# Patient Record
Sex: MALE | NOT HISPANIC OR LATINO
[De-identification: names, ages, dates, MRNs, and addresses within clinical notes are randomized per-mention and may not be internally consistent; named-entity substitution may affect disease eponyms.]

---

## 2019-01-01 ENCOUNTER — EXTERNAL RECORD (OUTPATIENT)
Dept: HEALTH INFORMATION MANAGEMENT | Facility: OTHER | Age: 64
End: 2019-01-01

## 2019-01-30 ENCOUNTER — EXTERNAL RECORD (OUTPATIENT)
Dept: CARDIOLOGY | Age: 64
End: 2019-01-30

## 2019-01-30 ENCOUNTER — EXTERNAL RECORD (OUTPATIENT)
Dept: OTHER | Age: 64
End: 2019-01-30

## 2019-01-30 LAB
A/G RATIO: 1.9 (ref 1.1–2.4)
ALANINE AMINOTRANSFE: 133 U/L (ref 7–52)
ALBUMIN, SERUM (ALB): 4 G/DL (ref 3.5–5.7)
ALCOHOL, ETOH: < 10 MG/DL
ALKALINE PHOSPHATASE (ALK): 46 U/L (ref 34–104)
ALLEN'S TEST PERFORMED: POSITIVE
ALLEN'S TEST PERFORMED: POSITIVE
ANION GAP: 11 MEQ/L (ref 8–16)
ASPARTATE AMINOTRANS: 291 U/L (ref 13–39)
BASE EXCESS: -11.4 MMOL/L
BASE EXCESS: -2.8 MMOL/L
BASE EXCESS: -7.4 MMOL/L
BASE EXCESS: -8.4 MMOL/L
BILIRUBIN, TOTAL: 0.6 MG/DL (ref 0.2–0.8)
BLOOD UREA NITROGEN (BUN): 27 MG/DL (ref 7–25)
BRAIN NATIURETIC PEPTIDE CHF: 510 PG/ML (ref 0–100)
BUN/CREATININE RATIO: 19.6 (ref 7.4–23)
CALCIUM, SERUM: 8.4 MG/DL (ref 8.6–10.3)
CARBON DIOXIDE: 21 MMOL/L (ref 21–31)
CHLORIDE, SERUM: 102 MMOL/L (ref 98–107)
CREATINE KINASE: 2141 U/L (ref 30–223)
CREATININE: 1.38 MG/DL (ref 0.7–1.3)
EST GLOMERULAR FILTRATION RATE: 52 1.73M SQ
FIO2: 100 % (ref 21–100)
GLUCOSE: 301 MG/DL (ref 70–99)
HCO3 ARTERIAL: 17.4 MMOL/L (ref 21–29)
HCO3 ARTERIAL: 17.6 MMOL/L (ref 21–29)
HCO3 ARTERIAL: 20.4 MMOL/L (ref 21–29)
HCO3 ARTERIAL: 21.6 MMOL/L (ref 21–29)
INSPIRATORY PRESSURE: 37 CMH2O
K (POTASSIUM, SERUM): 4.3 MMOL/L (ref 3.5–5.1)
LACTIC ACID: 2.8 MMOL/L (ref 0.5–2)
LACTIC ACID: 5.1 MMOL/L (ref 0.5–2)
MG (MAGNESIUM, SERUM: 1.5 MG/DL (ref 1.6–2.6)
NA (SODIUM, SERUM): 134 MMOL/L (ref 133–144)
O2 APPLIANCE: ABNORMAL
OXYHEMOGLOBIN: 86 % (ref 95–100)
OXYHEMOGLOBIN: 90 % (ref 95–100)
OXYHEMOGLOBIN: 96 % (ref 95–100)
OXYHEMOGLOBIN: 98 % (ref 95–100)
PCO2 ARTERIAL: 36.5 MMHG (ref 32–46)
PCO2 ARTERIAL: 37.1 MMHG (ref 32–46)
PCO2 ARTERIAL: 50.6 MMHG (ref 32–46)
PCO2 ARTERIAL: 51.8 MMHG (ref 32–46)
PEEP: 10 CMH2O
PEEP: 5 CMH2O
PH: 7.15 (ref 7.38–7.46)
PH: 7.22 (ref 7.38–7.46)
PH: 7.29 (ref 7.38–7.46)
PH: 7.39 (ref 7.38–7.46)
PO2 ARTERIAL: 104.4 MMHG (ref 79–108)
PO2 ARTERIAL: 268.8 MMHG (ref 79–108)
PO2 ARTERIAL: 68.5 MMHG (ref 79–108)
PO2 ARTERIAL: 70.1 MMHG (ref 79–108)
PO2/FIO2 RATIO: 104
PO2/FIO2 RATIO: 269
PO2/FIO2 RATIO: 69
PO2/FIO2 RATIO: 70
PROCALCITONIN (NUMERIC): 2.13 NG/ML (ref 0.2–0.5)
PROTEIN TOTAL: 6.1 G/DL (ref 6.4–8.9)
PTT: 78 SECONDS (ref 25–36)
RESPIRATION RATE: 20
SET RESPIRATION RATE: 14
SET RESPIRATION RATE: 16
SET RESPIRATION RATE: 20
SET RESPIRATION RATE: 20
SITE: ABNORMAL
SITE: ABNORMAL
TIDAL VOLUME: 550 ML
TOTAL HEMOGLOBIN ARTERIAL: 13.2 G/DL (ref 14–18)
TOTAL HEMOGLOBIN ARTERIAL: 13.8 G/DL (ref 14–18)
TOTAL HEMOGLOBIN ARTERIAL: 13.9 G/DL (ref 14–18)
TOTAL HEMOGLOBIN ARTERIAL: 14.4 G/DL (ref 14–18)
TROPONIN I (TROP): > 73 NG/ML
VENTILATION: ABNORMAL

## 2019-01-31 ENCOUNTER — EXTERNAL RECORD (OUTPATIENT)
Dept: OTHER | Age: 64
End: 2019-01-31

## 2019-01-31 LAB
*MEAN CORPUSCULAR HGB CONC: 34.4 G/DL (ref 32.5–35.8)
*MEAN CORPUSCULAR HGB CONC: 35.2 G/DL (ref 32.5–35.8)
A/G RATIO: 1.35 (ref 1.1–2.4)
ALANINE AMINOTRANSFE: 126 U/L (ref 7–52)
ALBUMIN, SERUM (ALB): 3.5 G/DL (ref 3.5–5.7)
ALKALINE PHOSPHATASE (ALK): 45 U/L (ref 34–104)
AMPHETAMINES, URINE, QUAL: NEGATIVE
ANION GAP: 10 MEQ/L (ref 8–16)
ANION GAP: 6 MEQ/L (ref 8–16)
ASPARTATE AMINOTRANS: 261 U/L (ref 13–39)
BARBITURATES, URINE, QUAL: NEGATIVE
BASOPHIL AUTOMATED: 0.3 %
BASOPHIL AUTOMATED: 0.5 %
BASOPHILS: 0.1 (ref 0–0.2)
BASOPHILS: 0.1 (ref 0–0.2)
BEDSIDE GLUCOSE: 169 MG/DL (ref 70–110)
BEDSIDE GLUCOSE: 173 MG/DL (ref 70–110)
BEDSIDE GLUCOSE: 174 MG/DL (ref 70–110)
BEDSIDE GLUCOSE: 181 MG/DL (ref 70–110)
BEDSIDE GLUCOSE: 182 MG/DL (ref 70–110)
BEDSIDE GLUCOSE: 184 MG/DL (ref 70–110)
BEDSIDE GLUCOSE: 184 MG/DL (ref 70–110)
BEDSIDE GLUCOSE: 206 MG/DL (ref 70–110)
BEDSIDE GLUCOSE: 238 MG/DL (ref 70–110)
BEDSIDE GLUCOSE: 267 MG/DL (ref 70–110)
BEDSIDE GLUCOSE: 308 MG/DL (ref 70–110)
BEDSIDE GLUCOSE: 322 MG/DL (ref 70–110)
BEDSIDE GLUCOSE: 323 MG/DL (ref 70–110)
BENZODIAZEPINES, URINE, QUAL: POSITIVE
BILIRUBIN, TOTAL: 0.5 MG/DL (ref 0.2–0.8)
BLOOD UREA NITROGEN (BUN): 18 MG/DL (ref 7–25)
BLOOD UREA NITROGEN (BUN): 23 MG/DL (ref 7–25)
BUN/CREATININE RATIO: 13.1 (ref 7.4–23)
BUN/CREATININE RATIO: 15.4 (ref 7.4–23)
CALCIUM, SERUM: 8 MG/DL (ref 8.6–10.3)
CALCIUM, SERUM: 8.4 MG/DL (ref 8.6–10.3)
CANNABINOIDS, URINE, QUAL: NEGATIVE
CARBON DIOXIDE: 24 MMOL/L (ref 21–31)
CARBON DIOXIDE: 28 MMOL/L (ref 21–31)
CHLORIDE, SERUM: 103 MMOL/L (ref 98–107)
CHLORIDE, SERUM: 98 MMOL/L (ref 98–107)
CHOLESTEROL HDL RATIO: 8
CHOLESTEROL: 225 MG/DL
COCAINE, URINE, QUAL: NEGATIVE
CREATINE KINASE: 1350 U/L (ref 30–223)
CREATINE KINASE: 2551 U/L (ref 30–223)
CREATINE KINASE: 2689 U/L (ref 30–223)
CREATININE: 1.37 MG/DL (ref 0.7–1.3)
CREATININE: 1.49 MG/DL (ref 0.7–1.3)
EOSINOPHIL AUTOMATED: 0.1 %
EOSINOPHIL AUTOMATED: 0.4 %
EOSINOPHILS: 0 (ref 0–0.5)
EOSINOPHILS: 0.1 (ref 0–0.5)
EST GLOMERULAR FILTRATION RATE: 48 1.73M SQ
EST GLOMERULAR FILTRATION RATE: 53 1.73M SQ
ESTIMATED AVERAGE GLUCOSE: 174 MG/DL
ESTIMATED AVERAGE GLUCOSE: 177 MG/DL
GLUCOSE: 186 MG/DL (ref 70–99)
GLUCOSE: 191 MG/DL (ref 70–99)
GLUCOSE: 274 MG/DL (ref 70–99)
HDL CHOLESTEROL: 28 MG/DL
HEMATOCRIT: 33.9 % (ref 38.6–49.2)
HEMATOCRIT: 38.2 % (ref 38.6–49.2)
HEMOGLOBIN A1C (GLYCOSYLATED): 7.7 %
HEMOGLOBIN A1C (GLYCOSYLATED): 7.8 %
HEMOGLOBIN: 11.7 GM/DL (ref 13–17)
HEMOGLOBIN: 13.4 GM/DL (ref 13–17)
K (POTASSIUM, SERUM): 3.4 MMOL/L (ref 3.5–5.1)
K (POTASSIUM, SERUM): 3.6 MMOL/L (ref 3.5–5.1)
K (POTASSIUM, SERUM): 3.6 MMOL/L (ref 3.5–5.1)
LACTIC ACID: 1.1 MMOL/L (ref 0.5–2)
LACTIC ACID: 2.6 MMOL/L (ref 0.5–2)
LDL CHOLESTEROL DIRECT: ABNORMAL MG/DL (ref 0–129)
LDL CHOLESTEROL, DIRECT: 136 MG/DL (ref 0–129)
LYMPHOCYTE AUTOMATED: 13.8 %
LYMPHOCYTE AUTOMATED: 18.7 %
LYMPHOCYTES: 2.2 (ref 0.6–3.4)
LYMPHOCYTES: 2.3 (ref 0.6–3.4)
MEAN CORPUSCULAR HGB: 29.9 PG (ref 26–34)
MEAN CORPUSCULAR HGB: 30.9 PG (ref 26–34)
MEAN CORPUSCULAR VOL: 87 FL (ref 80–100)
MEAN CORPUSCULAR VOL: 87.9 FL (ref 80–100)
MEAN PLATELET VOLUME: 10.4 FL (ref 6.8–10.2)
MEAN PLATELET VOLUME: 10.7 FL (ref 6.8–10.2)
MG (MAGNESIUM, SERUM: 1.9 MG/DL (ref 1.6–2.6)
MG (MAGNESIUM, SERUM: 2.2 MG/DL (ref 1.6–2.6)
MONOCYTE AUTOMATED: 8.6 %
MONOCYTE AUTOMATED: 8.9 %
MONOCYTES: 1.1 (ref 0.3–1)
MONOCYTES: 1.4 (ref 0.3–1)
MRSA SCREEN, PCR: NEGATIVE
MRSA SOURCE: NORMAL
NA (SODIUM, SERUM): 132 MMOL/L (ref 133–144)
NA (SODIUM, SERUM): 137 MMOL/L (ref 133–144)
NEUTROPHIL ABSOLUTE: 13 (ref 1.7–7.7)
NEUTROPHIL ABSOLUTE: 8.5 (ref 1.7–7.7)
NEUTROPHIL AUTOMATED: 71.5 %
NEUTROPHIL AUTOMATED: 77.2 %
NON HDL CHOLESTEROL: 197 MG/DL
OPIATES, URINE, QUAL: POSITIVE
PHENCYCLIDINE, URINE, QUAL: NEGATIVE
PLATELET COUNT: 155 K/MM3 (ref 150–450)
PLATELET COUNT: 165 K/MM3 (ref 150–450)
PROTEIN TOTAL: 6.1 G/DL (ref 6.4–8.9)
PTT: 34 SECONDS (ref 25–36)
PTT: 44 SECONDS (ref 25–36)
PTT: 54 SECONDS (ref 25–36)
RED BLOOD CELL COUNT: 3.9 M/MM3 (ref 4.34–5.6)
RED BLOOD CELL COUNT: 4.34 M/MM3 (ref 4.34–5.6)
RED CELL DISTRIBUTIO: 13.1 % (ref 11.9–15.9)
RED CELL DISTRIBUTIO: 13.1 % (ref 11.9–15.9)
TRIGLYCERIDES: 828 MG/DL
TRIGLYCERIDES: 830 MG/DL
TROPONIN I (TROP): 70.22 NG/ML
TROPONIN I (TROP): > 73 NG/ML
VLDL CHOLESTEROL: ABNORMAL MG/DL (ref 5–30)
WHITE BLOOD CELL COU: 11.9 K/MM3 (ref 4–11)
WHITE BLOOD CELL COU: 16.8 K/MM3 (ref 4–11)

## 2019-02-01 LAB
*MEAN CORPUSCULAR HGB CONC: 34.8 G/DL (ref 32.5–35.8)
A/G RATIO: 1.48 (ref 1.1–2.4)
ALANINE AMINOTRANSFE: 78 U/L (ref 7–52)
ALBUMIN, SERUM (ALB): 3.4 G/DL (ref 3.5–5.7)
ALKALINE PHOSPHATASE (ALK): 40 U/L (ref 34–104)
ANION GAP: 7 MEQ/L (ref 8–16)
ASPARTATE AMINOTRANS: 126 U/L (ref 13–39)
BASOPHIL AUTOMATED: 0.6 %
BASOPHILS: 0.1 (ref 0–0.2)
BEDSIDE GLUCOSE: 112 MG/DL (ref 70–110)
BEDSIDE GLUCOSE: 130 MG/DL (ref 70–110)
BEDSIDE GLUCOSE: 138 MG/DL (ref 70–110)
BEDSIDE GLUCOSE: 141 MG/DL (ref 70–110)
BEDSIDE GLUCOSE: 142 MG/DL (ref 70–110)
BEDSIDE GLUCOSE: 143 MG/DL (ref 70–110)
BEDSIDE GLUCOSE: 147 MG/DL (ref 70–110)
BEDSIDE GLUCOSE: 150 MG/DL (ref 70–110)
BEDSIDE GLUCOSE: 151 MG/DL (ref 70–110)
BEDSIDE GLUCOSE: 157 MG/DL (ref 70–110)
BEDSIDE GLUCOSE: 157 MG/DL (ref 70–110)
BEDSIDE GLUCOSE: 161 MG/DL (ref 70–110)
BEDSIDE GLUCOSE: 162 MG/DL (ref 70–110)
BEDSIDE GLUCOSE: 165 MG/DL (ref 70–110)
BEDSIDE GLUCOSE: 166 MG/DL (ref 70–110)
BEDSIDE GLUCOSE: 168 MG/DL (ref 70–110)
BEDSIDE GLUCOSE: 182 MG/DL (ref 70–110)
BEDSIDE GLUCOSE: 182 MG/DL (ref 70–110)
BEDSIDE GLUCOSE: 186 MG/DL (ref 70–110)
BEDSIDE GLUCOSE: 190 MG/DL (ref 70–110)
BILIRUBIN, TOTAL: 0.6 MG/DL (ref 0.2–0.8)
BLOOD UREA NITROGEN (BUN): 13 MG/DL (ref 7–25)
BUN/CREATININE RATIO: 11 (ref 7.4–23)
CALCIUM, SERUM: 7.7 MG/DL (ref 8.6–10.3)
CARBON DIOXIDE: 29 MMOL/L (ref 21–31)
CHLORIDE, SERUM: 100 MMOL/L (ref 98–107)
CREATININE: 1.18 MG/DL (ref 0.7–1.3)
EOSINOPHIL AUTOMATED: 2.1 %
EOSINOPHILS: 0.2 (ref 0–0.5)
EST GLOMERULAR FILTRATION RATE: > 60 1.73M SQ
GLUCOSE: 137 MG/DL (ref 70–99)
HEMATOCRIT: 31.4 % (ref 38.6–49.2)
HEMOGLOBIN: 10.9 GM/DL (ref 13–17)
INTERNATIONAL NORMAL: 1.3 (ref 0.8–1.1)
K (POTASSIUM, SERUM): 2.8 MMOL/L (ref 3.5–5.1)
K (POTASSIUM, SERUM): 3.3 MMOL/L (ref 3.5–5.1)
K (POTASSIUM, SERUM): 3.5 MMOL/L (ref 3.5–5.1)
LYMPHOCYTE AUTOMATED: 21.8 %
LYMPHOCYTES: 2 (ref 0.6–3.4)
MEAN CORPUSCULAR HGB: 30.2 PG (ref 26–34)
MEAN CORPUSCULAR VOL: 86.7 FL (ref 80–100)
MEAN PLATELET VOLUME: 9.5 FL (ref 6.8–10.2)
MG (MAGNESIUM, SERUM: 1.9 MG/DL (ref 1.6–2.6)
MG (MAGNESIUM, SERUM: 2.1 MG/DL (ref 1.6–2.6)
MONOCYTE AUTOMATED: 8 %
MONOCYTES: 0.7 (ref 0.3–1)
NA (SODIUM, SERUM): 136 MMOL/L (ref 133–144)
NEUTROPHIL ABSOLUTE: 6.1 (ref 1.7–7.7)
NEUTROPHIL AUTOMATED: 67.5 %
PLATELET COUNT: 132 K/MM3 (ref 150–450)
PROCALCITONIN (NUMERIC): 2.21 NG/ML (ref 0.2–0.5)
PROTEIN TOTAL: 5.7 G/DL (ref 6.4–8.9)
PROTHROMBIN TIME (PATIENT): 14.4 SECONDS (ref 10.1–13.1)
PTT: 41 SECONDS (ref 25–36)
PTT: 44 SECONDS (ref 25–36)
PTT: 55 SECONDS (ref 25–36)
RED BLOOD CELL COUNT: 3.63 M/MM3 (ref 4.34–5.6)
RED CELL DISTRIBUTIO: 13.3 % (ref 11.9–15.9)
TROPONIN I (TROP): 46.82 NG/ML
TROPONIN I (TROP): 48.4 NG/ML
URINE CULTURE: NORMAL
WHITE BLOOD CELL COU: 9.1 K/MM3 (ref 4–11)

## 2019-02-02 ENCOUNTER — EXTERNAL RECORD (OUTPATIENT)
Dept: NEPHROLOGY | Age: 64
End: 2019-02-02

## 2019-02-02 LAB
*MEAN CORPUSCULAR HGB CONC: 34.5 G/DL (ref 32.5–35.8)
A/G RATIO: 1.37 (ref 1.1–2.4)
ALANINE AMINOTRANSFE: 56 U/L (ref 7–52)
ALBUMIN, SERUM (ALB): 3.7 G/DL (ref 3.5–5.7)
ALKALINE PHOSPHATASE (ALK): 39 U/L (ref 34–104)
ANION GAP: 9 MEQ/L (ref 8–16)
APPEARANCE: CLEAR
ASPARTATE AMINOTRANS: 64 U/L (ref 13–39)
BACTERIA: ABNORMAL /HPF
BASOPHIL AUTOMATED: 0.4 %
BASOPHILS: 0 (ref 0–0.2)
BEDSIDE GLUCOSE: 107 MG/DL (ref 70–110)
BEDSIDE GLUCOSE: 121 MG/DL (ref 70–110)
BEDSIDE GLUCOSE: 126 MG/DL (ref 70–110)
BEDSIDE GLUCOSE: 127 MG/DL (ref 70–110)
BEDSIDE GLUCOSE: 128 MG/DL (ref 70–110)
BEDSIDE GLUCOSE: 129 MG/DL (ref 70–110)
BEDSIDE GLUCOSE: 129 MG/DL (ref 70–110)
BEDSIDE GLUCOSE: 132 MG/DL (ref 70–110)
BEDSIDE GLUCOSE: 135 MG/DL (ref 70–110)
BEDSIDE GLUCOSE: 136 MG/DL (ref 70–110)
BEDSIDE GLUCOSE: 142 MG/DL (ref 70–110)
BEDSIDE GLUCOSE: 143 MG/DL (ref 70–110)
BEDSIDE GLUCOSE: 145 MG/DL (ref 70–110)
BEDSIDE GLUCOSE: 155 MG/DL (ref 70–110)
BEDSIDE GLUCOSE: 163 MG/DL (ref 70–110)
BEDSIDE GLUCOSE: 168 MG/DL (ref 70–110)
BEDSIDE GLUCOSE: 196 MG/DL (ref 70–110)
BEDSIDE GLUCOSE: 224 MG/DL (ref 70–110)
BEDSIDE GLUCOSE: 90 MG/DL (ref 70–110)
BEDSIDE GLUCOSE: 91 MG/DL (ref 70–110)
BEDSIDE GLUCOSE: 93 MG/DL (ref 70–110)
BILIRUBIN, TOTAL: 0.7 MG/DL (ref 0.2–0.8)
BILIRUBIN: ABNORMAL
BLOOD UREA NITROGEN (BUN): 12 MG/DL (ref 7–25)
BUN/CREATININE RATIO: 10 (ref 7.4–23)
CALCIUM, SERUM: 8.5 MG/DL (ref 8.6–10.3)
CARBON DIOXIDE: 32 MMOL/L (ref 21–31)
CHLORIDE, SERUM: 99 MMOL/L (ref 98–107)
COLOR: ABNORMAL
CREATININE: 1.2 MG/DL (ref 0.7–1.3)
CULTURE REFLEX COMMENT: NO
EOSINOPHIL AUTOMATED: 2.4 %
EOSINOPHILS: 0.2 (ref 0–0.5)
EST GLOMERULAR FILTRATION RATE: > 60 1.73M SQ
GLUCOSE, URINE, AUTOMATED: ABNORMAL MG/DL
GLUCOSE: 110 MG/DL (ref 70–99)
HEMATOCRIT: 30.8 % (ref 38.6–49.2)
HEMOGLOBIN: 10.6 GM/DL (ref 13–17)
K (POTASSIUM, SERUM): 3.2 MMOL/L (ref 3.5–5.1)
K (POTASSIUM, SERUM): 3.4 MMOL/L (ref 3.5–5.1)
K (POTASSIUM, SERUM): 3.5 MMOL/L (ref 3.5–5.1)
KETONES, URINE, AUTOMATED: ABNORMAL MG/DL
LEUKOCYTE, URINE, AUTOMATED: ABNORMAL
LYMPHOCYTE AUTOMATED: 22.1 %
LYMPHOCYTES: 1.6 (ref 0.6–3.4)
MEAN CORPUSCULAR HGB: 29.8 PG (ref 26–34)
MEAN CORPUSCULAR VOL: 86.4 FL (ref 80–100)
MEAN PLATELET VOLUME: 9.2 FL (ref 6.8–10.2)
MG (MAGNESIUM, SERUM: 2.1 MG/DL (ref 1.6–2.6)
MONOCYTE AUTOMATED: 8.6 %
MONOCYTES: 0.6 (ref 0.3–1)
NA (SODIUM, SERUM): 140 MMOL/L (ref 133–144)
NEUTROPHIL ABSOLUTE: 4.8 (ref 1.7–7.7)
NEUTROPHIL AUTOMATED: 66.5 %
NITRITE, URINE AUTOMATED: NEGATIVE
PH, URINE: 8 (ref 5–8)
PLATELET COUNT: 123 K/MM3 (ref 150–450)
PROTEIN TOTAL: 6.4 G/DL (ref 6.4–8.9)
PROTEIN, URINE: ABNORMAL MG/DL
PTT: 81 SECONDS (ref 25–36)
PTT: 95 SECONDS (ref 25–36)
RBC: ABNORMAL /HPF (ref 0–2)
RED BLOOD CELL COUNT: 3.57 M/MM3 (ref 4.34–5.6)
RED CELL DISTRIBUTIO: 13 % (ref 11.9–15.9)
SPECIFIC GRAVITY UA: 1 (ref 1–1.03)
SQUAMOUS EPITHELIAL: ABNORMAL /LPF
TRIGLYCERIDES: 223 MG/DL
URINE, BLOOD, AUTOMATED: ABNORMAL
UROBILINOGEN, URINE, AUTOMATED: ABNORMAL MG/DL
WBC: ABNORMAL /HPF (ref 0–5)
WHITE BLOOD CELL COU: 7.2 K/MM3 (ref 4–11)

## 2019-02-03 LAB
% SATURATION: 13 % (ref 20–55)
*MEAN CORPUSCULAR HGB CONC: 33.9 G/DL (ref 32.5–35.8)
A/G RATIO: 1.32 (ref 1.1–2.4)
ALANINE AMINOTRANSFE: 41 U/L (ref 7–52)
ALBUMIN, SERUM (ALB): 3.7 G/DL (ref 3.5–5.7)
ALKALINE PHOSPHATASE (ALK): 36 U/L (ref 34–104)
ANION GAP: 11 MEQ/L (ref 8–16)
ANION GAP: 9 MEQ/L (ref 8–16)
ASPARTATE AMINOTRANS: 42 U/L (ref 13–39)
BASOPHIL AUTOMATED: 0.5 %
BASOPHILS: 0 (ref 0–0.2)
BEDSIDE GLUCOSE: 127 MG/DL (ref 70–110)
BEDSIDE GLUCOSE: 130 MG/DL (ref 70–110)
BEDSIDE GLUCOSE: 130 MG/DL (ref 70–110)
BEDSIDE GLUCOSE: 143 MG/DL (ref 70–110)
BEDSIDE GLUCOSE: 148 MG/DL (ref 70–110)
BEDSIDE GLUCOSE: 149 MG/DL (ref 70–110)
BEDSIDE GLUCOSE: 153 MG/DL (ref 70–110)
BEDSIDE GLUCOSE: 164 MG/DL (ref 70–110)
BEDSIDE GLUCOSE: 218 MG/DL (ref 70–110)
BILIRUBIN, TOTAL: 0.6 MG/DL (ref 0.2–0.8)
BLOOD UREA NITROGEN (BUN): 20 MG/DL (ref 7–25)
BLOOD UREA NITROGEN (BUN): 22 MG/DL (ref 7–25)
BRAIN NATIURETIC PEPTIDE CHF: 269 PG/ML (ref 0–100)
BUN/CREATININE RATIO: 18.2 (ref 7.4–23)
BUN/CREATININE RATIO: 20 (ref 7.4–23)
CALCIUM, SERUM: 8.9 MG/DL (ref 8.6–10.3)
CALCIUM, SERUM: 9.2 MG/DL (ref 8.6–10.3)
CARBON DIOXIDE: 23 MMOL/L (ref 21–31)
CARBON DIOXIDE: 27 MMOL/L (ref 21–31)
CEFTAROLINE FOSAMIL: <=0.5
CHLORIDE, SERUM: 100 MMOL/L (ref 98–107)
CHLORIDE, SERUM: 99 MMOL/L (ref 98–107)
CLINDAMYCIN: 0.5
CREATININE: 1.1 MG/DL (ref 0.7–1.3)
CREATININE: 1.1 MG/DL (ref 0.7–1.3)
EOSINOPHIL AUTOMATED: 3.7 %
EOSINOPHILS: 0.2 (ref 0–0.5)
EST GLOMERULAR FILTRATION RATE: > 60 1.73M SQ
EST GLOMERULAR FILTRATION RATE: > 60 1.73M SQ
GLUCOSE: 140 MG/DL (ref 70–99)
GLUCOSE: 164 MG/DL (ref 70–99)
HEMATOCRIT: 30.3 % (ref 38.6–49.2)
HEMOGLOBIN: 10.3 GM/DL (ref 13–17)
IRON BINDING CAPACIT: 231 UG/DL (ref 228–428)
IRON, SERUM (IRON): 30 UG/DL (ref 50–212)
K (POTASSIUM, SERUM): 3.2 MMOL/L (ref 3.5–5.1)
K (POTASSIUM, SERUM): 3.7 MMOL/L (ref 3.5–5.1)
LYMPHOCYTE AUTOMATED: 20.5 %
LYMPHOCYTES: 1.4 (ref 0.6–3.4)
MEAN CORPUSCULAR HGB: 29.7 PG (ref 26–34)
MEAN CORPUSCULAR VOL: 87.6 FL (ref 80–100)
MEAN PLATELET VOLUME: 9 FL (ref 6.8–10.2)
MG (MAGNESIUM, SERUM: 2 MG/DL (ref 1.6–2.6)
MG (MAGNESIUM, SERUM: 2.3 MG/DL (ref 1.6–2.6)
MONOCYTE AUTOMATED: 9.1 %
MONOCYTES: 0.6 (ref 0.3–1)
NA (SODIUM, SERUM): 133 MMOL/L (ref 133–144)
NA (SODIUM, SERUM): 136 MMOL/L (ref 133–144)
NEUTROPHIL ABSOLUTE: 4.5 (ref 1.7–7.7)
NEUTROPHIL AUTOMATED: 66.2 %
OXACILLIN: <=0.25
PLATELET COUNT: 135 K/MM3 (ref 150–450)
PROTEIN TOTAL: 6.5 G/DL (ref 6.4–8.9)
PTT: 43 SECONDS (ref 25–36)
PTT: 45 SECONDS (ref 25–36)
PTT: 51 SECONDS (ref 25–36)
RED BLOOD CELL COUNT: 3.46 M/MM3 (ref 4.34–5.6)
RED CELL DISTRIBUTIO: 13 % (ref 11.9–15.9)
RIFAMPIN: <=1
SPUTUM CULTURE: NORMAL
SPUTUM CULTURE: NORMAL
TRIGLYCERIDES: 221 MG/DL
TRIMETHOPRIM/SULFAMETHOXAZOLE: ABNORMAL
UNSATURATED IRON BINDING CAP: 201 UG/DL (ref 155–300)
VANCOMYCIN SERPL-MCNC: 1 UG/ML
WHITE BLOOD CELL COU: 6.7 K/MM3 (ref 4–11)

## 2019-02-04 LAB
*MEAN CORPUSCULAR HGB CONC: 34.1 G/DL (ref 32.5–35.8)
A/G RATIO: 1.28 (ref 1.1–2.4)
ALANINE AMINOTRANSFE: 36 U/L (ref 7–52)
ALBUMIN, SERUM (ALB): 3.7 G/DL (ref 3.5–5.7)
ALKALINE PHOSPHATASE (ALK): 38 U/L (ref 34–104)
ANION GAP: 10 MEQ/L (ref 8–16)
ASPARTATE AMINOTRANS: 28 U/L (ref 13–39)
BEDSIDE GLUCOSE: 176 MG/DL (ref 70–110)
BEDSIDE GLUCOSE: 185 MG/DL (ref 70–110)
BEDSIDE GLUCOSE: 197 MG/DL (ref 70–110)
BILIRUBIN, TOTAL: 0.5 MG/DL (ref 0.2–0.8)
BLOOD CULTURE: NORMAL
BLOOD UREA NITROGEN (BUN): 25 MG/DL (ref 7–25)
BUN/CREATININE RATIO: 21 (ref 7.4–23)
CALCIUM, SERUM: 9.1 MG/DL (ref 8.6–10.3)
CARBON DIOXIDE: 23 MMOL/L (ref 21–31)
CHLORIDE, SERUM: 98 MMOL/L (ref 98–107)
CREATININE: 1.19 MG/DL (ref 0.7–1.3)
EST GLOMERULAR FILTRATION RATE: > 60 1.73M SQ
GLUCOSE: 205 MG/DL (ref 70–99)
HEMATOCRIT: 30.8 % (ref 38.6–49.2)
HEMOGLOBIN: 10.5 GM/DL (ref 13–17)
INTERNATIONAL NORMAL: 1.1 (ref 0.8–1.1)
K (POTASSIUM, SERUM): 3.3 MMOL/L (ref 3.5–5.1)
MEAN CORPUSCULAR HGB: 29.6 PG (ref 26–34)
MEAN CORPUSCULAR VOL: 86.9 FL (ref 80–100)
MEAN PLATELET VOLUME: 9.3 FL (ref 6.8–10.2)
MG (MAGNESIUM, SERUM: 2.2 MG/DL (ref 1.6–2.6)
NA (SODIUM, SERUM): 131 MMOL/L (ref 133–144)
PLATELET COUNT: 155 K/MM3 (ref 150–450)
PROTEIN TOTAL: 6.6 G/DL (ref 6.4–8.9)
PROTHROMBIN TIME (PATIENT): 13.1 SECONDS (ref 10.1–13.1)
PTT: 42 SECONDS (ref 25–36)
PTT: 58 SECONDS (ref 25–36)
PTT: 66 SECONDS (ref 25–36)
RED BLOOD CELL COUNT: 3.55 M/MM3 (ref 4.34–5.6)
RED CELL DISTRIBUTIO: 12.8 % (ref 11.9–15.9)
WHITE BLOOD CELL COU: 6.2 K/MM3 (ref 4–11)

## 2019-02-05 LAB
*MEAN CORPUSCULAR HGB CONC: 33.5 G/DL (ref 32.5–35.8)
A/G RATIO: 1.24 (ref 1.1–2.4)
ALANINE AMINOTRANSFE: 44 U/L (ref 7–52)
ALBUMIN, SERUM (ALB): 4.2 G/DL (ref 3.5–5.7)
ALKALINE PHOSPHATASE (ALK): 48 U/L (ref 34–104)
ANION GAP: 13 MEQ/L (ref 8–16)
ASPARTATE AMINOTRANS: 35 U/L (ref 13–39)
BASOPHIL AUTOMATED: 0.6 %
BASOPHILS: 0.1 (ref 0–0.2)
BEDSIDE GLUCOSE: 159 MG/DL (ref 70–110)
BEDSIDE GLUCOSE: 172 MG/DL (ref 70–110)
BEDSIDE GLUCOSE: 210 MG/DL (ref 70–110)
BILIRUBIN, TOTAL: 0.5 MG/DL (ref 0.2–0.8)
BLOOD CULTURE: NORMAL
BLOOD UREA NITROGEN (BUN): 28 MG/DL (ref 7–25)
BUN/CREATININE RATIO: 22.2 (ref 7.4–23)
CALCIUM, SERUM: 9.7 MG/DL (ref 8.6–10.3)
CARBON DIOXIDE: 22 MMOL/L (ref 21–31)
CHLORIDE, SERUM: 105 MMOL/L (ref 98–107)
CREATININE: 1.26 MG/DL (ref 0.7–1.3)
EOSINOPHIL AUTOMATED: 4.6 %
EOSINOPHILS: 0.4 (ref 0–0.5)
EST GLOMERULAR FILTRATION RATE: 58 1.73M SQ
GLUCOSE: 205 MG/DL (ref 70–99)
HEMATOCRIT: 34.6 % (ref 38.6–49.2)
HEMOGLOBIN: 11.6 GM/DL (ref 13–17)
K (POTASSIUM, SERUM): 3.6 MMOL/L (ref 3.5–5.1)
LYMPHOCYTE AUTOMATED: 17.6 %
LYMPHOCYTES: 1.6 (ref 0.6–3.4)
MEAN CORPUSCULAR HGB: 29.3 PG (ref 26–34)
MEAN CORPUSCULAR VOL: 87.4 FL (ref 80–100)
MEAN PLATELET VOLUME: 9.3 FL (ref 6.8–10.2)
MONOCYTE AUTOMATED: 10.5 %
MONOCYTES: 0.9 (ref 0.3–1)
NA (SODIUM, SERUM): 140 MMOL/L (ref 133–144)
NEUTROPHIL ABSOLUTE: 5.9 (ref 1.7–7.7)
NEUTROPHIL AUTOMATED: 66.7 %
PLATELET COUNT: 223 K/MM3 (ref 150–450)
PROTEIN TOTAL: 7.6 G/DL (ref 6.4–8.9)
PTT: 61 SECONDS (ref 25–36)
RED BLOOD CELL COUNT: 3.96 M/MM3 (ref 4.34–5.6)
RED CELL DISTRIBUTIO: 12.8 % (ref 11.9–15.9)
WHITE BLOOD CELL COU: 8.9 K/MM3 (ref 4–11)

## 2019-02-06 LAB
*MEAN CORPUSCULAR HGB CONC: 34.1 G/DL (ref 32.5–35.8)
ANION GAP: 12 MEQ/L (ref 8–16)
APPEARANCE: ABNORMAL
BACTERIA: ABNORMAL /HPF
BASOPHIL AUTOMATED: 0.5 %
BASOPHILS: 0 (ref 0–0.2)
BEDSIDE GLUCOSE: 146 MG/DL (ref 70–110)
BEDSIDE GLUCOSE: 157 MG/DL (ref 70–110)
BEDSIDE GLUCOSE: 174 MG/DL (ref 70–110)
BEDSIDE GLUCOSE: 190 MG/DL (ref 70–110)
BILIRUBIN: ABNORMAL
BLOOD UREA NITROGEN (BUN): 30 MG/DL (ref 7–25)
BUN/CREATININE RATIO: 23.8 (ref 7.4–23)
CALCIUM, SERUM: 8.9 MG/DL (ref 8.6–10.3)
CARBON DIOXIDE: 22 MMOL/L (ref 21–31)
CHLORIDE, SERUM: 103 MMOL/L (ref 98–107)
COLOR: ABNORMAL
CREATININE: 1.26 MG/DL (ref 0.7–1.3)
EOSINOPHIL AUTOMATED: 4.5 %
EOSINOPHILS: 0.4 (ref 0–0.5)
EST GLOMERULAR FILTRATION RATE: 58 1.73M SQ
GLUCOSE, URINE, AUTOMATED: ABNORMAL MG/DL
GLUCOSE: 198 MG/DL (ref 70–99)
HEMATOCRIT: 30.9 % (ref 38.6–49.2)
HEMOGLOBIN: 10.5 GM/DL (ref 13–17)
K (POTASSIUM, SERUM): 3.2 MMOL/L (ref 3.5–5.1)
KETONES, URINE, AUTOMATED: ABNORMAL MG/DL
LEUKOCYTE, URINE, AUTOMATED: ABNORMAL
LYMPHOCYTE AUTOMATED: 23.1 %
LYMPHOCYTES: 1.9 (ref 0.6–3.4)
MEAN CORPUSCULAR HGB: 29.8 PG (ref 26–34)
MEAN CORPUSCULAR VOL: 87.5 FL (ref 80–100)
MEAN PLATELET VOLUME: 9.1 FL (ref 6.8–10.2)
MONOCYTE AUTOMATED: 11.9 %
MONOCYTES: 1 (ref 0.3–1)
NA (SODIUM, SERUM): 137 MMOL/L (ref 133–144)
NEUTROPHIL ABSOLUTE: 4.9 (ref 1.7–7.7)
NEUTROPHIL AUTOMATED: 60 %
NITRITE, URINE AUTOMATED: NEGATIVE
PH, URINE: 6 (ref 5–8)
PLATELET COUNT: 162 K/MM3 (ref 150–450)
PROTEIN, URINE: ABNORMAL MG/DL
PTT: 58 SECONDS (ref 25–36)
PTT: 80 SECONDS (ref 25–36)
RBC: >100 /HPF (ref 0–2)
RED BLOOD CELL COUNT: 3.53 M/MM3 (ref 4.34–5.6)
RED CELL DISTRIBUTIO: 12.9 % (ref 11.9–15.9)
SPECIFIC GRAVITY UA: 1.01 (ref 1–1.03)
SQUAMOUS EPITHELIAL: ABNORMAL /LPF
URINE, BLOOD, AUTOMATED: ABNORMAL
UROBILINOGEN, URINE, AUTOMATED: ABNORMAL MG/DL
WBC: ABNORMAL /HPF (ref 0–5)
WHITE BLOOD CELL COU: 8.2 K/MM3 (ref 4–11)

## 2019-02-07 LAB
*MEAN CORPUSCULAR HGB CONC: 34.6 G/DL (ref 32.5–35.8)
ANION GAP: 12 MEQ/L (ref 8–16)
BASOPHIL AUTOMATED: 0.7 %
BASOPHILS: 0.1 (ref 0–0.2)
BEDSIDE GLUCOSE: 157 MG/DL (ref 70–110)
BEDSIDE GLUCOSE: 163 MG/DL (ref 70–110)
BEDSIDE GLUCOSE: 189 MG/DL (ref 70–110)
BLOOD UREA NITROGEN (BUN): 29 MG/DL (ref 7–25)
BUN/CREATININE RATIO: 23.2 (ref 7.4–23)
CALCIUM, SERUM: 9.1 MG/DL (ref 8.6–10.3)
CARBON DIOXIDE: 22 MMOL/L (ref 21–31)
CHLORIDE, SERUM: 104 MMOL/L (ref 98–107)
CREATININE: 1.25 MG/DL (ref 0.7–1.3)
EOSINOPHIL AUTOMATED: 3.7 %
EOSINOPHILS: 0.4 (ref 0–0.5)
EST GLOMERULAR FILTRATION RATE: 59 1.73M SQ
GLUCOSE: 150 MG/DL (ref 70–99)
HEMATOCRIT: 34.1 % (ref 38.6–49.2)
HEMOGLOBIN: 11.8 GM/DL (ref 13–17)
K (POTASSIUM, SERUM): 3.3 MMOL/L (ref 3.5–5.1)
LYMPHOCYTE AUTOMATED: 22 %
LYMPHOCYTES: 2.1 (ref 0.6–3.4)
MEAN CORPUSCULAR HGB: 29.9 PG (ref 26–34)
MEAN CORPUSCULAR VOL: 86.4 FL (ref 80–100)
MEAN PLATELET VOLUME: 10 FL (ref 6.8–10.2)
MONOCYTE AUTOMATED: 10.5 %
MONOCYTES: 1 (ref 0.3–1)
NA (SODIUM, SERUM): 138 MMOL/L (ref 133–144)
NEUTROPHIL ABSOLUTE: 6.1 (ref 1.7–7.7)
NEUTROPHIL AUTOMATED: 63.1 %
PLATELET COUNT: 163 K/MM3 (ref 150–450)
PTT: 119 SECONDS (ref 25–36)
PTT: 36 SECONDS (ref 25–36)
PTT: 64 SECONDS (ref 25–36)
RED BLOOD CELL COUNT: 3.95 M/MM3 (ref 4.34–5.6)
RED CELL DISTRIBUTIO: 12.8 % (ref 11.9–15.9)
WHITE BLOOD CELL COU: 9.6 K/MM3 (ref 4–11)

## 2019-02-08 ENCOUNTER — EXTERNAL RECORD (OUTPATIENT)
Dept: UROLOGY | Age: 64
End: 2019-02-08

## 2019-02-08 LAB
*MEAN CORPUSCULAR HGB CONC: 34.5 G/DL (ref 32.5–35.8)
ANION GAP: 13 MEQ/L (ref 8–16)
BASOPHIL AUTOMATED: 0.8 %
BASOPHILS: 0.1 (ref 0–0.2)
BEDSIDE GLUCOSE: 106 MG/DL (ref 70–110)
BEDSIDE GLUCOSE: 143 MG/DL (ref 70–110)
BEDSIDE GLUCOSE: 192 MG/DL (ref 70–110)
BLOOD UREA NITROGEN (BUN): 25 MG/DL (ref 7–25)
BUN/CREATININE RATIO: 21.7 (ref 7.4–23)
CALCIUM, SERUM: 8.9 MG/DL (ref 8.6–10.3)
CARBON DIOXIDE: 20 MMOL/L (ref 21–31)
CHLORIDE, SERUM: 106 MMOL/L (ref 98–107)
CREATININE: 1.15 MG/DL (ref 0.7–1.3)
EOSINOPHIL AUTOMATED: 3.5 %
EOSINOPHILS: 0.3 (ref 0–0.5)
EST GLOMERULAR FILTRATION RATE: > 60 1.73M SQ
GLUCOSE: 166 MG/DL (ref 70–99)
HEMATOCRIT: 33.2 % (ref 38.6–49.2)
HEMATOCRIT: 34 % (ref 38.6–49.2)
HEMATOCRIT: 34.4 % (ref 38.6–49.2)
HEMATOCRIT: 34.5 % (ref 38.6–49.2)
HEMATOCRIT: 34.8 % (ref 38.6–49.2)
HEMOGLOBIN: 11.4 GM/DL (ref 13–17)
HEMOGLOBIN: 11.6 GM/DL (ref 13–17)
HEMOGLOBIN: 11.6 GM/DL (ref 13–17)
HEMOGLOBIN: 11.9 GM/DL (ref 13–17)
HEMOGLOBIN: 12.1 GM/DL (ref 13–17)
K (POTASSIUM, SERUM): 3.4 MMOL/L (ref 3.5–5.1)
LYMPHOCYTE AUTOMATED: 24.2 %
LYMPHOCYTES: 2.3 (ref 0.6–3.4)
MEAN CORPUSCULAR HGB: 30.1 PG (ref 26–34)
MEAN CORPUSCULAR VOL: 87.3 FL (ref 80–100)
MEAN PLATELET VOLUME: 9.6 FL (ref 6.8–10.2)
MONOCYTE AUTOMATED: 11.6 %
MONOCYTES: 1.1 (ref 0.3–1)
NA (SODIUM, SERUM): 139 MMOL/L (ref 133–144)
NEUTROPHIL ABSOLUTE: 5.8 (ref 1.7–7.7)
NEUTROPHIL AUTOMATED: 59.9 %
PLATELET COUNT: 122 K/MM3 (ref 150–450)
PTT: 31 SECONDS (ref 25–36)
PTT: 57 SECONDS (ref 25–36)
PTT: 72 SECONDS (ref 25–36)
RED BLOOD CELL COUNT: 3.94 M/MM3 (ref 4.34–5.6)
RED CELL DISTRIBUTIO: 13.1 % (ref 11.9–15.9)
WHITE BLOOD CELL COU: 9.7 K/MM3 (ref 4–11)

## 2019-02-09 LAB
ANION GAP: 10 MEQ/L (ref 8–16)
BEDSIDE GLUCOSE: 159 MG/DL (ref 70–110)
BEDSIDE GLUCOSE: 177 MG/DL (ref 70–110)
BEDSIDE GLUCOSE: 194 MG/DL (ref 70–110)
BEDSIDE GLUCOSE: 196 MG/DL (ref 70–110)
BLOOD UREA NITROGEN (BUN): 25 MG/DL (ref 7–25)
BUN/CREATININE RATIO: 22.7 (ref 7.4–23)
CALCIUM, SERUM: 9.1 MG/DL (ref 8.6–10.3)
CARBON DIOXIDE: 24 MMOL/L (ref 21–31)
CHLORIDE, SERUM: 104 MMOL/L (ref 98–107)
CREATININE: 1.1 MG/DL (ref 0.7–1.3)
EST GLOMERULAR FILTRATION RATE: > 60 1.73M SQ
GLUCOSE: 174 MG/DL (ref 70–99)
HEMATOCRIT: 31.9 % (ref 38.6–49.2)
HEMATOCRIT: 33.4 % (ref 38.6–49.2)
HEMOGLOBIN: 10.9 GM/DL (ref 13–17)
HEMOGLOBIN: 11.4 GM/DL (ref 13–17)
K (POTASSIUM, SERUM): 3.7 MMOL/L (ref 3.5–5.1)
MG (MAGNESIUM, SERUM: 2 MG/DL (ref 1.6–2.6)
NA (SODIUM, SERUM): 138 MMOL/L (ref 133–144)
PTT: 31 SECONDS (ref 25–36)
PTT: 33 SECONDS (ref 25–36)

## 2019-02-10 LAB
*MEAN CORPUSCULAR HGB CONC: 33.5 G/DL (ref 32.5–35.8)
*MEAN CORPUSCULAR HGB CONC: 34.6 G/DL (ref 32.5–35.8)
A/G RATIO: 1.68 (ref 1.1–2.4)
ALANINE AMINOTRANSFE: 29 U/L (ref 7–52)
ALBUMIN, SERUM (ALB): 3.7 G/DL (ref 3.5–5.7)
ALKALINE PHOSPHATASE (ALK): 46 U/L (ref 34–104)
ANION GAP: 13 MEQ/L (ref 8–16)
ASPARTATE AMINOTRANS: 21 U/L (ref 13–39)
BASOPHIL AUTOMATED: 0.2 %
BASOPHIL AUTOMATED: 0.5 %
BASOPHILS: 0 (ref 0–0.2)
BASOPHILS: 0.1 (ref 0–0.2)
BEDSIDE GLUCOSE: 142 MG/DL (ref 70–110)
BEDSIDE GLUCOSE: 158 MG/DL (ref 70–110)
BEDSIDE GLUCOSE: 175 MG/DL (ref 70–110)
BEDSIDE GLUCOSE: 200 MG/DL (ref 70–110)
BILIRUBIN, TOTAL: 0.5 MG/DL (ref 0.2–0.8)
BLOOD UREA NITROGEN (BUN): 37 MG/DL (ref 7–25)
BUN/CREATININE RATIO: 22 (ref 7.4–23)
CALCIUM, SERUM: 8.6 MG/DL (ref 8.6–10.3)
CARBON DIOXIDE: 21 MMOL/L (ref 21–31)
CHLORIDE, SERUM: 101 MMOL/L (ref 98–107)
CREATININE: 1.68 MG/DL (ref 0.7–1.3)
EOSINOPHIL AUTOMATED: 3.2 %
EOSINOPHIL AUTOMATED: 3.6 %
EOSINOPHILS: 0.3 (ref 0–0.5)
EOSINOPHILS: 0.4 (ref 0–0.5)
EST GLOMERULAR FILTRATION RATE: 42 1.73M SQ
GLUCOSE: 197 MG/DL (ref 70–99)
HEMATOCRIT: 26.6 % (ref 38.6–49.2)
HEMATOCRIT: 26.7 % (ref 38.6–49.2)
HEMOGLOBIN: 9 GM/DL (ref 13–17)
HEMOGLOBIN: 9.2 GM/DL (ref 13–17)
K (POTASSIUM, SERUM): 3.4 MMOL/L (ref 3.5–5.1)
LYMPHOCYTE AUTOMATED: 18.9 %
LYMPHOCYTE AUTOMATED: 22 %
LYMPHOCYTES: 1.9 (ref 0.6–3.4)
LYMPHOCYTES: 2.2 (ref 0.6–3.4)
MEAN CORPUSCULAR HGB: 29.5 PG (ref 26–34)
MEAN CORPUSCULAR HGB: 30 PG (ref 26–34)
MEAN CORPUSCULAR VOL: 86.9 FL (ref 80–100)
MEAN CORPUSCULAR VOL: 87.8 FL (ref 80–100)
MEAN PLATELET VOLUME: 10.5 FL (ref 6.8–10.2)
MEAN PLATELET VOLUME: 9.7 FL (ref 6.8–10.2)
MG (MAGNESIUM, SERUM: 2.1 MG/DL (ref 1.6–2.6)
MONOCYTE AUTOMATED: 7.6 %
MONOCYTE AUTOMATED: 7.8 %
MONOCYTES: 0.7 (ref 0.3–1)
MONOCYTES: 0.8 (ref 0.3–1)
NA (SODIUM, SERUM): 135 MMOL/L (ref 133–144)
NEUTROPHIL ABSOLUTE: 6.5 (ref 1.7–7.7)
NEUTROPHIL ABSOLUTE: 6.9 (ref 1.7–7.7)
NEUTROPHIL AUTOMATED: 66.4 %
NEUTROPHIL AUTOMATED: 69.8 %
PLATELET COUNT: 28 K/MM3 (ref 150–450)
PLATELET COUNT: 31 K/MM3 (ref 150–450)
PROTEIN TOTAL: 5.9 G/DL (ref 6.4–8.9)
RED BLOOD CELL COUNT: 3.04 M/MM3 (ref 4.34–5.6)
RED BLOOD CELL COUNT: 3.06 M/MM3 (ref 4.34–5.6)
RED CELL DISTRIBUTIO: 12.8 % (ref 11.9–15.9)
RED CELL DISTRIBUTIO: 13.2 % (ref 11.9–15.9)
WHITE BLOOD CELL COU: 9.8 K/MM3 (ref 4–11)
WHITE BLOOD CELL COU: 9.8 K/MM3 (ref 4–11)

## 2019-02-11 LAB
*MEAN CORPUSCULAR HGB CONC: 33.2 G/DL (ref 32.5–35.8)
*MEAN CORPUSCULAR HGB CONC: 34.5 G/DL (ref 32.5–35.8)
A/G RATIO: 1.36 (ref 1.1–2.4)
ALANINE AMINOTRANSFE: 23 U/L (ref 7–52)
ALBUMIN, SERUM (ALB): 3.4 G/DL (ref 3.5–5.7)
ALKALINE PHOSPHATASE (ALK): 40 U/L (ref 34–104)
ANION GAP: 13 MEQ/L (ref 8–16)
ASPARTATE AMINOTRANS: 18 U/L (ref 13–39)
BASOPHIL AUTOMATED: 0.6 %
BASOPHILS: 0.1 (ref 0–0.2)
BEDSIDE GLUCOSE: 159 MG/DL (ref 70–110)
BEDSIDE GLUCOSE: 160 MG/DL (ref 70–110)
BEDSIDE GLUCOSE: 180 MG/DL (ref 70–110)
BILIRUBIN, TOTAL: 0.6 MG/DL (ref 0.2–0.8)
BLOOD UREA NITROGEN (BUN): 24 MG/DL (ref 7–25)
BUN/CREATININE RATIO: 22.2 (ref 7.4–23)
CALCIUM, SERUM: 8.3 MG/DL (ref 8.6–10.3)
CARBON DIOXIDE: 25 MMOL/L (ref 21–31)
CHLORIDE, SERUM: 102 MMOL/L (ref 98–107)
CREATININE: 1.08 MG/DL (ref 0.7–1.3)
EOSINOPHIL AUTOMATED: 5 %
EOSINOPHILS: 0.6 (ref 0–0.5)
EST GLOMERULAR FILTRATION RATE: > 60 1.73M SQ
GLUCOSE: 145 MG/DL (ref 70–99)
HEMATOCRIT: 28.4 % (ref 38.6–49.2)
HEMATOCRIT: 28.9 % (ref 38.6–49.2)
HEMOGLOBIN: 9.6 GM/DL (ref 13–17)
HEMOGLOBIN: 9.8 GM/DL (ref 13–17)
K (POTASSIUM, SERUM): 3.5 MMOL/L (ref 3.5–5.1)
LYMPHOCYTE AUTOMATED: 23.2 %
LYMPHOCYTES: 2.6 (ref 0.6–3.4)
MEAN CORPUSCULAR HGB: 28 PG (ref 26–34)
MEAN CORPUSCULAR HGB: 28.6 PG (ref 26–34)
MEAN CORPUSCULAR VOL: 82.8 FL (ref 80–100)
MEAN CORPUSCULAR VOL: 84.1 FL (ref 80–100)
MEAN PLATELET VOLUME: 11.6 FL (ref 6.8–10.2)
MEAN PLATELET VOLUME: 9.9 FL (ref 6.8–10.2)
MG (MAGNESIUM, SERUM: 2.1 MG/DL (ref 1.6–2.6)
MONOCYTE AUTOMATED: 6.8 %
MONOCYTES: 0.8 (ref 0.3–1)
NA (SODIUM, SERUM): 140 MMOL/L (ref 133–144)
NEUTROPHIL ABSOLUTE: 7.1 (ref 1.7–7.7)
NEUTROPHIL AUTOMATED: 64.4 %
PLATELET COUNT: 45 K/MM3 (ref 150–450)
PLATELET COUNT: 61 K/MM3 (ref 150–450)
PROTEIN TOTAL: 5.9 G/DL (ref 6.4–8.9)
RED BLOOD CELL COUNT: 3.43 M/MM3 (ref 4.34–5.6)
RED BLOOD CELL COUNT: 3.43 M/MM3 (ref 4.34–5.6)
RED CELL DISTRIBUTIO: 18.5 % (ref 11.9–15.9)
RED CELL DISTRIBUTIO: 18.7 % (ref 11.9–15.9)
SLIDE REVIEW COMMENT: ABNORMAL
WHITE BLOOD CELL COU: 10.7 K/MM3 (ref 4–11)
WHITE BLOOD CELL COU: 11.1 K/MM3 (ref 4–11)

## 2019-02-12 LAB
*MEAN CORPUSCULAR HGB CONC: 32.6 G/DL (ref 32.5–35.8)
*MEAN CORPUSCULAR HGB CONC: 33.2 G/DL (ref 32.5–35.8)
ALBUMIN, SERUM (ALB): 3.5 G/DL (ref 3.5–5.7)
ANION GAP: 8 MEQ/L (ref 8–16)
BASOPHIL AUTOMATED: 0.3 %
BASOPHIL AUTOMATED: 0.4 %
BASOPHILS: 0 (ref 0–0.2)
BASOPHILS: 0 (ref 0–0.2)
BEDSIDE GLUCOSE: 118 MG/DL (ref 70–110)
BEDSIDE GLUCOSE: 158 MG/DL (ref 70–110)
BEDSIDE GLUCOSE: 161 MG/DL (ref 70–110)
BEDSIDE GLUCOSE: 274 MG/DL (ref 70–110)
BILIRUBIN, TOTAL: 0.5 MG/DL (ref 0.2–0.8)
BLOOD UREA NITROGEN (BUN): 26 MG/DL (ref 7–25)
BUN/CREATININE RATIO: 24.1 (ref 7.4–23)
CALCIUM, SERUM: 8.2 MG/DL (ref 8.6–10.3)
CARBON DIOXIDE: 25 MMOL/L (ref 21–31)
CHLORIDE, SERUM: 105 MMOL/L (ref 98–107)
CREATININE: 1.08 MG/DL (ref 0.7–1.3)
EOSINOPHIL AUTOMATED: 5.3 %
EOSINOPHIL AUTOMATED: 6.1 %
EOSINOPHILS: 0.6 (ref 0–0.5)
EOSINOPHILS: 0.6 (ref 0–0.5)
EST GLOMERULAR FILTRATION RATE: > 60 1.73M SQ
GLUCOSE: 176 MG/DL (ref 70–99)
HEMATOCRIT: 27.1 % (ref 38.6–49.2)
HEMATOCRIT: 30.6 % (ref 38.6–49.2)
HEMATOCRIT: 35 % (ref 38.6–49.2)
HEMOGLOBIN: 10.1 GM/DL (ref 13–17)
HEMOGLOBIN: 11.9 GM/DL (ref 13–17)
HEMOGLOBIN: 8.8 GM/DL (ref 13–17)
HIPA PF4 INHIBITION: NORMAL
HIPA PF4 OD RESULT: 0.65
INTERNATIONAL NORMAL: 1.1 (ref 0.8–1.1)
K (POTASSIUM, SERUM): 3.4 MMOL/L (ref 3.5–5.1)
LYMPHOCYTE AUTOMATED: 25.5 %
LYMPHOCYTE AUTOMATED: 29.2 %
LYMPHOCYTES: 2.8 (ref 0.6–3.4)
LYMPHOCYTES: 2.8 (ref 0.6–3.4)
MEAN CORPUSCULAR HGB: 28 PG (ref 26–34)
MEAN CORPUSCULAR HGB: 28.3 PG (ref 26–34)
MEAN CORPUSCULAR VOL: 85.4 FL (ref 80–100)
MEAN CORPUSCULAR VOL: 85.8 FL (ref 80–100)
MEAN PLATELET VOLUME: 10.5 FL (ref 6.8–10.2)
MEAN PLATELET VOLUME: 10.8 FL (ref 6.8–10.2)
MG (MAGNESIUM, SERUM: 2 MG/DL (ref 1.6–2.6)
MONOCYTE AUTOMATED: 5.9 %
MONOCYTE AUTOMATED: 6.2 %
MONOCYTES: 0.6 (ref 0.3–1)
MONOCYTES: 0.7 (ref 0.3–1)
NA (SODIUM, SERUM): 138 MMOL/L (ref 133–144)
NEUTROPHIL ABSOLUTE: 5.6 (ref 1.7–7.7)
NEUTROPHIL ABSOLUTE: 7 (ref 1.7–7.7)
NEUTROPHIL AUTOMATED: 58.1 %
NEUTROPHIL AUTOMATED: 63 %
PLATELET COUNT: 78 K/MM3 (ref 150–450)
PLATELET COUNT: 94 K/MM3 (ref 150–450)
PROTHROMBIN TIME (PATIENT): 12.7 SECONDS (ref 10.1–13.1)
PTT: 19 SECONDS (ref 25–36)
PTT: 24 SECONDS (ref 25–36)
RED BLOOD CELL COUNT: 3.16 M/MM3 (ref 4.34–5.6)
RED BLOOD CELL COUNT: 3.58 M/MM3 (ref 4.34–5.6)
RED CELL DISTRIBUTIO: 17.3 % (ref 11.9–15.9)
RED CELL DISTRIBUTIO: 18.2 % (ref 11.9–15.9)
WHITE BLOOD CELL COU: 11.1 K/MM3 (ref 4–11)
WHITE BLOOD CELL COU: 9.7 K/MM3 (ref 4–11)

## 2019-02-13 LAB
*MEAN CORPUSCULAR HGB CONC: 34.5 G/DL (ref 32.5–35.8)
A/G RATIO: 1.46 (ref 1.1–2.4)
ALANINE AMINOTRANSFE: 20 U/L (ref 7–52)
ALBUMIN, SERUM (ALB): 3.5 G/DL (ref 3.5–5.7)
ALKALINE PHOSPHATASE (ALK): 43 U/L (ref 34–104)
ANION GAP: 10 MEQ/L (ref 8–16)
ASPARTATE AMINOTRANS: 17 U/L (ref 13–39)
BASOPHIL AUTOMATED: 0.8 %
BASOPHILS: 0.1 (ref 0–0.2)
BEDSIDE GLUCOSE: 113 MG/DL (ref 70–110)
BEDSIDE GLUCOSE: 148 MG/DL (ref 70–110)
BEDSIDE GLUCOSE: 161 MG/DL (ref 70–110)
BEDSIDE GLUCOSE: 211 MG/DL (ref 70–110)
BILIRUBIN, TOTAL: 0.7 MG/DL (ref 0.2–0.8)
BLOOD UREA NITROGEN (BUN): 22 MG/DL (ref 7–25)
BUN/CREATININE RATIO: 19.5 (ref 7.4–23)
CALCIUM, SERUM: 8.3 MG/DL (ref 8.6–10.3)
CARBON DIOXIDE: 26 MMOL/L (ref 21–31)
CHLORIDE, SERUM: 102 MMOL/L (ref 98–107)
CREATININE: 1.13 MG/DL (ref 0.7–1.3)
EOSINOPHIL AUTOMATED: 5.6 %
EOSINOPHILS: 0.4 (ref 0–0.5)
EST GLOMERULAR FILTRATION RATE: > 60 1.73M SQ
GLUCOSE: 194 MG/DL (ref 70–99)
HEMATOCRIT: 33.6 % (ref 38.6–49.2)
HEMOGLOBIN: 11.6 GM/DL (ref 13–17)
K (POTASSIUM, SERUM): 3.8 MMOL/L (ref 3.5–5.1)
LYMPHOCYTE AUTOMATED: 27.9 %
LYMPHOCYTES: 2.1 (ref 0.6–3.4)
MEAN CORPUSCULAR HGB: 29.3 PG (ref 26–34)
MEAN CORPUSCULAR VOL: 84.9 FL (ref 80–100)
MEAN PLATELET VOLUME: 8.6 FL (ref 6.8–10.2)
MG (MAGNESIUM, SERUM: 1.9 MG/DL (ref 1.6–2.6)
MONOCYTE AUTOMATED: 6.9 %
MONOCYTES: 0.5 (ref 0.3–1)
NA (SODIUM, SERUM): 138 MMOL/L (ref 133–144)
NEUTROPHIL ABSOLUTE: 4.4 (ref 1.7–7.7)
NEUTROPHIL AUTOMATED: 58.8 %
PERIPHERAL BLOOD SMEAR, PATH: NORMAL
PLATELET COUNT: 84 K/MM3 (ref 150–450)
PROTEIN TOTAL: 5.9 G/DL (ref 6.4–8.9)
PTT: 20 SECONDS (ref 25–36)
PTT: 21 SECONDS (ref 25–36)
PTT: 35 SECONDS (ref 25–36)
PTT: 66 SECONDS (ref 25–36)
PTT: 70 SECONDS (ref 25–36)
RED BLOOD CELL COUNT: 3.96 M/MM3 (ref 4.34–5.6)
RED CELL DISTRIBUTIO: 17.4 % (ref 11.9–15.9)
WHITE BLOOD CELL COU: 7.4 K/MM3 (ref 4–11)

## 2019-02-14 ENCOUNTER — EXTERNAL RECORD (OUTPATIENT)
Dept: HEMATOLOGY/ONCOLOGY | Age: 64
End: 2019-02-14

## 2019-02-14 LAB
*MEAN CORPUSCULAR HGB CONC: 34.6 G/DL (ref 32.5–35.8)
A/G RATIO: 1.44 (ref 1.1–2.4)
ALANINE AMINOTRANSFE: 20 U/L (ref 7–52)
ALBUMIN, SERUM (ALB): 3.6 G/DL (ref 3.5–5.7)
ALKALINE PHOSPHATASE (ALK): 42 U/L (ref 34–104)
ANION GAP: 8 MEQ/L (ref 8–16)
ASPARTATE AMINOTRANS: 17 U/L (ref 13–39)
BASOPHIL AUTOMATED: 0.9 %
BASOPHILS: 0.1 (ref 0–0.2)
BEDSIDE GLUCOSE: 130 MG/DL (ref 70–110)
BEDSIDE GLUCOSE: 170 MG/DL (ref 70–110)
BEDSIDE GLUCOSE: 241 MG/DL (ref 70–110)
BILIRUBIN, TOTAL: 0.6 MG/DL (ref 0.2–0.8)
BLOOD UREA NITROGEN (BUN): 20 MG/DL (ref 7–25)
BUN/CREATININE RATIO: 19.4 (ref 7.4–23)
CALCIUM, SERUM: 8.7 MG/DL (ref 8.6–10.3)
CARBON DIOXIDE: 27 MMOL/L (ref 21–31)
CHLORIDE, SERUM: 102 MMOL/L (ref 98–107)
CREATININE: 1.03 MG/DL (ref 0.7–1.3)
EOSINOPHIL AUTOMATED: 5.5 %
EOSINOPHILS: 0.5 (ref 0–0.5)
EST GLOMERULAR FILTRATION RATE: > 60 1.73M SQ
GLUCOSE: 146 MG/DL (ref 70–99)
HEMATOCRIT: 34.6 % (ref 38.6–49.2)
HEMOGLOBIN: 12 GM/DL (ref 13–17)
K (POTASSIUM, SERUM): 4 MMOL/L (ref 3.5–5.1)
LYMPHOCYTE AUTOMATED: 30.8 %
LYMPHOCYTES: 2.8 (ref 0.6–3.4)
MEAN CORPUSCULAR HGB: 29.6 PG (ref 26–34)
MEAN CORPUSCULAR VOL: 85.4 FL (ref 80–100)
MEAN PLATELET VOLUME: 8.6 FL (ref 6.8–10.2)
MG (MAGNESIUM, SERUM: 2.1 MG/DL (ref 1.6–2.6)
MONOCYTE AUTOMATED: 8.7 %
MONOCYTES: 0.8 (ref 0.3–1)
NA (SODIUM, SERUM): 137 MMOL/L (ref 133–144)
NEUTROPHIL ABSOLUTE: 5 (ref 1.7–7.7)
NEUTROPHIL AUTOMATED: 54.1 %
PLATELET COUNT: 165 K/MM3 (ref 150–450)
PROTEIN TOTAL: 6.1 G/DL (ref 6.4–8.9)
PTT: 53 SECONDS (ref 25–36)
RED BLOOD CELL COUNT: 4.05 M/MM3 (ref 4.34–5.6)
RED CELL DISTRIBUTIO: 17.1 % (ref 11.9–15.9)
WHITE BLOOD CELL COU: 9.2 K/MM3 (ref 4–11)

## 2019-02-15 ENCOUNTER — EXTERNAL RECORD (OUTPATIENT)
Dept: OTHER | Age: 64
End: 2019-02-15

## 2019-02-15 LAB
*MEAN CORPUSCULAR HGB CONC: 33.9 G/DL (ref 32.5–35.8)
*MEAN CORPUSCULAR HGB CONC: 34.2 G/DL (ref 32.5–35.8)
ACTIVATED CLOTTING TIME(ISTAT): 131 (ref 74–137)
ACTIVATED CLOTTING TIME(ISTAT): 249 (ref 74–137)
ANION GAP: 7 MEQ/L (ref 8–16)
ANION GAP: 7 MEQ/L (ref 8–16)
BASE EXCESS: -0.8 MMOL/L
BEDSIDE  ARTERIAL TCO2: 25 MMOL/L (ref 23–27)
BEDSIDE  ARTERIAL TCO2: 30 MMOL/L (ref 23–27)
BEDSIDE  ARTERIAL TCO2: 35 MMOL/L (ref 23–27)
BEDSIDE ARTERIAL BASE: -3 MMOL/L (ref -2–3)
BEDSIDE ARTERIAL BASE: 10 MMOL/L (ref -2–3)
BEDSIDE ARTERIAL BASE: 5 MMOL/L (ref -2–3)
BEDSIDE ARTERIAL GLUCOSE: 150 MG/DL (ref 65–115)
BEDSIDE ARTERIAL GLUCOSE: 155 MG/DL (ref 65–115)
BEDSIDE ARTERIAL GLUCOSE: 180 MG/DL (ref 65–115)
BEDSIDE ARTERIAL HCO3: 24 MMOL/L (ref 22–26)
BEDSIDE ARTERIAL HCO3: 29 MMOL/L (ref 22–26)
BEDSIDE ARTERIAL HCO3: 33 MMOL/L (ref 22–26)
BEDSIDE ARTERIAL HEMATOCRIT: 23 (ref 38.6–49.2)
BEDSIDE ARTERIAL HEMATOCRIT: 23 (ref 38.6–49.2)
BEDSIDE ARTERIAL HEMATOCRIT: 30 (ref 38.6–49.2)
BEDSIDE ARTERIAL HEMOGLOBIN: 10.2 (ref 13–17)
BEDSIDE ARTERIAL HEMOGLOBIN: 7.8 (ref 13–17)
BEDSIDE ARTERIAL HEMOGLOBIN: 7.8 (ref 13–17)
BEDSIDE ARTERIAL O2 SATURATION: 100 % (ref 95–98)
BEDSIDE ARTERIAL PCO2: 36 MMHG (ref 35–45)
BEDSIDE ARTERIAL PCO2: 39 MMHG (ref 35–45)
BEDSIDE ARTERIAL PCO2: 50 MMHG (ref 35–45)
BEDSIDE ARTERIAL PH: 7.28 (ref 7.35–7.45)
BEDSIDE ARTERIAL PH: 7.51 (ref 7.35–7.45)
BEDSIDE ARTERIAL PH: 7.52 (ref 7.35–7.45)
BEDSIDE ARTERIAL PO2: 251 MMHG (ref 80–105)
BEDSIDE ARTERIAL PO2: 400 MMHG (ref 80–105)
BEDSIDE ARTERIAL PO2: 409 MMHG (ref 80–105)
BEDSIDE ARTERIAL POTASSIUM: 4.7 (ref 3.4–5.1)
BEDSIDE ARTERIAL POTASSIUM: 5.2 (ref 3.4–5.1)
BEDSIDE ARTERIAL POTASSIUM: 5.3 (ref 3.4–5.1)
BEDSIDE ARTERIAL SODIUM: 136 (ref 136–145)
BEDSIDE ARTERIAL SODIUM: 138 (ref 136–145)
BEDSIDE ARTERIAL SODIUM: 139 (ref 136–145)
BEDSIDE GLUCOSE: 158 MG/DL (ref 70–110)
BEDSIDE GLUCOSE: 161 MG/DL (ref 70–110)
BLOOD UREA NITROGEN (BUN): 19 MG/DL (ref 7–25)
BLOOD UREA NITROGEN (BUN): 22 MG/DL (ref 7–25)
BS ARTERIAL IONIZED CALCIUM: 1 (ref 1.1–1.4)
BS ARTERIAL IONIZED CALCIUM: 1 (ref 1.1–1.4)
BS ARTERIAL IONIZED CALCIUM: 1.2 (ref 1.1–1.4)
BUN/CREATININE RATIO: 17.6 (ref 7.4–23)
BUN/CREATININE RATIO: 20.2 (ref 7.4–23)
CALCIUM, SERUM: 8.6 MG/DL (ref 8.6–10.3)
CALCIUM, SERUM: 8.7 MG/DL (ref 8.6–10.3)
CARBON DIOXIDE: 25 MMOL/L (ref 21–31)
CARBON DIOXIDE: 27 MMOL/L (ref 21–31)
CHLORIDE, SERUM: 103 MMOL/L (ref 98–107)
CHLORIDE, SERUM: 106 MMOL/L (ref 98–107)
CREATININE: 1.08 MG/DL (ref 0.7–1.3)
CREATININE: 1.09 MG/DL (ref 0.7–1.3)
EST GLOMERULAR FILTRATION RATE: > 60 1.73M SQ
EST GLOMERULAR FILTRATION RATE: > 60 1.73M SQ
FIO2: 40 % (ref 21–100)
GLUCOSE: 149 MG/DL (ref 70–99)
GLUCOSE: 174 MG/DL (ref 70–99)
HCO3 ARTERIAL: 24.1 MMOL/L (ref 21–29)
HEMATOCRIT: 31.7 % (ref 38.6–49.2)
HEMATOCRIT: 33.3 % (ref 38.6–49.2)
HEMOGLOBIN: 10.8 GM/DL (ref 13–17)
HEMOGLOBIN: 11.4 GM/DL (ref 13–17)
INTERNATIONAL NORMAL: 2.3 (ref 0.8–1.1)
K (POTASSIUM, SERUM): 4 MMOL/L (ref 3.5–5.1)
K (POTASSIUM, SERUM): 4.3 MMOL/L (ref 3.5–5.1)
MEAN CORPUSCULAR HGB: 29.1 PG (ref 26–34)
MEAN CORPUSCULAR HGB: 29.1 PG (ref 26–34)
MEAN CORPUSCULAR VOL: 85.1 FL (ref 80–100)
MEAN CORPUSCULAR VOL: 85.7 FL (ref 80–100)
MEAN PLATELET VOLUME: 8.3 FL (ref 6.8–10.2)
MEAN PLATELET VOLUME: 8.6 FL (ref 6.8–10.2)
MG (MAGNESIUM, SERUM: 2 MG/DL (ref 1.6–2.6)
MG (MAGNESIUM, SERUM: 2.6 MG/DL (ref 1.6–2.6)
NA (SODIUM, SERUM): 137 MMOL/L (ref 133–144)
NA (SODIUM, SERUM): 138 MMOL/L (ref 133–144)
O2 APPLIANCE: ABNORMAL
O2 CONTENT: 11.9 ML/DL (ref 18–22)
OXYHEMOGLOBIN: 97 % (ref 95–100)
PCO2 ARTERIAL: 40.8 MMHG (ref 32–46)
PEEP: 5 CMH2O
PH: 7.39 (ref 7.38–7.46)
PLATELET COUNT: 178 K/MM3 (ref 150–450)
PLATELET COUNT: 201 K/MM3 (ref 150–450)
PO2 ARTERIAL: 113.8 MMHG (ref 79–108)
PO2/FIO2 RATIO: 285
PROTHROMBIN TIME (PATIENT): 28.2 SECONDS (ref 10.1–13.1)
PTT: 46 SECONDS (ref 25–36)
PTT: 86 SECONDS (ref 25–36)
RED BLOOD CELL COUNT: 3.7 M/MM3 (ref 4.34–5.6)
RED BLOOD CELL COUNT: 3.91 M/MM3 (ref 4.34–5.6)
RED CELL DISTRIBUTIO: 17.3 % (ref 11.9–15.9)
RED CELL DISTRIBUTIO: 17.3 % (ref 11.9–15.9)
SET RESPIRATION RATE: 12
SITE: ABNORMAL
TIDAL VOLUME: 600 ML
TOTAL HEMOGLOBIN ARTERIAL: 8.6 G/DL (ref 14–18)
WHITE BLOOD CELL COU: 15.8 K/MM3 (ref 4–11)
WHITE BLOOD CELL COU: 9 K/MM3 (ref 4–11)

## 2019-02-16 LAB
*MEAN CORPUSCULAR HGB CONC: 34.5 G/DL (ref 32.5–35.8)
A/G RATIO: 1.94 (ref 1.1–2.4)
ACTIVATED CLOTTING TIME(ISTAT): 282 (ref 74–137)
ACTIVATED CLOTTING TIME(ISTAT): 318 (ref 74–137)
ACTIVATED CLOTTING TIME(ISTAT): 318 (ref 74–137)
ACTIVATED CLOTTING TIME(ISTAT): 334 (ref 74–137)
ACTIVATED CLOTTING TIME(ISTAT): 383 (ref 74–137)
ALANINE AMINOTRANSFE: 9 U/L (ref 7–52)
ALBUMIN, SERUM (ALB): 3.1 G/DL (ref 3.5–5.7)
ALKALINE PHOSPHATASE (ALK): 24 U/L (ref 34–104)
ANION GAP: 5 MEQ/L (ref 8–16)
ASPARTATE AMINOTRANS: 17 U/L (ref 13–39)
BASOPHIL AUTOMATED: 0.5 %
BASOPHILS: 0 (ref 0–0.2)
BEDSIDE  ARTERIAL TCO2: 26 MMOL/L (ref 23–27)
BEDSIDE  ARTERIAL TCO2: 27 MMOL/L (ref 23–27)
BEDSIDE ARTERIAL BASE: -1 MMOL/L (ref -2–3)
BEDSIDE ARTERIAL BASE: 0 MMOL/L (ref -2–3)
BEDSIDE ARTERIAL GLUCOSE: 154 MG/DL (ref 65–115)
BEDSIDE ARTERIAL GLUCOSE: 163 MG/DL (ref 65–115)
BEDSIDE ARTERIAL HCO3: 25 MMOL/L (ref 22–26)
BEDSIDE ARTERIAL HCO3: 25 MMOL/L (ref 22–26)
BEDSIDE ARTERIAL HEMATOCRIT: 29 (ref 38.6–49.2)
BEDSIDE ARTERIAL HEMATOCRIT: 29 (ref 38.6–49.2)
BEDSIDE ARTERIAL HEMOGLOBIN: 9.9 (ref 13–17)
BEDSIDE ARTERIAL HEMOGLOBIN: 9.9 (ref 13–17)
BEDSIDE ARTERIAL O2 SATURATION: 100 % (ref 95–98)
BEDSIDE ARTERIAL O2 SATURATION: 98 % (ref 95–98)
BEDSIDE ARTERIAL PCO2: 47 MMHG (ref 35–45)
BEDSIDE ARTERIAL PCO2: 47 MMHG (ref 35–45)
BEDSIDE ARTERIAL PH: 7.34 (ref 7.35–7.45)
BEDSIDE ARTERIAL PH: 7.34 (ref 7.35–7.45)
BEDSIDE ARTERIAL PO2: 119 MMHG (ref 80–105)
BEDSIDE ARTERIAL PO2: 237 MMHG (ref 80–105)
BEDSIDE ARTERIAL POTASSIUM: 4.2 (ref 3.4–5.1)
BEDSIDE ARTERIAL POTASSIUM: 4.6 (ref 3.4–5.1)
BEDSIDE ARTERIAL SODIUM: 138 (ref 136–145)
BEDSIDE ARTERIAL SODIUM: 141 (ref 136–145)
BEDSIDE GLUCOSE: 102 MG/DL (ref 70–110)
BEDSIDE GLUCOSE: 102 MG/DL (ref 70–110)
BEDSIDE GLUCOSE: 108 MG/DL (ref 70–110)
BEDSIDE GLUCOSE: 111 MG/DL (ref 70–110)
BEDSIDE GLUCOSE: 111 MG/DL (ref 70–110)
BEDSIDE GLUCOSE: 113 MG/DL (ref 70–110)
BEDSIDE GLUCOSE: 114 MG/DL (ref 70–110)
BEDSIDE GLUCOSE: 114 MG/DL (ref 70–110)
BEDSIDE GLUCOSE: 117 MG/DL (ref 70–110)
BEDSIDE GLUCOSE: 130 MG/DL (ref 70–110)
BEDSIDE GLUCOSE: 130 MG/DL (ref 70–110)
BEDSIDE GLUCOSE: 132 MG/DL (ref 70–110)
BEDSIDE GLUCOSE: 138 MG/DL (ref 70–110)
BEDSIDE GLUCOSE: 149 MG/DL (ref 70–110)
BEDSIDE GLUCOSE: 160 MG/DL (ref 70–110)
BEDSIDE GLUCOSE: 161 MG/DL (ref 70–110)
BEDSIDE GLUCOSE: 163 MG/DL (ref 70–110)
BEDSIDE GLUCOSE: 165 MG/DL (ref 70–110)
BEDSIDE GLUCOSE: 171 MG/DL (ref 70–110)
BEDSIDE GLUCOSE: 181 MG/DL (ref 70–110)
BEDSIDE GLUCOSE: 185 MG/DL (ref 70–110)
BEDSIDE GLUCOSE: 199 MG/DL (ref 70–110)
BEDSIDE GLUCOSE: 211 MG/DL (ref 70–110)
BEDSIDE GLUCOSE: 53 MG/DL (ref 70–110)
BEDSIDE GLUCOSE: 96 MG/DL (ref 70–110)
BEDSIDE GLUCOSE: 97 MG/DL (ref 70–110)
BEDSIDE GLUCOSE: 99 MG/DL (ref 70–110)
BILIRUBIN, TOTAL: 0.5 MG/DL (ref 0.2–0.8)
BLOOD UREA NITROGEN (BUN): 16 MG/DL (ref 7–25)
BS ARTERIAL IONIZED CALCIUM: 1.2 (ref 1.1–1.4)
BS ARTERIAL IONIZED CALCIUM: 1.2 (ref 1.1–1.4)
BUN/CREATININE RATIO: 14.8 (ref 7.4–23)
CALCIUM, SERUM: 7.7 MG/DL (ref 8.6–10.3)
CARBON DIOXIDE: 24 MMOL/L (ref 21–31)
CHLORIDE, SERUM: 111 MMOL/L (ref 98–107)
CREATININE: 1.08 MG/DL (ref 0.7–1.3)
EOSINOPHIL AUTOMATED: 1 %
EOSINOPHILS: 0.1 (ref 0–0.5)
EST GLOMERULAR FILTRATION RATE: > 60 1.73M SQ
GLUCOSE: 108 MG/DL (ref 70–99)
HEMATOCRIT: 22.2 % (ref 38.6–49.2)
HEMOGLOBIN: 7.6 GM/DL (ref 13–17)
INTERNATIONAL NORMAL: 2 (ref 0.8–1.1)
K (POTASSIUM, SERUM): 4.8 MMOL/L (ref 3.5–5.1)
LYMPHOCYTE AUTOMATED: 10.6 %
LYMPHOCYTES: 1 (ref 0.6–3.4)
MEAN CORPUSCULAR HGB: 29.4 PG (ref 26–34)
MEAN CORPUSCULAR VOL: 85.4 FL (ref 80–100)
MEAN PLATELET VOLUME: 8.5 FL (ref 6.8–10.2)
MG (MAGNESIUM, SERUM: 2.1 MG/DL (ref 1.6–2.6)
MONOCYTE AUTOMATED: 8.9 %
MONOCYTES: 0.8 (ref 0.3–1)
NA (SODIUM, SERUM): 140 MMOL/L (ref 133–144)
NEUTROPHIL ABSOLUTE: 7.5 (ref 1.7–7.7)
NEUTROPHIL AUTOMATED: 79 %
PHOSPHORUS, SERUM: 2.8 MG/DL (ref 2.5–4.5)
PLATELET COUNT: 154 K/MM3 (ref 150–450)
PROTEIN TOTAL: 4.7 G/DL (ref 6.4–8.9)
PROTHROMBIN TIME (PATIENT): 24.5 SECONDS (ref 10.1–13.1)
PTT: 57 SECONDS (ref 25–36)
PTT: 61 SECONDS (ref 25–36)
RED BLOOD CELL COUNT: 2.6 M/MM3 (ref 4.34–5.6)
RED CELL DISTRIBUTIO: 17.4 % (ref 11.9–15.9)
SRA UF HEPARIN: NEGATIVE
SRA UFH HIGH DOSE 100 IU/ML: 1 %
SRA UFH INTERP: NORMAL
SRA UFH LOW DOSE 0.1 IU/ML: 9 %
WHITE BLOOD CELL COU: 9.5 K/MM3 (ref 4–11)

## 2019-02-17 LAB
*MEAN CORPUSCULAR HGB CONC: 33.4 G/DL (ref 32.5–35.8)
ANION GAP: 11 MEQ/L (ref 8–16)
BEDSIDE GLUCOSE: 180 MG/DL (ref 70–110)
BEDSIDE GLUCOSE: 182 MG/DL (ref 70–110)
BEDSIDE GLUCOSE: 297 MG/DL (ref 70–110)
BLOOD UREA NITROGEN (BUN): 29 MG/DL (ref 7–25)
BUN/CREATININE RATIO: 20.3 (ref 7.4–23)
CALCIUM, SERUM: 8 MG/DL (ref 8.6–10.3)
CARBON DIOXIDE: 21 MMOL/L (ref 21–31)
CHLORIDE, SERUM: 106 MMOL/L (ref 98–107)
CREATININE: 1.43 MG/DL (ref 0.7–1.3)
EST GLOMERULAR FILTRATION RATE: 50 1.73M SQ
GLUCOSE: 186 MG/DL (ref 70–99)
HEMATOCRIT: 20.5 % (ref 38.6–49.2)
HEMOGLOBIN: 6.8 GM/DL (ref 13–17)
INTERNATIONAL NORMAL: 2.1 (ref 0.8–1.1)
K (POTASSIUM, SERUM): 4.4 MMOL/L (ref 3.5–5.1)
MEAN CORPUSCULAR HGB: 28.9 PG (ref 26–34)
MEAN CORPUSCULAR VOL: 86.5 FL (ref 80–100)
MEAN PLATELET VOLUME: 9.4 FL (ref 6.8–10.2)
NA (SODIUM, SERUM): 138 MMOL/L (ref 133–144)
PLATELET COUNT: 155 K/MM3 (ref 150–450)
PROTHROMBIN TIME (PATIENT): 25.2 SECONDS (ref 10.1–13.1)
PTT: 56 SECONDS (ref 25–36)
RED BLOOD CELL COUNT: 2.37 M/MM3 (ref 4.34–5.6)
RED CELL DISTRIBUTIO: 15.2 % (ref 11.9–15.9)
SLIDE REVIEW COMMENT: ABNORMAL
WHITE BLOOD CELL COU: 8.5 K/MM3 (ref 4–11)

## 2019-02-18 LAB
*MEAN CORPUSCULAR HGB CONC: 34.6 G/DL (ref 32.5–35.8)
ANION GAP: 6 MEQ/L (ref 8–16)
BEDSIDE GLUCOSE: 120 MG/DL (ref 70–110)
BEDSIDE GLUCOSE: 136 MG/DL (ref 70–110)
BEDSIDE GLUCOSE: 213 MG/DL (ref 70–110)
BLOOD UREA NITROGEN (BUN): 18 MG/DL (ref 7–25)
BUN/CREATININE RATIO: 18.2 (ref 7.4–23)
CALCIUM, SERUM: 8 MG/DL (ref 8.6–10.3)
CARBON DIOXIDE: 26 MMOL/L (ref 21–31)
CHLORIDE, SERUM: 106 MMOL/L (ref 98–107)
CREATININE: 0.99 MG/DL (ref 0.7–1.3)
EST GLOMERULAR FILTRATION RATE: > 60 1.73M SQ
GLUCOSE: 154 MG/DL (ref 70–99)
HEMATOCRIT: 25.9 % (ref 38.6–49.2)
HEMOGLOBIN: 8.9 GM/DL (ref 13–17)
INTERNATIONAL NORMAL: 2.3 (ref 0.8–1.1)
K (POTASSIUM, SERUM): 4.1 MMOL/L (ref 3.5–5.1)
MEAN CORPUSCULAR HGB: 29.6 PG (ref 26–34)
MEAN CORPUSCULAR VOL: 85.7 FL (ref 80–100)
MEAN PLATELET VOLUME: 8.5 FL (ref 6.8–10.2)
MG (MAGNESIUM, SERUM: 1.8 MG/DL (ref 1.6–2.6)
NA (SODIUM, SERUM): 138 MMOL/L (ref 133–144)
PERIPHERAL BLOOD SMEAR, PATH: NORMAL
PHOSPHORUS, SERUM: 3.1 MG/DL (ref 2.5–4.5)
PLATELET COUNT: 155 K/MM3 (ref 150–450)
PROTHROMBIN TIME (PATIENT): 28.4 SECONDS (ref 10.1–13.1)
PTT: 52 SECONDS (ref 25–36)
RED BLOOD CELL COUNT: 3.02 M/MM3 (ref 4.34–5.6)
RED CELL DISTRIBUTIO: 16.1 % (ref 11.9–15.9)
WHITE BLOOD CELL COU: 8.1 K/MM3 (ref 4–11)

## 2019-02-19 ENCOUNTER — TELEPHONE (OUTPATIENT)
Dept: UROLOGY | Age: 64
End: 2019-02-19

## 2019-02-19 ENCOUNTER — TELEPHONE (OUTPATIENT)
Dept: INTERNAL MEDICINE | Age: 64
End: 2019-02-19

## 2019-02-19 ENCOUNTER — EXTERNAL RECORD (OUTPATIENT)
Dept: OTHER | Age: 64
End: 2019-02-19

## 2019-02-19 ENCOUNTER — TELEPHONE (OUTPATIENT)
Dept: HEMATOLOGY/ONCOLOGY | Age: 64
End: 2019-02-19

## 2019-02-19 ENCOUNTER — TELEPHONE (OUTPATIENT)
Dept: CARDIOLOGY | Age: 64
End: 2019-02-19

## 2019-02-19 LAB
*MEAN CORPUSCULAR HGB CONC: 32.8 G/DL (ref 32.5–35.8)
ANION GAP: 9 MEQ/L (ref 8–16)
BASOPHIL AUTOMATED: 0.4 %
BASOPHILS: 0 (ref 0–0.2)
BEDSIDE GLUCOSE: 152 MG/DL (ref 70–110)
BEDSIDE GLUCOSE: 191 MG/DL (ref 70–110)
BLOOD UREA NITROGEN (BUN): 14 MG/DL (ref 7–25)
BUN/CREATININE RATIO: 14.6 (ref 7.4–23)
CALCIUM, SERUM: 8.4 MG/DL (ref 8.6–10.3)
CARBON DIOXIDE: 26 MMOL/L (ref 21–31)
CHLORIDE, SERUM: 107 MMOL/L (ref 98–107)
CREATININE: 0.96 MG/DL (ref 0.7–1.3)
EOSINOPHIL AUTOMATED: 4.5 %
EOSINOPHILS: 0.3 (ref 0–0.5)
EST GLOMERULAR FILTRATION RATE: > 60 1.73M SQ
GLUCOSE: 148 MG/DL (ref 70–99)
HEMATOCRIT: 27.8 % (ref 38.6–49.2)
HEMOGLOBIN: 9.1 GM/DL (ref 13–17)
INTERNATIONAL NORMAL: 3.1 (ref 0.8–1.1)
K (POTASSIUM, SERUM): 3.9 MMOL/L (ref 3.5–5.1)
LYMPHOCYTE AUTOMATED: 24 %
LYMPHOCYTES: 1.6 (ref 0.6–3.4)
MEAN CORPUSCULAR HGB: 28.6 PG (ref 26–34)
MEAN CORPUSCULAR VOL: 87.2 FL (ref 80–100)
MEAN PLATELET VOLUME: 8.8 FL (ref 6.8–10.2)
MONOCYTE AUTOMATED: 8.2 %
MONOCYTES: 0.5 (ref 0.3–1)
NA (SODIUM, SERUM): 142 MMOL/L (ref 133–144)
NEUTROPHIL ABSOLUTE: 4.2 (ref 1.7–7.7)
NEUTROPHIL AUTOMATED: 62.9 %
PLATELET COUNT: 224 K/MM3 (ref 150–450)
PROTHROMBIN TIME (PATIENT): 37.4 SECONDS (ref 10.1–13.1)
RED BLOOD CELL COUNT: 3.19 M/MM3 (ref 4.34–5.6)
RED CELL DISTRIBUTIO: 15.9 % (ref 11.9–15.9)
WHITE BLOOD CELL COU: 6.7 K/MM3 (ref 4–11)

## 2019-03-12 ENCOUNTER — OFFICE VISIT (OUTPATIENT)
Dept: CARDIOLOGY | Age: 64
End: 2019-03-12

## 2019-03-12 ENCOUNTER — TELEPHONE (OUTPATIENT)
Dept: CARDIOLOGY | Age: 64
End: 2019-03-12

## 2019-03-12 VITALS
SYSTOLIC BLOOD PRESSURE: 124 MMHG | RESPIRATION RATE: 20 BRPM | DIASTOLIC BLOOD PRESSURE: 80 MMHG | OXYGEN SATURATION: 95 % | BODY MASS INDEX: 28.56 KG/M2 | HEIGHT: 71 IN | WEIGHT: 204 LBS | HEART RATE: 100 BPM

## 2019-03-12 DIAGNOSIS — R06.02 SHORTNESS OF BREATH: Primary | ICD-10-CM

## 2019-03-12 DIAGNOSIS — Z95.1 S/P CABG (CORONARY ARTERY BYPASS GRAFT): ICD-10-CM

## 2019-03-12 DIAGNOSIS — I25.10 CORONARY ARTERY DISEASE INVOLVING NATIVE CORONARY ARTERY OF NATIVE HEART WITHOUT ANGINA PECTORIS: ICD-10-CM

## 2019-03-12 DIAGNOSIS — Z95.1 HX OF CABG: ICD-10-CM

## 2019-03-12 DIAGNOSIS — I25.5 ISCHEMIC CARDIOMYOPATHY: ICD-10-CM

## 2019-03-12 DIAGNOSIS — Z87.891 HISTORY OF TOBACCO ABUSE: ICD-10-CM

## 2019-03-12 DIAGNOSIS — I25.10 CORONARY ARTERY DISEASE INVOLVING NATIVE HEART WITHOUT ANGINA PECTORIS, UNSPECIFIED VESSEL OR LESION TYPE: Primary | ICD-10-CM

## 2019-03-12 DIAGNOSIS — E78.5 DYSLIPIDEMIA: ICD-10-CM

## 2019-03-12 DIAGNOSIS — I10 ESSENTIAL HYPERTENSION, BENIGN: ICD-10-CM

## 2019-03-12 PROBLEM — H33.20 RETINAL DETACHMENT: Status: ACTIVE | Noted: 2017-06-04

## 2019-03-12 PROCEDURE — 3074F SYST BP LT 130 MM HG: CPT | Performed by: INTERNAL MEDICINE

## 2019-03-12 PROCEDURE — 93000 ELECTROCARDIOGRAM COMPLETE: CPT | Performed by: INTERNAL MEDICINE

## 2019-03-12 PROCEDURE — 3079F DIAST BP 80-89 MM HG: CPT | Performed by: INTERNAL MEDICINE

## 2019-03-12 PROCEDURE — 99214 OFFICE O/P EST MOD 30 MIN: CPT | Performed by: INTERNAL MEDICINE

## 2019-03-12 RX ORDER — TOBRAMYCIN AND DEXAMETHASONE 3; 1 MG/ML; MG/ML
1 SUSPENSION/ DROPS OPHTHALMIC
COMMUNITY
Start: 2017-06-07 | End: 2019-03-12 | Stop reason: ALTCHOICE

## 2019-03-12 RX ORDER — TAMSULOSIN HYDROCHLORIDE 0.4 MG/1
0.4 CAPSULE ORAL
Qty: 90 CAPSULE | Refills: 3 | Status: SHIPPED | OUTPATIENT
Start: 2019-03-12 | End: 2020-02-27 | Stop reason: ALTCHOICE

## 2019-03-12 RX ORDER — EZETIMIBE 10 MG/1
10 TABLET ORAL DAILY
Qty: 90 TABLET | Refills: 3 | Status: SHIPPED | OUTPATIENT
Start: 2019-03-12 | End: 2022-05-23 | Stop reason: SDUPTHER

## 2019-03-12 RX ORDER — ALLOPURINOL 300 MG/1
TABLET ORAL
COMMUNITY
Start: 2016-08-30 | End: 2019-04-04 | Stop reason: SDUPTHER

## 2019-03-12 RX ORDER — CARVEDILOL 12.5 MG/1
12.5 TABLET ORAL 2 TIMES DAILY WITH MEALS
Qty: 180 TABLET | Refills: 3 | Status: SHIPPED | OUTPATIENT
Start: 2019-03-12 | End: 2019-03-26 | Stop reason: ALTCHOICE

## 2019-03-12 RX ORDER — TAMSULOSIN HYDROCHLORIDE 0.4 MG/1
0.4 CAPSULE ORAL
COMMUNITY
End: 2019-03-12 | Stop reason: SDUPTHER

## 2019-03-12 RX ORDER — ATORVASTATIN CALCIUM 80 MG/1
80 TABLET, FILM COATED ORAL DAILY
COMMUNITY
End: 2019-03-12 | Stop reason: SDUPTHER

## 2019-03-12 RX ORDER — HYDROCODONE BITARTRATE AND ACETAMINOPHEN 5; 325 MG/1; MG/1
TABLET ORAL
COMMUNITY
End: 2019-03-12 | Stop reason: ALTCHOICE

## 2019-03-12 RX ORDER — MOXIFLOXACIN 5 MG/ML
1 SOLUTION/ DROPS OPHTHALMIC
COMMUNITY
Start: 2017-04-20 | End: 2019-08-20 | Stop reason: ALTCHOICE

## 2019-03-12 RX ORDER — DIFLUPREDNATE OPHTHALMIC 0.5 MG/ML
1 EMULSION OPHTHALMIC
COMMUNITY
Start: 2017-10-27 | End: 2019-03-12 | Stop reason: ALTCHOICE

## 2019-03-12 RX ORDER — HYDROCODONE/ACETAMINOPHEN 10MG-325MG
TABLET ORAL
Refills: 0 | COMMUNITY
Start: 2019-01-11 | End: 2019-03-12 | Stop reason: ALTCHOICE

## 2019-03-12 RX ORDER — PRAMIPEXOLE DIHYDROCHLORIDE 1 MG/1
TABLET ORAL
COMMUNITY
End: 2019-03-12 | Stop reason: ALTCHOICE

## 2019-03-12 RX ORDER — SIMVASTATIN 40 MG
TABLET ORAL
COMMUNITY
Start: 2016-08-30 | End: 2019-03-12 | Stop reason: ALTCHOICE

## 2019-03-12 RX ORDER — CARVEDILOL 12.5 MG/1
12.5 TABLET ORAL 2 TIMES DAILY WITH MEALS
COMMUNITY
End: 2019-03-12 | Stop reason: SDUPTHER

## 2019-03-12 RX ORDER — CLOPIDOGREL BISULFATE 75 MG/1
75 TABLET ORAL DAILY
COMMUNITY
End: 2019-03-12 | Stop reason: SDUPTHER

## 2019-03-12 RX ORDER — PAROXETINE HYDROCHLORIDE 40 MG/1
40 TABLET, FILM COATED ORAL
COMMUNITY
Start: 2016-08-30 | End: 2019-04-04 | Stop reason: SDUPTHER

## 2019-03-12 RX ORDER — CLOPIDOGREL BISULFATE 75 MG/1
75 TABLET ORAL DAILY
Qty: 90 TABLET | Refills: 3 | Status: SHIPPED | OUTPATIENT
Start: 2019-03-12 | End: 2019-12-02 | Stop reason: SDUPTHER

## 2019-03-12 RX ORDER — PRAMIPEXOLE DIHYDROCHLORIDE 0.5 MG/1
TABLET ORAL
COMMUNITY
Start: 2016-08-30 | End: 2019-04-04 | Stop reason: SDUPTHER

## 2019-03-12 RX ORDER — HYDROCODONE BITARTRATE AND ACETAMINOPHEN 10; 325 MG/1; MG/1
TABLET ORAL 4 TIMES DAILY
COMMUNITY
Start: 2016-08-04 | End: 2020-03-17 | Stop reason: DRUGHIGH

## 2019-03-12 RX ORDER — RAMIPRIL 2.5 MG/1
CAPSULE ORAL
COMMUNITY
Start: 2016-08-30 | End: 2019-03-12 | Stop reason: ALTCHOICE

## 2019-03-12 RX ORDER — ASPIRIN 325 MG
325 TABLET, DELAYED RELEASE (ENTERIC COATED) ORAL DAILY
COMMUNITY
End: 2019-04-09 | Stop reason: DRUGHIGH

## 2019-03-12 RX ORDER — ATORVASTATIN CALCIUM 80 MG/1
80 TABLET, FILM COATED ORAL DAILY
Qty: 90 TABLET | Refills: 3 | Status: SHIPPED | OUTPATIENT
Start: 2019-03-12 | End: 2020-07-08 | Stop reason: SDUPTHER

## 2019-03-12 RX ORDER — EZETIMIBE 10 MG/1
10 TABLET ORAL
COMMUNITY
Start: 2016-08-30 | End: 2019-03-12 | Stop reason: SDUPTHER

## 2019-03-26 ENCOUNTER — TELEPHONE (OUTPATIENT)
Dept: CARDIOLOGY | Age: 64
End: 2019-03-26

## 2019-03-26 RX ORDER — METOPROLOL SUCCINATE 100 MG/1
100 TABLET, EXTENDED RELEASE ORAL 2 TIMES DAILY
Status: SHIPPED | COMMUNITY
Start: 2019-03-26 | End: 2019-04-17 | Stop reason: ALTCHOICE

## 2019-03-30 ENCOUNTER — EXTERNAL RECORD (OUTPATIENT)
Dept: CARDIOLOGY | Age: 64
End: 2019-03-30

## 2019-03-30 ENCOUNTER — EXTERNAL RECORD (OUTPATIENT)
Dept: NEPHROLOGY | Age: 64
End: 2019-03-30

## 2019-03-30 ENCOUNTER — EXTERNAL RECORD (OUTPATIENT)
Dept: OTHER | Age: 64
End: 2019-03-30

## 2019-03-30 LAB
BEDSIDE GLUCOSE: 115 MG/DL (ref 70–110)
BEDSIDE GLUCOSE: 140 MG/DL (ref 70–110)
BEDSIDE GLUCOSE: 156 MG/DL (ref 70–110)
BEDSIDE GLUCOSE: 170 MG/DL (ref 70–110)
BEDSIDE GLUCOSE: 215 MG/DL (ref 70–110)
CHLORIDE, RANDOM URINE: 48 MEQ/L (ref 110–250)
CREATININE, RANDOM URINE: 25.4 MG/DL (ref 16–241)
POTASSIUM, RANDOM URINE: 23.1 MEQ/L (ref 25–125)
SODIUM, RANDOM URINE: 32 MEQ/L (ref 40–220)

## 2019-03-31 LAB
*MEAN CORPUSCULAR HGB CONC: 33.6 G/DL (ref 32.5–35.8)
ANION GAP: 14 MEQ/L (ref 8–16)
BEDSIDE GLUCOSE: 101 MG/DL (ref 70–110)
BEDSIDE GLUCOSE: 157 MG/DL (ref 70–110)
BEDSIDE GLUCOSE: 168 MG/DL (ref 70–110)
BEDSIDE GLUCOSE: 170 MG/DL (ref 70–110)
BLOOD UREA NITROGEN (BUN): 32 MG/DL (ref 7–25)
BUN/CREATININE RATIO: 25.4 (ref 7.4–23)
CALCIUM, SERUM: 9 MG/DL (ref 8.6–10.3)
CARBON DIOXIDE: 22 MMOL/L (ref 21–31)
CHLORIDE, SERUM: 101 MMOL/L (ref 98–107)
CREATININE: 1.26 MG/DL (ref 0.7–1.3)
EST GLOMERULAR FILTRATION RATE: 58 1.73M SQ
GLUCOSE: 234 MG/DL (ref 70–99)
HEMATOCRIT: 38.1 % (ref 38.6–49.2)
HEMOGLOBIN: 12.8 GM/DL (ref 13–17)
K (POTASSIUM, SERUM): 3.6 MMOL/L (ref 3.5–5.1)
MEAN CORPUSCULAR HGB: 28.8 PG (ref 26–34)
MEAN CORPUSCULAR VOL: 85.6 FL (ref 80–100)
MEAN PLATELET VOLUME: 9.5 FL (ref 6.8–10.2)
NA (SODIUM, SERUM): 137 MMOL/L (ref 133–144)
PLATELET COUNT: 164 K/MM3 (ref 150–450)
RED BLOOD CELL COUNT: 4.45 M/MM3 (ref 4.34–5.6)
RED CELL DISTRIBUTIO: 14.9 % (ref 11.9–15.9)
WHITE BLOOD CELL COU: 4.4 K/MM3 (ref 4–11)

## 2019-04-01 ENCOUNTER — EXTERNAL RECORD (OUTPATIENT)
Dept: OTHER | Age: 64
End: 2019-04-01

## 2019-04-01 LAB
*MEAN CORPUSCULAR HGB CONC: 34 G/DL (ref 32.5–35.8)
ANION GAP: 11 MEQ/L (ref 8–16)
BEDSIDE GLUCOSE: 175 MG/DL (ref 70–110)
BLOOD UREA NITROGEN (BUN): 29 MG/DL (ref 7–25)
BUN/CREATININE RATIO: 27.6 (ref 7.4–23)
CALCIUM, SERUM: 9.1 MG/DL (ref 8.6–10.3)
CARBON DIOXIDE: 26 MMOL/L (ref 21–31)
CHLORIDE, SERUM: 102 MMOL/L (ref 98–107)
CREATININE: 1.05 MG/DL (ref 0.7–1.3)
EST GLOMERULAR FILTRATION RATE: > 60 1.73M SQ
GLUCOSE: 161 MG/DL (ref 70–99)
HEMATOCRIT: 37.6 % (ref 38.6–49.2)
HEMOGLOBIN: 12.8 GM/DL (ref 13–17)
K (POTASSIUM, SERUM): 3.8 MMOL/L (ref 3.5–5.1)
MEAN CORPUSCULAR HGB: 29.1 PG (ref 26–34)
MEAN CORPUSCULAR VOL: 85.6 FL (ref 80–100)
MEAN PLATELET VOLUME: 9.2 FL (ref 6.8–10.2)
NA (SODIUM, SERUM): 139 MMOL/L (ref 133–144)
PLATELET COUNT: 172 K/MM3 (ref 150–450)
RED BLOOD CELL COUNT: 4.39 M/MM3 (ref 4.34–5.6)
RED CELL DISTRIBUTIO: 14.3 % (ref 11.9–15.9)
WHITE BLOOD CELL COU: 5.6 K/MM3 (ref 4–11)

## 2019-04-03 ENCOUNTER — TELEPHONE (OUTPATIENT)
Dept: CARDIOLOGY | Age: 64
End: 2019-04-03

## 2019-04-04 RX ORDER — PAROXETINE HYDROCHLORIDE 40 MG/1
40 TABLET, FILM COATED ORAL EVERY MORNING
Qty: 90 TABLET | Refills: 0 | Status: SHIPPED | OUTPATIENT
Start: 2019-04-04 | End: 2019-12-02 | Stop reason: SDUPTHER

## 2019-04-04 RX ORDER — ALLOPURINOL 300 MG/1
300 TABLET ORAL DAILY
Qty: 90 TABLET | Refills: 1 | Status: SHIPPED | OUTPATIENT
Start: 2019-04-04 | End: 2019-12-02 | Stop reason: SDUPTHER

## 2019-04-04 RX ORDER — PRAMIPEXOLE DIHYDROCHLORIDE 0.5 MG/1
0.5 TABLET ORAL 3 TIMES DAILY
Qty: 270 TABLET | Refills: 0 | Status: SHIPPED | OUTPATIENT
Start: 2019-04-04 | End: 2020-03-17 | Stop reason: DRUGHIGH

## 2019-04-05 LAB
BEDSIDE GLUCOSE: 118 MG/DL (ref 70–110)
BEDSIDE GLUCOSE: 239 MG/DL (ref 70–110)

## 2019-04-06 LAB
BEDSIDE GLUCOSE: 160 MG/DL (ref 70–110)
BEDSIDE GLUCOSE: 163 MG/DL (ref 70–110)

## 2019-04-08 ENCOUNTER — TELEPHONE (OUTPATIENT)
Dept: CARDIOLOGY | Age: 64
End: 2019-04-08

## 2019-04-09 ENCOUNTER — TELEPHONE (OUTPATIENT)
Dept: CARDIOLOGY | Age: 64
End: 2019-04-09

## 2019-04-09 ENCOUNTER — OFFICE VISIT (OUTPATIENT)
Dept: CARDIOLOGY | Age: 64
End: 2019-04-09

## 2019-04-09 VITALS
WEIGHT: 212 LBS | SYSTOLIC BLOOD PRESSURE: 100 MMHG | HEART RATE: 100 BPM | BODY MASS INDEX: 29.68 KG/M2 | OXYGEN SATURATION: 96 % | DIASTOLIC BLOOD PRESSURE: 80 MMHG | HEIGHT: 71 IN | RESPIRATION RATE: 20 BRPM

## 2019-04-09 DIAGNOSIS — I25.10 CORONARY ARTERY DISEASE INVOLVING NATIVE CORONARY ARTERY OF NATIVE HEART WITHOUT ANGINA PECTORIS: ICD-10-CM

## 2019-04-09 DIAGNOSIS — I25.5 ISCHEMIC CARDIOMYOPATHY: ICD-10-CM

## 2019-04-09 DIAGNOSIS — I10 ESSENTIAL HYPERTENSION, BENIGN: ICD-10-CM

## 2019-04-09 DIAGNOSIS — K62.5 BRBPR (BRIGHT RED BLOOD PER RECTUM): Primary | ICD-10-CM

## 2019-04-09 DIAGNOSIS — E78.5 DYSLIPIDEMIA: ICD-10-CM

## 2019-04-09 DIAGNOSIS — Z95.1 HX OF CABG: ICD-10-CM

## 2019-04-09 DIAGNOSIS — I26.99 OTHER ACUTE PULMONARY EMBOLISM WITHOUT ACUTE COR PULMONALE (CMD): ICD-10-CM

## 2019-04-09 LAB
BEDSIDE GLUCOSE: 126 MG/DL (ref 70–110)
BEDSIDE GLUCOSE: 170 MG/DL (ref 70–110)
HEMOGLOBIN: 11.9 GM/DL (ref 13–17)
TROPONIN I (TROP): 0.04 NG/ML (ref 0–0.04)
TROPONIN I (TROP): 0.04 NG/ML (ref 0–0.04)

## 2019-04-09 PROCEDURE — 3079F DIAST BP 80-89 MM HG: CPT | Performed by: INTERNAL MEDICINE

## 2019-04-09 PROCEDURE — 3074F SYST BP LT 130 MM HG: CPT | Performed by: INTERNAL MEDICINE

## 2019-04-09 PROCEDURE — 99215 OFFICE O/P EST HI 40 MIN: CPT | Performed by: INTERNAL MEDICINE

## 2019-04-09 RX ORDER — FUROSEMIDE 40 MG/1
40 TABLET ORAL 2 TIMES DAILY
COMMUNITY
End: 2019-04-17 | Stop reason: DRUGHIGH

## 2019-04-09 RX ORDER — PANTOPRAZOLE SODIUM 40 MG/1
40 TABLET, DELAYED RELEASE ORAL DAILY
COMMUNITY
End: 2019-04-17 | Stop reason: DRUGHIGH

## 2019-04-10 ENCOUNTER — EXTERNAL RECORD (OUTPATIENT)
Dept: GASTROENTEROLOGY | Age: 64
End: 2019-04-10

## 2019-04-10 ENCOUNTER — EXTERNAL RECORD (OUTPATIENT)
Dept: PULMONOLOGY | Age: 64
End: 2019-04-10

## 2019-04-10 ENCOUNTER — EXTERNAL RECORD (OUTPATIENT)
Dept: CARDIOLOGY | Age: 64
End: 2019-04-10

## 2019-04-10 LAB
*MEAN CORPUSCULAR HGB CONC: 34.4 G/DL (ref 32.5–35.8)
ANION GAP: 8 MEQ/L (ref 8–16)
BASOPHIL AUTOMATED: 0.6 %
BASOPHILS: 0 (ref 0–0.2)
BEDSIDE GLUCOSE: 119 MG/DL (ref 70–110)
BEDSIDE GLUCOSE: 126 MG/DL (ref 70–110)
BEDSIDE GLUCOSE: 167 MG/DL (ref 70–110)
BEDSIDE GLUCOSE: 268 MG/DL (ref 70–110)
BEDSIDE GLUCOSE: 75 MG/DL (ref 70–110)
BLOOD UREA NITROGEN (BUN): 23 MG/DL (ref 7–25)
BUN/CREATININE RATIO: 18.3 (ref 7.4–23)
CALCIUM, SERUM: 9.2 MG/DL (ref 8.6–10.3)
CARBON DIOXIDE: 29 MMOL/L (ref 21–31)
CHLORIDE, SERUM: 102 MMOL/L (ref 98–107)
CREATININE: 1.26 MG/DL (ref 0.7–1.3)
EOSINOPHIL AUTOMATED: 5 %
EOSINOPHILS: 0.3 (ref 0–0.5)
EST GLOMERULAR FILTRATION RATE: 58 1.73M SQ
GLUCOSE: 153 MG/DL (ref 70–99)
HEMATOCRIT: 35 % (ref 38.6–49.2)
HEMOGLOBIN: 11.8 GM/DL (ref 13–17)
HEMOGLOBIN: 12 GM/DL (ref 13–17)
INTERNATIONAL NORMAL: 1.5 (ref 0.8–1.1)
K (POTASSIUM, SERUM): 3.9 MMOL/L (ref 3.5–5.1)
LYMPHOCYTE AUTOMATED: 28.5 %
LYMPHOCYTES: 1.6 (ref 0.6–3.4)
MEAN CORPUSCULAR HGB: 29 PG (ref 26–34)
MEAN CORPUSCULAR VOL: 84.2 FL (ref 80–100)
MEAN PLATELET VOLUME: 9.9 FL (ref 6.8–10.2)
MONOCYTE AUTOMATED: 9.6 %
MONOCYTES: 0.5 (ref 0.3–1)
NA (SODIUM, SERUM): 139 MMOL/L (ref 133–144)
NEUTROPHIL ABSOLUTE: 3.1 (ref 1.7–7.7)
NEUTROPHIL AUTOMATED: 56.3 %
PLATELET COUNT: 150 K/MM3 (ref 150–450)
PROTHROMBIN TIME (PATIENT): 18.6 SECONDS (ref 10.1–13.1)
RED BLOOD CELL COUNT: 4.15 M/MM3 (ref 4.34–5.6)
RED CELL DISTRIBUTIO: 14.4 % (ref 11.9–15.9)
WHITE BLOOD CELL COU: 5.5 K/MM3 (ref 4–11)

## 2019-04-11 ENCOUNTER — EXTERNAL RECORD (OUTPATIENT)
Dept: OTHER | Age: 64
End: 2019-04-11

## 2019-04-11 LAB
*MEAN CORPUSCULAR HGB CONC: 34.6 G/DL (ref 32.5–35.8)
ANION GAP: 8 MEQ/L (ref 8–16)
BEDSIDE GLUCOSE: 151 MG/DL (ref 70–110)
BEDSIDE GLUCOSE: 216 MG/DL (ref 70–110)
BLOOD UREA NITROGEN (BUN): 18 MG/DL (ref 7–25)
BUN/CREATININE RATIO: 15.4 (ref 7.4–23)
CALCIUM, SERUM: 8.8 MG/DL (ref 8.6–10.3)
CARBON DIOXIDE: 28 MMOL/L (ref 21–31)
CHLORIDE, SERUM: 104 MMOL/L (ref 98–107)
CREATININE: 1.17 MG/DL (ref 0.7–1.3)
EST GLOMERULAR FILTRATION RATE: > 60 1.73M SQ
GLUCOSE: 146 MG/DL (ref 70–99)
HEMATOCRIT: 34.2 % (ref 38.6–49.2)
HEMOGLOBIN: 11.8 GM/DL (ref 13–17)
K (POTASSIUM, SERUM): 3.9 MMOL/L (ref 3.5–5.1)
MEAN CORPUSCULAR HGB: 29.3 PG (ref 26–34)
MEAN CORPUSCULAR VOL: 84.6 FL (ref 80–100)
MEAN PLATELET VOLUME: 9.4 FL (ref 6.8–10.2)
NA (SODIUM, SERUM): 140 MMOL/L (ref 133–144)
PLATELET COUNT: 152 K/MM3 (ref 150–450)
RED BLOOD CELL COUNT: 4.04 M/MM3 (ref 4.34–5.6)
RED CELL DISTRIBUTIO: 14.2 % (ref 11.9–15.9)
WHITE BLOOD CELL COU: 5.1 K/MM3 (ref 4–11)

## 2019-04-15 ENCOUNTER — TELEPHONE (OUTPATIENT)
Dept: GASTROENTEROLOGY | Age: 64
End: 2019-04-15

## 2019-04-15 ENCOUNTER — TELEPHONE (OUTPATIENT)
Dept: FAMILY MEDICINE | Age: 64
End: 2019-04-15

## 2019-04-17 ENCOUNTER — TELEPHONE (OUTPATIENT)
Dept: CARDIOLOGY | Age: 64
End: 2019-04-17

## 2019-04-17 RX ORDER — CARVEDILOL 12.5 MG/1
12.5 TABLET ORAL 2 TIMES DAILY WITH MEALS
Status: SHIPPED | COMMUNITY
Start: 2019-04-17 | End: 2019-11-18 | Stop reason: SDUPTHER

## 2019-04-17 RX ORDER — FUROSEMIDE 40 MG/1
40 TABLET ORAL DAILY
Status: SHIPPED | COMMUNITY
Start: 2019-04-17 | End: 2019-09-13 | Stop reason: ALTCHOICE

## 2019-04-17 RX ORDER — PANTOPRAZOLE SODIUM 40 MG/1
40 TABLET, DELAYED RELEASE ORAL 2 TIMES DAILY
Status: SHIPPED | COMMUNITY
Start: 2019-04-17 | End: 2019-10-04 | Stop reason: ALTCHOICE

## 2019-04-17 RX ORDER — LISINOPRIL 2.5 MG/1
2.5 TABLET ORAL DAILY
Status: SHIPPED | COMMUNITY
Start: 2019-04-17 | End: 2019-10-18 | Stop reason: DRUGHIGH

## 2019-06-10 ENCOUNTER — EXTERNAL RECORD (OUTPATIENT)
Dept: OTHER | Age: 64
End: 2019-06-10

## 2019-06-10 LAB
BEDSIDE GLUCOSE: 127 MG/DL (ref 70–110)
BEDSIDE GLUCOSE: 138 MG/DL (ref 70–110)
BEDSIDE GLUCOSE: 150 MG/DL (ref 70–110)

## 2019-06-11 ENCOUNTER — EXTERNAL RECORD (OUTPATIENT)
Dept: CARDIOLOGY | Age: 64
End: 2019-06-11

## 2019-06-11 LAB
*MEAN CORPUSCULAR HGB CONC: 34.4 G/DL (ref 32.5–35.8)
ANION GAP: 10 MEQ/L (ref 8–16)
BASOPHIL AUTOMATED: 0.9 %
BASOPHILS: 0 (ref 0–0.2)
BEDSIDE GLUCOSE: 125 MG/DL (ref 70–110)
BEDSIDE GLUCOSE: 171 MG/DL (ref 70–110)
BEDSIDE GLUCOSE: 192 MG/DL (ref 70–110)
BLOOD UREA NITROGEN (BUN): 28 MG/DL (ref 7–25)
BUN/CREATININE RATIO: 20.9 (ref 7.4–23)
CALCIUM, SERUM: 9.1 MG/DL (ref 8.6–10.3)
CARBON DIOXIDE: 30 MMOL/L (ref 21–31)
CHLORIDE, SERUM: 104 MMOL/L (ref 98–107)
CREATININE: 1.34 MG/DL (ref 0.7–1.3)
EOSINOPHIL AUTOMATED: 2.1 %
EOSINOPHILS: 0.1 (ref 0–0.5)
EST GLOMERULAR FILTRATION RATE: 54 1.73M SQ
GLUCOSE: 177 MG/DL (ref 70–99)
HEMATOCRIT: 35.3 % (ref 38.6–49.2)
HEMOGLOBIN: 12.1 GM/DL (ref 13–17)
K (POTASSIUM, SERUM): 3.6 MMOL/L (ref 3.5–5.1)
LYMPHOCYTE AUTOMATED: 32.1 %
LYMPHOCYTES: 1.6 (ref 0.6–3.4)
MEAN CORPUSCULAR HGB: 29.1 PG (ref 26–34)
MEAN CORPUSCULAR VOL: 84.8 FL (ref 80–100)
MEAN PLATELET VOLUME: 9.6 FL (ref 6.8–10.2)
MG (MAGNESIUM, SERUM: 1.9 MG/DL (ref 1.6–2.6)
MONOCYTE AUTOMATED: 10 %
MONOCYTES: 0.5 (ref 0.3–1)
NA (SODIUM, SERUM): 144 MMOL/L (ref 133–144)
NEUTROPHIL ABSOLUTE: 2.7 (ref 1.7–7.7)
NEUTROPHIL AUTOMATED: 54.9 %
PHOSPHORUS, SERUM: 4.5 MG/DL (ref 2.5–4.5)
PLATELET COUNT: 138 K/MM3 (ref 150–450)
RED BLOOD CELL COUNT: 4.17 M/MM3 (ref 4.34–5.6)
RED CELL DISTRIBUTIO: 15.3 % (ref 11.9–15.9)
WHITE BLOOD CELL COU: 4.9 K/MM3 (ref 4–11)

## 2019-06-12 ENCOUNTER — TELEPHONE (OUTPATIENT)
Dept: CARDIOLOGY | Age: 64
End: 2019-06-12

## 2019-06-12 LAB
*MEAN CORPUSCULAR HGB CONC: 34.7 G/DL (ref 32.5–35.8)
A/G RATIO: 1.61 (ref 1.1–2.4)
ALANINE AMINOTRANSFE: 66 U/L (ref 7–52)
ALBUMIN, SERUM (ALB): 3.7 G/DL (ref 3.5–5.7)
ALKALINE PHOSPHATASE (ALK): 62 U/L (ref 34–104)
ANION GAP: 8 MEQ/L (ref 8–16)
ASPARTATE AMINOTRANS: 20 U/L (ref 13–39)
BASOPHIL AUTOMATED: 0.7 %
BASOPHILS: 0 (ref 0–0.2)
BEDSIDE GLUCOSE: 149 MG/DL (ref 70–110)
BILIRUBIN, TOTAL: 0.5 MG/DL (ref 0.2–0.8)
BLOOD UREA NITROGEN (BUN): 23 MG/DL (ref 7–25)
BUN/CREATININE RATIO: 19.2 (ref 7.4–23)
CALCIUM, SERUM: 9 MG/DL (ref 8.6–10.3)
CARBON DIOXIDE: 28 MMOL/L (ref 21–31)
CHLORIDE, SERUM: 106 MMOL/L (ref 98–107)
CREATININE: 1.2 MG/DL (ref 0.7–1.3)
EOSINOPHIL AUTOMATED: 3.1 %
EOSINOPHILS: 0.2 (ref 0–0.5)
EST GLOMERULAR FILTRATION RATE: > 60 1.73M SQ
GLUCOSE: 142 MG/DL (ref 70–99)
HEMATOCRIT: 34.9 % (ref 38.6–49.2)
HEMOGLOBIN: 12.1 GM/DL (ref 13–17)
K (POTASSIUM, SERUM): 3.5 MMOL/L (ref 3.5–5.1)
LYMPHOCYTE AUTOMATED: 36.2 %
LYMPHOCYTES: 1.8 (ref 0.6–3.4)
MEAN CORPUSCULAR HGB: 29.2 PG (ref 26–34)
MEAN CORPUSCULAR VOL: 84.1 FL (ref 80–100)
MEAN PLATELET VOLUME: 9.3 FL (ref 6.8–10.2)
MONOCYTE AUTOMATED: 9.8 %
MONOCYTES: 0.5 (ref 0.3–1)
NA (SODIUM, SERUM): 142 MMOL/L (ref 133–144)
NEUTROPHIL ABSOLUTE: 2.4 (ref 1.7–7.7)
NEUTROPHIL AUTOMATED: 50.2 %
PLATELET COUNT: 137 K/MM3 (ref 150–450)
PROTEIN TOTAL: 6 G/DL (ref 6.4–8.9)
RED BLOOD CELL COUNT: 4.15 M/MM3 (ref 4.34–5.6)
RED CELL DISTRIBUTIO: 15.4 % (ref 11.9–15.9)
WHITE BLOOD CELL COU: 4.9 K/MM3 (ref 4–11)

## 2019-08-08 ENCOUNTER — TELEPHONE (OUTPATIENT)
Dept: CARDIOLOGY | Age: 64
End: 2019-08-08

## 2019-08-18 PROBLEM — I26.99 OTHER PULMONARY EMBOLISM WITHOUT ACUTE COR PULMONALE (CMD): Status: RESOLVED | Noted: 2019-04-09 | Resolved: 2019-08-18

## 2019-08-18 SDOH — HEALTH STABILITY: MENTAL HEALTH: HOW OFTEN DO YOU HAVE A DRINK CONTAINING ALCOHOL?: NEVER

## 2019-08-20 ENCOUNTER — TELEPHONE (OUTPATIENT)
Dept: CARDIOLOGY | Age: 64
End: 2019-08-20

## 2019-08-20 ENCOUNTER — LAB SERVICES (OUTPATIENT)
Dept: LAB | Age: 64
End: 2019-08-20

## 2019-08-20 ENCOUNTER — APPOINTMENT (OUTPATIENT)
Dept: CARDIOLOGY | Age: 64
End: 2019-08-20

## 2019-08-20 ENCOUNTER — OFFICE VISIT (OUTPATIENT)
Dept: CARDIOLOGY | Age: 64
End: 2019-08-20

## 2019-08-20 ENCOUNTER — IMAGING SERVICES (OUTPATIENT)
Dept: GENERAL RADIOLOGY | Age: 64
End: 2019-08-20
Attending: INTERNAL MEDICINE

## 2019-08-20 VITALS
BODY MASS INDEX: 28.31 KG/M2 | HEIGHT: 71 IN | SYSTOLIC BLOOD PRESSURE: 100 MMHG | DIASTOLIC BLOOD PRESSURE: 58 MMHG | OXYGEN SATURATION: 98 % | RESPIRATION RATE: 18 BRPM | WEIGHT: 202.2 LBS | HEART RATE: 68 BPM

## 2019-08-20 DIAGNOSIS — I50.9 CONGESTIVE HEART FAILURE, UNSPECIFIED HF CHRONICITY, UNSPECIFIED HEART FAILURE TYPE (CMD): ICD-10-CM

## 2019-08-20 DIAGNOSIS — M10.9 GOUT, UNSPECIFIED CAUSE, UNSPECIFIED CHRONICITY, UNSPECIFIED SITE: ICD-10-CM

## 2019-08-20 DIAGNOSIS — Z01.818 PRE-OP TESTING: ICD-10-CM

## 2019-08-20 DIAGNOSIS — E78.5 DYSLIPIDEMIA: ICD-10-CM

## 2019-08-20 DIAGNOSIS — I25.5 ISCHEMIC CARDIOMYOPATHY: ICD-10-CM

## 2019-08-20 DIAGNOSIS — I50.22 CHF (CONGESTIVE HEART FAILURE), NYHA CLASS II, CHRONIC, SYSTOLIC (CMD): Primary | ICD-10-CM

## 2019-08-20 DIAGNOSIS — I25.10 CORONARY ARTERY DISEASE INVOLVING NATIVE CORONARY ARTERY OF NATIVE HEART WITHOUT ANGINA PECTORIS: ICD-10-CM

## 2019-08-20 DIAGNOSIS — I25.2 MYOCARDIAL INFARCT, OLD: ICD-10-CM

## 2019-08-20 DIAGNOSIS — Z01.818 PRE-OP TESTING: Primary | ICD-10-CM

## 2019-08-20 DIAGNOSIS — I50.22 CHF (CONGESTIVE HEART FAILURE), NYHA CLASS II, CHRONIC, SYSTOLIC (CMD): ICD-10-CM

## 2019-08-20 DIAGNOSIS — I10 BENIGN ESSENTIAL HTN: ICD-10-CM

## 2019-08-20 DIAGNOSIS — R07.89 OTHER CHEST PAIN: ICD-10-CM

## 2019-08-20 DIAGNOSIS — E11.9 TYPE 2 DIABETES MELLITUS WITHOUT COMPLICATION, WITHOUT LONG-TERM CURRENT USE OF INSULIN (CMD): ICD-10-CM

## 2019-08-20 LAB
BASOPHIL %: 0.4 % (ref 0–1.2)
BASOPHIL ABSOLUTE #: 0 10*3/UL (ref 0–0.1)
BUN SERPL-MCNC: 32 MG/DL (ref 6–27)
CALCIUM SERPL-MCNC: 9.3 MG/DL (ref 8.6–10.6)
CHLORIDE SERPL-SCNC: 105 MMOL/L (ref 96–107)
CREAT SERPL-MCNC: 1.3 MG/DL (ref 0.6–1.6)
DIFFERENTIAL TYPE: ABNORMAL
EOSINOPHIL %: 2.6 % (ref 0–10)
EOSINOPHIL ABSOLUTE #: 0.1 10*3/UL (ref 0–0.5)
GFR SERPL CREATININE-BSD FRML MDRD: 54 ML/MIN/{1.73M2}
GFR SERPL CREATININE-BSD FRML MDRD: >60 ML/MIN/{1.73M2}
GLUCOSE SERPL-MCNC: 201 MG/DL (ref 70–200)
HCO3 SERPL-SCNC: 29 MMOL/L (ref 22–32)
HEMATOCRIT: 37.1 % (ref 40–51)
HEMOGLOBIN: 11.7 G/DL (ref 13.7–17.5)
INTERNATIONAL NORMALIZED RATIO: 1.1
LYMPH PERCENT: 35.3 % (ref 20.5–51.1)
LYMPHOCYTE ABSOLUTE #: 1.9 10*3/UL (ref 1.2–3.4)
MAGNESIUM SERPL-MCNC: 2 MG/DL (ref 1.7–2.6)
MEAN CORPUSCULAR HGB CONCENTRATION: 31.5 % (ref 32–36)
MEAN CORPUSCULAR HGB: 28.3 PG (ref 27–34)
MEAN CORPUSCULAR VOLUME: 89.8 FL (ref 79–95)
MEAN PLATELET VOLUME: 12.5 FL (ref 8.6–12.4)
MONOCYTE ABSOLUTE #: 0.5 10*3/UL (ref 0.2–0.9)
MONOCYTE PERCENT: 9.6 % (ref 4.3–12.9)
NEUTROPHIL ABSOLUTE #: 2.8 10*3/UL (ref 1.4–6.5)
NEUTROPHIL PERCENT: 52.1 % (ref 34–73.5)
PLATELET COUNT: 165 10*3/UL (ref 150–400)
POTASSIUM SERPL-SCNC: 4.3 MMOL/L (ref 3.5–5.3)
PROTHROMBIN TIME, THERAPEUTIC: 12 S (ref 9.5–11.5)
RED BLOOD CELL COUNT: 4.13 10*6/UL (ref 3.9–5.7)
RED CELL DISTRIBUTION WIDTH: 14.3 % (ref 11.3–14.8)
SODIUM SERPL-SCNC: 142 MMOL/L (ref 136–146)
WHITE BLOOD CELL COUNT: 5.3 10*3/UL (ref 4–10)

## 2019-08-20 PROCEDURE — 3078F DIAST BP <80 MM HG: CPT | Performed by: INTERNAL MEDICINE

## 2019-08-20 PROCEDURE — 3074F SYST BP LT 130 MM HG: CPT | Performed by: INTERNAL MEDICINE

## 2019-08-20 PROCEDURE — 80048 BASIC METABOLIC PNL TOTAL CA: CPT | Performed by: INTERNAL MEDICINE

## 2019-08-20 PROCEDURE — 36415 COLL VENOUS BLD VENIPUNCTURE: CPT | Performed by: INTERNAL MEDICINE

## 2019-08-20 PROCEDURE — 85610 PROTHROMBIN TIME: CPT | Performed by: INTERNAL MEDICINE

## 2019-08-20 PROCEDURE — 71046 X-RAY EXAM CHEST 2 VIEWS: CPT | Performed by: RADIOLOGY

## 2019-08-20 PROCEDURE — 83735 ASSAY OF MAGNESIUM: CPT | Performed by: INTERNAL MEDICINE

## 2019-08-20 PROCEDURE — 93000 ELECTROCARDIOGRAM COMPLETE: CPT | Performed by: INTERNAL MEDICINE

## 2019-08-20 PROCEDURE — 99245 OFF/OP CONSLTJ NEW/EST HI 55: CPT | Performed by: INTERNAL MEDICINE

## 2019-08-20 PROCEDURE — 85025 COMPLETE CBC W/AUTO DIFF WBC: CPT | Performed by: INTERNAL MEDICINE

## 2019-08-20 SDOH — HEALTH STABILITY: MENTAL HEALTH: HOW OFTEN DO YOU HAVE A DRINK CONTAINING ALCOHOL?: NEVER

## 2019-09-11 ENCOUNTER — TELEPHONE (OUTPATIENT)
Dept: CARDIOLOGY | Age: 64
End: 2019-09-11

## 2019-09-11 DIAGNOSIS — Z79.899 LONG TERM CURRENT USE OF DIURETIC: Primary | ICD-10-CM

## 2019-09-13 RX ORDER — BUMETANIDE 1 MG/1
1 TABLET ORAL DAILY
Qty: 30 TABLET | Refills: 5 | Status: SHIPPED | OUTPATIENT
Start: 2019-09-13 | End: 2019-10-04 | Stop reason: SDUPTHER

## 2019-09-17 ENCOUNTER — TELEPHONE (OUTPATIENT)
Dept: CARDIOLOGY | Age: 64
End: 2019-09-17

## 2019-09-17 DIAGNOSIS — I50.9 CONGESTIVE HEART FAILURE, UNSPECIFIED HF CHRONICITY, UNSPECIFIED HEART FAILURE TYPE (CMD): ICD-10-CM

## 2019-09-17 DIAGNOSIS — Z01.818 PRE-OP TESTING: Primary | ICD-10-CM

## 2019-09-17 DIAGNOSIS — Z79.899 LONG TERM CURRENT USE OF DIURETIC: ICD-10-CM

## 2019-09-25 ENCOUNTER — LAB SERVICES (OUTPATIENT)
Dept: LAB | Age: 64
End: 2019-09-25

## 2019-09-25 DIAGNOSIS — Z79.899 LONG TERM CURRENT USE OF DIURETIC: ICD-10-CM

## 2019-09-25 LAB
BUN SERPL-MCNC: 23 MG/DL (ref 6–27)
CALCIUM SERPL-MCNC: 9.7 MG/DL (ref 8.6–10.6)
CHLORIDE SERPL-SCNC: 103 MMOL/L (ref 96–107)
CREAT SERPL-MCNC: 1.3 MG/DL (ref 0.6–1.6)
GFR SERPL CREATININE-BSD FRML MDRD: 55 ML/MIN/{1.73M2}
GFR SERPL CREATININE-BSD FRML MDRD: >60 ML/MIN/{1.73M2}
GLUCOSE SERPL-MCNC: 156 MG/DL (ref 70–200)
HCO3 SERPL-SCNC: 25 MMOL/L (ref 22–32)
POTASSIUM SERPL-SCNC: 4 MMOL/L (ref 3.5–5.3)
SODIUM SERPL-SCNC: 139 MMOL/L (ref 136–146)

## 2019-09-25 PROCEDURE — 36415 COLL VENOUS BLD VENIPUNCTURE: CPT | Performed by: INTERNAL MEDICINE

## 2019-09-25 PROCEDURE — 80048 BASIC METABOLIC PNL TOTAL CA: CPT | Performed by: INTERNAL MEDICINE

## 2019-10-04 ENCOUNTER — OFFICE VISIT (OUTPATIENT)
Dept: CARDIOLOGY | Age: 64
End: 2019-10-04

## 2019-10-04 VITALS
RESPIRATION RATE: 18 BRPM | HEART RATE: 93 BPM | OXYGEN SATURATION: 97 % | DIASTOLIC BLOOD PRESSURE: 70 MMHG | SYSTOLIC BLOOD PRESSURE: 120 MMHG | HEIGHT: 71 IN | BODY MASS INDEX: 28.42 KG/M2 | WEIGHT: 203 LBS

## 2019-10-04 DIAGNOSIS — I50.22 CHRONIC SYSTOLIC CHF (CONGESTIVE HEART FAILURE) (CMD): Primary | ICD-10-CM

## 2019-10-04 DIAGNOSIS — I21.3 ST ELEVATION MYOCARDIAL INFARCTION (STEMI), UNSPECIFIED ARTERY (CMD): ICD-10-CM

## 2019-10-04 DIAGNOSIS — I26.99 OTHER PULMONARY EMBOLISM WITHOUT ACUTE COR PULMONALE, UNSPECIFIED CHRONICITY (CMD): ICD-10-CM

## 2019-10-04 DIAGNOSIS — I25.5 ISCHEMIC CARDIOMYOPATHY: ICD-10-CM

## 2019-10-04 DIAGNOSIS — I25.10 CORONARY ARTERY DISEASE INVOLVING NATIVE CORONARY ARTERY OF NATIVE HEART WITHOUT ANGINA PECTORIS: ICD-10-CM

## 2019-10-04 DIAGNOSIS — E78.5 DYSLIPIDEMIA: ICD-10-CM

## 2019-10-04 DIAGNOSIS — I10 BENIGN ESSENTIAL HTN: ICD-10-CM

## 2019-10-04 PROCEDURE — 3078F DIAST BP <80 MM HG: CPT | Performed by: PHYSICIAN ASSISTANT

## 2019-10-04 PROCEDURE — 99214 OFFICE O/P EST MOD 30 MIN: CPT | Performed by: PHYSICIAN ASSISTANT

## 2019-10-04 PROCEDURE — 3074F SYST BP LT 130 MM HG: CPT | Performed by: PHYSICIAN ASSISTANT

## 2019-10-04 RX ORDER — BUMETANIDE 2 MG/1
2 TABLET ORAL DAILY
Qty: 30 TABLET | Refills: 3 | Status: SHIPPED | OUTPATIENT
Start: 2019-10-04 | End: 2019-10-18 | Stop reason: DRUGHIGH

## 2019-10-04 RX ORDER — ASPIRIN 325 MG
325 TABLET ORAL DAILY
Status: SHIPPED | COMMUNITY
Start: 2019-10-04 | End: 2021-06-08 | Stop reason: ALTCHOICE

## 2019-10-11 ASSESSMENT — NEW YORK HEART ASSOCIATION (NYHA) CLASSIFICATION: NYHA FUNCTIONAL CLASS: III

## 2019-10-18 ENCOUNTER — TELEPHONE (OUTPATIENT)
Dept: CARDIOLOGY | Age: 64
End: 2019-10-18

## 2019-10-18 ENCOUNTER — OFFICE VISIT (OUTPATIENT)
Dept: CARDIOLOGY | Age: 64
End: 2019-10-18

## 2019-10-18 ENCOUNTER — LAB SERVICES (OUTPATIENT)
Dept: LAB | Age: 64
End: 2019-10-18

## 2019-10-18 DIAGNOSIS — I10 BENIGN ESSENTIAL HTN: ICD-10-CM

## 2019-10-18 DIAGNOSIS — I25.5 ISCHEMIC CARDIOMYOPATHY: ICD-10-CM

## 2019-10-18 DIAGNOSIS — I25.10 CORONARY ARTERY DISEASE INVOLVING NATIVE CORONARY ARTERY OF NATIVE HEART WITHOUT ANGINA PECTORIS: ICD-10-CM

## 2019-10-18 DIAGNOSIS — Z01.818 PRE-OP TESTING: Primary | ICD-10-CM

## 2019-10-18 DIAGNOSIS — Z79.899 LONG TERM CURRENT USE OF DIURETIC: ICD-10-CM

## 2019-10-18 DIAGNOSIS — E78.5 DYSLIPIDEMIA: ICD-10-CM

## 2019-10-18 DIAGNOSIS — Z01.818 PRE-OP TESTING: ICD-10-CM

## 2019-10-18 DIAGNOSIS — I21.3 ST ELEVATION MYOCARDIAL INFARCTION (STEMI), UNSPECIFIED ARTERY (CMD): ICD-10-CM

## 2019-10-18 DIAGNOSIS — I50.22 CHF (CONGESTIVE HEART FAILURE), NYHA CLASS II, CHRONIC, SYSTOLIC (CMD): Primary | ICD-10-CM

## 2019-10-18 DIAGNOSIS — I50.9 CONGESTIVE HEART FAILURE, UNSPECIFIED HF CHRONICITY, UNSPECIFIED HEART FAILURE TYPE (CMD): ICD-10-CM

## 2019-10-18 LAB
BASOPHIL %: 0.4 % (ref 0–1.2)
BASOPHIL ABSOLUTE #: 0 10*3/UL (ref 0–0.1)
BUN SERPL-MCNC: 41 MG/DL (ref 6–27)
CALCIUM SERPL-MCNC: 9.9 MG/DL (ref 8.6–10.6)
CHLORIDE SERPL-SCNC: 95 MMOL/L (ref 96–107)
CREAT SERPL-MCNC: 1.6 MG/DL (ref 0.6–1.6)
DIFFERENTIAL TYPE: ABNORMAL
EOSINOPHIL %: 3.6 % (ref 0–10)
EOSINOPHIL ABSOLUTE #: 0.2 10*3/UL (ref 0–0.5)
GFR SERPL CREATININE-BSD FRML MDRD: 43 ML/MIN/{1.73M2}
GFR SERPL CREATININE-BSD FRML MDRD: 52 ML/MIN/{1.73M2}
GLUCOSE SERPL-MCNC: 150 MG/DL (ref 70–200)
HCO3 SERPL-SCNC: 28 MMOL/L (ref 22–32)
HEMATOCRIT: 40.1 % (ref 40–51)
HEMOGLOBIN: 13.4 G/DL (ref 13.7–17.5)
INTERNATIONAL NORMALIZED RATIO: 1.1
LYMPH PERCENT: 33.4 % (ref 20.5–51.1)
LYMPHOCYTE ABSOLUTE #: 1.8 10*3/UL (ref 1.2–3.4)
MAGNESIUM SERPL-MCNC: 2.2 MG/DL (ref 1.7–2.6)
MEAN CORPUSCULAR HGB CONCENTRATION: 33.4 % (ref 32–36)
MEAN CORPUSCULAR HGB: 28.1 PG (ref 27–34)
MEAN CORPUSCULAR VOLUME: 84.1 FL (ref 79–95)
MEAN PLATELET VOLUME: 11.6 FL (ref 8.6–12.4)
MONOCYTE ABSOLUTE #: 0.9 10*3/UL (ref 0.2–0.9)
MONOCYTE PERCENT: 16.9 % (ref 4.3–12.9)
NEUTROPHIL ABSOLUTE #: 2.4 10*3/UL (ref 1.4–6.5)
NEUTROPHIL PERCENT: 45.7 % (ref 34–73.5)
PLATELET COUNT: 197 10*3/UL (ref 150–400)
POTASSIUM SERPL-SCNC: 3.8 MMOL/L (ref 3.5–5.3)
PROTHROMBIN TIME, THERAPEUTIC: 11.4 S (ref 9.5–11.5)
RED BLOOD CELL COUNT: 4.77 10*6/UL (ref 3.9–5.7)
RED CELL DISTRIBUTION WIDTH: 13 % (ref 11.3–14.8)
SODIUM SERPL-SCNC: 135 MMOL/L (ref 136–146)
WHITE BLOOD CELL COUNT: 5.3 10*3/UL (ref 4–10)

## 2019-10-18 PROCEDURE — 83735 ASSAY OF MAGNESIUM: CPT | Performed by: INTERNAL MEDICINE

## 2019-10-18 PROCEDURE — 80048 BASIC METABOLIC PNL TOTAL CA: CPT | Performed by: INTERNAL MEDICINE

## 2019-10-18 PROCEDURE — 99214 OFFICE O/P EST MOD 30 MIN: CPT | Performed by: PHYSICIAN ASSISTANT

## 2019-10-18 PROCEDURE — 85025 COMPLETE CBC W/AUTO DIFF WBC: CPT | Performed by: INTERNAL MEDICINE

## 2019-10-18 PROCEDURE — 3074F SYST BP LT 130 MM HG: CPT | Performed by: PHYSICIAN ASSISTANT

## 2019-10-18 PROCEDURE — 36415 COLL VENOUS BLD VENIPUNCTURE: CPT | Performed by: INTERNAL MEDICINE

## 2019-10-18 PROCEDURE — 85610 PROTHROMBIN TIME: CPT | Performed by: INTERNAL MEDICINE

## 2019-10-18 PROCEDURE — 3078F DIAST BP <80 MM HG: CPT | Performed by: PHYSICIAN ASSISTANT

## 2019-10-18 RX ORDER — BUMETANIDE 2 MG/1
1 TABLET ORAL DAILY
Qty: 30 TABLET | Refills: 3 | Status: SHIPPED | COMMUNITY
Start: 2019-10-18 | End: 2019-10-18 | Stop reason: DRUGHIGH

## 2019-10-18 RX ORDER — FUROSEMIDE 40 MG/1
TABLET ORAL DAILY
Refills: 2 | COMMUNITY
Start: 2019-10-03 | End: 2019-10-18 | Stop reason: ALTCHOICE

## 2019-10-18 RX ORDER — LISINOPRIL 2.5 MG/1
5 TABLET ORAL DAILY
Qty: 180 TABLET | Refills: 1 | Status: SHIPPED | OUTPATIENT
Start: 2019-10-18 | End: 2019-12-26 | Stop reason: SDUPTHER

## 2019-10-18 RX ORDER — BUMETANIDE 1 MG/1
1 TABLET ORAL DAILY
Status: SHIPPED | COMMUNITY
Start: 2019-10-18 | End: 2019-12-02 | Stop reason: SDUPTHER

## 2019-10-18 RX ORDER — RIVAROXABAN 20 MG/1
20 TABLET, FILM COATED ORAL DAILY
Refills: 2 | COMMUNITY
Start: 2019-10-02 | End: 2019-11-18 | Stop reason: ALTCHOICE

## 2019-10-18 RX ORDER — METOPROLOL SUCCINATE 100 MG/1
TABLET, EXTENDED RELEASE ORAL
Refills: 2 | COMMUNITY
Start: 2019-08-30 | End: 2019-10-18 | Stop reason: ALTCHOICE

## 2019-10-18 RX ORDER — LORAZEPAM 0.5 MG/1
TABLET ORAL DAILY
Refills: 0 | COMMUNITY
Start: 2019-08-30 | End: 2020-03-17 | Stop reason: DRUGHIGH

## 2019-10-18 RX ORDER — TADALAFIL 20 MG
TABLET ORAL PRN
Refills: 10 | COMMUNITY
Start: 2019-07-30 | End: 2020-02-27 | Stop reason: ALTCHOICE

## 2019-10-18 ASSESSMENT — NEW YORK HEART ASSOCIATION (NYHA) CLASSIFICATION: NYHA FUNCTIONAL CLASS: III

## 2019-10-18 ASSESSMENT — PAIN SCALES - GENERAL: PAINLEVEL: 0

## 2019-10-21 ENCOUNTER — TELEPHONE (OUTPATIENT)
Dept: CARDIOLOGY | Age: 64
End: 2019-10-21

## 2019-10-28 ENCOUNTER — TELEPHONE (OUTPATIENT)
Dept: CARDIOLOGY | Age: 64
End: 2019-10-28

## 2019-11-01 ENCOUNTER — APPOINTMENT (OUTPATIENT)
Dept: CARDIOLOGY | Age: 64
End: 2019-11-01

## 2019-11-15 ENCOUNTER — APPOINTMENT (OUTPATIENT)
Dept: CARDIOLOGY | Age: 64
End: 2019-11-15

## 2019-11-18 ENCOUNTER — TELEPHONE (OUTPATIENT)
Dept: CARDIOLOGY | Age: 64
End: 2019-11-18

## 2019-11-18 ENCOUNTER — OFFICE VISIT (OUTPATIENT)
Dept: CARDIOLOGY | Age: 64
End: 2019-11-18

## 2019-11-18 DIAGNOSIS — I26.99 OTHER PULMONARY EMBOLISM WITHOUT ACUTE COR PULMONALE, UNSPECIFIED CHRONICITY (CMD): ICD-10-CM

## 2019-11-18 DIAGNOSIS — E11.9 TYPE 2 DIABETES MELLITUS WITHOUT COMPLICATION, WITHOUT LONG-TERM CURRENT USE OF INSULIN (CMD): ICD-10-CM

## 2019-11-18 DIAGNOSIS — I50.22 CHF (CONGESTIVE HEART FAILURE), NYHA CLASS II, CHRONIC, SYSTOLIC (CMD): Primary | ICD-10-CM

## 2019-11-18 DIAGNOSIS — R06.09 DOE (DYSPNEA ON EXERTION): ICD-10-CM

## 2019-11-18 DIAGNOSIS — I25.10 CORONARY ARTERY DISEASE INVOLVING NATIVE CORONARY ARTERY OF NATIVE HEART WITHOUT ANGINA PECTORIS: ICD-10-CM

## 2019-11-18 DIAGNOSIS — I10 BENIGN ESSENTIAL HTN: ICD-10-CM

## 2019-11-18 PROCEDURE — 3074F SYST BP LT 130 MM HG: CPT | Performed by: PHYSICIAN ASSISTANT

## 2019-11-18 PROCEDURE — 99214 OFFICE O/P EST MOD 30 MIN: CPT | Performed by: PHYSICIAN ASSISTANT

## 2019-11-18 PROCEDURE — 3078F DIAST BP <80 MM HG: CPT | Performed by: PHYSICIAN ASSISTANT

## 2019-11-18 RX ORDER — FUROSEMIDE 40 MG/1
40 TABLET ORAL DAILY
Refills: 2 | COMMUNITY
Start: 2019-11-06 | End: 2019-11-18 | Stop reason: ALTCHOICE

## 2019-11-18 RX ORDER — ALLOPURINOL 300 MG/1
300 TABLET ORAL DAILY
Qty: 30 TABLET | Refills: 0 | Status: CANCELLED | OUTPATIENT
Start: 2019-11-18

## 2019-11-18 RX ORDER — CARVEDILOL 12.5 MG/1
25 TABLET ORAL 2 TIMES DAILY WITH MEALS
Status: SHIPPED | COMMUNITY
Start: 2019-11-18 | End: 2019-12-02 | Stop reason: SDUPTHER

## 2019-11-18 ASSESSMENT — PATIENT HEALTH QUESTIONNAIRE - PHQ9
1. LITTLE INTEREST OR PLEASURE IN DOING THINGS: NOT AT ALL
SUM OF ALL RESPONSES TO PHQ9 QUESTIONS 1 AND 2: 0
SUM OF ALL RESPONSES TO PHQ9 QUESTIONS 1 AND 2: 0
2. FEELING DOWN, DEPRESSED OR HOPELESS: NOT AT ALL

## 2019-11-18 ASSESSMENT — PAIN SCALES - GENERAL: PAINLEVEL: 0

## 2019-11-18 ASSESSMENT — NEW YORK HEART ASSOCIATION (NYHA) CLASSIFICATION: NYHA FUNCTIONAL CLASS: III

## 2019-12-02 ENCOUNTER — OFFICE VISIT (OUTPATIENT)
Dept: CARDIOLOGY | Age: 64
End: 2019-12-02

## 2019-12-02 VITALS
RESPIRATION RATE: 16 BRPM | WEIGHT: 214 LBS | HEIGHT: 71 IN | SYSTOLIC BLOOD PRESSURE: 124 MMHG | OXYGEN SATURATION: 96 % | BODY MASS INDEX: 29.96 KG/M2 | DIASTOLIC BLOOD PRESSURE: 72 MMHG | HEART RATE: 64 BPM

## 2019-12-02 DIAGNOSIS — I10 BENIGN ESSENTIAL HTN: ICD-10-CM

## 2019-12-02 DIAGNOSIS — I25.2 MYOCARDIAL INFARCT, OLD: ICD-10-CM

## 2019-12-02 DIAGNOSIS — I25.10 CORONARY ARTERY DISEASE INVOLVING NATIVE CORONARY ARTERY OF NATIVE HEART WITHOUT ANGINA PECTORIS: ICD-10-CM

## 2019-12-02 DIAGNOSIS — I26.99 OTHER PULMONARY EMBOLISM WITHOUT ACUTE COR PULMONALE, UNSPECIFIED CHRONICITY (CMD): Primary | ICD-10-CM

## 2019-12-02 DIAGNOSIS — I25.5 ISCHEMIC CARDIOMYOPATHY: ICD-10-CM

## 2019-12-02 DIAGNOSIS — R07.89 OTHER CHEST PAIN: ICD-10-CM

## 2019-12-02 DIAGNOSIS — I50.22 CHF (CONGESTIVE HEART FAILURE), NYHA CLASS II, CHRONIC, SYSTOLIC (CMD): ICD-10-CM

## 2019-12-02 PROCEDURE — 99214 OFFICE O/P EST MOD 30 MIN: CPT | Performed by: INTERNAL MEDICINE

## 2019-12-02 PROCEDURE — 3078F DIAST BP <80 MM HG: CPT | Performed by: INTERNAL MEDICINE

## 2019-12-02 PROCEDURE — 3074F SYST BP LT 130 MM HG: CPT | Performed by: INTERNAL MEDICINE

## 2019-12-02 RX ORDER — CARVEDILOL 12.5 MG/1
12.5 TABLET ORAL 2 TIMES DAILY WITH MEALS
Qty: 180 TABLET | Refills: 3 | Status: SHIPPED | OUTPATIENT
Start: 2019-12-02 | End: 2019-12-26 | Stop reason: SDUPTHER

## 2019-12-02 RX ORDER — PAROXETINE HYDROCHLORIDE 40 MG/1
40 TABLET, FILM COATED ORAL EVERY MORNING
Qty: 90 TABLET | Refills: 0 | Status: SHIPPED | OUTPATIENT
Start: 2019-12-02 | End: 2023-09-15 | Stop reason: ALTCHOICE

## 2019-12-02 RX ORDER — BUMETANIDE 1 MG/1
1 TABLET ORAL DAILY
Qty: 90 TABLET | Refills: 3 | Status: SHIPPED | OUTPATIENT
Start: 2019-12-02 | End: 2020-03-17 | Stop reason: ALTCHOICE

## 2019-12-02 RX ORDER — BUMETANIDE 1 MG/1
1 TABLET ORAL DAILY
Qty: 30 TABLET | Refills: 0 | Status: SHIPPED | OUTPATIENT
Start: 2019-12-02 | End: 2019-12-02 | Stop reason: SDUPTHER

## 2019-12-02 RX ORDER — BUMETANIDE 1 MG/1
1 TABLET ORAL DAILY
Qty: 90 TABLET | Refills: 3 | Status: SHIPPED | OUTPATIENT
Start: 2019-12-02 | End: 2019-12-26 | Stop reason: SDUPTHER

## 2019-12-02 RX ORDER — CLOPIDOGREL BISULFATE 75 MG/1
75 TABLET ORAL DAILY
Qty: 90 TABLET | Refills: 1 | Status: SHIPPED | OUTPATIENT
Start: 2019-12-02 | End: 2019-12-26 | Stop reason: SDUPTHER

## 2019-12-02 RX ORDER — CARVEDILOL 12.5 MG/1
12.5 TABLET ORAL 2 TIMES DAILY WITH MEALS
Qty: 60 TABLET | Refills: 0 | Status: SHIPPED | OUTPATIENT
Start: 2019-12-02 | End: 2019-12-02 | Stop reason: SDUPTHER

## 2019-12-02 RX ORDER — ALLOPURINOL 300 MG/1
300 TABLET ORAL DAILY
Qty: 90 TABLET | Refills: 1 | Status: SHIPPED | OUTPATIENT
Start: 2019-12-02 | End: 2023-09-18 | Stop reason: SDUPTHER

## 2019-12-02 RX ORDER — CLOPIDOGREL BISULFATE 75 MG/1
75 TABLET ORAL DAILY
Qty: 90 TABLET | Refills: 30 | Status: SHIPPED | OUTPATIENT
Start: 2019-12-02 | End: 2020-02-27 | Stop reason: ALTCHOICE

## 2019-12-02 RX ORDER — CLOPIDOGREL BISULFATE 75 MG/1
75 TABLET ORAL DAILY
Qty: 30 TABLET | Refills: 0 | Status: SHIPPED | OUTPATIENT
Start: 2019-12-02 | End: 2019-12-02 | Stop reason: SDUPTHER

## 2019-12-26 ENCOUNTER — OFFICE VISIT (OUTPATIENT)
Dept: CARDIOLOGY | Age: 64
End: 2019-12-26

## 2019-12-26 VITALS
BODY MASS INDEX: 29.62 KG/M2 | OXYGEN SATURATION: 95 % | DIASTOLIC BLOOD PRESSURE: 74 MMHG | HEIGHT: 71 IN | RESPIRATION RATE: 18 BRPM | WEIGHT: 211.6 LBS | SYSTOLIC BLOOD PRESSURE: 122 MMHG | HEART RATE: 83 BPM

## 2019-12-26 DIAGNOSIS — I50.22 CHF (CONGESTIVE HEART FAILURE), NYHA CLASS II, CHRONIC, SYSTOLIC (CMD): ICD-10-CM

## 2019-12-26 DIAGNOSIS — I26.99 OTHER PULMONARY EMBOLISM WITHOUT ACUTE COR PULMONALE, UNSPECIFIED CHRONICITY (CMD): ICD-10-CM

## 2019-12-26 DIAGNOSIS — I10 BENIGN ESSENTIAL HTN: ICD-10-CM

## 2019-12-26 DIAGNOSIS — I25.10 CORONARY ARTERY DISEASE INVOLVING NATIVE CORONARY ARTERY OF NATIVE HEART WITHOUT ANGINA PECTORIS: Primary | ICD-10-CM

## 2019-12-26 DIAGNOSIS — Z95.1 HX OF CABG: ICD-10-CM

## 2019-12-26 DIAGNOSIS — E78.5 DYSLIPIDEMIA: ICD-10-CM

## 2019-12-26 PROCEDURE — 3078F DIAST BP <80 MM HG: CPT | Performed by: PHYSICIAN ASSISTANT

## 2019-12-26 PROCEDURE — 3074F SYST BP LT 130 MM HG: CPT | Performed by: PHYSICIAN ASSISTANT

## 2019-12-26 PROCEDURE — 99214 OFFICE O/P EST MOD 30 MIN: CPT | Performed by: PHYSICIAN ASSISTANT

## 2019-12-26 RX ORDER — CARVEDILOL 25 MG/1
25 TABLET ORAL 2 TIMES DAILY WITH MEALS
Qty: 180 TABLET | Refills: 1 | Status: SHIPPED | OUTPATIENT
Start: 2019-12-26 | End: 2020-02-27 | Stop reason: DRUGHIGH

## 2019-12-26 RX ORDER — LISINOPRIL 5 MG/1
5 TABLET ORAL DAILY
Qty: 90 TABLET | Refills: 1 | Status: SHIPPED | OUTPATIENT
Start: 2019-12-26 | End: 2020-02-27 | Stop reason: DRUGHIGH

## 2019-12-26 SDOH — HEALTH STABILITY: MENTAL HEALTH: HOW OFTEN DO YOU HAVE A DRINK CONTAINING ALCOHOL?: NEVER

## 2019-12-26 ASSESSMENT — NEW YORK HEART ASSOCIATION (NYHA) CLASSIFICATION: NYHA FUNCTIONAL CLASS: III

## 2019-12-30 ENCOUNTER — APPOINTMENT (OUTPATIENT)
Dept: CARDIOLOGY | Age: 64
End: 2019-12-30

## 2020-01-03 ENCOUNTER — TELEPHONE (OUTPATIENT)
Dept: CARDIOLOGY | Age: 65
End: 2020-01-03

## 2020-01-09 ENCOUNTER — IMAGING SERVICES (OUTPATIENT)
Dept: CT IMAGING | Age: 65
End: 2020-01-09

## 2020-01-09 ENCOUNTER — TELEPHONE (OUTPATIENT)
Dept: CARDIOLOGY | Age: 65
End: 2020-01-09

## 2020-01-09 ENCOUNTER — APPOINTMENT (OUTPATIENT)
Dept: CARDIOLOGY | Age: 65
End: 2020-01-09

## 2020-01-09 ENCOUNTER — IMAGING SERVICES (OUTPATIENT)
Dept: GENERAL RADIOLOGY | Age: 65
End: 2020-01-09

## 2020-01-10 ENCOUNTER — IMAGING SERVICES (OUTPATIENT)
Dept: CT IMAGING | Age: 65
End: 2020-01-10
Attending: INTERNAL MEDICINE

## 2020-01-10 DIAGNOSIS — I26.99 OTHER PULMONARY EMBOLISM WITHOUT ACUTE COR PULMONALE, UNSPECIFIED CHRONICITY (CMD): Primary | ICD-10-CM

## 2020-01-10 DIAGNOSIS — I26.99 OTHER PULMONARY EMBOLISM WITHOUT ACUTE COR PULMONALE, UNSPECIFIED CHRONICITY  (CMD): ICD-10-CM

## 2020-01-10 LAB — GFR SERPLBLD BASED ON 1.73 SQ M-ARVRAT: >60 ML/MIN

## 2020-01-10 PROCEDURE — 82565 ASSAY OF CREATININE: CPT | Performed by: INTERNAL MEDICINE

## 2020-01-10 PROCEDURE — 71275 CT ANGIOGRAPHY CHEST: CPT | Performed by: RADIOLOGY

## 2020-01-13 DIAGNOSIS — R91.8 PULMONARY NODULES: Primary | ICD-10-CM

## 2020-01-14 ENCOUNTER — APPOINTMENT (OUTPATIENT)
Dept: CARDIOLOGY | Age: 65
End: 2020-01-14

## 2020-02-27 ENCOUNTER — OFFICE VISIT (OUTPATIENT)
Dept: CARDIOLOGY | Age: 65
End: 2020-02-27

## 2020-02-27 VITALS
HEART RATE: 62 BPM | RESPIRATION RATE: 18 BRPM | HEIGHT: 71 IN | BODY MASS INDEX: 30.18 KG/M2 | DIASTOLIC BLOOD PRESSURE: 68 MMHG | OXYGEN SATURATION: 96 % | SYSTOLIC BLOOD PRESSURE: 122 MMHG | WEIGHT: 215.6 LBS

## 2020-02-27 DIAGNOSIS — I25.5 ISCHEMIC CARDIOMYOPATHY: ICD-10-CM

## 2020-02-27 DIAGNOSIS — I10 BENIGN ESSENTIAL HTN: ICD-10-CM

## 2020-02-27 DIAGNOSIS — I50.22 CHF (CONGESTIVE HEART FAILURE), NYHA CLASS II, CHRONIC, SYSTOLIC (CMD): Primary | ICD-10-CM

## 2020-02-27 DIAGNOSIS — I26.99 OTHER PULMONARY EMBOLISM WITHOUT ACUTE COR PULMONALE, UNSPECIFIED CHRONICITY (CMD): ICD-10-CM

## 2020-02-27 DIAGNOSIS — E78.5 DYSLIPIDEMIA: ICD-10-CM

## 2020-02-27 DIAGNOSIS — I25.10 CORONARY ARTERY DISEASE INVOLVING NATIVE CORONARY ARTERY OF NATIVE HEART WITHOUT ANGINA PECTORIS: ICD-10-CM

## 2020-02-27 DIAGNOSIS — E11.9 TYPE 2 DIABETES MELLITUS WITHOUT COMPLICATION, WITHOUT LONG-TERM CURRENT USE OF INSULIN (CMD): ICD-10-CM

## 2020-02-27 PROCEDURE — 3074F SYST BP LT 130 MM HG: CPT | Performed by: PHYSICIAN ASSISTANT

## 2020-02-27 PROCEDURE — 3078F DIAST BP <80 MM HG: CPT | Performed by: PHYSICIAN ASSISTANT

## 2020-02-27 PROCEDURE — 99214 OFFICE O/P EST MOD 30 MIN: CPT | Performed by: PHYSICIAN ASSISTANT

## 2020-02-27 RX ORDER — CARVEDILOL 25 MG/1
12.5 TABLET ORAL 2 TIMES DAILY WITH MEALS
Status: SHIPPED | COMMUNITY
Start: 2020-02-27 | End: 2020-03-17 | Stop reason: DRUGHIGH

## 2020-02-27 RX ORDER — LISINOPRIL 5 MG/1
TABLET ORAL
Qty: 90 TABLET | Refills: 1 | Status: SHIPPED | COMMUNITY
Start: 2020-02-27 | End: 2020-03-17 | Stop reason: DRUGHIGH

## 2020-02-27 RX ORDER — CLOPIDOGREL BISULFATE 75 MG/1
75 TABLET ORAL DAILY
Status: SHIPPED | COMMUNITY
Start: 2020-02-27 | End: 2020-07-08 | Stop reason: SDUPTHER

## 2020-02-27 RX ORDER — METOPROLOL SUCCINATE 100 MG/1
100 TABLET, EXTENDED RELEASE ORAL 2 TIMES DAILY
COMMUNITY
End: 2020-07-08 | Stop reason: ALTCHOICE

## 2020-02-27 SDOH — HEALTH STABILITY: MENTAL HEALTH: HOW OFTEN DO YOU HAVE A DRINK CONTAINING ALCOHOL?: NEVER

## 2020-02-27 ASSESSMENT — NEW YORK HEART ASSOCIATION (NYHA) CLASSIFICATION: NYHA FUNCTIONAL CLASS: III

## 2020-03-05 RX ORDER — ATORVASTATIN CALCIUM 80 MG/1
80 TABLET, FILM COATED ORAL DAILY
Qty: 90 TABLET | Refills: 3 | Status: CANCELLED | OUTPATIENT
Start: 2020-03-05

## 2020-03-05 RX ORDER — BUMETANIDE 1 MG/1
1 TABLET ORAL DAILY
Qty: 90 TABLET | Refills: 3 | Status: CANCELLED | OUTPATIENT
Start: 2020-03-05

## 2020-03-05 RX ORDER — METOPROLOL SUCCINATE 100 MG/1
100 TABLET, EXTENDED RELEASE ORAL 2 TIMES DAILY
Qty: 180 TABLET | Refills: 3 | Status: CANCELLED | OUTPATIENT
Start: 2020-03-05

## 2020-03-09 ENCOUNTER — APPOINTMENT (OUTPATIENT)
Dept: CARDIOLOGY | Age: 65
End: 2020-03-09

## 2020-03-17 RX ORDER — LORAZEPAM 0.5 MG/1
0.5 TABLET ORAL EVERY 6 HOURS PRN
Refills: 0 | Status: SHIPPED | COMMUNITY
Start: 2020-03-17 | End: 2022-03-24 | Stop reason: ALTCHOICE

## 2020-03-17 RX ORDER — LISINOPRIL 5 MG/1
5 TABLET ORAL DAILY
Status: SHIPPED | COMMUNITY
Start: 2020-04-01 | End: 2020-07-08 | Stop reason: SDUPTHER

## 2020-03-17 RX ORDER — PRAMIPEXOLE DIHYDROCHLORIDE 0.5 MG/1
0.5 TABLET ORAL DAILY
Qty: 270 TABLET | Refills: 0 | Status: SHIPPED | COMMUNITY
Start: 2020-03-17 | End: 2022-03-24 | Stop reason: ALTCHOICE

## 2020-03-17 RX ORDER — CARVEDILOL 12.5 MG/1
12.5 TABLET ORAL 2 TIMES DAILY WITH MEALS
Status: SHIPPED | COMMUNITY
Start: 2020-03-17 | End: 2020-07-08 | Stop reason: SDUPTHER

## 2020-03-17 RX ORDER — HYDROCODONE BITARTRATE AND ACETAMINOPHEN 10; 325 MG/1; MG/1
1 TABLET ORAL 4 TIMES DAILY
Status: SHIPPED | COMMUNITY
Start: 2020-03-17 | End: 2023-04-24

## 2020-03-17 RX ORDER — LISINOPRIL 2.5 MG/1
2.5 TABLET ORAL 2 TIMES DAILY
Status: SHIPPED | COMMUNITY
Start: 2020-03-17 | End: 2020-07-08 | Stop reason: DRUGHIGH

## 2020-07-03 ENCOUNTER — TELEPHONE (OUTPATIENT)
Dept: CARDIOLOGY | Age: 65
End: 2020-07-03

## 2020-07-08 ENCOUNTER — TELEPHONE (OUTPATIENT)
Dept: CARDIOLOGY | Age: 65
End: 2020-07-08

## 2020-07-08 ENCOUNTER — OFFICE VISIT (OUTPATIENT)
Dept: CARDIOLOGY | Age: 65
End: 2020-07-08

## 2020-07-08 DIAGNOSIS — I25.10 CORONARY ARTERY DISEASE INVOLVING NATIVE CORONARY ARTERY OF NATIVE HEART WITHOUT ANGINA PECTORIS: ICD-10-CM

## 2020-07-08 DIAGNOSIS — I50.22 CHF (CONGESTIVE HEART FAILURE), NYHA CLASS II, CHRONIC, SYSTOLIC (CMD): Primary | ICD-10-CM

## 2020-07-08 DIAGNOSIS — I25.5 ISCHEMIC CARDIOMYOPATHY: ICD-10-CM

## 2020-07-08 DIAGNOSIS — I10 BENIGN ESSENTIAL HTN: ICD-10-CM

## 2020-07-08 DIAGNOSIS — E78.5 DYSLIPIDEMIA: ICD-10-CM

## 2020-07-08 PROCEDURE — 99214 OFFICE O/P EST MOD 30 MIN: CPT | Performed by: PHYSICIAN ASSISTANT

## 2020-07-08 RX ORDER — LISINOPRIL 5 MG/1
5 TABLET ORAL DAILY
Qty: 30 TABLET | Refills: 3 | Status: SHIPPED | OUTPATIENT
Start: 2020-07-08 | End: 2020-07-08 | Stop reason: SDUPTHER

## 2020-07-08 RX ORDER — CLOPIDOGREL BISULFATE 75 MG/1
75 TABLET ORAL DAILY
Qty: 30 TABLET | Refills: 11 | Status: SHIPPED | OUTPATIENT
Start: 2020-07-08 | End: 2020-07-08 | Stop reason: SDUPTHER

## 2020-07-08 RX ORDER — CARVEDILOL 12.5 MG/1
12.5 TABLET ORAL 2 TIMES DAILY WITH MEALS
Qty: 60 TABLET | Refills: 3 | Status: SHIPPED | OUTPATIENT
Start: 2020-07-08 | End: 2020-07-08 | Stop reason: SDUPTHER

## 2020-07-08 RX ORDER — CLOPIDOGREL BISULFATE 75 MG/1
75 TABLET ORAL DAILY
Qty: 90 TABLET | Refills: 2 | Status: SHIPPED | OUTPATIENT
Start: 2020-07-08 | End: 2022-04-22 | Stop reason: ALTCHOICE

## 2020-07-08 RX ORDER — BUMETANIDE 2 MG/1
2 TABLET ORAL DAILY
Qty: 90 TABLET | Refills: 1 | Status: SHIPPED | OUTPATIENT
Start: 2020-07-08 | End: 2020-08-21 | Stop reason: SDUPTHER

## 2020-07-08 RX ORDER — CARVEDILOL 25 MG/1
25 TABLET ORAL 2 TIMES DAILY WITH MEALS
Qty: 180 TABLET | Refills: 1 | Status: SHIPPED | OUTPATIENT
Start: 2020-07-08 | End: 2020-08-24 | Stop reason: SDUPTHER

## 2020-07-08 RX ORDER — CARVEDILOL 12.5 MG/1
12.5 TABLET ORAL 2 TIMES DAILY WITH MEALS
Qty: 180 TABLET | Refills: 1 | Status: SHIPPED | OUTPATIENT
Start: 2020-07-08 | End: 2020-07-08 | Stop reason: DRUGHIGH

## 2020-07-08 RX ORDER — LISINOPRIL 5 MG/1
5 TABLET ORAL DAILY
Qty: 90 TABLET | Refills: 1 | Status: SHIPPED | OUTPATIENT
Start: 2020-07-08 | End: 2020-08-21 | Stop reason: SDUPTHER

## 2020-07-08 RX ORDER — ATORVASTATIN CALCIUM 80 MG/1
80 TABLET, FILM COATED ORAL DAILY
Qty: 90 TABLET | Refills: 3 | Status: SHIPPED | OUTPATIENT
Start: 2020-07-08 | End: 2021-06-07 | Stop reason: DRUGHIGH

## 2020-07-08 ASSESSMENT — NEW YORK HEART ASSOCIATION (NYHA) CLASSIFICATION: NYHA FUNCTIONAL CLASS: III

## 2020-08-18 ENCOUNTER — TELEPHONE (OUTPATIENT)
Dept: CARDIOLOGY | Age: 65
End: 2020-08-18

## 2020-08-19 ENCOUNTER — TELEPHONE (OUTPATIENT)
Dept: CARDIOLOGY | Age: 65
End: 2020-08-19

## 2020-08-20 ASSESSMENT — PATIENT HEALTH QUESTIONNAIRE - PHQ9
2. FEELING DOWN, DEPRESSED OR HOPELESS: NOT AT ALL
SUM OF ALL RESPONSES TO PHQ9 QUESTIONS 1 AND 2: 0
SUM OF ALL RESPONSES TO PHQ9 QUESTIONS 1 AND 2: 0
CLINICAL INTERPRETATION OF PHQ2 SCORE: NO FURTHER SCREENING NEEDED
CLINICAL INTERPRETATION OF PHQ9 SCORE: NO FURTHER SCREENING NEEDED
1. LITTLE INTEREST OR PLEASURE IN DOING THINGS: NOT AT ALL

## 2020-08-21 ENCOUNTER — OFFICE VISIT (OUTPATIENT)
Dept: CARDIOLOGY | Age: 65
End: 2020-08-21

## 2020-08-21 VITALS
RESPIRATION RATE: 18 BRPM | BODY MASS INDEX: 30.77 KG/M2 | DIASTOLIC BLOOD PRESSURE: 70 MMHG | WEIGHT: 219.8 LBS | HEART RATE: 74 BPM | HEIGHT: 71 IN | SYSTOLIC BLOOD PRESSURE: 118 MMHG | OXYGEN SATURATION: 96 %

## 2020-08-21 DIAGNOSIS — I10 BENIGN ESSENTIAL HTN: ICD-10-CM

## 2020-08-21 DIAGNOSIS — E78.5 DYSLIPIDEMIA: ICD-10-CM

## 2020-08-21 DIAGNOSIS — I50.22 CHF (CONGESTIVE HEART FAILURE), NYHA CLASS II, CHRONIC, SYSTOLIC (CMD): ICD-10-CM

## 2020-08-21 DIAGNOSIS — I50.22 CHRONIC SYSTOLIC CHF (CONGESTIVE HEART FAILURE) (CMD): Primary | ICD-10-CM

## 2020-08-21 DIAGNOSIS — I25.5 ISCHEMIC CARDIOMYOPATHY: ICD-10-CM

## 2020-08-21 DIAGNOSIS — Z95.1 HX OF CABG: ICD-10-CM

## 2020-08-21 DIAGNOSIS — I25.10 CORONARY ARTERY DISEASE INVOLVING NATIVE CORONARY ARTERY OF NATIVE HEART WITHOUT ANGINA PECTORIS: ICD-10-CM

## 2020-08-21 PROCEDURE — 99214 OFFICE O/P EST MOD 30 MIN: CPT | Performed by: PHYSICIAN ASSISTANT

## 2020-08-21 PROCEDURE — 3074F SYST BP LT 130 MM HG: CPT | Performed by: PHYSICIAN ASSISTANT

## 2020-08-21 PROCEDURE — 3078F DIAST BP <80 MM HG: CPT | Performed by: PHYSICIAN ASSISTANT

## 2020-08-21 RX ORDER — GABAPENTIN 600 MG/1
600 TABLET ORAL DAILY
Status: SHIPPED | COMMUNITY
Start: 2020-08-21 | End: 2021-06-07 | Stop reason: ALTCHOICE

## 2020-08-21 RX ORDER — LISINOPRIL 5 MG/1
5 TABLET ORAL DAILY
Qty: 90 TABLET | Refills: 1 | Status: SHIPPED | OUTPATIENT
Start: 2020-08-21 | End: 2021-05-07 | Stop reason: SDUPTHER

## 2020-08-21 RX ORDER — BUMETANIDE 2 MG/1
2 TABLET ORAL DAILY
Qty: 90 TABLET | Refills: 1 | Status: SHIPPED | OUTPATIENT
Start: 2020-08-21 | End: 2021-01-11 | Stop reason: SDUPTHER

## 2020-08-21 SDOH — HEALTH STABILITY: MENTAL HEALTH: HOW OFTEN DO YOU HAVE A DRINK CONTAINING ALCOHOL?: NEVER

## 2020-08-21 ASSESSMENT — NEW YORK HEART ASSOCIATION (NYHA) CLASSIFICATION: NYHA FUNCTIONAL CLASS: III

## 2020-08-24 RX ORDER — CARVEDILOL 25 MG/1
25 TABLET ORAL 2 TIMES DAILY WITH MEALS
Qty: 180 TABLET | Refills: 3 | Status: SHIPPED | OUTPATIENT
Start: 2020-08-24 | End: 2021-05-07 | Stop reason: SDUPTHER

## 2020-08-27 ENCOUNTER — APPOINTMENT (OUTPATIENT)
Dept: CARDIOLOGY | Age: 65
End: 2020-08-27

## 2021-01-11 ENCOUNTER — TELEPHONE (OUTPATIENT)
Dept: CARDIOLOGY | Age: 66
End: 2021-01-11

## 2021-01-11 DIAGNOSIS — I10 ESSENTIAL HYPERTENSION: ICD-10-CM

## 2021-01-11 DIAGNOSIS — E78.2 MIXED HYPERLIPIDEMIA: ICD-10-CM

## 2021-01-11 DIAGNOSIS — I25.5 ISCHEMIC CARDIOMYOPATHY: ICD-10-CM

## 2021-01-11 DIAGNOSIS — I50.22 CHRONIC SYSTOLIC HEART FAILURE (CMD): Primary | ICD-10-CM

## 2021-01-11 RX ORDER — BUMETANIDE 2 MG/1
2 TABLET ORAL DAILY
Qty: 90 TABLET | Refills: 1 | Status: SHIPPED | OUTPATIENT
Start: 2021-01-11 | End: 2021-05-05 | Stop reason: SDUPTHER

## 2021-05-05 ENCOUNTER — TELEPHONE (OUTPATIENT)
Dept: CARDIOLOGY | Age: 66
End: 2021-05-05

## 2021-05-05 RX ORDER — BUMETANIDE 2 MG/1
2 TABLET ORAL DAILY
Qty: 90 TABLET | Refills: 0 | Status: SHIPPED | OUTPATIENT
Start: 2021-05-05 | End: 2021-06-07 | Stop reason: ALTCHOICE

## 2021-05-07 RX ORDER — LISINOPRIL 5 MG/1
5 TABLET ORAL DAILY
Qty: 90 TABLET | Refills: 3 | Status: SHIPPED | OUTPATIENT
Start: 2021-05-07 | End: 2022-02-21 | Stop reason: ALTCHOICE

## 2021-05-07 RX ORDER — CARVEDILOL 25 MG/1
25 TABLET ORAL 2 TIMES DAILY WITH MEALS
Qty: 180 TABLET | Refills: 3 | Status: SHIPPED | OUTPATIENT
Start: 2021-05-07 | End: 2022-02-21 | Stop reason: HOSPADM

## 2021-05-26 ENCOUNTER — EXTERNAL RECORD (OUTPATIENT)
Dept: HEALTH INFORMATION MANAGEMENT | Facility: OTHER | Age: 66
End: 2021-05-26

## 2021-05-26 VITALS
HEART RATE: 71 BPM | RESPIRATION RATE: 16 BRPM | SYSTOLIC BLOOD PRESSURE: 122 MMHG | BODY MASS INDEX: 28.31 KG/M2 | DIASTOLIC BLOOD PRESSURE: 70 MMHG | BODY MASS INDEX: 28.98 KG/M2 | HEIGHT: 71 IN | WEIGHT: 207 LBS | RESPIRATION RATE: 16 BRPM | HEIGHT: 71 IN | SYSTOLIC BLOOD PRESSURE: 110 MMHG | HEART RATE: 78 BPM | OXYGEN SATURATION: 97 % | DIASTOLIC BLOOD PRESSURE: 60 MMHG | OXYGEN SATURATION: 98 %

## 2021-05-26 LAB
*MEAN CORPUSCULAR HGB CONC: 33.5 G/DL (ref 32.5–35.8)
A/G RATIO: 1.52 (ref 1.1–2.4)
ALANINE AMINOTRANSFE: 24 U/L (ref 7–52)
ALBUMIN, SERUM (ALB): 4.1 G/DL (ref 3.5–5.7)
ALKALINE PHOSPHATASE (ALK): 66 U/L (ref 34–104)
ANION GAP: 10 MEQ/L (ref 6.2–14.7)
ASPARTATE AMINOTRANS: 18 U/L (ref 13–39)
BASOPHIL AUTOMATED: 0.5 %
BASOPHILS: 0 (ref 0–0.14)
BILIRUBIN, TOTAL: 0.4 MG/DL (ref 0.2–0.8)
BLOOD UREA NITROGEN (BUN): 28 MG/DL (ref 7–25)
BRAIN NATIURETIC PEPTIDE CHF: 515 PG/ML (ref 0–100)
BUN/CREATININE RATIO: 16.9 (ref 7.4–23)
CALCIUM, SERUM: 9.3 MG/DL (ref 8.6–10.3)
CARBON DIOXIDE: 26 MEQ/L (ref 21–31)
CHLORIDE, SERUM: 100 MMOL/L (ref 98–107)
CHRONIC KIDNEY DISEASE STAGE: ABNORMAL
CREATININE: 1.66 MG/DL (ref 0.7–1.3)
D-DIMER, QUANTITATIVE: 766 NG/MLFEU (ref 0–622)
EOSINOPHIL AUTOMATED: 2 %
EOSINOPHILS: 0.1 (ref 0–0.6)
EST GLOMERULAR FILTRATION RATE: 42 ML/MIN
GLUCOSE: 188 MG/DL (ref 70–99)
HEMATOCRIT: 29.9 % (ref 38.6–49.2)
HEMOGLOBIN: 10 GM/DL (ref 13–17)
INTERNATIONAL NORMAL: 1.6 (ref 0.8–1.1)
K (POTASSIUM, SERUM): 3.9 MMOL/L (ref 3.5–5.2)
LYMPHOCYTE AUTOMATED: 17.3 %
LYMPHOCYTES: 1.2 (ref 0.78–3.73)
MEAN CORPUSCULAR HGB: 28.3 PG (ref 26–34)
MEAN CORPUSCULAR VOL: 84.4 FL (ref 80–100)
MEAN PLATELET VOLUME: 9.1 FL (ref 6.8–10.2)
MONOCYTE AUTOMATED: 8.4 %
MONOCYTES: 0.6 (ref 0.17–1)
NA (SODIUM, SERUM): 136 MMOL/L (ref 133–144)
NEUTROPHIL ABSOLUTE: 4.8 (ref 1.91–7.6)
NEUTROPHIL AUTOMATED: 71.8 %
PLATELET COUNT: 207 K/MM3 (ref 150–450)
PROTEIN TOTAL: 6.8 G/DL (ref 6.4–8.9)
PROTHROMBIN TIME (PATIENT): 19.8 SECONDS (ref 10.1–13.1)
PTT: 40 SECONDS (ref 25–36)
RED BLOOD CELL COUNT: 3.54 M/MM3 (ref 4.34–5.6)
RED CELL DISTRIBUTIO: 14.9 % (ref 11.9–15.9)
TROPONIN I HIGH SENSITIVITY: 12 PG/ML (ref 0–20)
WHITE BLOOD CELL COU: 6.7 K/MM3 (ref 4–11)

## 2021-05-26 PROCEDURE — 93010 ELECTROCARDIOGRAM REPORT: CPT | Performed by: INTERNAL MEDICINE

## 2021-05-27 ENCOUNTER — TELEPHONE (OUTPATIENT)
Dept: CARDIOLOGY | Age: 66
End: 2021-05-27

## 2021-05-30 ENCOUNTER — EXTERNAL RECORD (OUTPATIENT)
Dept: OTHER | Age: 66
End: 2021-05-30

## 2021-05-30 LAB
*MEAN CORPUSCULAR HGB CONC: 32.8 G/DL (ref 32.5–35.8)
A/G RATIO: 1.82 (ref 1.1–2.4)
ALANINE AMINOTRANSFE: 56 U/L (ref 7–52)
ALBUMIN, SERUM (ALB): 4 G/DL (ref 3.5–5.7)
ALKALINE PHOSPHATASE (ALK): 72 U/L (ref 34–104)
ANION GAP: 9 MEQ/L (ref 6.2–14.7)
ASPARTATE AMINOTRANS: 42 U/L (ref 13–39)
BASOPHIL AUTOMATED: 0.6 %
BASOPHILS: 0 (ref 0–0.14)
BEDSIDE GLUCOSE: 159 MG/DL (ref 70–110)
BEDSIDE GLUCOSE: 198 MG/DL (ref 70–110)
BILIRUBIN, TOTAL: 0.3 MG/DL (ref 0.2–0.8)
BLOOD UREA NITROGEN (BUN): 47 MG/DL (ref 7–25)
BRAIN NATIURETIC PEPTIDE CHF: 890 PG/ML (ref 0–100)
BUN/CREATININE RATIO: 27.5 (ref 7.4–23)
CALCIUM, SERUM: 8.5 MG/DL (ref 8.6–10.3)
CARBON DIOXIDE: 24 MEQ/L (ref 21–31)
CHLORIDE, SERUM: 97 MMOL/L (ref 98–107)
CHRONIC KIDNEY DISEASE STAGE: ABNORMAL
COVID-19 RAPID RESULT: NEGATIVE
COVID-19 RAPID SOURCE: NORMAL
CREATININE: 1.71 MG/DL (ref 0.7–1.3)
EOSINOPHIL AUTOMATED: 2.4 %
EOSINOPHILS: 0.2 (ref 0–0.6)
EST GLOMERULAR FILTRATION RATE: 41 ML/MIN
GLUCOSE: 338 MG/DL (ref 70–99)
HEMATOCRIT: 28.7 % (ref 38.6–49.2)
HEMOGLOBIN: 9.4 GM/DL (ref 13–17)
INTERNATIONAL NORMAL: 1.2 (ref 0.8–1.1)
K (POTASSIUM, SERUM): 4.2 MMOL/L (ref 3.5–5.2)
LYMPHOCYTE AUTOMATED: 21.1 %
LYMPHOCYTES: 1.3 (ref 0.78–3.73)
MEAN CORPUSCULAR HGB: 27.8 PG (ref 26–34)
MEAN CORPUSCULAR VOL: 84.6 FL (ref 80–100)
MEAN PLATELET VOLUME: 9.9 FL (ref 6.8–10.2)
MONOCYTE AUTOMATED: 9.3 %
MONOCYTES: 0.6 (ref 0.17–1)
NA (SODIUM, SERUM): 130 MMOL/L (ref 133–144)
NEUTROPHIL ABSOLUTE: 4.2 (ref 1.91–7.6)
NEUTROPHIL AUTOMATED: 66.6 %
PLATELET COUNT: 172 K/MM3 (ref 150–450)
PROTEIN TOTAL: 6.2 G/DL (ref 6.4–8.9)
PROTHROMBIN TIME (PATIENT): 14.9 SECONDS (ref 10.1–13.1)
PTT: 36 SECONDS (ref 25–36)
RED BLOOD CELL COUNT: 3.39 M/MM3 (ref 4.34–5.6)
RED CELL DISTRIBUTIO: 15 % (ref 11.9–15.9)
TROPONIN I HIGH SENSITIVITY: 11 PG/ML (ref 0–20)
WHITE BLOOD CELL COU: 6.4 K/MM3 (ref 4–11)

## 2021-05-30 PROCEDURE — 93010 ELECTROCARDIOGRAM REPORT: CPT | Performed by: INTERNAL MEDICINE

## 2021-05-30 PROCEDURE — 99223 1ST HOSP IP/OBS HIGH 75: CPT | Performed by: HOSPITALIST

## 2021-05-31 ENCOUNTER — EXTERNAL RECORD (OUTPATIENT)
Dept: OTHER | Age: 66
End: 2021-05-31

## 2021-05-31 ENCOUNTER — EXTERNAL RECORD (OUTPATIENT)
Dept: CARDIOLOGY | Age: 66
End: 2021-05-31

## 2021-05-31 LAB
*MEAN CORPUSCULAR HGB CONC: 33.3 G/DL (ref 32.5–35.8)
ANION GAP: 10 MEQ/L (ref 6.2–14.7)
BASOPHIL AUTOMATED: 0.6 %
BASOPHILS: 0 (ref 0–0.14)
BEDSIDE GLUCOSE: 192 MG/DL (ref 70–110)
BEDSIDE GLUCOSE: 243 MG/DL (ref 70–110)
BLOOD UREA NITROGEN (BUN): 40 MG/DL (ref 7–25)
BUN/CREATININE RATIO: 25.5 (ref 7.4–23)
CALCIUM, SERUM: 8.9 MG/DL (ref 8.6–10.3)
CARBON DIOXIDE: 25 MEQ/L (ref 21–31)
CHLORIDE, SERUM: 100 MMOL/L (ref 98–107)
CHRONIC KIDNEY DISEASE STAGE: ABNORMAL
CREATININE: 1.57 MG/DL (ref 0.7–1.3)
EOSINOPHIL AUTOMATED: 3.5 %
EOSINOPHILS: 0.3 (ref 0–0.6)
EST GLOMERULAR FILTRATION RATE: 45 ML/MIN
ESTIMATED AVERAGE GLUCOSE: 203 MG/DL
GLUCOSE: 212 MG/DL (ref 70–99)
HEMATOCRIT: 29.4 % (ref 38.6–49.2)
HEMOGLOBIN A1C (GLYCOSYLATED): 8.7 %
HEMOGLOBIN: 9.8 GM/DL (ref 13–17)
K (POTASSIUM, SERUM): 4 MMOL/L (ref 3.5–5.2)
LYMPHOCYTE AUTOMATED: 21.8 %
LYMPHOCYTES: 1.7 (ref 0.78–3.73)
MEAN CORPUSCULAR HGB: 28 PG (ref 26–34)
MEAN CORPUSCULAR VOL: 84 FL (ref 80–100)
MEAN PLATELET VOLUME: 9.6 FL (ref 6.8–10.2)
MONOCYTE AUTOMATED: 7.3 %
MONOCYTES: 0.6 (ref 0.17–1)
NA (SODIUM, SERUM): 135 MMOL/L (ref 133–144)
NEUTROPHIL ABSOLUTE: 5.3 (ref 1.91–7.6)
NEUTROPHIL AUTOMATED: 66.8 %
PLATELET COUNT: 193 K/MM3 (ref 150–450)
RED BLOOD CELL COUNT: 3.5 M/MM3 (ref 4.34–5.6)
RED CELL DISTRIBUTIO: 14.7 % (ref 11.9–15.9)
WHITE BLOOD CELL COU: 7.9 K/MM3 (ref 4–11)

## 2021-05-31 PROCEDURE — 99245 OFF/OP CONSLTJ NEW/EST HI 55: CPT | Performed by: INTERNAL MEDICINE

## 2021-05-31 PROCEDURE — 99238 HOSP IP/OBS DSCHRG MGMT 30/<: CPT | Performed by: HOSPITALIST

## 2021-06-01 ENCOUNTER — TELEPHONE (OUTPATIENT)
Dept: CARDIOLOGY | Age: 66
End: 2021-06-01

## 2021-06-01 ENCOUNTER — TELEPHONE (OUTPATIENT)
Dept: HEMATOLOGY/ONCOLOGY | Age: 66
End: 2021-06-01

## 2021-06-01 DIAGNOSIS — I50.9 CONGESTIVE HEART FAILURE, UNSPECIFIED HF CHRONICITY, UNSPECIFIED HEART FAILURE TYPE (CMD): Primary | ICD-10-CM

## 2021-06-01 DIAGNOSIS — I26.99 PULMONARY EMBOLISM, OTHER, UNSPECIFIED CHRONICITY, UNSPECIFIED WHETHER ACUTE COR PULMONALE PRESENT (CMD): ICD-10-CM

## 2021-06-02 ENCOUNTER — TELEPHONE (OUTPATIENT)
Dept: INFUSION THERAPY | Age: 66
End: 2021-06-02

## 2021-06-04 ENCOUNTER — TELEPHONE (OUTPATIENT)
Dept: CARDIOLOGY | Age: 66
End: 2021-06-04

## 2021-06-07 ENCOUNTER — OFFICE VISIT (OUTPATIENT)
Dept: CARDIOLOGY | Age: 66
End: 2021-06-07

## 2021-06-07 VITALS
WEIGHT: 221.8 LBS | BODY MASS INDEX: 31.05 KG/M2 | DIASTOLIC BLOOD PRESSURE: 62 MMHG | SYSTOLIC BLOOD PRESSURE: 124 MMHG | RESPIRATION RATE: 18 BRPM | HEIGHT: 71 IN | OXYGEN SATURATION: 97 % | HEART RATE: 68 BPM

## 2021-06-07 DIAGNOSIS — E78.5 DYSLIPIDEMIA: ICD-10-CM

## 2021-06-07 DIAGNOSIS — I50.22 CHF (CONGESTIVE HEART FAILURE), NYHA CLASS II, CHRONIC, SYSTOLIC (CMD): Primary | ICD-10-CM

## 2021-06-07 DIAGNOSIS — I25.5 ISCHEMIC CARDIOMYOPATHY: ICD-10-CM

## 2021-06-07 DIAGNOSIS — I25.10 CORONARY ARTERY DISEASE INVOLVING NATIVE CORONARY ARTERY OF NATIVE HEART WITHOUT ANGINA PECTORIS: ICD-10-CM

## 2021-06-07 DIAGNOSIS — I10 BENIGN ESSENTIAL HTN: ICD-10-CM

## 2021-06-07 PROCEDURE — 3074F SYST BP LT 130 MM HG: CPT | Performed by: PHYSICIAN ASSISTANT

## 2021-06-07 PROCEDURE — 99214 OFFICE O/P EST MOD 30 MIN: CPT | Performed by: PHYSICIAN ASSISTANT

## 2021-06-07 PROCEDURE — 3078F DIAST BP <80 MM HG: CPT | Performed by: PHYSICIAN ASSISTANT

## 2021-06-07 RX ORDER — TAMSULOSIN HYDROCHLORIDE 0.4 MG/1
0.4 CAPSULE ORAL DAILY
COMMUNITY
Start: 2019-02-19 | End: 2022-09-21

## 2021-06-07 RX ORDER — METOPROLOL SUCCINATE 100 MG/1
100 TABLET, EXTENDED RELEASE ORAL DAILY
COMMUNITY
End: 2021-06-07 | Stop reason: ALTCHOICE

## 2021-06-07 RX ORDER — TRAMADOL HYDROCHLORIDE 50 MG/1
50 TABLET ORAL EVERY 6 HOURS
COMMUNITY
End: 2022-03-24 | Stop reason: ALTCHOICE

## 2021-06-07 RX ORDER — FUROSEMIDE 40 MG/1
40 TABLET ORAL DAILY
Status: SHIPPED | COMMUNITY
Start: 2021-06-07 | End: 2022-02-21 | Stop reason: ALTCHOICE

## 2021-06-07 RX ORDER — ATORVASTATIN CALCIUM 40 MG/1
40 TABLET, FILM COATED ORAL DAILY
Status: SHIPPED | COMMUNITY
Start: 2021-06-07 | End: 2022-02-21 | Stop reason: HOSPADM

## 2021-06-07 RX ORDER — GABAPENTIN 600 MG/1
600 TABLET ORAL DAILY
Status: SHIPPED | COMMUNITY
Start: 2021-06-07 | End: 2022-02-21 | Stop reason: ALTCHOICE

## 2021-06-07 RX ORDER — FUROSEMIDE 80 MG
80 TABLET ORAL DAILY
COMMUNITY
End: 2021-06-07 | Stop reason: DRUGHIGH

## 2021-06-08 ASSESSMENT — NEW YORK HEART ASSOCIATION (NYHA) CLASSIFICATION: NYHA FUNCTIONAL CLASS: III

## 2021-08-25 ENCOUNTER — APPOINTMENT (OUTPATIENT)
Dept: GENERAL RADIOLOGY | Facility: HOSPITAL | Age: 66
End: 2021-08-25
Attending: EMERGENCY MEDICINE
Payer: COMMERCIAL

## 2021-08-25 ENCOUNTER — APPOINTMENT (OUTPATIENT)
Dept: CT IMAGING | Facility: HOSPITAL | Age: 66
End: 2021-08-25
Attending: EMERGENCY MEDICINE
Payer: COMMERCIAL

## 2021-08-25 ENCOUNTER — HOSPITAL ENCOUNTER (EMERGENCY)
Facility: HOSPITAL | Age: 66
Discharge: HOME | End: 2021-08-25
Attending: EMERGENCY MEDICINE
Payer: COMMERCIAL

## 2021-08-25 VITALS
RESPIRATION RATE: 18 BRPM | SYSTOLIC BLOOD PRESSURE: 97 MMHG | BODY MASS INDEX: 29.78 KG/M2 | HEART RATE: 72 BPM | WEIGHT: 208 LBS | HEIGHT: 70 IN | DIASTOLIC BLOOD PRESSURE: 60 MMHG | TEMPERATURE: 97 F | OXYGEN SATURATION: 95 %

## 2021-08-25 DIAGNOSIS — J18.9 COMMUNITY ACQUIRED PNEUMONIA, UNSPECIFIED LATERALITY: ICD-10-CM

## 2021-08-25 DIAGNOSIS — D64.9 ANEMIA, UNSPECIFIED TYPE: Primary | ICD-10-CM

## 2021-08-25 LAB
ANION GAP SERPL CALC-SCNC: 8 MMOL/L (ref 0–18)
BASOPHILS # BLD AUTO: 0.03 X10(3) UL (ref 0–0.2)
BASOPHILS NFR BLD AUTO: 0.4 %
BUN BLD-MCNC: 47 MG/DL (ref 7–18)
BUN/CREAT SERPL: 29.2 (ref 10–20)
CALCIUM BLD-MCNC: 8.1 MG/DL (ref 8.5–10.1)
CHLORIDE SERPL-SCNC: 100 MMOL/L (ref 98–112)
CO2 SERPL-SCNC: 27 MMOL/L (ref 21–32)
CREAT BLD-MCNC: 1.61 MG/DL
D DIMER PPP FEU-MCNC: 1.29 UG/ML FEU (ref ?–0.64)
DEPRECATED RDW RBC AUTO: 42 FL (ref 35.1–46.3)
EOSINOPHIL # BLD AUTO: 0.08 X10(3) UL (ref 0–0.7)
EOSINOPHIL NFR BLD AUTO: 1.1 %
ERYTHROCYTE [DISTWIDTH] IN BLOOD BY AUTOMATED COUNT: 15.4 % (ref 11–15)
GLUCOSE BLD-MCNC: 296 MG/DL (ref 70–99)
HCT VFR BLD AUTO: 30.1 %
HGB BLD-MCNC: 9.4 G/DL
IMM GRANULOCYTES # BLD AUTO: 0.02 X10(3) UL (ref 0–1)
IMM GRANULOCYTES NFR BLD: 0.3 %
LYMPHOCYTES # BLD AUTO: 1.42 X10(3) UL (ref 1–4)
LYMPHOCYTES NFR BLD AUTO: 19 %
MCH RBC QN AUTO: 24 PG (ref 26–34)
MCHC RBC AUTO-ENTMCNC: 31.2 G/DL (ref 31–37)
MCV RBC AUTO: 76.8 FL
MONOCYTES # BLD AUTO: 0.62 X10(3) UL (ref 0.1–1)
MONOCYTES NFR BLD AUTO: 8.3 %
NEUTROPHILS # BLD AUTO: 5.3 X10 (3) UL (ref 1.5–7.7)
NEUTROPHILS # BLD AUTO: 5.3 X10(3) UL (ref 1.5–7.7)
NEUTROPHILS NFR BLD AUTO: 70.9 %
NT-PROBNP SERPL-MCNC: 5758 PG/ML (ref ?–125)
OSMOLALITY SERPL CALC.SUM OF ELEC: 303 MOSM/KG (ref 275–295)
PLATELET # BLD AUTO: 172 10(3)UL (ref 150–450)
POTASSIUM SERPL-SCNC: 4 MMOL/L (ref 3.5–5.1)
RBC # BLD AUTO: 3.92 X10(6)UL
SARS-COV-2 RNA RESP QL NAA+PROBE: NOT DETECTED
SODIUM SERPL-SCNC: 135 MMOL/L (ref 136–145)
TROPONIN I SERPL-MCNC: <0.045 NG/ML (ref ?–0.04)
WBC # BLD AUTO: 7.5 X10(3) UL (ref 4–11)

## 2021-08-25 PROCEDURE — 85025 COMPLETE CBC W/AUTO DIFF WBC: CPT | Performed by: EMERGENCY MEDICINE

## 2021-08-25 PROCEDURE — 71260 CT THORAX DX C+: CPT | Performed by: EMERGENCY MEDICINE

## 2021-08-25 PROCEDURE — 99284 EMERGENCY DEPT VISIT MOD MDM: CPT

## 2021-08-25 PROCEDURE — 93010 ELECTROCARDIOGRAM REPORT: CPT | Performed by: EMERGENCY MEDICINE

## 2021-08-25 PROCEDURE — 36415 COLL VENOUS BLD VENIPUNCTURE: CPT

## 2021-08-25 PROCEDURE — 85379 FIBRIN DEGRADATION QUANT: CPT | Performed by: EMERGENCY MEDICINE

## 2021-08-25 PROCEDURE — 83880 ASSAY OF NATRIURETIC PEPTIDE: CPT | Performed by: EMERGENCY MEDICINE

## 2021-08-25 PROCEDURE — 84484 ASSAY OF TROPONIN QUANT: CPT | Performed by: EMERGENCY MEDICINE

## 2021-08-25 PROCEDURE — 71045 X-RAY EXAM CHEST 1 VIEW: CPT | Performed by: EMERGENCY MEDICINE

## 2021-08-25 PROCEDURE — 80048 BASIC METABOLIC PNL TOTAL CA: CPT | Performed by: EMERGENCY MEDICINE

## 2021-08-25 PROCEDURE — 93005 ELECTROCARDIOGRAM TRACING: CPT

## 2021-08-25 RX ORDER — AZITHROMYCIN 250 MG/1
TABLET, FILM COATED ORAL
Qty: 6 TABLET | Refills: 0 | Status: SHIPPED | OUTPATIENT
Start: 2021-08-25 | End: 2021-08-30

## 2021-08-25 NOTE — ED INITIAL ASSESSMENT (HPI)
Difficulty breathing x 3-5 days that is worsening. Pt denies fevers. At the end of triage pt also c/o mid back pain. Pt states the pain is similar to when he had a heart attack 2 years ago.

## 2021-08-25 NOTE — ED PROVIDER NOTES
Patient Seen in: [facility] Emergency Department      History   Patient presents with:  Difficulty Breathing    Stated Complaint: sob x 3 days    HPI/Subjective:   HPI    Pt is 59 yo M who p/w SOB x 3 days. Worse with lying flat. +orthopnea.  +swel normal gait, 5/5 motor strength in all extremities, no focal deficits  SKIN: warm, dry, no rashes        ED Course     Labs Reviewed   BASIC METABOLIC PANEL (8) - Abnormal; Notable for the following components:       Result Value    Glucose 296 (*)     Sod congestion. Left base patchy opacity, which may represent atelectasis, aspiration, or pneumonia. Enlarged cardiac medicinal silhouette. Median sternotomy wires. High riding right humeral head, suggestive of chronic rotator cuff disease.     The re

## 2021-09-01 ENCOUNTER — HOSPITAL ENCOUNTER (EMERGENCY)
Facility: HOSPITAL | Age: 66
Discharge: HOME | End: 2021-09-01
Attending: EMERGENCY MEDICINE
Payer: COMMERCIAL

## 2021-09-01 VITALS
OXYGEN SATURATION: 97 % | HEART RATE: 70 BPM | SYSTOLIC BLOOD PRESSURE: 110 MMHG | RESPIRATION RATE: 18 BRPM | DIASTOLIC BLOOD PRESSURE: 66 MMHG | HEIGHT: 70 IN | WEIGHT: 210 LBS | BODY MASS INDEX: 30.06 KG/M2 | TEMPERATURE: 99 F

## 2021-09-01 DIAGNOSIS — G47.9 DIFFICULTY SLEEPING: ICD-10-CM

## 2021-09-01 DIAGNOSIS — G25.81 RLS (RESTLESS LEGS SYNDROME): Primary | ICD-10-CM

## 2021-09-01 LAB — SARS-COV-2 RNA RESP QL NAA+PROBE: NOT DETECTED

## 2021-09-01 PROCEDURE — 93005 ELECTROCARDIOGRAM TRACING: CPT

## 2021-09-01 PROCEDURE — 99284 EMERGENCY DEPT VISIT MOD MDM: CPT

## 2021-09-01 PROCEDURE — 93010 ELECTROCARDIOGRAM REPORT: CPT | Performed by: EMERGENCY MEDICINE

## 2021-09-01 RX ORDER — DIAZEPAM 5 MG/1
TABLET ORAL NIGHTLY PRN
Qty: 20 TABLET | Refills: 0 | Status: SHIPPED | OUTPATIENT
Start: 2021-09-01 | End: 2021-09-21

## 2021-09-01 RX ORDER — DIAZEPAM 5 MG/1
TABLET ORAL NIGHTLY PRN
Qty: 20 TABLET | Refills: 0 | Status: SHIPPED | OUTPATIENT
Start: 2021-09-01 | End: 2021-09-01

## 2021-09-01 NOTE — ED INITIAL ASSESSMENT (HPI)
Pt reports shortness of breath for one week, pt reports hx of MI in past and open heart surgery, denies CP, reports R leg pain and twitching

## 2021-09-02 NOTE — ED PROVIDER NOTES
Patient Seen in: [facility] Emergency Department    History   Patient presents with:  Shortness Of Breath      HPI    The patient presents to the ED complaining of shortness of breath for the last week that he states comes and goes.   He also state 1207 18   Temp 09/01/21 1326 98.7 °F (37.1 °C)   Temp src 09/01/21 1326 Oral   SpO2 09/01/21 1207 95 %   O2 Device 09/01/21 1207 None (Room air)       All measures to prevent infection transmission during my interaction with the patient were taken.  The pat Nonspecific QRS widening, septal Q waves, abnormal EKG             Imaging Results Available and Reviewed while in ED: No results found.   ED Medications Administered: Medications - No data to display      [facility]      09/01/21  1207 09/01/21  1315 09/01/21  13 medications    diazePAM 5 MG Oral Tab  Take 1-2 tablets (5-10 mg total) by mouth nightly as needed for Sleep., Normal, Disp-20 tablet, R-0

## 2021-12-22 ENCOUNTER — APPOINTMENT (OUTPATIENT)
Dept: GENERAL RADIOLOGY | Facility: HOSPITAL | Age: 66
DRG: 291 | End: 2021-12-22
Payer: COMMERCIAL

## 2021-12-22 ENCOUNTER — HOSPITAL ENCOUNTER (INPATIENT)
Facility: HOSPITAL | Age: 66
LOS: 3 days | Discharge: HOME | DRG: 291 | End: 2021-12-26
Admitting: HOSPITALIST
Payer: COMMERCIAL

## 2021-12-22 DIAGNOSIS — I50.9 ACUTE CONGESTIVE HEART FAILURE, UNSPECIFIED HEART FAILURE TYPE (HCC): Primary | ICD-10-CM

## 2021-12-22 LAB
ANION GAP SERPL CALC-SCNC: 5 MMOL/L (ref 0–18)
BASOPHILS # BLD AUTO: 0.03 X10(3) UL (ref 0–0.2)
BASOPHILS NFR BLD AUTO: 0.5 %
BUN BLD-MCNC: 17 MG/DL (ref 7–18)
BUN/CREAT SERPL: 14.4 (ref 10–20)
CALCIUM BLD-MCNC: 8.5 MG/DL (ref 8.5–10.1)
CHLORIDE SERPL-SCNC: 108 MMOL/L (ref 98–112)
CO2 SERPL-SCNC: 28 MMOL/L (ref 21–32)
CREAT BLD-MCNC: 1.18 MG/DL
DEPRECATED RDW RBC AUTO: 57.7 FL (ref 35.1–46.3)
EOSINOPHIL # BLD AUTO: 0.15 X10(3) UL (ref 0–0.7)
EOSINOPHIL NFR BLD AUTO: 2.7 %
ERYTHROCYTE [DISTWIDTH] IN BLOOD BY AUTOMATED COUNT: 19 % (ref 11–15)
GLUCOSE BLD-MCNC: 194 MG/DL (ref 70–99)
HCT VFR BLD AUTO: 33.2 %
HGB BLD-MCNC: 10.2 G/DL
IMM GRANULOCYTES # BLD AUTO: 0.02 X10(3) UL (ref 0–1)
IMM GRANULOCYTES NFR BLD: 0.4 %
LYMPHOCYTES # BLD AUTO: 1.56 X10(3) UL (ref 1–4)
LYMPHOCYTES NFR BLD AUTO: 28.1 %
MCH RBC QN AUTO: 25.8 PG (ref 26–34)
MCHC RBC AUTO-ENTMCNC: 30.7 G/DL (ref 31–37)
MCV RBC AUTO: 83.8 FL
MONOCYTES # BLD AUTO: 0.48 X10(3) UL (ref 0.1–1)
MONOCYTES NFR BLD AUTO: 8.6 %
NEUTROPHILS # BLD AUTO: 3.31 X10 (3) UL (ref 1.5–7.7)
NEUTROPHILS # BLD AUTO: 3.31 X10(3) UL (ref 1.5–7.7)
NEUTROPHILS NFR BLD AUTO: 59.7 %
NT-PROBNP SERPL-MCNC: 8349 PG/ML (ref ?–125)
OSMOLALITY SERPL CALC.SUM OF ELEC: 299 MOSM/KG (ref 275–295)
PLATELET # BLD AUTO: 140 10(3)UL (ref 150–450)
POTASSIUM SERPL-SCNC: 4.1 MMOL/L (ref 3.5–5.1)
RBC # BLD AUTO: 3.96 X10(6)UL
SARS-COV-2 RNA RESP QL NAA+PROBE: NOT DETECTED
SODIUM SERPL-SCNC: 141 MMOL/L (ref 136–145)
TROPONIN I HIGH SENSITIVITY: 13 NG/L
WBC # BLD AUTO: 5.6 X10(3) UL (ref 4–11)

## 2021-12-22 PROCEDURE — 71045 X-RAY EXAM CHEST 1 VIEW: CPT

## 2021-12-22 PROCEDURE — 99222 1ST HOSP IP/OBS MODERATE 55: CPT | Performed by: HOSPITALIST

## 2021-12-22 RX ORDER — FUROSEMIDE 10 MG/ML
40 INJECTION INTRAMUSCULAR; INTRAVENOUS ONCE
Status: COMPLETED | OUTPATIENT
Start: 2021-12-22 | End: 2021-12-22

## 2021-12-23 ENCOUNTER — APPOINTMENT (OUTPATIENT)
Dept: CT IMAGING | Facility: HOSPITAL | Age: 66
DRG: 291 | End: 2021-12-23
Attending: EMERGENCY MEDICINE
Payer: COMMERCIAL

## 2021-12-23 ENCOUNTER — APPOINTMENT (OUTPATIENT)
Dept: CV DIAGNOSTICS | Facility: HOSPITAL | Age: 66
DRG: 291 | End: 2021-12-23
Attending: NURSE PRACTITIONER
Payer: COMMERCIAL

## 2021-12-23 PROBLEM — I50.9 ACUTE CONGESTIVE HEART FAILURE, UNSPECIFIED HEART FAILURE TYPE (HCC): Status: ACTIVE | Noted: 2021-12-23

## 2021-12-23 LAB
D DIMER PPP FEU-MCNC: 0.93 UG/ML FEU (ref ?–0.65)
EST. AVERAGE GLUCOSE BLD GHB EST-MCNC: 189 MG/DL (ref 68–126)
GLUCOSE BLDC GLUCOMTR-MCNC: 148 MG/DL (ref 70–99)
GLUCOSE BLDC GLUCOMTR-MCNC: 149 MG/DL (ref 70–99)
GLUCOSE BLDC GLUCOMTR-MCNC: 173 MG/DL (ref 70–99)
GLUCOSE BLDC GLUCOMTR-MCNC: 175 MG/DL (ref 70–99)
GLUCOSE BLDC GLUCOMTR-MCNC: 223 MG/DL (ref 70–99)
HBA1C MFR BLD: 8.2 % (ref ?–5.7)
TROPONIN I HIGH SENSITIVITY: 14 NG/L

## 2021-12-23 PROCEDURE — 71260 CT THORAX DX C+: CPT | Performed by: EMERGENCY MEDICINE

## 2021-12-23 PROCEDURE — 93306 TTE W/DOPPLER COMPLETE: CPT | Performed by: NURSE PRACTITIONER

## 2021-12-23 PROCEDURE — 99233 SBSQ HOSP IP/OBS HIGH 50: CPT | Performed by: HOSPITALIST

## 2021-12-23 RX ORDER — FUROSEMIDE 10 MG/ML
40 INJECTION INTRAMUSCULAR; INTRAVENOUS DAILY
Status: DISCONTINUED | OUTPATIENT
Start: 2021-12-23 | End: 2021-12-23

## 2021-12-23 RX ORDER — ACETAMINOPHEN 325 MG/1
650 TABLET ORAL EVERY 6 HOURS PRN
Status: DISCONTINUED | OUTPATIENT
Start: 2021-12-23 | End: 2021-12-26

## 2021-12-23 RX ORDER — ONDANSETRON 2 MG/ML
4 INJECTION INTRAMUSCULAR; INTRAVENOUS EVERY 6 HOURS PRN
Status: DISCONTINUED | OUTPATIENT
Start: 2021-12-23 | End: 2021-12-26

## 2021-12-23 RX ORDER — DEXTROSE MONOHYDRATE 25 G/50ML
50 INJECTION, SOLUTION INTRAVENOUS
Status: DISCONTINUED | OUTPATIENT
Start: 2021-12-23 | End: 2021-12-26

## 2021-12-23 RX ORDER — RAMIPRIL 2.5 MG/1
2.5 CAPSULE ORAL DAILY
Status: DISCONTINUED | OUTPATIENT
Start: 2021-12-23 | End: 2021-12-23

## 2021-12-23 RX ORDER — FUROSEMIDE 10 MG/ML
40 INJECTION INTRAMUSCULAR; INTRAVENOUS DAILY
Status: COMPLETED | OUTPATIENT
Start: 2021-12-23 | End: 2021-12-24

## 2021-12-23 RX ORDER — HYDROCODONE BITARTRATE AND ACETAMINOPHEN 10; 325 MG/1; MG/1
1 TABLET ORAL EVERY 4 HOURS PRN
Status: DISCONTINUED | OUTPATIENT
Start: 2021-12-23 | End: 2021-12-26

## 2021-12-23 RX ORDER — ALLOPURINOL 300 MG/1
300 TABLET ORAL DAILY
Status: DISCONTINUED | OUTPATIENT
Start: 2021-12-23 | End: 2021-12-26

## 2021-12-23 RX ORDER — CARVEDILOL 6.25 MG/1
6.25 TABLET ORAL 2 TIMES DAILY WITH MEALS
Status: DISCONTINUED | OUTPATIENT
Start: 2021-12-23 | End: 2021-12-24

## 2021-12-23 RX ORDER — HYDRALAZINE HYDROCHLORIDE 20 MG/ML
10 INJECTION INTRAMUSCULAR; INTRAVENOUS EVERY 4 HOURS PRN
Status: DISCONTINUED | OUTPATIENT
Start: 2021-12-23 | End: 2021-12-26

## 2021-12-23 RX ORDER — EZETIMIBE 10 MG/1
10 TABLET ORAL NIGHTLY
Status: DISCONTINUED | OUTPATIENT
Start: 2021-12-23 | End: 2021-12-26

## 2021-12-23 RX ORDER — RAMIPRIL 5 MG/1
5 CAPSULE ORAL DAILY
Status: DISCONTINUED | OUTPATIENT
Start: 2021-12-24 | End: 2021-12-25

## 2021-12-23 RX ORDER — PRAMIPEXOLE DIHYDROCHLORIDE 0.5 MG/1
0.5 TABLET ORAL NIGHTLY
Status: DISCONTINUED | OUTPATIENT
Start: 2021-12-23 | End: 2021-12-26

## 2021-12-23 RX ORDER — ZOLPIDEM TARTRATE 5 MG/1
5 TABLET ORAL NIGHTLY PRN
Status: DISCONTINUED | OUTPATIENT
Start: 2021-12-23 | End: 2021-12-26

## 2021-12-23 RX ORDER — PAROXETINE HYDROCHLORIDE 20 MG/1
40 TABLET, FILM COATED ORAL DAILY
Status: DISCONTINUED | OUTPATIENT
Start: 2021-12-23 | End: 2021-12-26

## 2021-12-23 RX ORDER — ATORVASTATIN CALCIUM 20 MG/1
20 TABLET, FILM COATED ORAL NIGHTLY
Status: DISCONTINUED | OUTPATIENT
Start: 2021-12-23 | End: 2021-12-26

## 2021-12-23 NOTE — ED QUICK NOTES
Orders for admission, patient is aware of plan and ready to go upstairs. Any questions, please call ED RN Jose J at extension [phone].      Patient Covid vaccination status: Unvaccinated     COVID Test Ordered in ED: Rapid SARS-CoV-2 by PCR    COVID Suspicion a

## 2021-12-23 NOTE — H&P
[address] Patient Status:  Emergency    9/3/1955 MRN [id number]   [facility] [address] Attending Delfino Bauman MD   Hosp Day # 0 PCP Fran Baxter MD     Date: 40 MG Oral Tab   Yes No   Sig:     Pramipexole Dihydrochloride 0.5 MG Oral Tab   Yes No   Sig:     ZETIA 10 MG Oral Tab   Yes No   Sig:     allopurinol 300 MG Oral Tab   Yes No   Sig:     ramipril 2.5 MG Oral Cap   Yes No   Sig:     simvastatin 40 MG Oral affect.       Laboratory Data:   Lab Results   Component Value Date    WBC 5.6 12/22/2021    HGB 10.2 12/22/2021    HCT 33.2 12/22/2021    .0 12/22/2021    CREATSERUM 1.18 12/22/2021    BUN 17 12/22/2021     12/22/2021    K 4.1 12/22/2021    CL

## 2021-12-23 NOTE — ED PROVIDER NOTES
Patient Seen in: [facility] Emergency Department      History   Patient presents with:  Congestive Heart Failure  Swelling Edema    Stated Complaint: legs swelling; CHF    Subjective:   HPI    58-year-old male with history of prior LAD stenting wi Eyes: Conjunctivae are normal. Pupils are equal, round, and reactive to light. Neck: Normal range of motion. Neck supple. No obvious JVD  Cardiovascular: Normal rate, regular rhythm and intact and equal distal pulses.     Pulmonary/Chest: Effort normal within normal limits   POCT GLUCOSE - Abnormal; Notable for the following components:    POC Glucose  173 (*)     All other components within normal limits   POCT GLUCOSE - Abnormal; Notable for the following components:    POC Glucose  148 (*)     All oth limits   TROPONIN I HIGH SENSITIVITY - Normal   TROPONIN I HIGH SENSITIVITY - Normal   MAGNESIUM - Normal   POTASSIUM - Normal   VITAMIN B12 - Normal   TSH W REFLEX TO FREE T4 - Normal   RAPID SARS-COV-2 BY PCR - Normal   CBC WITH DIFFERENTIAL WITH PLATELE has been electronically signed and verified by the Radiologist whose name is printed above. DD:  12/22/2021/DT:  12/22/2021           MDM      The patient has signs of heart failure. He was given Lasix here.   He will be admitted by the [facility]

## 2021-12-23 NOTE — CONSULTS
Cardiology (consult dictated). Assessment:  1. Onset of dyspnea and extremity edema over the last 3 days, consistent with an exacerbation of heart failure. 2.  Known CAD, status post CABG 2019.   Troponin normal.    3.  Ischemic cardiomyopathy, eject

## 2021-12-23 NOTE — PLAN OF CARE
Dina Hickey is alert and oriented. On RA. Admission completed. Came in for SOB and BLE edema. One dose of IV lasix given in ED. No pain complaints overnight. Voiding freely. Plan for 2D echo in the AM. Discharge home once medically cleared.      Problem: Lynn decreased coronary artery perfusion - ex.  Angina  - Evaluate fluid balance, assess for edema, trend weights  Outcome: Progressing  Goal: Absence of cardiac arrhythmias or at baseline  Description: INTERVENTIONS:  - Continuous cardiac monitoring, monitor vi

## 2021-12-23 NOTE — PROGRESS NOTES
[facility]    Progress Note    Liliana Interiano Patient Status:  Inpatient    9/3/1955 MRN [id number]   [facility] [facility] 3W/SW Attending Joni Ash MD   Hosp Day # 0 PCP Dipak Singh MD       Subjective:     +dysp Slava Garcia  Suspicious for cardiac etiology     Hyperlipidemia  Continue statin and Zetia    Anemia, chronic  On admission hemoglobin 10.2, normocytic  -Trend    Mild hyperglycemia, no known history of diabetes  --> Check A1c, sliding scale for now     Proph --------------------------         Jessa Hoang MD  12/23/2021

## 2021-12-23 NOTE — ED INITIAL ASSESSMENT (HPI)
ble swelling x3 days with associated swelling to bl hands which has resolved by today, per pt. +increased dyspnea on exertion than pt's baseline. Denies chest pain. Pt is not vaccinated for covid (per his physician recommendation s/t impaired EF).

## 2021-12-24 ENCOUNTER — APPOINTMENT (OUTPATIENT)
Dept: ULTRASOUND IMAGING | Facility: HOSPITAL | Age: 66
DRG: 291 | End: 2021-12-24
Attending: HOSPITALIST
Payer: COMMERCIAL

## 2021-12-24 LAB
ALBUMIN SERPL-MCNC: 3.6 G/DL (ref 3.4–5)
ANION GAP SERPL CALC-SCNC: 8 MMOL/L (ref 0–18)
BASOPHILS # BLD AUTO: 0.02 X10(3) UL (ref 0–0.2)
BASOPHILS NFR BLD AUTO: 0.4 %
BUN BLD-MCNC: 14 MG/DL (ref 7–18)
BUN/CREAT SERPL: 11.9 (ref 10–20)
CALCIUM BLD-MCNC: 8.4 MG/DL (ref 8.5–10.1)
CHLORIDE SERPL-SCNC: 106 MMOL/L (ref 98–112)
CO2 SERPL-SCNC: 27 MMOL/L (ref 21–32)
CREAT BLD-MCNC: 1.18 MG/DL
DEPRECATED RDW RBC AUTO: 54.9 FL (ref 35.1–46.3)
EOSINOPHIL # BLD AUTO: 0.17 X10(3) UL (ref 0–0.7)
EOSINOPHIL NFR BLD AUTO: 3.5 %
ERYTHROCYTE [DISTWIDTH] IN BLOOD BY AUTOMATED COUNT: 18.6 % (ref 11–15)
GLUCOSE BLD-MCNC: 175 MG/DL (ref 70–99)
GLUCOSE BLDC GLUCOMTR-MCNC: 140 MG/DL (ref 70–99)
GLUCOSE BLDC GLUCOMTR-MCNC: 148 MG/DL (ref 70–99)
GLUCOSE BLDC GLUCOMTR-MCNC: 188 MG/DL (ref 70–99)
GLUCOSE BLDC GLUCOMTR-MCNC: 228 MG/DL (ref 70–99)
HCT VFR BLD AUTO: 34.1 %
HGB BLD-MCNC: 10.9 G/DL
IMM GRANULOCYTES # BLD AUTO: 0.02 X10(3) UL (ref 0–1)
IMM GRANULOCYTES NFR BLD: 0.4 %
LYMPHOCYTES # BLD AUTO: 1.04 X10(3) UL (ref 1–4)
LYMPHOCYTES NFR BLD AUTO: 21.2 %
MAGNESIUM SERPL-MCNC: 1.9 MG/DL (ref 1.6–2.6)
MCH RBC QN AUTO: 26 PG (ref 26–34)
MCHC RBC AUTO-ENTMCNC: 32 G/DL (ref 31–37)
MCV RBC AUTO: 81.2 FL
MONOCYTES # BLD AUTO: 0.44 X10(3) UL (ref 0.1–1)
MONOCYTES NFR BLD AUTO: 9 %
NEUTROPHILS # BLD AUTO: 3.21 X10 (3) UL (ref 1.5–7.7)
NEUTROPHILS # BLD AUTO: 3.21 X10(3) UL (ref 1.5–7.7)
NEUTROPHILS NFR BLD AUTO: 65.5 %
OSMOLALITY SERPL CALC.SUM OF ELEC: 297 MOSM/KG (ref 275–295)
PHOSPHATE SERPL-MCNC: 3.3 MG/DL (ref 2.5–4.9)
PLATELET # BLD AUTO: 149 10(3)UL (ref 150–450)
POTASSIUM SERPL-SCNC: 3.5 MMOL/L (ref 3.5–5.1)
RBC # BLD AUTO: 4.2 X10(6)UL
SODIUM SERPL-SCNC: 141 MMOL/L (ref 136–145)
WBC # BLD AUTO: 4.9 X10(3) UL (ref 4–11)

## 2021-12-24 PROCEDURE — 93970 EXTREMITY STUDY: CPT | Performed by: HOSPITALIST

## 2021-12-24 PROCEDURE — 99233 SBSQ HOSP IP/OBS HIGH 50: CPT | Performed by: HOSPITALIST

## 2021-12-24 RX ORDER — POTASSIUM CHLORIDE 20 MEQ/1
40 TABLET, EXTENDED RELEASE ORAL EVERY 4 HOURS
Status: COMPLETED | OUTPATIENT
Start: 2021-12-24 | End: 2021-12-24

## 2021-12-24 RX ORDER — CARVEDILOL 12.5 MG/1
12.5 TABLET ORAL 2 TIMES DAILY WITH MEALS
Status: DISCONTINUED | OUTPATIENT
Start: 2021-12-24 | End: 2021-12-26

## 2021-12-24 RX ORDER — FUROSEMIDE 40 MG/1
40 TABLET ORAL DAILY
Status: DISCONTINUED | OUTPATIENT
Start: 2021-12-25 | End: 2021-12-26

## 2021-12-24 NOTE — PLAN OF CARE
2D Echo completed today. Patient refused bilateral venous doppler today. Plan for tomorrow. Safety precautions in place. Call light within reach.    Problem: Patient Centered Care  Goal: Patient preferences are identified and integrated in the patient's cristino trend weights  Outcome: Progressing  Goal: Absence of cardiac arrhythmias or at baseline  Description: INTERVENTIONS:  - Continuous cardiac monitoring, monitor vital signs, obtain 12 lead EKG if indicated  - Evaluate effectiveness of antiarrhythmic and hea

## 2021-12-24 NOTE — PROGRESS NOTES
[facility]    Progress Note    Dayne Screws Patient Status:  Inpatient    9/3/1955 MRN [id number]   [facility] [facility] 3W/SW Attending Lora Cardenas MD   Hosp Day # 1 PCP Gonzalez Dickens MD       Subjective:     +dysp antiplatelet sometime ago  --> Nuclear stress test as outpatient planned per cardiology    MR-->Will be considered for MitraClip after discharge    Multiple episodes of syncope  Without prodromal signs, patient states at least 10x over the last few months, 12/23/2021 at 8:19 AM     Finalized by (CST): Loraine Perkins MD on 12/23/2021 at 8:21 AM          CT CHEST PE AORTA (IV ONLY) (CPT=71260)    Result Date: 12/23/2021  CONCLUSION:   No pulmonary embolus in the central, main, lobar, segmental pulmonary arteries

## 2021-12-24 NOTE — PAYOR COMM NOTE
--------------  CONTINUED STAY REVIEW    Payor: LUANN CHANG  Subscriber #:  [id number]  Authorization Number: [id number]    Admit date: 12/23/21  Admit time:  2:50 AM    FAXING CLINICAL UPDATE FOR 12/23/21 12/23/21  Subjective:      +dyspnea, +swelling cardiac etiology     Hyperlipidemia  Continue statin and Zetia     Anemia, chronic  On admission hemoglobin 10.2, normocytic  -Trend     Mild hyperglycemia, no known history of diabetes  --> Check A1c, sliding scale for now     Prophylaxis  Pt allergic to Route     12/23/2021 2213 Given 10 mg Oral       furosemide (LASIX) injection 40 mg     Date Action Dose Route     12/23/2021 1539 Given 40 mg Intravenous       HYDROcodone-acetaminophen (NORCO)  MG per tab 1 tablet     Date Action Dose Route     12/

## 2021-12-24 NOTE — PROGRESS NOTES
[address] PROGRESS NOTE  Arlyn Gerard Patient Status:  Inpatient    9/3/1955 MRN [id number]   [facility] [facility] 3W/SW Attendi bruits. Cardiac: Regular rate and rhythm, S1, S2 normal, no murmur  Lungs: Clear without wheezes, rales, rhonchi. Normal excursions and effort. Abdomen: Soft, non-tender. Extremities: Without clubbing, cyanosis or edema. Peripheral pulses are 2+.   Sk

## 2021-12-25 LAB
ANION GAP SERPL CALC-SCNC: 10 MMOL/L (ref 0–18)
BUN BLD-MCNC: 11 MG/DL (ref 7–18)
BUN/CREAT SERPL: 11.5 (ref 10–20)
CALCIUM BLD-MCNC: 8.9 MG/DL (ref 8.5–10.1)
CHLORIDE SERPL-SCNC: 109 MMOL/L (ref 98–112)
CO2 SERPL-SCNC: 24 MMOL/L (ref 21–32)
CREAT BLD-MCNC: 0.96 MG/DL
GLUCOSE BLD-MCNC: 155 MG/DL (ref 70–99)
GLUCOSE BLDC GLUCOMTR-MCNC: 151 MG/DL (ref 70–99)
GLUCOSE BLDC GLUCOMTR-MCNC: 165 MG/DL (ref 70–99)
GLUCOSE BLDC GLUCOMTR-MCNC: 169 MG/DL (ref 70–99)
GLUCOSE BLDC GLUCOMTR-MCNC: 171 MG/DL (ref 70–99)
IRON SATURATION: 9 %
IRON SERPL-MCNC: 40 UG/DL
OSMOLALITY SERPL CALC.SUM OF ELEC: 299 MOSM/KG (ref 275–295)
POTASSIUM SERPL-SCNC: 3.6 MMOL/L (ref 3.5–5.1)
POTASSIUM SERPL-SCNC: 3.8 MMOL/L (ref 3.5–5.1)
SODIUM SERPL-SCNC: 143 MMOL/L (ref 136–145)
TOTAL IRON BINDING CAPACITY: 446 UG/DL (ref 240–450)
TRANSFERRIN SERPL-MCNC: 299 MG/DL (ref 200–360)
TSI SER-ACNC: 0.4 MIU/ML (ref 0.36–3.74)
VIT B12 SERPL-MCNC: 389 PG/ML (ref 193–986)

## 2021-12-25 PROCEDURE — 99233 SBSQ HOSP IP/OBS HIGH 50: CPT | Performed by: HOSPITALIST

## 2021-12-25 RX ORDER — POTASSIUM CHLORIDE 20 MEQ/1
40 TABLET, EXTENDED RELEASE ORAL EVERY 4 HOURS
Status: DISCONTINUED | OUTPATIENT
Start: 2021-12-25 | End: 2021-12-25

## 2021-12-25 RX ORDER — RAMIPRIL 10 MG/1
10 CAPSULE ORAL DAILY
Status: DISCONTINUED | OUTPATIENT
Start: 2021-12-26 | End: 2021-12-26

## 2021-12-25 NOTE — PROGRESS NOTES
[address] PROGRESS NOTE      Mariama Cormier Patient Status:  Inpatient    9/3/1955 MRN [id number]   [facility] [facility] 3W/SW Attending Edward Kelley MD   Hosp Day # 2 PCP Shae Iniguez MD         Assessment a Nightly   • allopurinol  300 mg Oral Daily   • pramipexole  0.5 mg Oral Nightly   • PARoxetine HCl  40 mg Oral Daily   • atorvastatin  20 mg Oral Nightly   • Insulin Aspart Pen  1-5 Units Subcutaneous TID CC and HS   • Insulin Aspart Pen  4 Units Subcutane

## 2021-12-25 NOTE — PLAN OF CARE
Patient reports feeling well, chronic back and shoulder pain with norco given prn. Continuing to diurese. Blood sugar checked ACHS. Up independently.      Problem: Patient Centered Care  Goal: Patient preferences are identified and integrated in the patient edema, trend weights  Outcome: Progressing  Goal: Absence of cardiac arrhythmias or at baseline  Description: INTERVENTIONS:  - Continuous cardiac monitoring, monitor vital signs, obtain 12 lead EKG if indicated  - Evaluate effectiveness of antiarrhythmic Consider cultural and social influences on pain and pain management  - Manage/alleviate anxiety  - Utilize distraction and/or relaxation techniques  - Monitor for opioid side effects  - Notify MD/LIP if interventions unsuccessful or patient reports new gerardo

## 2021-12-25 NOTE — PLAN OF CARE
Problem: Patient Centered Care  Goal: Patient preferences are identified and integrated in the patient's plan of care  Description: Interventions:  - What would you like us to know as we care for you? Just sold my house, trying to buy a house now.    - Pr monitoring, monitor vital signs, obtain 12 lead EKG if indicated  - Evaluate effectiveness of antiarrhythmic and heart rate control medications as ordered  - Initiate emergency measures for life threatening arrhythmias  - Monitor electrolytes and administe techniques  - Monitor for opioid side effects  - Notify MD/LIP if interventions unsuccessful or patient reports new pain  - Anticipate increased pain with activity and pre-medicate as appropriate  Outcome: Progressing   Patient alert and oriented x4, forge
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
## 2021-12-26 VITALS
BODY MASS INDEX: 28.72 KG/M2 | HEIGHT: 70 IN | OXYGEN SATURATION: 98 % | RESPIRATION RATE: 16 BRPM | DIASTOLIC BLOOD PRESSURE: 96 MMHG | SYSTOLIC BLOOD PRESSURE: 150 MMHG | TEMPERATURE: 98 F | HEART RATE: 99 BPM | WEIGHT: 200.63 LBS

## 2021-12-26 LAB
ANION GAP SERPL CALC-SCNC: 8 MMOL/L (ref 0–18)
BUN BLD-MCNC: 11 MG/DL (ref 7–18)
BUN/CREAT SERPL: 10.5 (ref 10–20)
CALCIUM BLD-MCNC: 8.5 MG/DL (ref 8.5–10.1)
CHLORIDE SERPL-SCNC: 108 MMOL/L (ref 98–112)
CO2 SERPL-SCNC: 24 MMOL/L (ref 21–32)
CREAT BLD-MCNC: 1.05 MG/DL
GLUCOSE BLD-MCNC: 178 MG/DL (ref 70–99)
GLUCOSE BLDC GLUCOMTR-MCNC: 124 MG/DL (ref 70–99)
GLUCOSE BLDC GLUCOMTR-MCNC: 166 MG/DL (ref 70–99)
MAGNESIUM SERPL-MCNC: 1.8 MG/DL (ref 1.6–2.6)
OSMOLALITY SERPL CALC.SUM OF ELEC: 294 MOSM/KG (ref 275–295)
POTASSIUM SERPL-SCNC: 3.9 MMOL/L (ref 3.5–5.1)
POTASSIUM SERPL-SCNC: 3.9 MMOL/L (ref 3.5–5.1)
SODIUM SERPL-SCNC: 140 MMOL/L (ref 136–145)

## 2021-12-26 PROCEDURE — 99239 HOSP IP/OBS DSCHRG MGMT >30: CPT | Performed by: HOSPITALIST

## 2021-12-26 RX ORDER — RAMIPRIL 10 MG/1
10 CAPSULE ORAL DAILY
Qty: 30 CAPSULE | Refills: 3 | Status: SHIPPED | OUTPATIENT
Start: 2021-12-26

## 2021-12-26 RX ORDER — RAMIPRIL 10 MG/1
10 CAPSULE ORAL DAILY
Qty: 30 CAPSULE | Refills: 3 | Status: SHIPPED | OUTPATIENT
Start: 2021-12-26 | End: 2021-12-26

## 2021-12-26 RX ORDER — POTASSIUM CHLORIDE 1.5 G/1.77G
10 POWDER, FOR SOLUTION ORAL DAILY
Qty: 30 EACH | Refills: 3 | Status: SHIPPED | OUTPATIENT
Start: 2021-12-26

## 2021-12-26 RX ORDER — CARVEDILOL 12.5 MG/1
12.5 TABLET ORAL 2 TIMES DAILY WITH MEALS
Qty: 60 TABLET | Refills: 3 | Status: SHIPPED | OUTPATIENT
Start: 2021-12-26

## 2021-12-26 RX ORDER — FUROSEMIDE 40 MG/1
40 TABLET ORAL DAILY
Qty: 30 TABLET | Refills: 3 | Status: SHIPPED | OUTPATIENT
Start: 2021-12-27

## 2021-12-26 RX ORDER — POTASSIUM CHLORIDE 1.5 G/1.77G
10 POWDER, FOR SOLUTION ORAL DAILY
Qty: 30 EACH | Refills: 3 | Status: SHIPPED | OUTPATIENT
Start: 2021-12-26 | End: 2021-12-26

## 2021-12-26 RX ORDER — CARVEDILOL 12.5 MG/1
12.5 TABLET ORAL 2 TIMES DAILY WITH MEALS
Qty: 60 TABLET | Refills: 3 | Status: SHIPPED | OUTPATIENT
Start: 2021-12-26 | End: 2021-12-26

## 2021-12-26 RX ORDER — POTASSIUM CHLORIDE 1.5 G/1.77G
10 POWDER, FOR SOLUTION ORAL 2 TIMES DAILY
Qty: 30 EACH | Refills: 3 | Status: SHIPPED | OUTPATIENT
Start: 2021-12-26 | End: 2021-12-26

## 2021-12-26 RX ORDER — FUROSEMIDE 40 MG/1
40 TABLET ORAL DAILY
Qty: 30 TABLET | Refills: 3 | Status: SHIPPED | OUTPATIENT
Start: 2021-12-27 | End: 2021-12-26

## 2021-12-26 NOTE — CM/SW NOTE
Received notice from RN/Jo Ann - VIVIENNE asking for SW to check OOP cost for Eliquis. Per chart review, VIVIENNE sent Rx to pt's pharmacy on file.  KATT contacted [facility] pharmacy at: [phone] and spoke to [place], they do not have updated insurance on file fo

## 2021-12-26 NOTE — PLAN OF CARE
Patient reports chronic shoulder pain which is slightly relieved by norco prn. Blood sugar checked ACHS. Up independently, plan for discharge tomorrow.      Problem: Patient Centered Care  Goal: Patient preferences are identified and integrated in the patie edema, trend weights  Outcome: Progressing  Goal: Absence of cardiac arrhythmias or at baseline  Description: INTERVENTIONS:  - Continuous cardiac monitoring, monitor vital signs, obtain 12 lead EKG if indicated  - Evaluate effectiveness of antiarrhythmic Consider cultural and social influences on pain and pain management  - Manage/alleviate anxiety  - Utilize distraction and/or relaxation techniques  - Monitor for opioid side effects  - Notify MD/LIP if interventions unsuccessful or patient reports new gerardo

## 2021-12-26 NOTE — PLAN OF CARE
Pt alert and oriented. Not in respiratory distress. No pain. Plan for discharge today.   Problem: Patient Centered Care  Goal: Patient preferences are identified and integrated in the patient's plan of care  Description: Interventions:  - What would you lik

## 2021-12-26 NOTE — PLAN OF CARE
Problem: Patient Centered Care  Goal: Patient preferences are identified and integrated in the patient's plan of care  Description: Interventions:  - What would you like us to know as we care for you? Just sold my house, trying to buy a house now.    - Pr monitoring, monitor vital signs, obtain 12 lead EKG if indicated  - Evaluate effectiveness of antiarrhythmic and heart rate control medications as ordered  - Initiate emergency measures for life threatening arrhythmias  - Monitor electrolytes and administe techniques  - Monitor for opioid side effects  - Notify MD/LIP if interventions unsuccessful or patient reports new pain  - Anticipate increased pain with activity and pre-medicate as appropriate  Outcome: Progressing

## 2021-12-26 NOTE — PROGRESS NOTES
Subjective: In bed on exam, frustrated and wants to go home. No acute events overnight. He currently denies CP, S OB, dizziness, or palpitations.     Objective:   /83 (BP [facility]: Right arm)   Pulse 76   Temp 97.7 °F (36.5 °C) (Oral)   Resp 35-40%, by visual assessment.      Hypokinesis of the anteroseptal and apical myocardium.      Hypokinesis of the basalinferior myocardium.  Doppler parameters      are consistent with a reversible restrictive pattern, indicative      of decreased left vent mural thrombus on Echo -Started on Eliquis 10 mg twice a day for 2 weeks after that 5 mg repeat echocardiogram in 3 months to reassess. · Mod-Severe MR  · Severe TR  · Hypertension–controlled  · HL -on statin    Plan:  · Euvolemic on exam, continue p.o.  L

## 2021-12-27 NOTE — DISCHARGE SUMMARY
[facility] HOSPITALIST  DISCHARGE SUMMARY     Silvia Hunt Patient Status:  Inpatient    9/3/1955 MRN [id number]   [facility] [facility] 3W/SW Attending No att. providers found   [address] Day # 3 PCP Gerry Blackmon MD     Date of Admission:  consulted.   -Optimizing heart failure meds     Coronary artery disease, h/o CABG 2019, no active ischemia  Ischemic cardiomyopathy, previous EF of 35%  Continue statin  patient states he was taken off antiplatelet sometime ago  --> Nuclear stress test as o tablet  Refills: 3     furosemide 40 MG Tabs  Commonly known as: LASIX      Take 1 tablet (40 mg total) by mouth daily. Quantity: 30 tablet  Refills: 3     potassium chloride 20 MEQ Pack  Commonly known as: KLOR-CON      Take 10 mEq by mouth daily.    Michelle Cohen He wants to go home  Respiratory: Almost clear to auscultation bilaterally. No wheezes. No rhonchi. Cardiovascular: S1, S2. Regular rate and rhythm. No murmurs, rubs or gallops. Abdomen: Soft, nontender, nondistended. Positive bowel sounds.  No rebound

## 2021-12-30 ENCOUNTER — PATIENT OUTREACH (OUTPATIENT)
Dept: CASE MANAGEMENT | Age: 66
End: 2021-12-30

## 2021-12-30 NOTE — PROGRESS NOTES
1st Attempt at scheduling & Follow up Dm Question         Mary Anne Romero MD ENDOCRINOLOGY Schedule an appointment as soon as possible for a visit in 1 week diabetes [address]   [phone]          Unable to cont

## 2022-01-04 NOTE — PROGRESS NOTES
2nd Attempt at scheduling & Follow up Dm Question         Alpesh Roberts MD ENDOCRINOLOGY Schedule an appointment as soon as possible for a visit in 1 week diabetes [address]   [phone]          Unable to cont

## 2022-01-05 NOTE — PROGRESS NOTES
3rd Attempt at scheduling & Follow up Dm Question         Nida Huffman MD ENDOCRINOLOGY Schedule an appointment as soon as possible for a visit in 1 week diabetes [address] [phone]          Unable to cont

## 2022-01-07 NOTE — PROGRESS NOTES
Spoke to patient and he had question about travel with his heart monitor, transferred him to Thomas Tipton for further assistance

## 2022-01-07 NOTE — PROGRESS NOTES
Pt returning call, left VM. Pt states he is leaving go to out of town. Requesting a call back. Please call pt.

## 2022-01-13 ENCOUNTER — TELEPHONE (OUTPATIENT)
Dept: INTERNAL MEDICINE UNIT | Facility: HOSPITAL | Age: 67
End: 2022-01-13

## 2022-01-13 NOTE — TELEPHONE ENCOUNTER
Fax received from [facility] requesting Prior Auth for Eliquis tabs prescribed on 12/26/21. Call made to Pharmacy whom states the medication did go through the patients insurance, no prior auth is needed and medication is ready for .      Attempt

## 2022-02-21 ENCOUNTER — OFFICE VISIT (OUTPATIENT)
Dept: CARDIOLOGY | Age: 67
End: 2022-02-21

## 2022-02-21 ENCOUNTER — LAB SERVICES (OUTPATIENT)
Dept: LAB | Age: 67
End: 2022-02-21

## 2022-02-21 ENCOUNTER — APPOINTMENT (OUTPATIENT)
Dept: CARDIOLOGY | Age: 67
End: 2022-02-21

## 2022-02-21 ENCOUNTER — TELEPHONE (OUTPATIENT)
Dept: CARDIOLOGY | Age: 67
End: 2022-02-21

## 2022-02-21 VITALS
HEIGHT: 71 IN | SYSTOLIC BLOOD PRESSURE: 130 MMHG | WEIGHT: 192.4 LBS | RESPIRATION RATE: 18 BRPM | BODY MASS INDEX: 26.94 KG/M2 | OXYGEN SATURATION: 98 % | HEART RATE: 72 BPM | DIASTOLIC BLOOD PRESSURE: 72 MMHG

## 2022-02-21 DIAGNOSIS — I50.22 CHRONIC SYSTOLIC CONGESTIVE HEART FAILURE (CMD): Primary | ICD-10-CM

## 2022-02-21 DIAGNOSIS — I50.22 CHRONIC SYSTOLIC CONGESTIVE HEART FAILURE (CMD): ICD-10-CM

## 2022-02-21 LAB
ALBUMIN SERPL BCG-MCNC: 4.1 G/DL (ref 3.6–5.1)
ALP SERPL-CCNC: 104 U/L (ref 45–115)
ALT SERPL W/O P-5'-P-CCNC: 7 U/L (ref 10–35)
AST SERPL-CCNC: 8 U/L (ref 9–37)
BASOPHIL %: 0.6 % (ref 0–1.2)
BASOPHIL ABSOLUTE #: 0 10*3/UL (ref 0–0.1)
BILIRUB SERPL-MCNC: 0.7 MG/DL (ref 0–1)
BUN SERPL-MCNC: 13 MG/DL (ref 6–27)
CALCIUM SERPL-MCNC: 8.7 MG/DL (ref 8.6–10.6)
CHLORIDE SERPL-SCNC: 105 MMOL/L (ref 96–107)
CHOLEST SERPL-MCNC: 207 MG/DL (ref 140–200)
CREAT SERPL-MCNC: 1.1 MG/DL (ref 0.6–1.6)
DIFFERENTIAL TYPE: ABNORMAL
EOSINOPHIL %: 4.3 % (ref 0–10)
EOSINOPHIL ABSOLUTE #: 0.2 10*3/UL (ref 0–0.5)
GFR SERPL CREATININE-BSD FRML MDRD: >60 ML/MIN/{1.73M2}
GFR SERPL CREATININE-BSD FRML MDRD: >60 ML/MIN/{1.73M2}
GLUCOSE P FAST SERPL-MCNC: 154 MG/DL (ref 60–100)
HCO3 SERPL-SCNC: 23 MMOL/L (ref 22–32)
HDLC SERPL-MCNC: 25 MG/DL
HEMATOCRIT: 35.4 % (ref 40–51)
HEMOGLOBIN: 11.4 G/DL (ref 13.7–17.5)
IMMATURE GRANULOCYTE ABSOLUTE: 0.01 10*3/UL (ref 0–0.05)
IMMATURE GRANULOCYTE PERCENT: 0.2 % (ref 0–0.5)
LDLC SERPL DIRECT ASSAY-MCNC: 147 MG/DL (ref 0–100)
LYMPH PERCENT: 23.4 % (ref 20.5–51.1)
LYMPHOCYTE ABSOLUTE #: 1.2 10*3/UL (ref 1.2–3.4)
MEAN CORPUSCULAR HGB CONCENTRATION: 32.2 % (ref 32–36)
MEAN CORPUSCULAR HGB: 27.5 PG (ref 27–34)
MEAN CORPUSCULAR VOLUME: 85.5 FL (ref 79–95)
MEAN PLATELET VOLUME: 12.1 FL (ref 8.6–12.4)
MONOCYTE ABSOLUTE #: 0.6 10*3/UL (ref 0.2–0.9)
MONOCYTE PERCENT: 11.1 % (ref 4.3–12.9)
NEUTROPHIL ABSOLUTE #: 3.2 10*3/UL (ref 1.4–6.5)
NEUTROPHIL PERCENT: 60.4 % (ref 34–73.5)
PLATELET COUNT: 187 10*3/UL (ref 150–400)
POTASSIUM SERPL-SCNC: 4.2 MMOL/L (ref 3.5–5.3)
PROT SERPL-MCNC: 6.1 G/DL (ref 6.2–8.1)
RED BLOOD CELL COUNT: 4.14 10*6/UL (ref 3.9–5.7)
RED CELL DISTRIBUTION WIDTH: 14.9 % (ref 11.3–14.8)
SODIUM SERPL-SCNC: 141 MMOL/L (ref 136–146)
TRIGL SERPL-MCNC: 271 MG/DL (ref 0–200)
WHITE BLOOD CELL COUNT: 5.3 10*3/UL (ref 4–10)

## 2022-02-21 PROCEDURE — 3078F DIAST BP <80 MM HG: CPT | Performed by: PHYSICIAN ASSISTANT

## 2022-02-21 PROCEDURE — 80061 LIPID PANEL: CPT | Performed by: PHYSICIAN ASSISTANT

## 2022-02-21 PROCEDURE — 99214 OFFICE O/P EST MOD 30 MIN: CPT | Performed by: PHYSICIAN ASSISTANT

## 2022-02-21 PROCEDURE — 3075F SYST BP GE 130 - 139MM HG: CPT | Performed by: PHYSICIAN ASSISTANT

## 2022-02-21 PROCEDURE — 85025 COMPLETE CBC W/AUTO DIFF WBC: CPT | Performed by: PHYSICIAN ASSISTANT

## 2022-02-21 PROCEDURE — 80053 COMPREHEN METABOLIC PANEL: CPT | Performed by: PHYSICIAN ASSISTANT

## 2022-02-21 PROCEDURE — 83721 ASSAY OF BLOOD LIPOPROTEIN: CPT | Performed by: PHYSICIAN ASSISTANT

## 2022-02-21 PROCEDURE — 36415 COLL VENOUS BLD VENIPUNCTURE: CPT | Performed by: PHYSICIAN ASSISTANT

## 2022-02-21 RX ORDER — DIAZEPAM 5 MG/1
5 TABLET ORAL EVERY 12 HOURS
COMMUNITY
End: 2022-09-21

## 2022-02-21 RX ORDER — ATORVASTATIN CALCIUM 80 MG/1
80 TABLET, FILM COATED ORAL DAILY
COMMUNITY
End: 2022-02-21 | Stop reason: SDUPTHER

## 2022-02-21 RX ORDER — RAMIPRIL 10 MG/1
10 CAPSULE ORAL DAILY
COMMUNITY
Start: 2021-12-26 | End: 2022-02-21 | Stop reason: SDUPTHER

## 2022-02-21 RX ORDER — ROPINIROLE 1 MG/1
1 TABLET, FILM COATED ORAL DAILY
COMMUNITY
End: 2023-09-18 | Stop reason: SDUPTHER

## 2022-02-21 RX ORDER — ATORVASTATIN CALCIUM 80 MG/1
80 TABLET, FILM COATED ORAL DAILY
Qty: 30 TABLET | Refills: 2 | Status: SHIPPED | OUTPATIENT
Start: 2022-02-21 | End: 2022-05-23 | Stop reason: SDUPTHER

## 2022-02-21 RX ORDER — RAMIPRIL 10 MG/1
10 CAPSULE ORAL DAILY
Qty: 30 CAPSULE | Refills: 2 | Status: SHIPPED | OUTPATIENT
Start: 2022-02-21 | End: 2022-05-23 | Stop reason: SDUPTHER

## 2022-02-21 RX ORDER — TADALAFIL 20 MG/1
20 TABLET ORAL DAILY
COMMUNITY
End: 2022-03-24 | Stop reason: ALTCHOICE

## 2022-02-21 RX ORDER — METOPROLOL SUCCINATE 100 MG/1
100 TABLET, EXTENDED RELEASE ORAL DAILY
COMMUNITY
End: 2022-02-21 | Stop reason: ALTCHOICE

## 2022-02-21 RX ORDER — CARVEDILOL 12.5 MG/1
12.5 TABLET ORAL 2 TIMES DAILY WITH MEALS
Qty: 60 TABLET | Refills: 2 | Status: SHIPPED | OUTPATIENT
Start: 2022-02-21 | End: 2022-03-24 | Stop reason: ALTCHOICE

## 2022-02-21 RX ORDER — CARVEDILOL 12.5 MG/1
12.5 TABLET ORAL 2 TIMES DAILY WITH MEALS
COMMUNITY
End: 2022-02-21 | Stop reason: SDUPTHER

## 2022-02-21 ASSESSMENT — PATIENT HEALTH QUESTIONNAIRE - PHQ9
1. LITTLE INTEREST OR PLEASURE IN DOING THINGS: NOT AT ALL
SUM OF ALL RESPONSES TO PHQ9 QUESTIONS 1 AND 2: 0
CLINICAL INTERPRETATION OF PHQ2 SCORE: NO FURTHER SCREENING NEEDED
2. FEELING DOWN, DEPRESSED OR HOPELESS: NOT AT ALL
SUM OF ALL RESPONSES TO PHQ9 QUESTIONS 1 AND 2: 0

## 2022-02-22 ENCOUNTER — TELEPHONE (OUTPATIENT)
Dept: CARDIOLOGY | Age: 67
End: 2022-02-22

## 2022-02-22 ASSESSMENT — NEW YORK HEART ASSOCIATION (NYHA) CLASSIFICATION: NYHA FUNCTIONAL CLASS: III

## 2022-03-12 ENCOUNTER — APPOINTMENT (OUTPATIENT)
Dept: CT IMAGING | Facility: HOSPITAL | Age: 67
End: 2022-03-12
Attending: EMERGENCY MEDICINE
Payer: MEDICARE

## 2022-03-12 ENCOUNTER — HOSPITAL ENCOUNTER (EMERGENCY)
Facility: HOSPITAL | Age: 67
Discharge: HOME | End: 2022-03-12
Attending: EMERGENCY MEDICINE
Payer: MEDICARE

## 2022-03-12 VITALS
OXYGEN SATURATION: 98 % | WEIGHT: 195 LBS | RESPIRATION RATE: 16 BRPM | TEMPERATURE: 97 F | BODY MASS INDEX: 27.92 KG/M2 | HEART RATE: 80 BPM | DIASTOLIC BLOOD PRESSURE: 87 MMHG | SYSTOLIC BLOOD PRESSURE: 142 MMHG | HEIGHT: 70 IN

## 2022-03-12 DIAGNOSIS — S01.511A COMPLICATED LACERATION OF LIP, INITIAL ENCOUNTER: Primary | ICD-10-CM

## 2022-03-12 DIAGNOSIS — S09.93XA DENTAL TRAUMA, INITIAL ENCOUNTER: ICD-10-CM

## 2022-03-12 DIAGNOSIS — S09.90XA INJURY OF HEAD, INITIAL ENCOUNTER: ICD-10-CM

## 2022-03-12 DIAGNOSIS — S02.401A CLOSED FRACTURE OF MAXILLA, UNSPECIFIED LATERALITY, INITIAL ENCOUNTER (HCC): ICD-10-CM

## 2022-03-12 PROCEDURE — 12013 RPR F/E/E/N/L/M 2.6-5.0 CM: CPT

## 2022-03-12 PROCEDURE — 96374 THER/PROPH/DIAG INJ IV PUSH: CPT

## 2022-03-12 PROCEDURE — 72125 CT NECK SPINE W/O DYE: CPT | Performed by: EMERGENCY MEDICINE

## 2022-03-12 PROCEDURE — 70486 CT MAXILLOFACIAL W/O DYE: CPT | Performed by: EMERGENCY MEDICINE

## 2022-03-12 PROCEDURE — 99285 EMERGENCY DEPT VISIT HI MDM: CPT

## 2022-03-12 PROCEDURE — 70450 CT HEAD/BRAIN W/O DYE: CPT | Performed by: EMERGENCY MEDICINE

## 2022-03-12 RX ORDER — LIDOCAINE HYDROCHLORIDE AND EPINEPHRINE 20; 5 MG/ML; UG/ML
INJECTION, SOLUTION EPIDURAL; INFILTRATION; INTRACAUDAL; PERINEURAL
Status: COMPLETED
Start: 2022-03-12 | End: 2022-03-12

## 2022-03-12 RX ORDER — LIDOCAINE HYDROCHLORIDE AND EPINEPHRINE 20; 5 MG/ML; UG/ML
20 INJECTION, SOLUTION EPIDURAL; INFILTRATION; INTRACAUDAL; PERINEURAL ONCE
Status: COMPLETED | OUTPATIENT
Start: 2022-03-12 | End: 2022-03-12

## 2022-03-12 RX ORDER — AMOXICILLIN AND CLAVULANATE POTASSIUM 875; 125 MG/1; MG/1
1 TABLET, FILM COATED ORAL 2 TIMES DAILY
Qty: 10 TABLET | Refills: 0 | Status: SHIPPED | OUTPATIENT
Start: 2022-03-12 | End: 2022-03-17

## 2022-03-12 RX ORDER — MORPHINE SULFATE 4 MG/ML
6 INJECTION, SOLUTION INTRAMUSCULAR; INTRAVENOUS ONCE
Status: COMPLETED | OUTPATIENT
Start: 2022-03-12 | End: 2022-03-12

## 2022-03-12 NOTE — ED INITIAL ASSESSMENT (HPI)
Patient presents via EMS after a fall at home. Patient states he tripped over a piece of cardboard on the floor. Patient denies LOC. Patient with laceration to upper lip; states he hit it on the floor.

## 2022-03-13 NOTE — CM/SW NOTE
CM responded to [employer]  and spoke to pt regarding assisting with dental resources. CM provided as requested by pt dentist in [address] [place] [address] [phone] provided. CM to remain available for support and/or discharge planning.     Albertina Hong RN, [facility]  ER   Ext. [phone]

## 2022-03-21 ENCOUNTER — ANCILLARY PROCEDURE (OUTPATIENT)
Dept: CARDIOLOGY | Age: 67
End: 2022-03-21
Attending: PHYSICIAN ASSISTANT

## 2022-03-21 DIAGNOSIS — I50.22 CHRONIC SYSTOLIC CONGESTIVE HEART FAILURE  (CMD): ICD-10-CM

## 2022-03-21 LAB
AV MEAN PRESS GRAD SYS DOP V1V2: 3.48 MM[HG]
AV MEAN VELOCITY (AVMV): NORMAL
AV ORIFICE AREA US: 2.15 CM2
AV PEAK GRADIENT (AVPG): NORMAL
AV STENOSIS SEVERITY TEXT: NORMAL
AV VMAX SYS DOP: 1.25
LV EF 2D ECHO EST: NORMAL %

## 2022-03-21 PROCEDURE — X1094 NO CHARGE VISIT: HCPCS | Performed by: PHYSICIAN ASSISTANT

## 2022-03-21 PROCEDURE — 93306 TTE W/DOPPLER COMPLETE: CPT | Performed by: INTERNAL MEDICINE

## 2022-03-22 ENCOUNTER — OFFICE VISIT (OUTPATIENT)
Dept: CARDIOLOGY | Age: 67
End: 2022-03-22

## 2022-03-22 VITALS
BODY MASS INDEX: 27.35 KG/M2 | RESPIRATION RATE: 20 BRPM | HEIGHT: 70 IN | HEART RATE: 60 BPM | WEIGHT: 191 LBS | SYSTOLIC BLOOD PRESSURE: 100 MMHG | DIASTOLIC BLOOD PRESSURE: 64 MMHG | OXYGEN SATURATION: 97 %

## 2022-03-22 DIAGNOSIS — Z95.1 HX OF CABG: ICD-10-CM

## 2022-03-22 DIAGNOSIS — I10 BENIGN ESSENTIAL HTN: ICD-10-CM

## 2022-03-22 DIAGNOSIS — I25.5 ISCHEMIC CARDIOMYOPATHY: ICD-10-CM

## 2022-03-22 DIAGNOSIS — I25.10 CORONARY ARTERY DISEASE INVOLVING NATIVE CORONARY ARTERY OF NATIVE HEART WITHOUT ANGINA PECTORIS: Primary | ICD-10-CM

## 2022-03-22 DIAGNOSIS — E78.5 DYSLIPIDEMIA: ICD-10-CM

## 2022-03-22 DIAGNOSIS — I26.99 OTHER PULMONARY EMBOLISM WITHOUT ACUTE COR PULMONALE, UNSPECIFIED CHRONICITY (CMD): ICD-10-CM

## 2022-03-22 PROCEDURE — 3078F DIAST BP <80 MM HG: CPT | Performed by: INTERNAL MEDICINE

## 2022-03-22 PROCEDURE — 99214 OFFICE O/P EST MOD 30 MIN: CPT | Performed by: INTERNAL MEDICINE

## 2022-03-22 PROCEDURE — 3074F SYST BP LT 130 MM HG: CPT | Performed by: INTERNAL MEDICINE

## 2022-03-22 RX ORDER — METOPROLOL SUCCINATE 100 MG/1
100 TABLET, EXTENDED RELEASE ORAL DAILY
COMMUNITY
Start: 2022-02-24 | End: 2022-03-24 | Stop reason: DRUGHIGH

## 2022-03-22 RX ORDER — SPIRONOLACTONE 25 MG/1
25 TABLET ORAL DAILY
COMMUNITY
End: 2022-05-23 | Stop reason: SDUPTHER

## 2022-03-22 RX ORDER — FUROSEMIDE 40 MG/1
40 TABLET ORAL DAILY
COMMUNITY
End: 2022-03-24 | Stop reason: ALTCHOICE

## 2022-03-22 RX ORDER — POTASSIUM CHLORIDE 1.5 G/1.58G
20 POWDER, FOR SOLUTION ORAL DAILY
COMMUNITY
End: 2022-03-24 | Stop reason: CLARIF

## 2022-03-22 RX ORDER — BUMETANIDE 2 MG/1
2 TABLET ORAL DAILY
COMMUNITY
Start: 2022-02-24 | End: 2022-05-23 | Stop reason: SDUPTHER

## 2022-03-22 RX ORDER — LISINOPRIL 5 MG/1
5 TABLET ORAL DAILY
COMMUNITY
Start: 2022-02-24 | End: 2022-03-24 | Stop reason: ALTCHOICE

## 2022-03-23 ENCOUNTER — TELEPHONE (OUTPATIENT)
Dept: CARDIOLOGY | Age: 67
End: 2022-03-23

## 2022-03-24 ENCOUNTER — NURSE ONLY (OUTPATIENT)
Dept: CARDIOLOGY | Age: 67
End: 2022-03-24

## 2022-03-24 DIAGNOSIS — I50.9 CONGESTIVE HEART FAILURE, UNSPECIFIED HF CHRONICITY, UNSPECIFIED HEART FAILURE TYPE (CMD): Primary | ICD-10-CM

## 2022-03-24 PROCEDURE — 99211 OFF/OP EST MAY X REQ PHY/QHP: CPT | Performed by: INTERNAL MEDICINE

## 2022-03-24 RX ORDER — METOPROLOL SUCCINATE 100 MG/1
200 TABLET, EXTENDED RELEASE ORAL DAILY
Status: SHIPPED | COMMUNITY
Start: 2022-03-24 | End: 2022-03-24 | Stop reason: DRUGHIGH

## 2022-03-24 RX ORDER — METOPROLOL SUCCINATE 100 MG/1
200 TABLET, EXTENDED RELEASE ORAL DAILY
Qty: 180 TABLET | Refills: 1 | Status: SHIPPED | OUTPATIENT
Start: 2022-03-24 | End: 2022-05-23 | Stop reason: SDUPTHER

## 2022-04-22 ENCOUNTER — OFFICE VISIT (OUTPATIENT)
Dept: CARDIOLOGY | Age: 67
End: 2022-04-22

## 2022-04-22 ENCOUNTER — TELEPHONE (OUTPATIENT)
Dept: CARDIOLOGY | Age: 67
End: 2022-04-22

## 2022-04-22 ENCOUNTER — PREP FOR CASE (OUTPATIENT)
Dept: CARDIOLOGY | Age: 67
End: 2022-04-22

## 2022-04-22 ENCOUNTER — LAB SERVICES (OUTPATIENT)
Dept: LAB | Age: 67
End: 2022-04-22

## 2022-04-22 VITALS
WEIGHT: 194 LBS | SYSTOLIC BLOOD PRESSURE: 122 MMHG | OXYGEN SATURATION: 97 % | DIASTOLIC BLOOD PRESSURE: 64 MMHG | HEART RATE: 72 BPM | RESPIRATION RATE: 16 BRPM | HEIGHT: 70 IN | BODY MASS INDEX: 27.77 KG/M2

## 2022-04-22 VITALS
RESPIRATION RATE: 16 BRPM | BODY MASS INDEX: 27.77 KG/M2 | DIASTOLIC BLOOD PRESSURE: 64 MMHG | HEART RATE: 72 BPM | SYSTOLIC BLOOD PRESSURE: 122 MMHG | OXYGEN SATURATION: 97 % | WEIGHT: 194 LBS | HEIGHT: 70 IN

## 2022-04-22 DIAGNOSIS — E78.5 DYSLIPIDEMIA: ICD-10-CM

## 2022-04-22 DIAGNOSIS — I25.5 ISCHEMIC CARDIOMYOPATHY: ICD-10-CM

## 2022-04-22 DIAGNOSIS — E11.9 TYPE 2 DIABETES MELLITUS WITHOUT COMPLICATION, WITHOUT LONG-TERM CURRENT USE OF INSULIN (CMD): ICD-10-CM

## 2022-04-22 DIAGNOSIS — I25.2 MYOCARDIAL INFARCT, OLD: ICD-10-CM

## 2022-04-22 DIAGNOSIS — I25.5 ISCHEMIC CARDIOMYOPATHY: Primary | ICD-10-CM

## 2022-04-22 DIAGNOSIS — Z01.818 PRE-OP TESTING: ICD-10-CM

## 2022-04-22 DIAGNOSIS — I50.22 CHRONIC SYSTOLIC CONGESTIVE HEART FAILURE, NYHA CLASS 2 (CMD): ICD-10-CM

## 2022-04-22 DIAGNOSIS — I50.22 CHF (CONGESTIVE HEART FAILURE), NYHA CLASS II, CHRONIC, SYSTOLIC (CMD): ICD-10-CM

## 2022-04-22 DIAGNOSIS — I50.22 CHF (CONGESTIVE HEART FAILURE), NYHA CLASS II, CHRONIC, SYSTOLIC (CMD): Primary | ICD-10-CM

## 2022-04-22 DIAGNOSIS — I25.10 CORONARY ARTERY DISEASE INVOLVING NATIVE CORONARY ARTERY OF NATIVE HEART WITHOUT ANGINA PECTORIS: ICD-10-CM

## 2022-04-22 DIAGNOSIS — I50.22 CHRONIC SYSTOLIC CHF (CONGESTIVE HEART FAILURE), NYHA CLASS 2 (CMD): ICD-10-CM

## 2022-04-22 DIAGNOSIS — I10 BENIGN ESSENTIAL HTN: ICD-10-CM

## 2022-04-22 DIAGNOSIS — Z01.812 PRE-PROCEDURAL LABORATORY EXAMINATION: Primary | ICD-10-CM

## 2022-04-22 DIAGNOSIS — Z01.818 PRE-OP TESTING: Primary | ICD-10-CM

## 2022-04-22 LAB
BASOPHIL %: 0.5 % (ref 0–1.2)
BASOPHIL ABSOLUTE #: 0 10*3/UL (ref 0–0.1)
BUN SERPL-MCNC: 25 MG/DL (ref 6–27)
CALCIUM SERPL-MCNC: 9.8 MG/DL (ref 8.6–10.6)
CHLORIDE SERPL-SCNC: 101 MMOL/L (ref 96–107)
CREAT SERPL-MCNC: 1.2 MG/DL (ref 0.6–1.6)
DIFFERENTIAL TYPE: ABNORMAL
EOSINOPHIL %: 2.3 % (ref 0–10)
EOSINOPHIL ABSOLUTE #: 0.1 10*3/UL (ref 0–0.5)
GFR SERPL CREATININE-BSD FRML MDRD: >60 ML/MIN/{1.73M2}
GFR SERPL CREATININE-BSD FRML MDRD: >60 ML/MIN/{1.73M2}
GLUCOSE SERPL-MCNC: 165 MG/DL (ref 70–200)
HCO3 SERPL-SCNC: 30 MMOL/L (ref 22–32)
HEMATOCRIT: 36.5 % (ref 40–51)
HEMOGLOBIN: 12.3 G/DL (ref 13.7–17.5)
IMMATURE GRANULOCYTE ABSOLUTE: 0.02 10*3/UL (ref 0–0.05)
IMMATURE GRANULOCYTE PERCENT: 0.3 % (ref 0–0.5)
INTERNATIONAL NORMALIZED RATIO: 1
LYMPH PERCENT: 25.7 % (ref 20.5–51.1)
LYMPHOCYTE ABSOLUTE #: 1.6 10*3/UL (ref 1.2–3.4)
MAGNESIUM SERPL-MCNC: 1.7 MG/DL (ref 1.7–2.6)
MEAN CORPUSCULAR HGB CONCENTRATION: 33.7 % (ref 32–36)
MEAN CORPUSCULAR HGB: 29 PG (ref 27–34)
MEAN CORPUSCULAR VOLUME: 86.1 FL (ref 79–95)
MEAN PLATELET VOLUME: 9.8 FL (ref 8.6–12.4)
MONOCYTE ABSOLUTE #: 0.6 10*3/UL (ref 0.2–0.9)
MONOCYTE PERCENT: 9.2 % (ref 4.3–12.9)
NEUTROPHIL ABSOLUTE #: 3.8 10*3/UL (ref 1.4–6.5)
NEUTROPHIL PERCENT: 62 % (ref 34–73.5)
PLATELET COUNT: 174 10*3/UL (ref 150–400)
POTASSIUM SERPL-SCNC: 4.3 MMOL/L (ref 3.5–5.3)
PROTHROMBIN TIME, THERAPEUTIC: 11.2 S (ref 9.8–12.1)
RED BLOOD CELL COUNT: 4.24 10*6/UL (ref 3.9–5.7)
RED CELL DISTRIBUTION WIDTH: 15.1 % (ref 11.3–14.8)
SODIUM SERPL-SCNC: 138 MMOL/L (ref 136–146)
WHITE BLOOD CELL COUNT: 6.2 10*3/UL (ref 4–10)

## 2022-04-22 PROCEDURE — 3078F DIAST BP <80 MM HG: CPT | Performed by: INTERNAL MEDICINE

## 2022-04-22 PROCEDURE — 36415 COLL VENOUS BLD VENIPUNCTURE: CPT | Performed by: INTERNAL MEDICINE

## 2022-04-22 PROCEDURE — 85610 PROTHROMBIN TIME: CPT | Performed by: INTERNAL MEDICINE

## 2022-04-22 PROCEDURE — 3074F SYST BP LT 130 MM HG: CPT | Performed by: INTERNAL MEDICINE

## 2022-04-22 PROCEDURE — 93000 ELECTROCARDIOGRAM COMPLETE: CPT | Performed by: PHYSICIAN ASSISTANT

## 2022-04-22 PROCEDURE — 99214 OFFICE O/P EST MOD 30 MIN: CPT | Performed by: PHYSICIAN ASSISTANT

## 2022-04-22 PROCEDURE — 3074F SYST BP LT 130 MM HG: CPT | Performed by: PHYSICIAN ASSISTANT

## 2022-04-22 PROCEDURE — 85025 COMPLETE CBC W/AUTO DIFF WBC: CPT | Performed by: INTERNAL MEDICINE

## 2022-04-22 PROCEDURE — 3078F DIAST BP <80 MM HG: CPT | Performed by: PHYSICIAN ASSISTANT

## 2022-04-22 PROCEDURE — 83735 ASSAY OF MAGNESIUM: CPT | Performed by: INTERNAL MEDICINE

## 2022-04-22 PROCEDURE — 80048 BASIC METABOLIC PNL TOTAL CA: CPT | Performed by: INTERNAL MEDICINE

## 2022-04-22 PROCEDURE — 99215 OFFICE O/P EST HI 40 MIN: CPT | Performed by: INTERNAL MEDICINE

## 2022-04-22 RX ORDER — ASPIRIN 81 MG/1
81 TABLET ORAL DAILY
Qty: 30 TABLET | Refills: 11 | Status: SHIPPED | OUTPATIENT
Start: 2022-04-22 | End: 2023-04-18

## 2022-04-22 ASSESSMENT — NEW YORK HEART ASSOCIATION (NYHA) CLASSIFICATION: NYHA FUNCTIONAL CLASS: III

## 2022-04-27 DIAGNOSIS — Z01.812 PRE-PROCEDURE LAB EXAM: Primary | ICD-10-CM

## 2022-05-04 ENCOUNTER — APPOINTMENT (OUTPATIENT)
Dept: LAB | Age: 67
End: 2022-05-04

## 2022-05-23 ENCOUNTER — TELEPHONE (OUTPATIENT)
Dept: CARDIOLOGY | Age: 67
End: 2022-05-23

## 2022-05-23 ENCOUNTER — APPOINTMENT (OUTPATIENT)
Dept: CARDIOLOGY | Age: 67
End: 2022-05-23

## 2022-05-25 RX ORDER — SPIRONOLACTONE 25 MG/1
25 TABLET ORAL DAILY
Qty: 90 TABLET | Refills: 1 | Status: SHIPPED | OUTPATIENT
Start: 2022-05-25 | End: 2023-07-24 | Stop reason: SDUPTHER

## 2022-05-25 RX ORDER — BUMETANIDE 2 MG/1
2 TABLET ORAL DAILY
Qty: 90 TABLET | Refills: 1 | Status: SHIPPED | OUTPATIENT
Start: 2022-05-25 | End: 2022-12-22

## 2022-05-25 RX ORDER — EZETIMIBE 10 MG/1
10 TABLET ORAL DAILY
Qty: 90 TABLET | Refills: 3 | Status: SHIPPED | OUTPATIENT
Start: 2022-05-25 | End: 2022-09-21

## 2022-05-25 RX ORDER — RAMIPRIL 10 MG/1
CAPSULE ORAL
Qty: 30 CAPSULE | Refills: 0 | OUTPATIENT
Start: 2022-05-25

## 2022-05-25 RX ORDER — METOPROLOL SUCCINATE 100 MG/1
200 TABLET, EXTENDED RELEASE ORAL DAILY
Qty: 180 TABLET | Refills: 1 | Status: SHIPPED | OUTPATIENT
Start: 2022-05-25 | End: 2022-06-06 | Stop reason: SDUPTHER

## 2022-05-25 RX ORDER — ATORVASTATIN CALCIUM 80 MG/1
80 TABLET, FILM COATED ORAL DAILY
Qty: 90 TABLET | Refills: 1 | Status: SHIPPED | OUTPATIENT
Start: 2022-05-25 | End: 2023-02-27

## 2022-05-25 RX ORDER — RAMIPRIL 10 MG/1
10 CAPSULE ORAL DAILY
Qty: 90 CAPSULE | Refills: 1 | Status: SHIPPED | OUTPATIENT
Start: 2022-05-25 | End: 2022-06-06 | Stop reason: ALTCHOICE

## 2022-06-06 ENCOUNTER — OFFICE VISIT (OUTPATIENT)
Dept: CARDIOLOGY | Age: 67
End: 2022-06-06

## 2022-06-06 ENCOUNTER — TELEPHONE (OUTPATIENT)
Dept: CARDIOLOGY | Age: 67
End: 2022-06-06

## 2022-06-06 VITALS
HEART RATE: 76 BPM | WEIGHT: 203.6 LBS | SYSTOLIC BLOOD PRESSURE: 138 MMHG | DIASTOLIC BLOOD PRESSURE: 74 MMHG | OXYGEN SATURATION: 98 % | RESPIRATION RATE: 18 BRPM | BODY MASS INDEX: 29.15 KG/M2 | HEIGHT: 70 IN

## 2022-06-06 DIAGNOSIS — E78.5 DYSLIPIDEMIA: ICD-10-CM

## 2022-06-06 DIAGNOSIS — I25.10 CORONARY ARTERY DISEASE INVOLVING NATIVE CORONARY ARTERY OF NATIVE HEART WITHOUT ANGINA PECTORIS: ICD-10-CM

## 2022-06-06 DIAGNOSIS — I25.5 ISCHEMIC CARDIOMYOPATHY: ICD-10-CM

## 2022-06-06 DIAGNOSIS — I50.22 CHRONIC SYSTOLIC CONGESTIVE HEART FAILURE (CMD): Primary | ICD-10-CM

## 2022-06-06 DIAGNOSIS — Z95.5 HISTORY OF CORONARY ARTERY STENT PLACEMENT: ICD-10-CM

## 2022-06-06 PROCEDURE — 3078F DIAST BP <80 MM HG: CPT | Performed by: PHYSICIAN ASSISTANT

## 2022-06-06 PROCEDURE — 3075F SYST BP GE 130 - 139MM HG: CPT | Performed by: PHYSICIAN ASSISTANT

## 2022-06-06 PROCEDURE — 99214 OFFICE O/P EST MOD 30 MIN: CPT | Performed by: PHYSICIAN ASSISTANT

## 2022-06-06 RX ORDER — SACUBITRIL AND VALSARTAN 24; 26 MG/1; MG/1
1 TABLET, FILM COATED ORAL 2 TIMES DAILY
Qty: 60 TABLET | Refills: 5 | Status: SHIPPED | OUTPATIENT
Start: 2022-06-06 | End: 2022-11-28

## 2022-06-06 RX ORDER — METOPROLOL SUCCINATE 200 MG/1
200 TABLET, EXTENDED RELEASE ORAL DAILY
Qty: 30 TABLET | Refills: 3 | Status: SHIPPED | OUTPATIENT
Start: 2022-06-06 | End: 2022-09-26

## 2022-06-06 RX ORDER — CLOPIDOGREL BISULFATE 75 MG/1
75 TABLET ORAL DAILY
COMMUNITY
Start: 2022-05-22 | End: 2023-03-27 | Stop reason: ALTCHOICE

## 2022-06-06 ASSESSMENT — PATIENT HEALTH QUESTIONNAIRE - PHQ9
1. LITTLE INTEREST OR PLEASURE IN DOING THINGS: NOT AT ALL
2. FEELING DOWN, DEPRESSED OR HOPELESS: NOT AT ALL
CLINICAL INTERPRETATION OF PHQ2 SCORE: NO FURTHER SCREENING NEEDED
SUM OF ALL RESPONSES TO PHQ9 QUESTIONS 1 AND 2: 0
SUM OF ALL RESPONSES TO PHQ9 QUESTIONS 1 AND 2: 0

## 2022-06-06 ASSESSMENT — NEW YORK HEART ASSOCIATION (NYHA) CLASSIFICATION: NYHA FUNCTIONAL CLASS: III

## 2022-06-07 ENCOUNTER — TELEPHONE (OUTPATIENT)
Dept: CARDIOLOGY | Age: 67
End: 2022-06-07

## 2022-09-12 ENCOUNTER — TELEPHONE (OUTPATIENT)
Dept: CARDIOLOGY | Age: 67
End: 2022-09-12

## 2022-09-12 DIAGNOSIS — I25.5 ISCHEMIC CARDIOMYOPATHY: ICD-10-CM

## 2022-09-12 DIAGNOSIS — Z01.818 PRE-OP TESTING: Primary | ICD-10-CM

## 2022-09-14 ENCOUNTER — HOSPITAL ENCOUNTER (OUTPATIENT)
Age: 67
End: 2022-09-14
Attending: INTERNAL MEDICINE | Admitting: INTERNAL MEDICINE

## 2022-09-14 ENCOUNTER — PREP FOR CASE (OUTPATIENT)
Dept: CARDIOLOGY | Age: 67
End: 2022-09-14

## 2022-09-14 ENCOUNTER — APPOINTMENT (OUTPATIENT)
Dept: LAB | Age: 67
End: 2022-09-14

## 2022-09-14 DIAGNOSIS — I50.22 CHRONIC SYSTOLIC CHF (CONGESTIVE HEART FAILURE), NYHA CLASS 2 (CMD): ICD-10-CM

## 2022-09-14 DIAGNOSIS — I25.5 ISCHEMIC CARDIOMYOPATHY: Primary | ICD-10-CM

## 2022-09-14 DIAGNOSIS — I25.5 ISCHEMIC CARDIOMYOPATHY: ICD-10-CM

## 2022-09-21 ENCOUNTER — TELEPHONE (OUTPATIENT)
Dept: CARDIOLOGY | Age: 67
End: 2022-09-21

## 2022-09-26 RX ORDER — METOPROLOL SUCCINATE 200 MG/1
200 TABLET, EXTENDED RELEASE ORAL DAILY
Qty: 90 TABLET | Refills: 3 | Status: SHIPPED | OUTPATIENT
Start: 2022-09-26 | End: 2023-03-27 | Stop reason: ALTCHOICE

## 2022-11-28 RX ORDER — SACUBITRIL AND VALSARTAN 24; 26 MG/1; MG/1
1 TABLET, FILM COATED ORAL 2 TIMES DAILY
Qty: 60 TABLET | Refills: 0 | Status: SHIPPED | OUTPATIENT
Start: 2022-11-28 | End: 2022-12-22

## 2022-12-22 RX ORDER — BUMETANIDE 2 MG/1
2 TABLET ORAL DAILY
Qty: 30 TABLET | Refills: 0 | Status: SHIPPED | OUTPATIENT
Start: 2022-12-22 | End: 2023-03-27 | Stop reason: ALTCHOICE

## 2022-12-22 RX ORDER — SACUBITRIL AND VALSARTAN 24; 26 MG/1; MG/1
1 TABLET, FILM COATED ORAL 2 TIMES DAILY
Qty: 60 TABLET | Refills: 0 | Status: SHIPPED | OUTPATIENT
Start: 2022-12-22 | End: 2023-07-24 | Stop reason: SDUPTHER

## 2023-01-19 RX ORDER — METOPROLOL SUCCINATE 100 MG/1
TABLET, EXTENDED RELEASE ORAL
Qty: 180 TABLET | Refills: 0 | OUTPATIENT
Start: 2023-01-19

## 2023-02-20 ENCOUNTER — LAB ENCOUNTER (OUTPATIENT)
Dept: LAB | Facility: HOSPITAL | Age: 68
End: 2023-02-20
Attending: UROLOGY
Payer: MEDICARE

## 2023-02-20 ENCOUNTER — OFFICE VISIT (OUTPATIENT)
Dept: SURGERY | Facility: CLINIC | Age: 68
End: 2023-02-20

## 2023-02-20 VITALS
WEIGHT: 220 LBS | DIASTOLIC BLOOD PRESSURE: 80 MMHG | BODY MASS INDEX: 31.5 KG/M2 | HEART RATE: 113 BPM | HEIGHT: 70 IN | SYSTOLIC BLOOD PRESSURE: 130 MMHG

## 2023-02-20 DIAGNOSIS — R30.0 DYSURIA: ICD-10-CM

## 2023-02-20 DIAGNOSIS — N40.1 BPH WITH OBSTRUCTION/LOWER URINARY TRACT SYMPTOMS: Primary | ICD-10-CM

## 2023-02-20 DIAGNOSIS — N13.8 BPH WITH OBSTRUCTION/LOWER URINARY TRACT SYMPTOMS: Primary | ICD-10-CM

## 2023-02-20 DIAGNOSIS — N40.1 BPH WITH OBSTRUCTION/LOWER URINARY TRACT SYMPTOMS: ICD-10-CM

## 2023-02-20 DIAGNOSIS — N13.8 BPH WITH OBSTRUCTION/LOWER URINARY TRACT SYMPTOMS: ICD-10-CM

## 2023-02-20 LAB
BILIRUB UR QL: NEGATIVE
CLARITY UR: CLEAR
COLOR UR: YELLOW
GLUCOSE UR-MCNC: >=500 MG/DL
HGB UR QL STRIP.AUTO: NEGATIVE
HYALINE CASTS #/AREA URNS AUTO: PRESENT /LPF
LEUKOCYTE ESTERASE UR QL STRIP.AUTO: NEGATIVE
NITRITE UR QL STRIP.AUTO: NEGATIVE
PH UR: 6 [PH] (ref 5–8)
PROT UR-MCNC: NEGATIVE MG/DL
PSA SERPL-MCNC: 9.27 NG/ML (ref ?–4)
SP GR UR STRIP: 1.02 (ref 1–1.03)
UROBILINOGEN UR STRIP-ACNC: <2
VIT C UR-MCNC: 40 MG/DL

## 2023-02-20 PROCEDURE — 36415 COLL VENOUS BLD VENIPUNCTURE: CPT

## 2023-02-20 PROCEDURE — 84153 ASSAY OF PSA TOTAL: CPT

## 2023-02-20 PROCEDURE — 87086 URINE CULTURE/COLONY COUNT: CPT

## 2023-02-20 PROCEDURE — 81001 URINALYSIS AUTO W/SCOPE: CPT

## 2023-02-20 RX ORDER — ASPIRIN 325 MG
325 TABLET ORAL DAILY
COMMUNITY

## 2023-02-20 RX ORDER — CIPROFLOXACIN 500 MG/1
500 TABLET, FILM COATED ORAL 2 TIMES DAILY
Qty: 20 TABLET | Refills: 0 | Status: SHIPPED | OUTPATIENT
Start: 2023-02-20

## 2023-02-27 RX ORDER — ATORVASTATIN CALCIUM 80 MG/1
TABLET, FILM COATED ORAL
Qty: 90 TABLET | Refills: 0 | Status: SHIPPED | OUTPATIENT
Start: 2023-02-27 | End: 2023-07-24 | Stop reason: SDUPTHER

## 2023-02-27 RX ORDER — APIXABAN 5 MG/1
TABLET, FILM COATED ORAL
Qty: 180 TABLET | Refills: 0 | Status: SHIPPED | OUTPATIENT
Start: 2023-02-27 | End: 2023-09-18 | Stop reason: SDUPTHER

## 2023-03-02 ENCOUNTER — TELEPHONE (OUTPATIENT)
Dept: SURGERY | Facility: CLINIC | Age: 68
End: 2023-03-02

## 2023-03-02 NOTE — TELEPHONE ENCOUNTER
- s/w pt; identity verified with name and   - I read KHB message to pt as stated below.   - Confirmed that pt has uroflow/cysto appt for 4/10/23, so reiterated that pt should have his repeat PSA blood test a few days prior   - Pt acknowledged understanding of all, and agreed to f/u with PCP regarding the glucose in his urine.    - Encounter complete

## 2023-03-02 NOTE — TELEPHONE ENCOUNTER
- Called pt, but unable to LM as VM was full.    ----- Message from Carol Samano MD sent at 3/2/2023 10:42 AM CST -----  Urology staff,  This patient is not on my chart. Please call him and let him know I reviewed his recent urine and blood tests. His urine analysis shows elevated glucose levels in the urine. Looks like he has a history of diabetes but I do not see that he is on any diabetes medications. I would recommend that he review this with his primary care physician to make sure that his glucose is well controlled. The presence of glucose or sugar in the urine could be a sign of uncontrolled diabetes with elevated blood glucose levels. His PSA (blood screening test for prostate cancer) is elevated at 9.27. Given that your symptoms during your recent office visit were suggestive of a prostate infection this could certainly elevate the PSA and I would like you to repeat this PSA blood test 1 to 2 days prior to your scheduled cystoscopy appointment. If the PSA continues to be higher than normal additional testing including a possible biopsy to rule out prostate cancer may be required.

## 2023-03-02 NOTE — TELEPHONE ENCOUNTER
----- Message from Halima Dhillon MD sent at 3/2/2023 10:42 AM CST -----  Urology staff,  This patient is not on my chart. Please call him and let him know I reviewed his recent urine and blood tests. His urine analysis shows elevated glucose levels in the urine. Looks like he has a history of diabetes but I do not see that he is on any diabetes medications. I would recommend that he review this with his primary care physician to make sure that his glucose is well controlled. The presence of glucose or sugar in the urine could be a sign of uncontrolled diabetes with elevated blood glucose levels. His PSA (blood screening test for prostate cancer) is elevated at 9.27. Given that your symptoms during your recent office visit were suggestive of a prostate infection this could certainly elevate the PSA and I would like you to repeat this PSA blood test 1 to 2 days prior to your scheduled cystoscopy appointment. If the PSA continues to be higher than normal additional testing including a possible biopsy to rule out prostate cancer may be required.

## 2023-03-24 ENCOUNTER — TELEPHONE (OUTPATIENT)
Dept: CARDIOLOGY | Age: 68
End: 2023-03-24

## 2023-03-24 DIAGNOSIS — Z01.818 PRE-OP TESTING: ICD-10-CM

## 2023-03-24 DIAGNOSIS — I25.5 ISCHEMIC CARDIOMYOPATHY: ICD-10-CM

## 2023-03-24 DIAGNOSIS — Z79.899 MEDICATION MANAGEMENT: Primary | ICD-10-CM

## 2023-03-28 RX ORDER — METOPROLOL SUCCINATE 200 MG/1
200 TABLET, EXTENDED RELEASE ORAL DAILY
Qty: 90 TABLET | Refills: 3 | Status: SHIPPED | OUTPATIENT
Start: 2023-03-28 | End: 2023-04-18

## 2023-04-03 RX ORDER — METOPROLOL SUCCINATE 100 MG/1
TABLET, EXTENDED RELEASE ORAL
Qty: 180 TABLET | Refills: 0 | OUTPATIENT
Start: 2023-04-03

## 2023-04-12 ENCOUNTER — TELEPHONE (OUTPATIENT)
Dept: INTERNAL MEDICINE | Age: 68
End: 2023-04-12

## 2023-04-13 ENCOUNTER — TELEPHONE (OUTPATIENT)
Dept: INTERNAL MEDICINE | Age: 68
End: 2023-04-13

## 2023-04-18 ENCOUNTER — OFFICE VISIT (OUTPATIENT)
Dept: CARDIOLOGY | Age: 68
End: 2023-04-18

## 2023-04-18 ENCOUNTER — ANCILLARY PROCEDURE (OUTPATIENT)
Dept: CARDIOLOGY | Age: 68
End: 2023-04-18

## 2023-04-18 VITALS
HEIGHT: 70 IN | HEART RATE: 101 BPM | OXYGEN SATURATION: 96 % | SYSTOLIC BLOOD PRESSURE: 112 MMHG | WEIGHT: 217.26 LBS | BODY MASS INDEX: 31.1 KG/M2 | DIASTOLIC BLOOD PRESSURE: 56 MMHG | RESPIRATION RATE: 17 BRPM

## 2023-04-18 DIAGNOSIS — Z95.1 HX OF CABG: ICD-10-CM

## 2023-04-18 DIAGNOSIS — I25.5 ISCHEMIC CARDIOMYOPATHY: ICD-10-CM

## 2023-04-18 DIAGNOSIS — R55 SYNCOPE AND COLLAPSE: Primary | ICD-10-CM

## 2023-04-18 DIAGNOSIS — I50.22 CHF (CONGESTIVE HEART FAILURE), NYHA CLASS II, CHRONIC, SYSTOLIC (CMD): ICD-10-CM

## 2023-04-18 DIAGNOSIS — E78.5 DYSLIPIDEMIA: ICD-10-CM

## 2023-04-18 PROCEDURE — 3078F DIAST BP <80 MM HG: CPT | Performed by: PHYSICIAN ASSISTANT

## 2023-04-18 PROCEDURE — 99214 OFFICE O/P EST MOD 30 MIN: CPT | Performed by: PHYSICIAN ASSISTANT

## 2023-04-18 PROCEDURE — 93000 ELECTROCARDIOGRAM COMPLETE: CPT | Performed by: PHYSICIAN ASSISTANT

## 2023-04-18 PROCEDURE — 3074F SYST BP LT 130 MM HG: CPT | Performed by: PHYSICIAN ASSISTANT

## 2023-04-18 RX ORDER — ROSUVASTATIN CALCIUM 20 MG/1
TABLET, COATED ORAL
COMMUNITY
Start: 2023-04-01 | End: 2023-04-18

## 2023-04-18 RX ORDER — GLIMEPIRIDE 4 MG/1
4 TABLET ORAL DAILY
COMMUNITY
Start: 2023-02-06 | End: 2023-04-18

## 2023-04-18 RX ORDER — DESOXIMETASONE 0.5 MG/G
CREAM TOPICAL 2 TIMES DAILY
COMMUNITY
End: 2023-09-18 | Stop reason: SDUPTHER

## 2023-04-18 RX ORDER — ACETAMINOPHEN AND CODEINE PHOSPHATE 300; 30 MG/1; MG/1
TABLET ORAL
COMMUNITY
End: 2023-04-24

## 2023-04-18 RX ORDER — CLOPIDOGREL BISULFATE 75 MG/1
TABLET ORAL
COMMUNITY
Start: 2023-03-29 | End: 2023-05-24 | Stop reason: ALTCHOICE

## 2023-04-18 RX ORDER — ASPIRIN 325 MG
325 TABLET ORAL
COMMUNITY
End: 2023-04-24 | Stop reason: DRUGHIGH

## 2023-04-18 RX ORDER — BUMETANIDE 2 MG/1
2 TABLET ORAL DAILY
COMMUNITY
Start: 2023-04-02 | End: 2023-05-01 | Stop reason: SDUPTHER

## 2023-04-18 RX ORDER — EZETIMIBE 10 MG/1
10 TABLET ORAL DAILY
COMMUNITY
Start: 2023-04-01 | End: 2023-07-24 | Stop reason: SDUPTHER

## 2023-04-18 ASSESSMENT — NEW YORK HEART ASSOCIATION (NYHA) CLASSIFICATION: NYHA FUNCTIONAL CLASS: III

## 2023-04-24 ENCOUNTER — TELEPHONE (OUTPATIENT)
Dept: CARDIOLOGY | Age: 68
End: 2023-04-24

## 2023-04-24 RX ORDER — ASPIRIN 81 MG/1
81 TABLET ORAL DAILY
Qty: 30 TABLET | Refills: 0 | Status: SHIPPED | COMMUNITY
Start: 2023-04-24 | End: 2023-09-26 | Stop reason: SDUPTHER

## 2023-05-01 RX ORDER — METOPROLOL SUCCINATE 200 MG/1
200 TABLET, EXTENDED RELEASE ORAL DAILY
Qty: 90 TABLET | Refills: 0 | Status: SHIPPED | OUTPATIENT
Start: 2023-05-01 | End: 2023-05-24 | Stop reason: ALTCHOICE

## 2023-05-01 RX ORDER — BUMETANIDE 2 MG/1
2 TABLET ORAL DAILY
Qty: 90 TABLET | Refills: 0 | Status: SHIPPED | OUTPATIENT
Start: 2023-05-01 | End: 2023-07-24 | Stop reason: SDUPTHER

## 2023-05-03 ENCOUNTER — APPOINTMENT (OUTPATIENT)
Dept: CT IMAGING | Facility: HOSPITAL | Age: 68
End: 2023-05-03
Attending: EMERGENCY MEDICINE
Payer: MEDICARE

## 2023-05-03 ENCOUNTER — HOSPITAL ENCOUNTER (INPATIENT)
Facility: HOSPITAL | Age: 68
LOS: 2 days | Discharge: HOME | End: 2023-05-05
Attending: EMERGENCY MEDICINE | Admitting: HOSPITALIST
Payer: MEDICARE

## 2023-05-03 ENCOUNTER — APPOINTMENT (OUTPATIENT)
Dept: ULTRASOUND IMAGING | Facility: HOSPITAL | Age: 68
End: 2023-05-03
Attending: HOSPITALIST
Payer: MEDICARE

## 2023-05-03 ENCOUNTER — TELEPHONE (OUTPATIENT)
Dept: CARDIOLOGY | Age: 68
End: 2023-05-03

## 2023-05-03 ENCOUNTER — HOSPITAL ENCOUNTER (OUTPATIENT)
Facility: HOSPITAL | Age: 68
Setting detail: OBSERVATION
Discharge: HOME | End: 2023-05-05
Attending: EMERGENCY MEDICINE | Admitting: HOSPITALIST
Payer: MEDICARE

## 2023-05-03 DIAGNOSIS — R55 SYNCOPE AND COLLAPSE: Primary | ICD-10-CM

## 2023-05-03 DIAGNOSIS — G25.81 RLS (RESTLESS LEGS SYNDROME): ICD-10-CM

## 2023-05-03 DIAGNOSIS — N17.9 AKI (ACUTE KIDNEY INJURY) (HCC): ICD-10-CM

## 2023-05-03 DIAGNOSIS — G47.50 PARASOMNIA, UNSPECIFIED TYPE: ICD-10-CM

## 2023-05-03 DIAGNOSIS — R42 DIZZINESS: ICD-10-CM

## 2023-05-03 LAB
ALBUMIN SERPL-MCNC: 3.6 G/DL (ref 3.4–5)
ALBUMIN/GLOB SERPL: 1.2 {RATIO} (ref 1–2)
ALP LIVER SERPL-CCNC: 99 U/L
ALT SERPL-CCNC: 133 U/L
ANION GAP SERPL CALC-SCNC: 7 MMOL/L (ref 0–18)
AST SERPL-CCNC: 91 U/L (ref 15–37)
ATRIAL RATE: 107 BPM
BASOPHILS # BLD AUTO: 0.03 X10(3) UL (ref 0–0.2)
BASOPHILS NFR BLD AUTO: 0.4 %
BILIRUB SERPL-MCNC: 0.8 MG/DL (ref 0.1–2)
BUN BLD-MCNC: 30 MG/DL (ref 7–18)
BUN/CREAT SERPL: 18.1 (ref 10–20)
CALCIUM BLD-MCNC: 8.2 MG/DL (ref 8.5–10.1)
CHLORIDE SERPL-SCNC: 107 MMOL/L (ref 98–112)
CO2 SERPL-SCNC: 21 MMOL/L (ref 21–32)
CREAT BLD-MCNC: 1.66 MG/DL
DEPRECATED RDW RBC AUTO: 44.4 FL (ref 35.1–46.3)
EOSINOPHIL # BLD AUTO: 0.12 X10(3) UL (ref 0–0.7)
EOSINOPHIL NFR BLD AUTO: 1.7 %
ERYTHROCYTE [DISTWIDTH] IN BLOOD BY AUTOMATED COUNT: 14.4 % (ref 11–15)
GFR SERPLBLD BASED ON 1.73 SQ M-ARVRAT: 45 ML/MIN/1.73M2 (ref 60–?)
GLOBULIN PLAS-MCNC: 3 G/DL (ref 2.8–4.4)
GLUCOSE BLD-MCNC: 249 MG/DL (ref 70–99)
GLUCOSE BLDC GLUCOMTR-MCNC: 206 MG/DL (ref 70–99)
GLUCOSE BLDC GLUCOMTR-MCNC: 247 MG/DL (ref 70–99)
GLUCOSE BLDC GLUCOMTR-MCNC: 249 MG/DL (ref 70–99)
HCT VFR BLD AUTO: 29.9 %
HGB BLD-MCNC: 9.2 G/DL
IMM GRANULOCYTES # BLD AUTO: 0.03 X10(3) UL (ref 0–1)
IMM GRANULOCYTES NFR BLD: 0.4 %
LYMPHOCYTES # BLD AUTO: 1.56 X10(3) UL (ref 1–4)
LYMPHOCYTES NFR BLD AUTO: 22.4 %
MCH RBC QN AUTO: 26.1 PG (ref 26–34)
MCHC RBC AUTO-ENTMCNC: 30.8 G/DL (ref 31–37)
MCV RBC AUTO: 84.9 FL
MONOCYTES # BLD AUTO: 0.82 X10(3) UL (ref 0.1–1)
MONOCYTES NFR BLD AUTO: 11.8 %
NEUTROPHILS # BLD AUTO: 4.41 X10 (3) UL (ref 1.5–7.7)
NEUTROPHILS # BLD AUTO: 4.41 X10(3) UL (ref 1.5–7.7)
NEUTROPHILS NFR BLD AUTO: 63.3 %
OSMOLALITY SERPL CALC.SUM OF ELEC: 295 MOSM/KG (ref 275–295)
P AXIS: 77 DEGREES
P-R INTERVAL: 164 MS
PLATELET # BLD AUTO: 224 10(3)UL (ref 150–450)
POTASSIUM SERPL-SCNC: 4.9 MMOL/L (ref 3.5–5.1)
PROT SERPL-MCNC: 6.6 G/DL (ref 6.4–8.2)
Q-T INTERVAL: 370 MS
QRS DURATION: 112 MS
QTC CALCULATION (BEZET): 493 MS
R AXIS: -51 DEGREES
RBC # BLD AUTO: 3.52 X10(6)UL
SODIUM SERPL-SCNC: 135 MMOL/L (ref 136–145)
T AXIS: 83 DEGREES
TROPONIN I HIGH SENSITIVITY: 55 NG/L
VENTRICULAR RATE: 107 BPM
WBC # BLD AUTO: 7 X10(3) UL (ref 4–11)

## 2023-05-03 PROCEDURE — 70450 CT HEAD/BRAIN W/O DYE: CPT | Performed by: EMERGENCY MEDICINE

## 2023-05-03 PROCEDURE — 93880 EXTRACRANIAL BILAT STUDY: CPT | Performed by: HOSPITALIST

## 2023-05-03 PROCEDURE — 99223 1ST HOSP IP/OBS HIGH 75: CPT | Performed by: HOSPITALIST

## 2023-05-03 RX ORDER — NICOTINE POLACRILEX 4 MG
15 LOZENGE BUCCAL
Status: DISCONTINUED | OUTPATIENT
Start: 2023-05-03 | End: 2023-05-05

## 2023-05-03 RX ORDER — ALLOPURINOL 300 MG/1
300 TABLET ORAL DAILY
Status: DISCONTINUED | OUTPATIENT
Start: 2023-05-04 | End: 2023-05-05

## 2023-05-03 RX ORDER — METOCLOPRAMIDE HYDROCHLORIDE 5 MG/ML
10 INJECTION INTRAMUSCULAR; INTRAVENOUS EVERY 8 HOURS PRN
Status: DISCONTINUED | OUTPATIENT
Start: 2023-05-03 | End: 2023-05-05

## 2023-05-03 RX ORDER — ROPINIROLE 1 MG/1
1 TABLET, FILM COATED ORAL DAILY
Status: DISCONTINUED | OUTPATIENT
Start: 2023-05-04 | End: 2023-05-05

## 2023-05-03 RX ORDER — ASPIRIN 325 MG
325 TABLET ORAL DAILY
Status: DISCONTINUED | OUTPATIENT
Start: 2023-05-04 | End: 2023-05-04

## 2023-05-03 RX ORDER — ONDANSETRON 2 MG/ML
4 INJECTION INTRAMUSCULAR; INTRAVENOUS EVERY 6 HOURS PRN
Status: DISCONTINUED | OUTPATIENT
Start: 2023-05-03 | End: 2023-05-05

## 2023-05-03 RX ORDER — ACETAMINOPHEN 500 MG
500 TABLET ORAL EVERY 4 HOURS PRN
Status: DISCONTINUED | OUTPATIENT
Start: 2023-05-03 | End: 2023-05-05

## 2023-05-03 RX ORDER — MUSCLE RUB CREAM 100; 150 MG/G; MG/G
CREAM TOPICAL 3 TIMES DAILY PRN
Status: DISCONTINUED | OUTPATIENT
Start: 2023-05-03 | End: 2023-05-05

## 2023-05-03 RX ORDER — ROPINIROLE 1 MG/1
1 TABLET, FILM COATED ORAL DAILY
COMMUNITY
Start: 2023-03-29

## 2023-05-03 RX ORDER — ATORVASTATIN CALCIUM 80 MG/1
80 TABLET, FILM COATED ORAL DAILY
COMMUNITY
Start: 2023-02-27

## 2023-05-03 RX ORDER — ALLOPURINOL 300 MG/1
300 TABLET ORAL DAILY
COMMUNITY

## 2023-05-03 RX ORDER — DEXTROSE MONOHYDRATE 25 G/50ML
50 INJECTION, SOLUTION INTRAVENOUS
Status: DISCONTINUED | OUTPATIENT
Start: 2023-05-03 | End: 2023-05-05

## 2023-05-03 RX ORDER — NICOTINE POLACRILEX 4 MG
30 LOZENGE BUCCAL
Status: DISCONTINUED | OUTPATIENT
Start: 2023-05-03 | End: 2023-05-05

## 2023-05-03 RX ORDER — PAROXETINE 10 MG/1
40 TABLET, FILM COATED ORAL DAILY
Status: DISCONTINUED | OUTPATIENT
Start: 2023-05-04 | End: 2023-05-05

## 2023-05-03 RX ORDER — BUMETANIDE 2 MG/1
2 TABLET ORAL DAILY
COMMUNITY
Start: 2023-05-01

## 2023-05-03 RX ORDER — EZETIMIBE 10 MG/1
10 TABLET ORAL DAILY
Status: DISCONTINUED | OUTPATIENT
Start: 2023-05-04 | End: 2023-05-05

## 2023-05-03 RX ORDER — ATORVASTATIN CALCIUM 80 MG/1
80 TABLET, FILM COATED ORAL DAILY
Status: DISCONTINUED | OUTPATIENT
Start: 2023-05-04 | End: 2023-05-05

## 2023-05-03 RX ORDER — SPIRONOLACTONE 25 MG/1
25 TABLET ORAL DAILY
Status: DISCONTINUED | OUTPATIENT
Start: 2023-05-04 | End: 2023-05-05

## 2023-05-03 RX ORDER — SACUBITRIL AND VALSARTAN 24; 26 MG/1; MG/1
1 TABLET, FILM COATED ORAL 2 TIMES DAILY
COMMUNITY
Start: 2022-12-22

## 2023-05-03 RX ORDER — SPIRONOLACTONE 25 MG/1
25 TABLET ORAL DAILY
COMMUNITY
Start: 2023-02-25

## 2023-05-03 RX ORDER — CARVEDILOL 12.5 MG/1
12.5 TABLET ORAL 2 TIMES DAILY WITH MEALS
Status: DISCONTINUED | OUTPATIENT
Start: 2023-05-03 | End: 2023-05-05

## 2023-05-03 NOTE — CONSULTS
Cardiology (consult dictated)    Assessment:  1. Confusing constellation of symptoms including intermittent dyspnea, lightheadedness without near syncope, questionable sleepwalking? 2. History of CAD, remote PCI and more recent MI and bypass surgery. No chest pain of late. 3.  Ischemic cardiomyopathy with ejection fraction of 30 to 35%. He is currently wearing a heart monitor after having refused a defibrillator. Plan:  1. Admit for observation. Repeat echo. Interrogate patient's outpatient monitor which he is wearing. 2.  Reevaluate psychiatric medications. 3.  Reevaluate for heart failure medications. 4.  Consider neurologic evaluation.       Thank you

## 2023-05-03 NOTE — PLAN OF CARE
Patient admitted for syncope and collapse. Found down outside of his car, was brought in via ambulance. Pt has no idea what medications he takes and handed this nurse a bag of his pills and said that's what he takes and anything that's twice a day or nightly he takes only in the morning. Pt had a significantly long list of outside meds which were removed as he said he does not take any of them. He also stated he recently lost contact with his primary care physician. Pt did say he sleep walks therefore bed alarm should be on at all times. Orthostatic BP's were negative. Plan for carotid dopplers, EEG, Echo, neuro and cardiac consults. Plan pending consults/test results. Problem: Patient Centered Care  Goal: Patient preferences are identified and integrated in the patient's plan of care  Description: Interventions:  - What would you like us to know as we care for you?  I live at home alone  - Provide timely, complete, and accurate information to patient/family  - Incorporate patient and family knowledge, values, beliefs, and cultural backgrounds into the planning and delivery of care  - Encourage patient/family to participate in care and decision-making at the level they choose  - Honor patient and family perspectives and choices  Outcome: Progressing     Problem: Diabetes/Glucose Control  Goal: Glucose maintained within prescribed range  Description: INTERVENTIONS:  - Monitor Blood Glucose as ordered  - Assess for signs and symptoms of hyperglycemia and hypoglycemia  - Administer ordered medications to maintain glucose within target range  - Assess barriers to adequate nutritional intake and initiate nutrition consult as needed  - Instruct patient on self management of diabetes  Outcome: Progressing     Problem: Patient/Family Goals  Goal: Patient/Family Long Term Goal  Description: Patient's Long Term Goal: To stay out of the hospital    Interventions:  - Appropriate follow up care  - See additional Care Plan goals for specific interventions  Outcome: Progressing  Goal: Patient/Family Short Term Goal  Description: Patient's Short Term Goal: To figure out why I keep passing out    Interventions:   [facility] admission, neuro consult, cardiology consult  - See additional Care Plan goals for specific interventions  Outcome: Progressing     Problem: CARDIOVASCULAR - ADULT  Goal: Maintains optimal cardiac output and hemodynamic stability  Description: INTERVENTIONS:  - Monitor vital signs, rhythm, and trends  - Monitor for bleeding, hypotension and signs of decreased cardiac output  - Evaluate effectiveness of vasoactive medications to optimize hemodynamic stability  - Monitor arterial and/or venous puncture sites for bleeding and/or hematoma  - Assess quality of pulses, skin color and temperature  - Assess for signs of decreased coronary artery perfusion - ex. Angina  - Evaluate fluid balance, assess for edema, trend weights  Outcome: Progressing  Goal: Absence of cardiac arrhythmias or at baseline  Description: INTERVENTIONS:  - Continuous cardiac monitoring, monitor vital signs, obtain 12 lead EKG if indicated  - Evaluate effectiveness of antiarrhythmic and heart rate control medications as ordered  - Initiate emergency measures for life threatening arrhythmias  - Monitor electrolytes and administer replacement therapy as ordered  Outcome: Progressing     Problem: NEUROLOGICAL - ADULT  Goal: Achieves stable or improved neurological status  Description: INTERVENTIONS  - Assess for and report changes in neurological status  - Initiate measures to prevent increased intracranial pressure  - Maintain blood pressure and fluid volume within ordered parameters to optimize cerebral perfusion and minimize risk of hemorrhage  - Monitor temperature, glucose, and sodium.  Initiate appropriate interventions as ordered  Outcome: Progressing  Goal: Achieves maximal functionality and self care  Description: INTERVENTIONS  - Monitor swallowing and airway patency with patient fatigue and changes in neurological status  - Encourage and assist patient to increase activity and self care with guidance from PT/OT  - Encourage visually impaired, hearing impaired and aphasic patients to use assistive/communication devices  Outcome: Progressing

## 2023-05-03 NOTE — CONSULTS
[facility]    PATIENT'S NAME: [address] [place] [address] PHYSICIAN: Maria Eugenia Arredondo MD   CONSULTING PHYSICIAN: Yesi Duong. Arias Wagner MD   PATIENT ACCOUNT#:   [id number]    LOCATION:  [address] [place] [address] #:   [id number]       YOB: 1955  ADMISSION DATE:       05/03/2023      CONSULT DATE:  05/03/2023    REPORT OF CONSULTATION      HISTORY OF PRESENT ILLNESS:  This is a 60-year-old man, who came to the hospital for shortness of breath. The dyspnea comes and goes without relationship to activity, meals, or time of day. It lasts for variable periods of time. He is stating that he is short of breath during our interview, when in fact, he is breathing normally and his oxygen levels are normal.  He also describes episodes of fainting. When asked to describe the fainting, he described an episode where he was cooking a frozen meal in the frying pan in the kitchen at 4 in the morning. It sounded more like sleepwalking than syncope. There was no loss of consciousness. Another time he woke \"found himself in the laundry room at night,\" not remembering how he got there. He states that on occasion when he gets short of breath and gets lightheaded, he will involuntarily urinate. PAST MEDICAL HISTORY:  Coronary artery disease, status post PCI of the RCA in 2002, and in the LAD in 2003, he did well until January of 2019, when he suffered an anterior wall MI and had PCI of the LAD. 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 SOCIAL HISTORY:  He is single and has no children. He is a retired  for [employer]. He quit smoking in 1995. He does not ingest alcohol or caffeine. He does not exercise.     REVIEW OF SYSTEMS:  Negative, except for the above. HOME MEDICATIONS:  Requip 1 mg daily, Bumex 2 mg daily, paroxetine 40 mg daily, Eliquis 5 mg b.i.d., spironolactone 25 mg daily, Zetia 10 at bedtime, metformin 1 g daily, atorvastatin 80 q.h.s., Entresto 24-26 1 tablet b.i.d. and allopurinol. 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 ALLERGIES:  Naprosyn. The patient also describes an allergy to heparin in the records, reaction was hives. PHYSICAL EXAMINATION:    GENERAL:  Well-developed, well-nourished male in no acute distress. Alert and oriented x3. VITAL SIGNS:  Blood pressure 119/73; respirations 18, unlabored; pulse 104, regular rhythm; afebrile; saturation 92% on room air. HEENT:  Unremarkable. NECK:  Supple. Jugular venous pressure normal.  Carotids brisk without bruits. No thyromegaly or adenopathy. LUNGS:  Clear. HEART:  S1, S2 normal.  No gallop, murmur, rub, or click. ABDOMEN:  Benign. EXTREMITIES:  Warm and dry. Pulses intact. No cyanosis, clubbing, or edema. No calf, thigh, or popliteal tenderness. NEUROLOGIC:  No focal neuro deficits. SKIN:  Warm, dry and pink. LABORATORY DATA:  Sodium 135, potassium 4.9, BUN 30, creatinine 1.66, mild elevation of liver enzymes. Hemoglobin 9.2, white count 7.0, platelets 700, PSA 4.11. CT of the brain shows no acute intracranial abnormality. EKG shows sinus tachycardia with a rate of 104 with 1 ectopic beat. There is a nonspecific interventricular conduction delay and left axis deviation. ASSESSMENT:    1. Confusing array of symptoms, including intermittent dyspnea, lightheadedness without syncope, questionable sleepwalking and lab abnormalities, including renal insufficiency, anemia with elevated liver enzymes, hyperglycemia. 2.   History of coronary artery disease, prior myocardial infarction, prior bypass surgery. No symptoms of active ischemia.   Reason for tachycardia is uncertain. PLAN:    1.   Echocardiogram.  2. Re-evaluate psychiatric medications. Consider Psychiatry and Neuro consults. 3.   Interrogate the patient's outpatient monitor, which he is currently wearing. 4. Re-evaluate his heart failure medications and optimize. Thank you for this consultation. Dictated By Claudene Distel.  Katina Espinal MD  d: 05/03/2023 16:58:43  t: 05/03/2023 17:24:14  Job [id number]  [facility]/

## 2023-05-03 NOTE — PLAN OF CARE
Spoke with Cass Post, [employer] technician. Patients heart rate has not exceed 140 bpms or fallen below 40 bpm. He maintains sinus rhythm but has frequent multifocal PVCs.   Primary cardiologist was called for two events:  April 22, 2023: 4 beats Vtach & 6 beats Vtach  April 30, 2023: 6 beats Phoebe Ballard

## 2023-05-03 NOTE — ED QUICK NOTES
Cardiologist Dr. Vaibhav Patel at bedside    Patient reports dizziness has been a new symptom over the past few days, +wearing Holter monitor for recurrent syncopal episodes.

## 2023-05-03 NOTE — ED INITIAL ASSESSMENT (HPI)
Patient presents to the ED via EMS c/o syncopal episodes 2x per night for the past 3 months. Per pt last syncopal episode was at 0430. Pt c/o dizziness.

## 2023-05-03 NOTE — ED QUICK NOTES
Orders for admission, patient is aware of plan and ready to go upstairs. Any questions, please call ED RN [facility] at extension [phone].      Patient Covid vaccination status: Unvaccinated     COVID Test Ordered in ED: None    COVID Suspicion at Admission: N/A    Running Infusions:  None    Mental Status/LOC at time of transport: x4    Other pertinent information:   CIWA score: N/A   NIH score:  N/A

## 2023-05-04 ENCOUNTER — APPOINTMENT (OUTPATIENT)
Dept: CV DIAGNOSTICS | Facility: HOSPITAL | Age: 68
End: 2023-05-04
Attending: INTERNAL MEDICINE
Payer: MEDICARE

## 2023-05-04 LAB
ANION GAP SERPL CALC-SCNC: 9 MMOL/L (ref 0–18)
BASOPHILS # BLD AUTO: 0.05 X10(3) UL (ref 0–0.2)
BASOPHILS NFR BLD AUTO: 0.6 %
BUN BLD-MCNC: 35 MG/DL (ref 7–18)
BUN/CREAT SERPL: 21.1 (ref 10–20)
CALCIUM BLD-MCNC: 8.1 MG/DL (ref 8.5–10.1)
CHLORIDE SERPL-SCNC: 108 MMOL/L (ref 98–112)
CO2 SERPL-SCNC: 18 MMOL/L (ref 21–32)
CREAT BLD-MCNC: 1.66 MG/DL
DEPRECATED RDW RBC AUTO: 43.7 FL (ref 35.1–46.3)
EOSINOPHIL # BLD AUTO: 0.2 X10(3) UL (ref 0–0.7)
EOSINOPHIL NFR BLD AUTO: 2.5 %
ERYTHROCYTE [DISTWIDTH] IN BLOOD BY AUTOMATED COUNT: 14.4 % (ref 11–15)
EST. AVERAGE GLUCOSE BLD GHB EST-MCNC: 255 MG/DL (ref 68–126)
GFR SERPLBLD BASED ON 1.73 SQ M-ARVRAT: 45 ML/MIN/1.73M2 (ref 60–?)
GLUCOSE BLD-MCNC: 244 MG/DL (ref 70–99)
GLUCOSE BLDC GLUCOMTR-MCNC: 244 MG/DL (ref 70–99)
GLUCOSE BLDC GLUCOMTR-MCNC: 249 MG/DL (ref 70–99)
GLUCOSE BLDC GLUCOMTR-MCNC: 253 MG/DL (ref 70–99)
GLUCOSE BLDC GLUCOMTR-MCNC: 255 MG/DL (ref 70–99)
GLUCOSE BLDC GLUCOMTR-MCNC: 264 MG/DL (ref 70–99)
HBA1C MFR BLD: 10.5 % (ref ?–5.7)
HCT VFR BLD AUTO: 29.4 %
HGB BLD-MCNC: 9.2 G/DL
IMM GRANULOCYTES # BLD AUTO: 0.02 X10(3) UL (ref 0–1)
IMM GRANULOCYTES NFR BLD: 0.2 %
LYMPHOCYTES # BLD AUTO: 1.42 X10(3) UL (ref 1–4)
LYMPHOCYTES NFR BLD AUTO: 17.5 %
MCH RBC QN AUTO: 26.3 PG (ref 26–34)
MCHC RBC AUTO-ENTMCNC: 31.3 G/DL (ref 31–37)
MCV RBC AUTO: 84 FL
MONOCYTES # BLD AUTO: 0.73 X10(3) UL (ref 0.1–1)
MONOCYTES NFR BLD AUTO: 9 %
NEUTROPHILS # BLD AUTO: 5.71 X10 (3) UL (ref 1.5–7.7)
NEUTROPHILS # BLD AUTO: 5.71 X10(3) UL (ref 1.5–7.7)
NEUTROPHILS NFR BLD AUTO: 70.2 %
OSMOLALITY SERPL CALC.SUM OF ELEC: 296 MOSM/KG (ref 275–295)
PLATELET # BLD AUTO: 228 10(3)UL (ref 150–450)
POTASSIUM SERPL-SCNC: 5.1 MMOL/L (ref 3.5–5.1)
RBC # BLD AUTO: 3.5 X10(6)UL
SODIUM SERPL-SCNC: 135 MMOL/L (ref 136–145)
WBC # BLD AUTO: 8.1 X10(3) UL (ref 4–11)

## 2023-05-04 PROCEDURE — 93306 TTE W/DOPPLER COMPLETE: CPT | Performed by: INTERNAL MEDICINE

## 2023-05-04 PROCEDURE — 99233 SBSQ HOSP IP/OBS HIGH 50: CPT | Performed by: HOSPITALIST

## 2023-05-04 PROCEDURE — 95816 EEG AWAKE AND DROWSY: CPT | Performed by: OTHER

## 2023-05-04 PROCEDURE — 99223 1ST HOSP IP/OBS HIGH 75: CPT | Performed by: OTHER

## 2023-05-04 RX ORDER — SODIUM CHLORIDE 9 MG/ML
INJECTION, SOLUTION INTRAVENOUS CONTINUOUS
Status: DISCONTINUED | OUTPATIENT
Start: 2023-05-04 | End: 2023-05-05

## 2023-05-04 RX ORDER — ASPIRIN 81 MG/1
81 TABLET, CHEWABLE ORAL DAILY
Status: DISCONTINUED | OUTPATIENT
Start: 2023-05-05 | End: 2023-05-05

## 2023-05-04 NOTE — PLAN OF CARE
Patient alert and oriented on room air. Complaints of leg pain r/t restless leg syndrome- caden QUIGLEY for PRN topical for relief. Patient currently sleeping with frequent rounding done. Patient given hot packs for back relief as well. Call light in reach and safety measures implemented.   Problem: Patient Centered Care  Goal: Patient preferences are identified and integrated in the patient's plan of care  Description: Interventions:  - What would you like us to know as we care for you?   - Provide timely, complete, and accurate information to patient/family  - Incorporate patient and family knowledge, values, beliefs, and cultural backgrounds into the planning and delivery of care  - Encourage patient/family to participate in care and decision-making at the level they choose  - Honor patient and family perspectives and choices  Outcome: Progressing     Problem: Diabetes/Glucose Control  Goal: Glucose maintained within prescribed range  Description: INTERVENTIONS:  - Monitor Blood Glucose as ordered  - Assess for signs and symptoms of hyperglycemia and hypoglycemia  - Administer ordered medications to maintain glucose within target range  - Assess barriers to adequate nutritional intake and initiate nutrition consult as needed  - Instruct patient on self management of diabetes  Outcome: Progressing     Problem: Patient/Family Goals  Goal: Patient/Family Long Term Goal  Description: Patient's Long Term Goal: To stay out of the hospital    Interventions:  - Appropriate follow up care  - See additional Care Plan goals for specific interventions  Outcome: Progressing  Goal: Patient/Family Short Term Goal  Description: Patient's Short Term Goal: To figure out why I keep passing out    Interventions:   [facility] admission, neuro consult, cardiology consult  - See additional Care Plan goals for specific interventions  Outcome: Progressing     Problem: CARDIOVASCULAR - ADULT  Goal: Maintains optimal cardiac output and Splint reapplied per md request. Pt tolerated well. MD notified. Approved splint for discharge.    hemodynamic stability  Description: INTERVENTIONS:  - Monitor vital signs, rhythm, and trends  - Monitor for bleeding, hypotension and signs of decreased cardiac output  - Evaluate effectiveness of vasoactive medications to optimize hemodynamic stability  - Monitor arterial and/or venous puncture sites for bleeding and/or hematoma  - Assess quality of pulses, skin color and temperature  - Assess for signs of decreased coronary artery perfusion - ex. Angina  - Evaluate fluid balance, assess for edema, trend weights  Outcome: Progressing  Goal: Absence of cardiac arrhythmias or at baseline  Description: INTERVENTIONS:  - Continuous cardiac monitoring, monitor vital signs, obtain 12 lead EKG if indicated  - Evaluate effectiveness of antiarrhythmic and heart rate control medications as ordered  - Initiate emergency measures for life threatening arrhythmias  - Monitor electrolytes and administer replacement therapy as ordered  Outcome: Progressing     Problem: NEUROLOGICAL - ADULT  Goal: Achieves stable or improved neurological status  Description: INTERVENTIONS  - Assess for and report changes in neurological status  - Initiate measures to prevent increased intracranial pressure  - Maintain blood pressure and fluid volume within ordered parameters to optimize cerebral perfusion and minimize risk of hemorrhage  - Monitor temperature, glucose, and sodium.  Initiate appropriate interventions as ordered  Outcome: Progressing  Goal: Achieves maximal functionality and self care  Description: INTERVENTIONS  - Monitor swallowing and airway patency with patient fatigue and changes in neurological status  - Encourage and assist patient to increase activity and self care with guidance from PT/OT  - Encourage visually impaired, hearing impaired and aphasic patients to use assistive/communication devices  Outcome: Progressing

## 2023-05-04 NOTE — PROCEDURES
EEG report    REFERRING PHYSICIAN: Flora Gonzalez MD    PCP and phone number:  Ebenezer Sparks MD  [phone]    TECHNIQUE: 21 channels of EEG, 2 channels of EOG, and 1 channel of EKG were recorded utilizing the International 10/20 System. The recording was performed in a digitized monopolar referential format and playback was reformatted into various referential and bipolar montages utilizing appropriate filter settings. Automatic seizure and spike detection programs were utilized throughout the recording. Video was recorded during the study    CLINICAL DATA:  Patient is sent for the evaluation of possible seizures. MEDICATION:  Continuous Medications:      Scheduled Medications:  No current outpatient medications on file. PRN Medications:  glucose **OR** glucose **OR** glucose-vitamin C **OR** dextrose **OR** glucose **OR** glucose **OR** glucose-vitamin C, acetaminophen, ondansetron, metoclopramide, camphor/menthol/methyl salicylate    ACTIVATION:  Hyperventilation: Not done  Photic stimulation: Done, no abnormalities  Sleep: Normal sleep architecture was seen. BACKGROUND  While the patient was awake, the posterior dominant rhythm consisted of well-regulated 9-10 Hz rhythmic waveforms, symmetrically distributed over both posterior quadrants and was reactive to eye opening. EEG ABNORMALITY  None    IMPRESSION:  This is a normal EEG. No focal, lateralized, or epileptiform features are noted. Clinical correlation required.

## 2023-05-04 NOTE — PLAN OF CARE
Pt had EEG this morning which came back normal. Pt did go for his echo today as well which came back with an EF of 20-25%, severe mitral regurg. Psych liason placed on consult this morning, pt is to be seen by their service. Pt to be seen by neurology as well still. Plan pending consults. Pt did shower this afternoon.        Problem: Patient Centered Care  Goal: Patient preferences are identified and integrated in the patient's plan of care  Description: Interventions:  - What would you like us to know as we care for you?   - Provide timely, complete, and accurate information to patient/family  - Incorporate patient and family knowledge, values, beliefs, and cultural backgrounds into the planning and delivery of care  - Encourage patient/family to participate in care and decision-making at the level they choose  - Honor patient and family perspectives and choices  Outcome: Progressing     Problem: Diabetes/Glucose Control  Goal: Glucose maintained within prescribed range  Description: INTERVENTIONS:  - Monitor Blood Glucose as ordered  - Assess for signs and symptoms of hyperglycemia and hypoglycemia  - Administer ordered medications to maintain glucose within target range  - Assess barriers to adequate nutritional intake and initiate nutrition consult as needed  - Instruct patient on self management of diabetes  Outcome: Progressing     Problem: Patient/Family Goals  Goal: Patient/Family Long Term Goal  Description: Patient's Long Term Goal: To stay out of the hospital    Interventions:  - Appropriate follow up care  - See additional Care Plan goals for specific interventions  Outcome: Progressing  Goal: Patient/Family Short Term Goal  Description: Patient's Short Term Goal: To figure out why I keep passing out    Interventions:   [facility] admission, neuro consult, cardiology consult  - See additional Care Plan goals for specific interventions  Outcome: Progressing     Problem: CARDIOVASCULAR - ADULT  Goal: Maintains optimal cardiac output and hemodynamic stability  Description: INTERVENTIONS:  - Monitor vital signs, rhythm, and trends  - Monitor for bleeding, hypotension and signs of decreased cardiac output  - Evaluate effectiveness of vasoactive medications to optimize hemodynamic stability  - Monitor arterial and/or venous puncture sites for bleeding and/or hematoma  - Assess quality of pulses, skin color and temperature  - Assess for signs of decreased coronary artery perfusion - ex. Angina  - Evaluate fluid balance, assess for edema, trend weights  Outcome: Progressing  Goal: Absence of cardiac arrhythmias or at baseline  Description: INTERVENTIONS:  - Continuous cardiac monitoring, monitor vital signs, obtain 12 lead EKG if indicated  - Evaluate effectiveness of antiarrhythmic and heart rate control medications as ordered  - Initiate emergency measures for life threatening arrhythmias  - Monitor electrolytes and administer replacement therapy as ordered  Outcome: Progressing     Problem: NEUROLOGICAL - ADULT  Goal: Achieves stable or improved neurological status  Description: INTERVENTIONS  - Assess for and report changes in neurological status  - Initiate measures to prevent increased intracranial pressure  - Maintain blood pressure and fluid volume within ordered parameters to optimize cerebral perfusion and minimize risk of hemorrhage  - Monitor temperature, glucose, and sodium.  Initiate appropriate interventions as ordered  Outcome: Progressing  Goal: Achieves maximal functionality and self care  Description: INTERVENTIONS  - Monitor swallowing and airway patency with patient fatigue and changes in neurological status  - Encourage and assist patient to increase activity and self care with guidance from PT/OT  - Encourage visually impaired, hearing impaired and aphasic patients to use assistive/communication devices  Outcome: Progressing

## 2023-05-04 NOTE — H&P
[facility]    PATIENT'S NAME: Lottie January PHYSICIAN: Sawyer Barlow MD   PATIENT ACCOUNT#:   [id number]    LOCATION:  [address] [place] [address] DrJeremias RECORD #:   [id number]       YOB: 1955  ADMISSION DATE:       05/03/2023    HISTORY AND PHYSICAL EXAMINATION    #####EDITING MAY BE REQUIRED#####    CHIEF COMPLAINT:  Possible syncopal episode. HISTORY OF PRESENT ILLNESS:  Patient is a 71-year-old  male who came into the emergency department for evaluation of presumed syncopal episode that happened last night. Also, he is complaining about persistent dizziness. Upon arrival to the emergency room today, he was found to have systolic blood pressure of 92 to 112. He was given IV fluids with improvement in his blood pressure. Patient is in sinus rhythm, not tachycardic. GFR was found to be 45, which is below his baseline. CBC showed hemoglobin of 9.2, which is again possibly _______. CT scan of the brain was unremarkable. PAST MEDICAL HISTORY:  Coronary artery disease, status post LAD and RCA drug-eluting stents and coronary artery bypass graft surgery. Known underlying ischemic cardiomyopathy. Latest echocardiogram showed ejection fraction 35% in March 2023. He had hypokinesis of the basal and apical segments of his myocardium. He has declined ICD placement. He was seen by his cardiologist on April 18 and had a 30-day event monitor placed, which he continued to wear. He has history of hyperlipidemia; hypertension; pulmonary embolism, anticoagulated with Eliquis; chronic kidney disease stage 2; degenerative joint disease of lumbar spine; restless legs syndrome; diabetes mellitus type 2; and gout. PAST SURGICAL HISTORY:  As mentioned above. Also had left vitrectomy and left inguinal hernia repair. MEDICATIONS:  Please see medication reconciliation list.     ALLERGIES:  Heparin, per the record. FAMILY HISTORY:  Mother had dementia.   Father had kidney failure. SOCIAL HISTORY:  Ex-tobacco user. No current tobacco, alcohol, or drug use. Lives by himself. Usually independent in his basic activities of daily living. REVIEW OF SYSTEMS:  Patient said he feels dizzy all the time, not necessarily positional.  He said multiple times he wakes up and finds himself in the kitchen cooking without recalling how he got there. Yesterday, he found himself on the floor of laundry room in his building, and he does not recall going there. He presumed he passed out. No witnessed seizures. He lives by himself. Other 12-point review of systems is negative. PHYSICAL EXAMINATION:    GENERAL:  Alert and oriented to time, place and person. No acute distress. VITAL SIGNS:  Temperature 97.2, pulse 98, respiratory rate 22, blood pressure 118/80, pulse ox 93% on room air. HEENT:  Atraumatic. Oropharynx clear. Dry mucous membranes. Normal hard and soft palate. Eyes:  Anicteric sclerae. NECK:  Supple. No lymphadenopathy. Trachea midline. Full range of motion. LUNGS:  Clear to auscultation bilaterally. Normal respiratory effort. HEART:  Regular rate and rhythm. S1 and S2 auscultated. No murmur. ABDOMEN:  Soft, nondistended. No tenderness. Positive bowel sounds. EXTREMITIES:  No peripheral edema, clubbing or cyanosis. NEUROLOGIC:  Motor and sensory intact. Cranial nerves II to XII are intact. EKG today showed sinus tachycardia. ASSESSMENT:    1. Syncope reported yesterday per the patient, plus dizziness. Noted to be a bit hypovolemic with mild acute kidney injury and tachycardia. Given IV fluids in the emergency room. 2.   History of coronary artery disease and ischemic cardiomyopathy. 3.   Diabetes mellitus type 2.  4.   History of hypertension, though his blood pressure is low. PLAN:  Patient will be admitted to telemetry floor. A 2D echocardiogram with Doppler. Carotid ultrasound. Cardiology consult.   Interrogate his cardiac event monitor. Also obtain electroencephalogram and neurology consult to rule out seizure disorder considering the fact that he does not recall how he got to certain places during the night. Fall precautions. Monitor Accu-Cheks. Further recommendations to follow.      Dictated By Quyen Suárez MD  d: 05/03/2023 17:03:44  t: 05/03/2023 17:16:08  Job [id number]  LT/

## 2023-05-05 VITALS
SYSTOLIC BLOOD PRESSURE: 95 MMHG | WEIGHT: 224 LBS | DIASTOLIC BLOOD PRESSURE: 67 MMHG | BODY MASS INDEX: 31.36 KG/M2 | OXYGEN SATURATION: 96 % | HEIGHT: 71 IN | TEMPERATURE: 98 F | RESPIRATION RATE: 18 BRPM | HEART RATE: 93 BPM

## 2023-05-05 LAB
ANION GAP SERPL CALC-SCNC: 7 MMOL/L (ref 0–18)
BUN BLD-MCNC: 40 MG/DL (ref 7–18)
BUN/CREAT SERPL: 25.5 (ref 10–20)
CALCIUM BLD-MCNC: 8.1 MG/DL (ref 8.5–10.1)
CHLORIDE SERPL-SCNC: 108 MMOL/L (ref 98–112)
CO2 SERPL-SCNC: 20 MMOL/L (ref 21–32)
CREAT BLD-MCNC: 1.57 MG/DL
GFR SERPLBLD BASED ON 1.73 SQ M-ARVRAT: 48 ML/MIN/1.73M2 (ref 60–?)
GLUCOSE BLD-MCNC: 250 MG/DL (ref 70–99)
GLUCOSE BLDC GLUCOMTR-MCNC: 243 MG/DL (ref 70–99)
GLUCOSE BLDC GLUCOMTR-MCNC: 258 MG/DL (ref 70–99)
GLUCOSE BLDC GLUCOMTR-MCNC: 283 MG/DL (ref 70–99)
OSMOLALITY SERPL CALC.SUM OF ELEC: 298 MOSM/KG (ref 275–295)
POTASSIUM SERPL-SCNC: 4.9 MMOL/L (ref 3.5–5.1)
SODIUM SERPL-SCNC: 135 MMOL/L (ref 136–145)

## 2023-05-05 PROCEDURE — 90792 PSYCH DIAG EVAL W/MED SRVCS: CPT | Performed by: OTHER

## 2023-05-05 PROCEDURE — 99233 SBSQ HOSP IP/OBS HIGH 50: CPT | Performed by: HOSPITALIST

## 2023-05-05 RX ORDER — MAGNESIUM OXIDE 400 MG (241.3 MG MAGNESIUM) TABLET
1 TABLET NIGHTLY
Status: DISCONTINUED | OUTPATIENT
Start: 2023-05-05 | End: 2023-05-05

## 2023-05-05 RX ORDER — GABAPENTIN 100 MG/1
100 CAPSULE ORAL 3 TIMES DAILY
Status: DISCONTINUED | OUTPATIENT
Start: 2023-05-05 | End: 2023-05-05

## 2023-05-05 RX ORDER — PAROXETINE HYDROCHLORIDE 20 MG/1
20 TABLET, FILM COATED ORAL DAILY
Qty: 30 TABLET | Refills: 1 | Status: SHIPPED | OUTPATIENT
Start: 2023-05-06

## 2023-05-05 RX ORDER — QUETIAPINE FUMARATE 50 MG/1
50 TABLET, FILM COATED ORAL NIGHTLY
Qty: 30 TABLET | Refills: 1 | Status: SHIPPED | OUTPATIENT
Start: 2023-05-05

## 2023-05-05 RX ORDER — PAROXETINE 10 MG/1
20 TABLET, FILM COATED ORAL DAILY
Status: DISCONTINUED | OUTPATIENT
Start: 2023-05-06 | End: 2023-05-05

## 2023-05-05 RX ORDER — UBIDECARENONE 75 MG
100 CAPSULE ORAL DAILY
Status: DISCONTINUED | OUTPATIENT
Start: 2023-05-05 | End: 2023-05-05

## 2023-05-05 RX ORDER — GABAPENTIN 100 MG/1
100 CAPSULE ORAL 3 TIMES DAILY
Qty: 90 CAPSULE | Refills: 1 | Status: SHIPPED | OUTPATIENT
Start: 2023-05-05

## 2023-05-05 RX ORDER — QUETIAPINE FUMARATE 25 MG/1
50 TABLET, FILM COATED ORAL NIGHTLY
Status: DISCONTINUED | OUTPATIENT
Start: 2023-05-05 | End: 2023-05-05

## 2023-05-05 RX ORDER — ASPIRIN 81 MG/1
81 TABLET, CHEWABLE ORAL DAILY
Refills: 0 | Status: SHIPPED | COMMUNITY
Start: 2023-05-06

## 2023-05-05 NOTE — PLAN OF CARE
Patient alert and oriented on room air with complaints of neck and leg pain- relieved with PRN.  Call light in reach and safety measures implemented  Problem: Patient Centered Care  Goal: Patient preferences are identified and integrated in the patient's plan of care  Description: Interventions:  - What would you like us to know as we care for you?   - Provide timely, complete, and accurate information to patient/family  - Incorporate patient and family knowledge, values, beliefs, and cultural backgrounds into the planning and delivery of care  - Encourage patient/family to participate in care and decision-making at the level they choose  - Honor patient and family perspectives and choices  Outcome: Progressing     Problem: Diabetes/Glucose Control  Goal: Glucose maintained within prescribed range  Description: INTERVENTIONS:  - Monitor Blood Glucose as ordered  - Assess for signs and symptoms of hyperglycemia and hypoglycemia  - Administer ordered medications to maintain glucose within target range  - Assess barriers to adequate nutritional intake and initiate nutrition consult as needed  - Instruct patient on self management of diabetes  Outcome: Progressing     Problem: Patient/Family Goals  Goal: Patient/Family Long Term Goal  Description: Patient's Long Term Goal: To stay out of the hospital    Interventions:  - Appropriate follow up care  - See additional Care Plan goals for specific interventions  Outcome: Progressing  Goal: Patient/Family Short Term Goal  Description: Patient's Short Term Goal: To figure out why I keep passing out    Interventions:   [facility] admission, neuro consult, cardiology consult  - See additional Care Plan goals for specific interventions  Outcome: Progressing     Problem: CARDIOVASCULAR - ADULT  Goal: Maintains optimal cardiac output and hemodynamic stability  Description: INTERVENTIONS:  - Monitor vital signs, rhythm, and trends  - Monitor for bleeding, hypotension and signs of decreased cardiac output  - Evaluate effectiveness of vasoactive medications to optimize hemodynamic stability  - Monitor arterial and/or venous puncture sites for bleeding and/or hematoma  - Assess quality of pulses, skin color and temperature  - Assess for signs of decreased coronary artery perfusion - ex. Angina  - Evaluate fluid balance, assess for edema, trend weights  Outcome: Progressing  Goal: Absence of cardiac arrhythmias or at baseline  Description: INTERVENTIONS:  - Continuous cardiac monitoring, monitor vital signs, obtain 12 lead EKG if indicated  - Evaluate effectiveness of antiarrhythmic and heart rate control medications as ordered  - Initiate emergency measures for life threatening arrhythmias  - Monitor electrolytes and administer replacement therapy as ordered  Outcome: Progressing     Problem: NEUROLOGICAL - ADULT  Goal: Achieves stable or improved neurological status  Description: INTERVENTIONS  - Assess for and report changes in neurological status  - Initiate measures to prevent increased intracranial pressure  - Maintain blood pressure and fluid volume within ordered parameters to optimize cerebral perfusion and minimize risk of hemorrhage  - Monitor temperature, glucose, and sodium.  Initiate appropriate interventions as ordered  Outcome: Progressing  Goal: Achieves maximal functionality and self care  Description: INTERVENTIONS  - Monitor swallowing and airway patency with patient fatigue and changes in neurological status  - Encourage and assist patient to increase activity and self care with guidance from PT/OT  - Encourage visually impaired, hearing impaired and aphasic patients to use assistive/communication devices  Outcome: Progressing

## 2023-05-05 NOTE — CM/SW NOTE
05/05/23 1500   Discharge disposition   Expected discharge disposition Home or Self   Discharge transportation Private car     Mariella Ladd MBA BSN RN Roxanne Mondragon RN Case Manager  [phone]

## 2023-05-05 NOTE — PLAN OF CARE
Patient cleared for discharging home, he will follow up with cardiology and EP as an outpatient. Pt was seen by psych today, see medication changes. Pt cleared for discharging home by attending. Discharge instructions reviewed with the pt by discharge RN. Pt discharged home in stable condition.        Problem: Patient Centered Care  Goal: Patient preferences are identified and integrated in the patient's plan of care  Description: Interventions:  - What would you like us to know as we care for you?   - Provide timely, complete, and accurate information to patient/family  - Incorporate patient and family knowledge, values, beliefs, and cultural backgrounds into the planning and delivery of care  - Encourage patient/family to participate in care and decision-making at the level they choose  - Honor patient and family perspectives and choices  Outcome: Completed     Problem: Diabetes/Glucose Control  Goal: Glucose maintained within prescribed range  Description: INTERVENTIONS:  - Monitor Blood Glucose as ordered  - Assess for signs and symptoms of hyperglycemia and hypoglycemia  - Administer ordered medications to maintain glucose within target range  - Assess barriers to adequate nutritional intake and initiate nutrition consult as needed  - Instruct patient on self management of diabetes  Outcome: Completed     Problem: Patient/Family Goals  Goal: Patient/Family Long Term Goal  Description: Patient's Long Term Goal: To stay out of the hospital    Interventions:  - Appropriate follow up care  - See additional Care Plan goals for specific interventions  Outcome: Completed  Goal: Patient/Family Short Term Goal  Description: Patient's Short Term Goal: To figure out why I keep passing out    Interventions:   [facility] admission, neuro consult, cardiology consult  - See additional Care Plan goals for specific interventions  Outcome: Completed     Problem: CARDIOVASCULAR - ADULT  Goal: Maintains optimal cardiac output and hemodynamic stability  Description: INTERVENTIONS:  - Monitor vital signs, rhythm, and trends  - Monitor for bleeding, hypotension and signs of decreased cardiac output  - Evaluate effectiveness of vasoactive medications to optimize hemodynamic stability  - Monitor arterial and/or venous puncture sites for bleeding and/or hematoma  - Assess quality of pulses, skin color and temperature  - Assess for signs of decreased coronary artery perfusion - ex. Angina  - Evaluate fluid balance, assess for edema, trend weights  Outcome: Completed  Goal: Absence of cardiac arrhythmias or at baseline  Description: INTERVENTIONS:  - Continuous cardiac monitoring, monitor vital signs, obtain 12 lead EKG if indicated  - Evaluate effectiveness of antiarrhythmic and heart rate control medications as ordered  - Initiate emergency measures for life threatening arrhythmias  - Monitor electrolytes and administer replacement therapy as ordered  Outcome: Completed     Problem: NEUROLOGICAL - ADULT  Goal: Achieves stable or improved neurological status  Description: INTERVENTIONS  - Assess for and report changes in neurological status  - Initiate measures to prevent increased intracranial pressure  - Maintain blood pressure and fluid volume within ordered parameters to optimize cerebral perfusion and minimize risk of hemorrhage  - Monitor temperature, glucose, and sodium.  Initiate appropriate interventions as ordered  Outcome: Completed  Goal: Achieves maximal functionality and self care  Description: INTERVENTIONS  - Monitor swallowing and airway patency with patient fatigue and changes in neurological status  - Encourage and assist patient to increase activity and self care with guidance from PT/OT  - Encourage visually impaired, hearing impaired and aphasic patients to use assistive/communication devices  Outcome: Completed

## 2023-05-05 NOTE — DISCHARGE INSTRUCTIONS
You have been prescribed B12 supplementation. This is a vitamin that is important for your cognition. Metformin can lower your B12 levels. You should take B12 every day.

## 2023-05-05 NOTE — DIETARY NOTE
NUTRITION EDUCATION NOTE     Pt screened with elevated A1C of 10.5 on 5/3/23. Knowledge assessment completed. Pt states he was given his dx of DM but was never provided education or direction on diet or management. He does not take medication for it at home and does not check BG at home. Pt states he was living in [place], then moved here, and may eventually move back to [place]. So he does not have a strong relationship with a PCP. Spoke with RN and briefly discussed pt via phone call. RN to place orders for diabetes educator to provide education for better DM management. Verbally reviewed basic diabetic diet restrictions. Provided with Ready, Set, Start Counting and NCM handout to reinforce. Receptive to instruction. Would benefit from outpt f/u. Expect poor compliance.         Facundo Rice RD, LDN  Clinical Dietitian  P: [phone]

## 2023-05-05 NOTE — DISCHARGE PLANNING
Patient was provided with discharge instructions, education, and follow up information. Prescriptions were already sent electronically to patient's pharmacy. Patient verbalizes understanding of follow up information, specifically PCP, cardiology, and neurology. Patient has no questions after reviewing all instructions and will be going home.      Nik Mendenhall, Discharge Leader [id number]

## 2023-05-06 PROBLEM — F39 EPISODIC MOOD DISORDER (HCC): Status: ACTIVE | Noted: 2023-01-01

## 2023-05-06 PROBLEM — F39 EPISODIC MOOD DISORDER (HCC): Status: ACTIVE | Noted: 2023-05-06

## 2023-05-09 ENCOUNTER — PATIENT OUTREACH (OUTPATIENT)
Dept: CASE MANAGEMENT | Age: 68
End: 2023-05-09

## 2023-05-12 NOTE — PROGRESS NOTES
3rd attempt DM Questions  No answer, LVMTCB to review dm questions  Unable to contact pt after multiple attempts  Closing encounter

## 2023-05-17 ENCOUNTER — APPOINTMENT (OUTPATIENT)
Dept: CARDIOLOGY | Age: 68
End: 2023-05-17

## 2023-05-18 ENCOUNTER — APPOINTMENT (OUTPATIENT)
Dept: CARDIOLOGY | Age: 68
End: 2023-05-18

## 2023-05-24 ENCOUNTER — OFFICE VISIT (OUTPATIENT)
Dept: CARDIOLOGY | Age: 68
End: 2023-05-24

## 2023-05-24 ENCOUNTER — LAB SERVICES (OUTPATIENT)
Dept: LAB | Age: 68
End: 2023-05-24

## 2023-05-24 VITALS
HEIGHT: 70 IN | WEIGHT: 218 LBS | SYSTOLIC BLOOD PRESSURE: 94 MMHG | DIASTOLIC BLOOD PRESSURE: 48 MMHG | BODY MASS INDEX: 31.21 KG/M2 | OXYGEN SATURATION: 99 % | HEART RATE: 79 BPM

## 2023-05-24 DIAGNOSIS — Z09 HOSPITAL DISCHARGE FOLLOW-UP: Primary | ICD-10-CM

## 2023-05-24 DIAGNOSIS — Z95.1 HX OF CABG: ICD-10-CM

## 2023-05-24 DIAGNOSIS — I25.5 ISCHEMIC CARDIOMYOPATHY: ICD-10-CM

## 2023-05-24 DIAGNOSIS — R55 SYNCOPE AND COLLAPSE: ICD-10-CM

## 2023-05-24 DIAGNOSIS — I10 BENIGN ESSENTIAL HTN: ICD-10-CM

## 2023-05-24 DIAGNOSIS — I50.22 CHF (CONGESTIVE HEART FAILURE), NYHA CLASS II, CHRONIC, SYSTOLIC (CMD): ICD-10-CM

## 2023-05-24 DIAGNOSIS — Z09 HOSPITAL DISCHARGE FOLLOW-UP: ICD-10-CM

## 2023-05-24 DIAGNOSIS — I25.10 CORONARY ARTERY DISEASE INVOLVING NATIVE CORONARY ARTERY OF NATIVE HEART WITHOUT ANGINA PECTORIS: ICD-10-CM

## 2023-05-24 DIAGNOSIS — E78.5 DYSLIPIDEMIA: ICD-10-CM

## 2023-05-24 LAB
ANION GAP SERPL CALC-SCNC: 11 MMOL/L (ref 7–19)
BUN SERPL-MCNC: 58 MG/DL (ref 6–20)
BUN/CREAT SERPL: 32 (ref 7–25)
CALCIUM SERPL-MCNC: 8.7 MG/DL (ref 8.4–10.2)
CHLORIDE SERPL-SCNC: 96 MMOL/L (ref 97–110)
CO2 SERPL-SCNC: 32 MMOL/L (ref 21–32)
CREAT SERPL-MCNC: 1.8 MG/DL (ref 0.67–1.17)
FASTING DURATION TIME PATIENT: ABNORMAL H
GFR SERPLBLD BASED ON 1.73 SQ M-ARVRAT: 41 ML/MIN
GLUCOSE SERPL-MCNC: 310 MG/DL (ref 70–99)
NT-PROBNP SERPL-MCNC: 5552 PG/ML
POTASSIUM SERPL-SCNC: 4.7 MMOL/L (ref 3.4–5.1)
SODIUM SERPL-SCNC: 134 MMOL/L (ref 135–145)

## 2023-05-24 PROCEDURE — 80048 BASIC METABOLIC PNL TOTAL CA: CPT | Performed by: INTERNAL MEDICINE

## 2023-05-24 PROCEDURE — 3078F DIAST BP <80 MM HG: CPT | Performed by: INTERNAL MEDICINE

## 2023-05-24 PROCEDURE — 36415 COLL VENOUS BLD VENIPUNCTURE: CPT | Performed by: INTERNAL MEDICINE

## 2023-05-24 PROCEDURE — 3074F SYST BP LT 130 MM HG: CPT | Performed by: INTERNAL MEDICINE

## 2023-05-24 PROCEDURE — 83880 ASSAY OF NATRIURETIC PEPTIDE: CPT | Performed by: INTERNAL MEDICINE

## 2023-05-24 PROCEDURE — 99215 OFFICE O/P EST HI 40 MIN: CPT | Performed by: INTERNAL MEDICINE

## 2023-05-24 PROCEDURE — 93000 ELECTROCARDIOGRAM COMPLETE: CPT | Performed by: INTERNAL MEDICINE

## 2023-05-24 RX ORDER — QUETIAPINE FUMARATE 50 MG/1
50 TABLET, FILM COATED ORAL
COMMUNITY
Start: 2023-05-05 | End: 2023-09-18 | Stop reason: SDUPTHER

## 2023-05-24 RX ORDER — CARVEDILOL 25 MG/1
25 TABLET ORAL
COMMUNITY
End: 2023-05-24 | Stop reason: DRUGHIGH

## 2023-05-24 RX ORDER — CARVEDILOL 12.5 MG/1
12.5 TABLET ORAL 2 TIMES DAILY
Qty: 60 TABLET | Refills: 5 | Status: SHIPPED | OUTPATIENT
Start: 2023-05-24 | End: 2023-05-24 | Stop reason: DRUGHIGH

## 2023-05-24 RX ORDER — PAROXETINE HYDROCHLORIDE 20 MG/1
20 TABLET, FILM COATED ORAL
COMMUNITY
Start: 2023-05-06 | End: 2023-09-26 | Stop reason: SDUPTHER

## 2023-05-24 RX ORDER — METOPROLOL SUCCINATE 100 MG/1
100 TABLET, EXTENDED RELEASE ORAL DAILY
Status: SHIPPED | COMMUNITY
Start: 2023-05-24 | End: 2023-07-24 | Stop reason: SDUPTHER

## 2023-05-24 RX ORDER — CARVEDILOL 6.25 MG/1
6.25 TABLET ORAL 2 TIMES DAILY WITH MEALS
Qty: 60 TABLET | Refills: 3 | Status: SHIPPED | OUTPATIENT
Start: 2023-05-24 | End: 2023-05-24 | Stop reason: ALTCHOICE

## 2023-05-24 RX ORDER — GABAPENTIN 100 MG/1
100 CAPSULE ORAL
COMMUNITY
Start: 2023-05-05 | End: 2023-09-15 | Stop reason: ALTCHOICE

## 2023-05-25 ENCOUNTER — TELEPHONE (OUTPATIENT)
Dept: CARDIOLOGY | Age: 68
End: 2023-05-25

## 2023-05-25 DIAGNOSIS — I50.9 CONGESTIVE HEART FAILURE, UNSPECIFIED HF CHRONICITY, UNSPECIFIED HEART FAILURE TYPE (CMD): Primary | ICD-10-CM

## 2023-05-29 ENCOUNTER — APPOINTMENT (OUTPATIENT)
Dept: CT IMAGING | Facility: HOSPITAL | Age: 68
End: 2023-05-29
Attending: EMERGENCY MEDICINE
Payer: MEDICARE

## 2023-05-29 ENCOUNTER — APPOINTMENT (OUTPATIENT)
Dept: GENERAL RADIOLOGY | Facility: HOSPITAL | Age: 68
End: 2023-05-29
Attending: EMERGENCY MEDICINE
Payer: MEDICARE

## 2023-05-29 ENCOUNTER — HOSPITAL ENCOUNTER (EMERGENCY)
Facility: HOSPITAL | Age: 68
Discharge: LEFT BEFORE DISCHARGE | End: 2023-05-29
Attending: EMERGENCY MEDICINE
Payer: MEDICARE

## 2023-05-29 VITALS
OXYGEN SATURATION: 95 % | TEMPERATURE: 98 F | SYSTOLIC BLOOD PRESSURE: 110 MMHG | DIASTOLIC BLOOD PRESSURE: 89 MMHG | HEART RATE: 97 BPM | RESPIRATION RATE: 20 BRPM

## 2023-05-29 DIAGNOSIS — Z79.01 ANTICOAGULATED: ICD-10-CM

## 2023-05-29 DIAGNOSIS — D62 ACUTE BLOOD LOSS ANEMIA: ICD-10-CM

## 2023-05-29 DIAGNOSIS — S09.90XA INJURY OF HEAD, INITIAL ENCOUNTER: Primary | ICD-10-CM

## 2023-05-29 DIAGNOSIS — S01.81XA FACIAL LACERATION, INITIAL ENCOUNTER: ICD-10-CM

## 2023-05-29 LAB
ANION GAP SERPL CALC-SCNC: 5 MMOL/L (ref 0–18)
ANTIBODY SCREEN: NEGATIVE
BASOPHILS # BLD AUTO: 0.03 X10(3) UL (ref 0–0.2)
BASOPHILS NFR BLD AUTO: 0.2 %
BUN BLD-MCNC: 35 MG/DL (ref 7–18)
BUN/CREAT SERPL: 22.6 (ref 10–20)
CALCIUM BLD-MCNC: 8.3 MG/DL (ref 8.5–10.1)
CHLORIDE SERPL-SCNC: 103 MMOL/L (ref 98–112)
CK SERPL-CCNC: 512 U/L
CO2 SERPL-SCNC: 23 MMOL/L (ref 21–32)
CREAT BLD-MCNC: 1.55 MG/DL
DEPRECATED RDW RBC AUTO: 43.7 FL (ref 35.1–46.3)
EOSINOPHIL # BLD AUTO: 0.05 X10(3) UL (ref 0–0.7)
EOSINOPHIL NFR BLD AUTO: 0.4 %
ERYTHROCYTE [DISTWIDTH] IN BLOOD BY AUTOMATED COUNT: 14.9 % (ref 11–15)
ETHANOL SERPL-MCNC: <3 MG/DL (ref ?–3)
GFR SERPLBLD BASED ON 1.73 SQ M-ARVRAT: 49 ML/MIN/1.73M2 (ref 60–?)
GLUCOSE BLD-MCNC: 312 MG/DL (ref 70–99)
GLUCOSE BLDC GLUCOMTR-MCNC: 332 MG/DL (ref 70–99)
GLUCOSE BLDC GLUCOMTR-MCNC: 341 MG/DL (ref 70–99)
HCT VFR BLD AUTO: 24.8 %
HGB BLD-MCNC: 7.7 G/DL
IMM GRANULOCYTES # BLD AUTO: 0.06 X10(3) UL (ref 0–1)
IMM GRANULOCYTES NFR BLD: 0.5 %
LYMPHOCYTES # BLD AUTO: 0.76 X10(3) UL (ref 1–4)
LYMPHOCYTES NFR BLD AUTO: 6 %
MCH RBC QN AUTO: 24.8 PG (ref 26–34)
MCHC RBC AUTO-ENTMCNC: 31 G/DL (ref 31–37)
MCV RBC AUTO: 80 FL
MONOCYTES # BLD AUTO: 1.08 X10(3) UL (ref 0.1–1)
MONOCYTES NFR BLD AUTO: 8.5 %
NEUTROPHILS # BLD AUTO: 10.78 X10 (3) UL (ref 1.5–7.7)
NEUTROPHILS # BLD AUTO: 10.78 X10(3) UL (ref 1.5–7.7)
NEUTROPHILS NFR BLD AUTO: 84.4 %
OSMOLALITY SERPL CALC.SUM OF ELEC: 292 MOSM/KG (ref 275–295)
PLATELET # BLD AUTO: 184 10(3)UL (ref 150–450)
POTASSIUM SERPL-SCNC: 4.6 MMOL/L (ref 3.5–5.1)
RBC # BLD AUTO: 3.1 X10(6)UL
RH BLOOD TYPE: POSITIVE
RH BLOOD TYPE: POSITIVE
SODIUM SERPL-SCNC: 131 MMOL/L (ref 136–145)
TROPONIN I HIGH SENSITIVITY: 26 NG/L
WBC # BLD AUTO: 12.8 X10(3) UL (ref 4–11)

## 2023-05-29 PROCEDURE — 82077 ASSAY SPEC XCP UR&BREATH IA: CPT | Performed by: EMERGENCY MEDICINE

## 2023-05-29 PROCEDURE — 82550 ASSAY OF CK (CPK): CPT | Performed by: EMERGENCY MEDICINE

## 2023-05-29 PROCEDURE — 86900 BLOOD TYPING SEROLOGIC ABO: CPT | Performed by: EMERGENCY MEDICINE

## 2023-05-29 PROCEDURE — 12051 INTMD RPR FACE/MM 2.5 CM/<: CPT

## 2023-05-29 PROCEDURE — 86901 BLOOD TYPING SEROLOGIC RH(D): CPT | Performed by: EMERGENCY MEDICINE

## 2023-05-29 PROCEDURE — 82962 GLUCOSE BLOOD TEST: CPT

## 2023-05-29 PROCEDURE — 84484 ASSAY OF TROPONIN QUANT: CPT | Performed by: EMERGENCY MEDICINE

## 2023-05-29 PROCEDURE — 86850 RBC ANTIBODY SCREEN: CPT | Performed by: EMERGENCY MEDICINE

## 2023-05-29 PROCEDURE — 80048 BASIC METABOLIC PNL TOTAL CA: CPT | Performed by: EMERGENCY MEDICINE

## 2023-05-29 PROCEDURE — 86920 COMPATIBILITY TEST SPIN: CPT

## 2023-05-29 PROCEDURE — 99285 EMERGENCY DEPT VISIT HI MDM: CPT

## 2023-05-29 PROCEDURE — 93010 ELECTROCARDIOGRAM REPORT: CPT

## 2023-05-29 PROCEDURE — 85025 COMPLETE CBC W/AUTO DIFF WBC: CPT | Performed by: EMERGENCY MEDICINE

## 2023-05-29 PROCEDURE — 36430 TRANSFUSION BLD/BLD COMPNT: CPT

## 2023-05-29 PROCEDURE — 93005 ELECTROCARDIOGRAM TRACING: CPT

## 2023-05-29 PROCEDURE — 36415 COLL VENOUS BLD VENIPUNCTURE: CPT

## 2023-05-29 RX ORDER — TRANEXAMIC ACID 100 MG/ML
INJECTION, SOLUTION INTRAVENOUS
Status: COMPLETED
Start: 2023-05-29 | End: 2023-05-29

## 2023-05-29 RX ORDER — MORPHINE SULFATE 2 MG/ML
2 INJECTION, SOLUTION INTRAMUSCULAR; INTRAVENOUS ONCE
Status: DISCONTINUED | OUTPATIENT
Start: 2023-05-29 | End: 2023-05-29

## 2023-05-29 RX ORDER — INSULIN ASPART 100 [IU]/ML
0.15 INJECTION, SOLUTION INTRAVENOUS; SUBCUTANEOUS ONCE
Status: DISCONTINUED | OUTPATIENT
Start: 2023-05-29 | End: 2023-05-29

## 2023-05-29 RX ORDER — INSULIN ASPART 100 [IU]/ML
0.15 INJECTION, SOLUTION INTRAVENOUS; SUBCUTANEOUS ONCE
Status: COMPLETED | OUTPATIENT
Start: 2023-05-29 | End: 2023-05-29

## 2023-05-29 RX ORDER — TRANEXAMIC ACID 100 MG/ML
5 INJECTION, SOLUTION INTRAVENOUS ONCE
Status: COMPLETED | OUTPATIENT
Start: 2023-05-29 | End: 2023-05-29

## 2023-05-29 RX ORDER — LIDOCAINE HYDROCHLORIDE AND EPINEPHRINE 20; 5 MG/ML; UG/ML
20 INJECTION, SOLUTION EPIDURAL; INFILTRATION; INTRACAUDAL; PERINEURAL ONCE
Status: COMPLETED | OUTPATIENT
Start: 2023-05-29 | End: 2023-05-29

## 2023-05-29 RX ORDER — FUROSEMIDE 10 MG/ML
20 INJECTION INTRAMUSCULAR; INTRAVENOUS ONCE
Status: DISCONTINUED | OUTPATIENT
Start: 2023-05-29 | End: 2023-05-29

## 2023-05-29 RX ORDER — AMOXICILLIN AND CLAVULANATE POTASSIUM 875; 125 MG/1; MG/1
1 TABLET, FILM COATED ORAL 2 TIMES DAILY
Qty: 10 TABLET | Refills: 0 | Status: SHIPPED | OUTPATIENT
Start: 2023-05-29 | End: 2023-06-03

## 2023-05-29 NOTE — ED NOTES
Patient at CT refusing to get CT scans, states he does not need them. RN to CT with pain medication for patient, states he is refusing CT, refusing medications, refusing blood. Dr. Alem Mehta at bedside to explain risks of leaving Against Medical Advice. Patient states he still does not want the CT scans or test, states he understands risks.

## 2023-05-29 NOTE — DISCHARGE INSTRUCTIONS
Seek care for vomiting, worsening headache, difficulty walking/thinking, vision change, eye pain or any concern: you struck your head on the ground while on blood thinners and sought care for a bleeding facial laceration and refused imaging of your brain/skull/facial bones. Return for any concern/worsening symptoms, followup with your PCP for suture removal in 3-5 days.

## 2023-05-29 NOTE — ED INITIAL ASSESSMENT (HPI)
Patent arrives through triage with c/o of fall and laceration. Patient states that he fell around 1300 today. Arrives with Laceration to L. Nose, denies LOC.  Hx falls, bloodthinners

## 2023-05-30 LAB
ATRIAL RATE: 99 BPM
P AXIS: 59 DEGREES
P-R INTERVAL: 154 MS
Q-T INTERVAL: 374 MS
QRS DURATION: 104 MS
QTC CALCULATION (BEZET): 479 MS
R AXIS: -66 DEGREES
T AXIS: 73 DEGREES
VENTRICULAR RATE: 99 BPM

## 2023-05-31 LAB
BLOOD TYPE BARCODE: 6200
UNIT VOLUME: 350 ML

## 2023-06-02 ENCOUNTER — TELEPHONE (OUTPATIENT)
Dept: CARDIOLOGY | Age: 68
End: 2023-06-02

## 2023-06-02 ENCOUNTER — TELEPHONE (OUTPATIENT)
Dept: INTERNAL MEDICINE | Age: 68
End: 2023-06-02

## 2023-06-03 ENCOUNTER — LAB SERVICES (OUTPATIENT)
Dept: LAB | Age: 68
End: 2023-06-03

## 2023-06-03 ENCOUNTER — OFFICE VISIT (OUTPATIENT)
Dept: INTERNAL MEDICINE | Age: 68
End: 2023-06-03

## 2023-06-03 VITALS
TEMPERATURE: 97.2 F | WEIGHT: 214.2 LBS | RESPIRATION RATE: 20 BRPM | DIASTOLIC BLOOD PRESSURE: 54 MMHG | BODY MASS INDEX: 30.73 KG/M2 | HEART RATE: 96 BPM | SYSTOLIC BLOOD PRESSURE: 94 MMHG

## 2023-06-03 DIAGNOSIS — D62 ANEMIA ASSOCIATED WITH ACUTE BLOOD LOSS: ICD-10-CM

## 2023-06-03 DIAGNOSIS — E11.69 TYPE 2 DIABETES MELLITUS WITH OTHER SPECIFIED COMPLICATION, WITHOUT LONG-TERM CURRENT USE OF INSULIN (CMD): ICD-10-CM

## 2023-06-03 DIAGNOSIS — S01.81XD FACIAL LACERATION, SUBSEQUENT ENCOUNTER: Primary | ICD-10-CM

## 2023-06-03 DIAGNOSIS — E78.5 DYSLIPIDEMIA: ICD-10-CM

## 2023-06-03 LAB
ALBUMIN SERPL-MCNC: 2.8 G/DL (ref 3.6–5.1)
ALBUMIN/GLOB SERPL: 1 {RATIO} (ref 1–2.4)
ALP SERPL-CCNC: 77 UNITS/L (ref 45–117)
ALT SERPL-CCNC: 57 UNITS/L
ANION GAP SERPL CALC-SCNC: 16 MMOL/L (ref 7–19)
AST SERPL-CCNC: 23 UNITS/L
BASOPHILS # BLD: 0 K/MCL (ref 0–0.3)
BASOPHILS NFR BLD: 1 %
BILIRUB SERPL-MCNC: 0.4 MG/DL (ref 0.2–1)
BUN SERPL-MCNC: 46 MG/DL (ref 6–20)
BUN/CREAT SERPL: 25 (ref 7–25)
CALCIUM SERPL-MCNC: 8.4 MG/DL (ref 8.4–10.2)
CHLORIDE SERPL-SCNC: 100 MMOL/L (ref 97–110)
CO2 SERPL-SCNC: 25 MMOL/L (ref 21–32)
CREAT SERPL-MCNC: 1.86 MG/DL (ref 0.67–1.17)
DEPRECATED RDW RBC: 46.4 FL (ref 39–50)
EOSINOPHIL # BLD: 0.3 K/MCL (ref 0–0.5)
EOSINOPHIL NFR BLD: 4 %
ERYTHROCYTE [DISTWIDTH] IN BLOOD: 15.7 % (ref 11–15)
FASTING DURATION TIME PATIENT: ABNORMAL H
GFR SERPLBLD BASED ON 1.73 SQ M-ARVRAT: 39 ML/MIN
GLOBULIN SER-MCNC: 2.9 G/DL (ref 2–4)
GLUCOSE SERPL-MCNC: 238 MG/DL (ref 70–99)
HBA1C MFR BLD: 10.8 % (ref 4.5–5.6)
HCT VFR BLD CALC: 23.2 % (ref 39–51)
HGB BLD-MCNC: 7.1 G/DL (ref 13–17)
IMM GRANULOCYTES # BLD AUTO: 0 K/MCL (ref 0–0.2)
IMM GRANULOCYTES # BLD: 0 %
LYMPHOCYTES # BLD: 1.1 K/MCL (ref 1–4)
LYMPHOCYTES NFR BLD: 15 %
MCH RBC QN AUTO: 25.1 PG (ref 26–34)
MCHC RBC AUTO-ENTMCNC: 30.6 G/DL (ref 32–36.5)
MCV RBC AUTO: 82 FL (ref 78–100)
MONOCYTES # BLD: 0.9 K/MCL (ref 0.3–0.9)
MONOCYTES NFR BLD: 12 %
NEUTROPHILS # BLD: 5 K/MCL (ref 1.8–7.7)
NEUTROPHILS NFR BLD: 68 %
NRBC BLD MANUAL-RTO: 0 /100 WBC
PLATELET # BLD AUTO: 243 K/MCL (ref 140–450)
POTASSIUM SERPL-SCNC: 5.3 MMOL/L (ref 3.4–5.1)
PROT SERPL-MCNC: 5.7 G/DL (ref 6.4–8.2)
RBC # BLD: 2.83 MIL/MCL (ref 4.5–5.9)
SODIUM SERPL-SCNC: 136 MMOL/L (ref 135–145)
WBC # BLD: 7.3 K/MCL (ref 4.2–11)

## 2023-06-03 PROCEDURE — 80053 COMPREHEN METABOLIC PANEL: CPT | Performed by: INTERNAL MEDICINE

## 2023-06-03 PROCEDURE — 99214 OFFICE O/P EST MOD 30 MIN: CPT | Performed by: INTERNAL MEDICINE

## 2023-06-03 PROCEDURE — 83036 HEMOGLOBIN GLYCOSYLATED A1C: CPT | Performed by: INTERNAL MEDICINE

## 2023-06-03 PROCEDURE — 85025 COMPLETE CBC W/AUTO DIFF WBC: CPT | Performed by: INTERNAL MEDICINE

## 2023-06-03 PROCEDURE — 3074F SYST BP LT 130 MM HG: CPT | Performed by: INTERNAL MEDICINE

## 2023-06-03 PROCEDURE — 36415 COLL VENOUS BLD VENIPUNCTURE: CPT | Performed by: INTERNAL MEDICINE

## 2023-06-03 PROCEDURE — 3078F DIAST BP <80 MM HG: CPT | Performed by: INTERNAL MEDICINE

## 2023-06-03 ASSESSMENT — PATIENT HEALTH QUESTIONNAIRE - PHQ9
1. LITTLE INTEREST OR PLEASURE IN DOING THINGS: NOT AT ALL
CLINICAL INTERPRETATION OF PHQ2 SCORE: NO FURTHER SCREENING NEEDED
SUM OF ALL RESPONSES TO PHQ9 QUESTIONS 1 AND 2: 0
SUM OF ALL RESPONSES TO PHQ9 QUESTIONS 1 AND 2: 0
2. FEELING DOWN, DEPRESSED OR HOPELESS: NOT AT ALL

## 2023-06-03 ASSESSMENT — PAIN SCALES - GENERAL: PAINLEVEL: 8

## 2023-06-05 DIAGNOSIS — D64.9 ANEMIA, UNSPECIFIED TYPE: ICD-10-CM

## 2023-06-05 DIAGNOSIS — N28.9 IMPAIRED RENAL FUNCTION: Primary | ICD-10-CM

## 2023-06-05 DIAGNOSIS — E11.69 TYPE 2 DIABETES MELLITUS WITH OTHER SPECIFIED COMPLICATION, WITHOUT LONG-TERM CURRENT USE OF INSULIN (CMD): ICD-10-CM

## 2023-06-06 RX ORDER — GLIMEPIRIDE 1 MG/1
1 TABLET ORAL
Qty: 90 TABLET | Refills: 0 | Status: SHIPPED | OUTPATIENT
Start: 2023-06-06 | End: 2023-09-26 | Stop reason: SDUPTHER

## 2023-06-07 ENCOUNTER — TELEPHONE (OUTPATIENT)
Dept: CHRONIC DISEASE MANAGEMENT | Age: 68
End: 2023-06-07

## 2023-06-08 ENCOUNTER — TELEPHONE (OUTPATIENT)
Dept: INTERNAL MEDICINE | Age: 68
End: 2023-06-08

## 2023-06-13 PROBLEM — R55 SYNCOPE AND COLLAPSE: Status: ACTIVE | Noted: 2023-06-13

## 2023-06-16 ENCOUNTER — TELEPHONE (OUTPATIENT)
Dept: CHRONIC DISEASE MANAGEMENT | Age: 68
End: 2023-06-16

## 2023-06-29 ENCOUNTER — TELEPHONE (OUTPATIENT)
Dept: FAMILY MEDICINE | Age: 68
End: 2023-06-29

## 2023-07-24 ENCOUNTER — OFFICE VISIT (OUTPATIENT)
Dept: CARDIOLOGY | Age: 68
End: 2023-07-24

## 2023-07-24 ENCOUNTER — LAB SERVICES (OUTPATIENT)
Dept: LAB | Age: 68
End: 2023-07-24

## 2023-07-24 VITALS
BODY MASS INDEX: 27.4 KG/M2 | OXYGEN SATURATION: 97 % | HEART RATE: 122 BPM | RESPIRATION RATE: 17 BRPM | HEIGHT: 70 IN | SYSTOLIC BLOOD PRESSURE: 112 MMHG | DIASTOLIC BLOOD PRESSURE: 60 MMHG | WEIGHT: 191.4 LBS

## 2023-07-24 DIAGNOSIS — D64.9 ANEMIA, UNSPECIFIED TYPE: ICD-10-CM

## 2023-07-24 DIAGNOSIS — D64.9 ANEMIA, UNSPECIFIED TYPE: Primary | ICD-10-CM

## 2023-07-24 DIAGNOSIS — R00.0 TACHYCARDIA, UNSPECIFIED: Primary | ICD-10-CM

## 2023-07-24 DIAGNOSIS — R00.0 TACHYCARDIA, UNSPECIFIED: ICD-10-CM

## 2023-07-24 LAB
ANION GAP SERPL CALC-SCNC: 16 MMOL/L (ref 7–19)
BASOPHILS # BLD: 0 K/MCL (ref 0–0.3)
BASOPHILS NFR BLD: 0 %
BUN SERPL-MCNC: 44 MG/DL (ref 6–20)
BUN/CREAT SERPL: 25 (ref 7–25)
CALCIUM SERPL-MCNC: 9.1 MG/DL (ref 8.4–10.2)
CHLORIDE SERPL-SCNC: 97 MMOL/L (ref 97–110)
CO2 SERPL-SCNC: 27 MMOL/L (ref 21–32)
CREAT SERPL-MCNC: 1.74 MG/DL (ref 0.67–1.17)
DEPRECATED RDW RBC: 42.8 FL (ref 39–50)
EOSINOPHIL # BLD: 0 K/MCL (ref 0–0.5)
EOSINOPHIL NFR BLD: 0 %
ERYTHROCYTE [DISTWIDTH] IN BLOOD: 16.9 % (ref 11–15)
FASTING DURATION TIME PATIENT: ABNORMAL H
GFR SERPLBLD BASED ON 1.73 SQ M-ARVRAT: 43 ML/MIN
GLUCOSE SERPL-MCNC: 218 MG/DL (ref 70–99)
HCT VFR BLD CALC: 26.6 % (ref 39–51)
HGB BLD-MCNC: 8.2 G/DL (ref 13–17)
IMM GRANULOCYTES # BLD AUTO: 0 K/MCL (ref 0–0.2)
IMM GRANULOCYTES # BLD: 0 %
LYMPHOCYTES # BLD: 1 K/MCL (ref 1–4)
LYMPHOCYTES NFR BLD: 13 %
MCH RBC QN AUTO: 21.6 PG (ref 26–34)
MCHC RBC AUTO-ENTMCNC: 30.8 G/DL (ref 32–36.5)
MCV RBC AUTO: 70.2 FL (ref 78–100)
MONOCYTES # BLD: 0.8 K/MCL (ref 0.3–0.9)
MONOCYTES NFR BLD: 10 %
NEUTROPHILS # BLD: 5.9 K/MCL (ref 1.8–7.7)
NEUTROPHILS NFR BLD: 77 %
NT-PROBNP SERPL-MCNC: ABNORMAL PG/ML
PLATELET # BLD AUTO: 290 K/MCL (ref 140–450)
POTASSIUM SERPL-SCNC: 4.1 MMOL/L (ref 3.4–5.1)
RBC # BLD: 3.79 MIL/MCL (ref 4.5–5.9)
SODIUM SERPL-SCNC: 136 MMOL/L (ref 135–145)
WBC # BLD: 7.8 K/MCL (ref 4.2–11)

## 2023-07-24 PROCEDURE — 93000 ELECTROCARDIOGRAM COMPLETE: CPT | Performed by: PHYSICIAN ASSISTANT

## 2023-07-24 PROCEDURE — 80048 BASIC METABOLIC PNL TOTAL CA: CPT | Performed by: INTERNAL MEDICINE

## 2023-07-24 PROCEDURE — 99214 OFFICE O/P EST MOD 30 MIN: CPT | Performed by: PHYSICIAN ASSISTANT

## 2023-07-24 PROCEDURE — 83540 ASSAY OF IRON: CPT | Performed by: INTERNAL MEDICINE

## 2023-07-24 PROCEDURE — 85025 COMPLETE CBC W/AUTO DIFF WBC: CPT | Performed by: INTERNAL MEDICINE

## 2023-07-24 PROCEDURE — 36415 COLL VENOUS BLD VENIPUNCTURE: CPT | Performed by: PHYSICIAN ASSISTANT

## 2023-07-24 PROCEDURE — 83550 IRON BINDING TEST: CPT | Performed by: INTERNAL MEDICINE

## 2023-07-24 PROCEDURE — 3078F DIAST BP <80 MM HG: CPT | Performed by: PHYSICIAN ASSISTANT

## 2023-07-24 PROCEDURE — 83880 ASSAY OF NATRIURETIC PEPTIDE: CPT | Performed by: INTERNAL MEDICINE

## 2023-07-24 PROCEDURE — 3074F SYST BP LT 130 MM HG: CPT | Performed by: PHYSICIAN ASSISTANT

## 2023-07-24 PROCEDURE — 93010 ELECTROCARDIOGRAM REPORT: CPT | Performed by: INTERNAL MEDICINE

## 2023-07-24 RX ORDER — HYDROCODONE BITARTRATE AND ACETAMINOPHEN 10; 325 MG/1; MG/1
TABLET ORAL
COMMUNITY

## 2023-07-24 RX ORDER — SACUBITRIL AND VALSARTAN 24; 26 MG/1; MG/1
1 TABLET, FILM COATED ORAL 2 TIMES DAILY
Qty: 60 TABLET | Refills: 0 | Status: SHIPPED | OUTPATIENT
Start: 2023-07-24 | End: 2023-09-15 | Stop reason: SDUPTHER

## 2023-07-24 RX ORDER — METOPROLOL SUCCINATE 100 MG/1
100 TABLET, EXTENDED RELEASE ORAL DAILY
Qty: 90 TABLET | Refills: 1 | Status: SHIPPED | OUTPATIENT
Start: 2023-07-24 | End: 2023-09-18 | Stop reason: SDUPTHER

## 2023-07-24 RX ORDER — SPIRONOLACTONE 25 MG/1
25 TABLET ORAL DAILY
Qty: 90 TABLET | Refills: 1 | Status: SHIPPED | OUTPATIENT
Start: 2023-07-24 | End: 2023-09-18 | Stop reason: SDUPTHER

## 2023-07-24 RX ORDER — CLOPIDOGREL BISULFATE 75 MG/1
75 TABLET ORAL DAILY
COMMUNITY
Start: 2023-06-26 | End: 2023-07-24 | Stop reason: SDUPTHER

## 2023-07-24 RX ORDER — EZETIMIBE 10 MG/1
10 TABLET ORAL DAILY
Qty: 90 TABLET | Refills: 1 | Status: SHIPPED | OUTPATIENT
Start: 2023-07-24 | End: 2023-09-18 | Stop reason: SDUPTHER

## 2023-07-24 RX ORDER — BUMETANIDE 2 MG/1
2 TABLET ORAL DAILY
Qty: 90 TABLET | Refills: 0 | Status: SHIPPED | OUTPATIENT
Start: 2023-07-24 | End: 2023-09-18 | Stop reason: SDUPTHER

## 2023-07-24 RX ORDER — ATORVASTATIN CALCIUM 80 MG/1
80 TABLET, FILM COATED ORAL DAILY
Qty: 90 TABLET | Refills: 1 | Status: SHIPPED | OUTPATIENT
Start: 2023-07-24 | End: 2023-09-18 | Stop reason: SDUPTHER

## 2023-07-24 RX ORDER — CLOPIDOGREL BISULFATE 75 MG/1
75 TABLET ORAL DAILY
Qty: 90 TABLET | Refills: 1 | Status: SHIPPED | OUTPATIENT
Start: 2023-07-24 | End: 2023-09-18 | Stop reason: SDUPTHER

## 2023-07-24 ASSESSMENT — NEW YORK HEART ASSOCIATION (NYHA) CLASSIFICATION: NYHA FUNCTIONAL CLASS: III

## 2023-07-25 ENCOUNTER — EXTERNAL RECORD (OUTPATIENT)
Dept: OTHER | Age: 68
End: 2023-07-25

## 2023-07-25 ENCOUNTER — EXTERNAL RECORD (OUTPATIENT)
Dept: CARDIOLOGY | Age: 68
End: 2023-07-25

## 2023-07-25 ENCOUNTER — EXTERNAL RECORD (OUTPATIENT)
Dept: NEPHROLOGY | Age: 68
End: 2023-07-25

## 2023-07-25 LAB
IRON SATN MFR SERPL: 3 % (ref 15–45)
IRON SERPL-MCNC: 12 MCG/DL (ref 65–175)
TIBC SERPL-MCNC: 443 MCG/DL (ref 250–450)

## 2023-07-25 PROCEDURE — 93306 TTE W/DOPPLER COMPLETE: CPT | Performed by: INTERNAL MEDICINE

## 2023-07-25 PROCEDURE — 99223 1ST HOSP IP/OBS HIGH 75: CPT | Performed by: INTERNAL MEDICINE

## 2023-07-25 PROCEDURE — 99223 1ST HOSP IP/OBS HIGH 75: CPT | Performed by: HOSPITALIST

## 2023-07-25 PROCEDURE — 99222 1ST HOSP IP/OBS MODERATE 55: CPT | Performed by: INTERNAL MEDICINE

## 2023-07-26 ENCOUNTER — EXTERNAL RECORD (OUTPATIENT)
Dept: OTHER | Age: 68
End: 2023-07-26

## 2023-07-26 PROCEDURE — 99233 SBSQ HOSP IP/OBS HIGH 50: CPT | Performed by: INTERNAL MEDICINE

## 2023-07-26 PROCEDURE — 99232 SBSQ HOSP IP/OBS MODERATE 35: CPT | Performed by: INTERNAL MEDICINE

## 2023-07-26 PROCEDURE — 99233 SBSQ HOSP IP/OBS HIGH 50: CPT | Performed by: HOSPITALIST

## 2023-07-27 PROCEDURE — 99232 SBSQ HOSP IP/OBS MODERATE 35: CPT | Performed by: INTERNAL MEDICINE

## 2023-07-27 PROCEDURE — 99233 SBSQ HOSP IP/OBS HIGH 50: CPT | Performed by: INTERNAL MEDICINE

## 2023-07-27 PROCEDURE — 99233 SBSQ HOSP IP/OBS HIGH 50: CPT | Performed by: HOSPITALIST

## 2023-07-28 ENCOUNTER — EXTERNAL LAB (OUTPATIENT)
Dept: OTHER | Age: 68
End: 2023-07-28

## 2023-07-28 LAB — LAB RESULT: NORMAL

## 2023-07-28 PROCEDURE — 99233 SBSQ HOSP IP/OBS HIGH 50: CPT | Performed by: INTERNAL MEDICINE

## 2023-07-28 PROCEDURE — 99233 SBSQ HOSP IP/OBS HIGH 50: CPT | Performed by: HOSPITALIST

## 2023-07-28 PROCEDURE — 99232 SBSQ HOSP IP/OBS MODERATE 35: CPT | Performed by: INTERNAL MEDICINE

## 2023-07-29 PROCEDURE — 99232 SBSQ HOSP IP/OBS MODERATE 35: CPT | Performed by: INTERNAL MEDICINE

## 2023-07-29 PROCEDURE — 99233 SBSQ HOSP IP/OBS HIGH 50: CPT | Performed by: INTERNAL MEDICINE

## 2023-07-29 PROCEDURE — 99233 SBSQ HOSP IP/OBS HIGH 50: CPT | Performed by: HOSPITALIST

## 2023-07-30 ENCOUNTER — IMAGING SERVICES (OUTPATIENT)
Dept: OTHER | Age: 68
End: 2023-07-30

## 2023-07-30 PROCEDURE — 99232 SBSQ HOSP IP/OBS MODERATE 35: CPT | Performed by: HOSPITALIST

## 2023-07-30 PROCEDURE — 99232 SBSQ HOSP IP/OBS MODERATE 35: CPT | Performed by: INTERNAL MEDICINE

## 2023-07-30 PROCEDURE — 99233 SBSQ HOSP IP/OBS HIGH 50: CPT | Performed by: INTERNAL MEDICINE

## 2023-07-31 ENCOUNTER — EXTERNAL RECORD (OUTPATIENT)
Dept: OTHER | Age: 68
End: 2023-07-31

## 2023-07-31 PROCEDURE — 99239 HOSP IP/OBS DSCHRG MGMT >30: CPT | Performed by: HOSPITALIST

## 2023-07-31 PROCEDURE — 99233 SBSQ HOSP IP/OBS HIGH 50: CPT | Performed by: INTERNAL MEDICINE

## 2023-07-31 PROCEDURE — 99232 SBSQ HOSP IP/OBS MODERATE 35: CPT | Performed by: INTERNAL MEDICINE

## 2023-08-01 ENCOUNTER — TELEPHONE (OUTPATIENT)
Dept: INTERNAL MEDICINE | Age: 68
End: 2023-08-01

## 2023-08-01 ENCOUNTER — TELEPHONE (OUTPATIENT)
Dept: CARDIOLOGY | Age: 68
End: 2023-08-01

## 2023-09-13 ENCOUNTER — APPOINTMENT (OUTPATIENT)
Dept: CARDIOLOGY | Age: 68
End: 2023-09-13

## 2023-09-14 ENCOUNTER — OFFICE VISIT (OUTPATIENT)
Dept: CARDIOLOGY | Age: 68
End: 2023-09-14

## 2023-09-14 VITALS
OXYGEN SATURATION: 96 % | DIASTOLIC BLOOD PRESSURE: 64 MMHG | BODY MASS INDEX: 27.2 KG/M2 | HEART RATE: 81 BPM | RESPIRATION RATE: 18 BRPM | SYSTOLIC BLOOD PRESSURE: 106 MMHG | HEIGHT: 70 IN | WEIGHT: 190 LBS

## 2023-09-14 DIAGNOSIS — I25.10 CORONARY ARTERY DISEASE INVOLVING NATIVE CORONARY ARTERY OF NATIVE HEART WITHOUT ANGINA PECTORIS: ICD-10-CM

## 2023-09-14 DIAGNOSIS — Z95.5 HISTORY OF CORONARY ARTERY STENT PLACEMENT: ICD-10-CM

## 2023-09-14 DIAGNOSIS — I50.22 CHF (CONGESTIVE HEART FAILURE), NYHA CLASS II, CHRONIC, SYSTOLIC (CMD): ICD-10-CM

## 2023-09-14 DIAGNOSIS — Z79.899 MEDICATION MANAGEMENT: Primary | ICD-10-CM

## 2023-09-14 DIAGNOSIS — Z95.1 HX OF CABG: ICD-10-CM

## 2023-09-14 DIAGNOSIS — I50.9 CONGESTIVE HEART FAILURE, UNSPECIFIED HF CHRONICITY, UNSPECIFIED HEART FAILURE TYPE (CMD): Primary | ICD-10-CM

## 2023-09-14 DIAGNOSIS — E78.5 DYSLIPIDEMIA: ICD-10-CM

## 2023-09-14 DIAGNOSIS — C18.9 MALIGNANT NEOPLASM OF COLON, UNSPECIFIED PART OF COLON (CMD): ICD-10-CM

## 2023-09-14 DIAGNOSIS — I25.5 ISCHEMIC CARDIOMYOPATHY: ICD-10-CM

## 2023-09-14 PROCEDURE — 3078F DIAST BP <80 MM HG: CPT | Performed by: INTERNAL MEDICINE

## 2023-09-14 PROCEDURE — 99215 OFFICE O/P EST HI 40 MIN: CPT | Performed by: INTERNAL MEDICINE

## 2023-09-14 PROCEDURE — 3074F SYST BP LT 130 MM HG: CPT | Performed by: INTERNAL MEDICINE

## 2023-09-15 RX ORDER — SACUBITRIL AND VALSARTAN 24; 26 MG/1; MG/1
1 TABLET, FILM COATED ORAL 2 TIMES DAILY
Qty: 180 TABLET | Refills: 1 | Status: SHIPPED | OUTPATIENT
Start: 2023-09-15 | End: 2023-09-26 | Stop reason: SDUPTHER

## 2023-09-18 ENCOUNTER — OFFICE VISIT (OUTPATIENT)
Dept: FAMILY MEDICINE | Age: 68
End: 2023-09-18

## 2023-09-18 VITALS
DIASTOLIC BLOOD PRESSURE: 60 MMHG | HEIGHT: 70 IN | BODY MASS INDEX: 27.06 KG/M2 | SYSTOLIC BLOOD PRESSURE: 100 MMHG | WEIGHT: 189 LBS | TEMPERATURE: 96.2 F | OXYGEN SATURATION: 98 % | HEART RATE: 77 BPM

## 2023-09-18 DIAGNOSIS — I25.5 ISCHEMIC CARDIOMYOPATHY: ICD-10-CM

## 2023-09-18 DIAGNOSIS — E11.69 TYPE 2 DIABETES MELLITUS WITH OTHER SPECIFIED COMPLICATION, WITHOUT LONG-TERM CURRENT USE OF INSULIN (CMD): ICD-10-CM

## 2023-09-18 DIAGNOSIS — Z95.1 HX OF CABG: ICD-10-CM

## 2023-09-18 DIAGNOSIS — I50.9 CONGESTIVE HEART FAILURE, UNSPECIFIED HF CHRONICITY, UNSPECIFIED HEART FAILURE TYPE (CMD): ICD-10-CM

## 2023-09-18 DIAGNOSIS — Z11.59 NEED FOR HEPATITIS C SCREENING TEST: ICD-10-CM

## 2023-09-18 DIAGNOSIS — Z00.00 MEDICARE ANNUAL WELLNESS VISIT, SUBSEQUENT: Primary | ICD-10-CM

## 2023-09-18 DIAGNOSIS — F32.1 CURRENT MODERATE EPISODE OF MAJOR DEPRESSIVE DISORDER WITHOUT PRIOR EPISODE (CMD): ICD-10-CM

## 2023-09-18 DIAGNOSIS — C18.7 MALIGNANT NEOPLASM OF SIGMOID COLON (CMD): ICD-10-CM

## 2023-09-18 DIAGNOSIS — Z87.891 HISTORY OF TOBACCO ABUSE: ICD-10-CM

## 2023-09-18 LAB
ALBUMIN SERPL-MCNC: 4.1 G/DL (ref 3.6–5.1)
ALBUMIN/GLOB SERPL: 1.3 {RATIO} (ref 1–2.4)
ALP SERPL-CCNC: 94 UNITS/L (ref 45–117)
ALT SERPL-CCNC: 17 UNITS/L
ANION GAP SERPL CALC-SCNC: 14 MMOL/L (ref 7–19)
AST SERPL-CCNC: 14 UNITS/L
BASOPHILS # BLD: 0.1 K/MCL (ref 0–0.3)
BASOPHILS NFR BLD: 1 %
BILIRUB SERPL-MCNC: 0.6 MG/DL (ref 0.2–1)
BUN SERPL-MCNC: 43 MG/DL (ref 6–20)
BUN/CREAT SERPL: 28 (ref 7–25)
CALCIUM SERPL-MCNC: 9.1 MG/DL (ref 8.4–10.2)
CHLORIDE SERPL-SCNC: 96 MMOL/L (ref 97–110)
CO2 SERPL-SCNC: 27 MMOL/L (ref 21–32)
CREAT SERPL-MCNC: 1.54 MG/DL (ref 0.67–1.17)
DEPRECATED RDW RBC: 69.4 FL (ref 39–50)
EGFRCR SERPLBLD CKD-EPI 2021: 49 ML/MIN/{1.73_M2}
EOSINOPHIL # BLD: 0.2 K/MCL (ref 0–0.5)
EOSINOPHIL NFR BLD: 2 %
ERYTHROCYTE [DISTWIDTH] IN BLOOD: 25.2 % (ref 11–15)
FASTING DURATION TIME PATIENT: ABNORMAL H
GLOBULIN SER-MCNC: 3.1 G/DL (ref 2–4)
GLUCOSE SERPL-MCNC: 236 MG/DL (ref 70–99)
HBA1C MFR BLD: 8.8 % (ref 4.5–5.6)
HCT VFR BLD CALC: 41.5 % (ref 39–51)
HGB BLD-MCNC: 12.8 G/DL (ref 13–17)
IMM GRANULOCYTES # BLD AUTO: 0 K/MCL (ref 0–0.2)
IMM GRANULOCYTES # BLD: 0 %
LYMPHOCYTES # BLD: 1.6 K/MCL (ref 1–4)
LYMPHOCYTES NFR BLD: 22 %
MCH RBC QN AUTO: 24.2 PG (ref 26–34)
MCHC RBC AUTO-ENTMCNC: 30.8 G/DL (ref 32–36.5)
MCV RBC AUTO: 78.3 FL (ref 78–100)
MONOCYTES # BLD: 0.8 K/MCL (ref 0.3–0.9)
MONOCYTES NFR BLD: 10 %
NEUTROPHILS # BLD: 4.8 K/MCL (ref 1.8–7.7)
NEUTROPHILS NFR BLD: 65 %
NRBC BLD MANUAL-RTO: 0 /100 WBC
PLATELET # BLD AUTO: 201 K/MCL (ref 140–450)
POTASSIUM SERPL-SCNC: 4.7 MMOL/L (ref 3.4–5.1)
PROT SERPL-MCNC: 7.2 G/DL (ref 6.4–8.2)
RBC # BLD: 5.3 MIL/MCL (ref 4.5–5.9)
SODIUM SERPL-SCNC: 132 MMOL/L (ref 135–145)
WBC # BLD: 7.4 K/MCL (ref 4.2–11)

## 2023-09-18 PROCEDURE — G0472 HEP C SCREEN HIGH RISK/OTHER: HCPCS | Performed by: CLINICAL MEDICAL LABORATORY

## 2023-09-18 PROCEDURE — 3074F SYST BP LT 130 MM HG: CPT | Performed by: FAMILY MEDICINE

## 2023-09-18 PROCEDURE — 99203 OFFICE O/P NEW LOW 30 MIN: CPT | Performed by: FAMILY MEDICINE

## 2023-09-18 PROCEDURE — 85025 COMPLETE CBC W/AUTO DIFF WBC: CPT | Performed by: INTERNAL MEDICINE

## 2023-09-18 PROCEDURE — 83036 HEMOGLOBIN GLYCOSYLATED A1C: CPT | Performed by: INTERNAL MEDICINE

## 2023-09-18 PROCEDURE — 80053 COMPREHEN METABOLIC PANEL: CPT | Performed by: INTERNAL MEDICINE

## 2023-09-18 PROCEDURE — 3078F DIAST BP <80 MM HG: CPT | Performed by: FAMILY MEDICINE

## 2023-09-18 PROCEDURE — G0438 PPPS, INITIAL VISIT: HCPCS | Performed by: FAMILY MEDICINE

## 2023-09-18 PROCEDURE — 36415 COLL VENOUS BLD VENIPUNCTURE: CPT | Performed by: INTERNAL MEDICINE

## 2023-09-18 RX ORDER — QUETIAPINE FUMARATE 50 MG/1
50 TABLET, FILM COATED ORAL DAILY
Qty: 90 TABLET | Refills: 1 | Status: SHIPPED | OUTPATIENT
Start: 2023-09-18 | End: 2023-09-26 | Stop reason: SDUPTHER

## 2023-09-18 RX ORDER — ROPINIROLE 1 MG/1
1 TABLET, FILM COATED ORAL DAILY
Qty: 90 TABLET | Refills: 1 | Status: SHIPPED | OUTPATIENT
Start: 2023-09-18

## 2023-09-18 RX ORDER — EZETIMIBE 10 MG/1
10 TABLET ORAL DAILY
Qty: 90 TABLET | Refills: 1 | Status: SHIPPED | OUTPATIENT
Start: 2023-09-18 | End: 2023-09-26 | Stop reason: SDUPTHER

## 2023-09-18 RX ORDER — CLOPIDOGREL BISULFATE 75 MG/1
75 TABLET ORAL DAILY
Qty: 90 TABLET | Refills: 3 | Status: SHIPPED | OUTPATIENT
Start: 2023-09-18 | End: 2023-09-26 | Stop reason: SDUPTHER

## 2023-09-18 RX ORDER — HYDROCODONE BITARTRATE AND ACETAMINOPHEN 10; 325 MG/1; MG/1
1 TABLET ORAL EVERY 6 HOURS PRN
Qty: 90 TABLET | Refills: 1 | Status: CANCELLED | OUTPATIENT
Start: 2023-09-18

## 2023-09-18 RX ORDER — SPIRONOLACTONE 25 MG/1
25 TABLET ORAL DAILY
Qty: 90 TABLET | Refills: 3 | Status: SHIPPED | OUTPATIENT
Start: 2023-09-18 | End: 2023-09-26 | Stop reason: SDUPTHER

## 2023-09-18 RX ORDER — METOPROLOL SUCCINATE 100 MG/1
100 TABLET, EXTENDED RELEASE ORAL DAILY
Qty: 90 TABLET | Refills: 3 | Status: SHIPPED | OUTPATIENT
Start: 2023-09-18 | End: 2023-09-26 | Stop reason: SDUPTHER

## 2023-09-18 RX ORDER — BUMETANIDE 2 MG/1
2 TABLET ORAL DAILY
Qty: 90 TABLET | Refills: 3 | Status: SHIPPED | OUTPATIENT
Start: 2023-09-18 | End: 2023-09-26 | Stop reason: SDUPTHER

## 2023-09-18 RX ORDER — ATORVASTATIN CALCIUM 80 MG/1
80 TABLET, FILM COATED ORAL DAILY
Qty: 90 TABLET | Refills: 1 | Status: SHIPPED | OUTPATIENT
Start: 2023-09-18 | End: 2023-09-26 | Stop reason: SDUPTHER

## 2023-09-18 RX ORDER — GLIMEPIRIDE 4 MG/1
4 TABLET ORAL
COMMUNITY
End: 2023-09-18 | Stop reason: SDUPTHER

## 2023-09-18 RX ORDER — ALLOPURINOL 300 MG/1
300 TABLET ORAL DAILY
Qty: 90 TABLET | Refills: 1 | Status: SHIPPED | OUTPATIENT
Start: 2023-09-18 | End: 2023-09-26 | Stop reason: SDUPTHER

## 2023-09-18 RX ORDER — DESOXIMETASONE 0.5 MG/G
CREAM TOPICAL 2 TIMES DAILY
Qty: 15 G | Refills: 3 | Status: SHIPPED | OUTPATIENT
Start: 2023-09-18 | End: 2023-09-26 | Stop reason: SDUPTHER

## 2023-09-18 RX ORDER — GLIMEPIRIDE 4 MG/1
4 TABLET ORAL 2 TIMES DAILY
Qty: 180 TABLET | Refills: 1 | Status: SHIPPED | OUTPATIENT
Start: 2023-09-18 | End: 2023-09-26 | Stop reason: SDUPTHER

## 2023-09-18 ASSESSMENT — PATIENT HEALTH QUESTIONNAIRE - PHQ9
SUM OF ALL RESPONSES TO PHQ9 QUESTIONS 1 AND 2: 0
CLINICAL INTERPRETATION OF PHQ2 SCORE: NO FURTHER SCREENING NEEDED
1. LITTLE INTEREST OR PLEASURE IN DOING THINGS: NOT AT ALL
SUM OF ALL RESPONSES TO PHQ9 QUESTIONS 1 AND 2: 0
2. FEELING DOWN, DEPRESSED OR HOPELESS: NOT AT ALL

## 2023-09-19 ENCOUNTER — TELEPHONE (OUTPATIENT)
Dept: FAMILY MEDICINE | Age: 68
End: 2023-09-19

## 2023-09-19 LAB — HCV AB SER QL: NEGATIVE

## 2023-09-20 PROBLEM — I42.0 ISCHEMIC CONGESTIVE CARDIOMYOPATHY (CMD): Status: ACTIVE | Noted: 2020-12-08

## 2023-09-20 PROBLEM — I25.5 ISCHEMIC CONGESTIVE CARDIOMYOPATHY (CMD): Status: ACTIVE | Noted: 2020-12-08

## 2023-09-20 PROBLEM — M48.061 SPINAL STENOSIS OF LUMBAR REGION: Status: ACTIVE | Noted: 2018-12-21

## 2023-09-26 ENCOUNTER — TELEPHONE (OUTPATIENT)
Dept: INFUSION THERAPY | Age: 68
End: 2023-09-26

## 2023-09-26 RX ORDER — HYDROCODONE BITARTRATE AND ACETAMINOPHEN 10; 325 MG/1; MG/1
TABLET ORAL
Status: CANCELLED | OUTPATIENT
Start: 2023-09-26

## 2023-09-27 ENCOUNTER — TELEPHONE (OUTPATIENT)
Dept: FAMILY MEDICINE | Age: 68
End: 2023-09-27

## 2023-09-27 RX ORDER — GLIMEPIRIDE 1 MG/1
1 TABLET ORAL
Qty: 90 TABLET | Refills: 1 | Status: SHIPPED | OUTPATIENT
Start: 2023-09-27 | End: 2023-09-27 | Stop reason: ALTCHOICE

## 2023-09-27 RX ORDER — SPIRONOLACTONE 25 MG/1
25 TABLET ORAL DAILY
Qty: 90 TABLET | Refills: 1 | Status: SHIPPED | OUTPATIENT
Start: 2023-09-27

## 2023-09-27 RX ORDER — PAROXETINE HYDROCHLORIDE 20 MG/1
20 TABLET, FILM COATED ORAL EVERY MORNING
Qty: 90 TABLET | Refills: 1 | Status: SHIPPED | OUTPATIENT
Start: 2023-09-27

## 2023-09-27 RX ORDER — ATORVASTATIN CALCIUM 80 MG/1
80 TABLET, FILM COATED ORAL DAILY
Qty: 90 TABLET | Refills: 1 | Status: SHIPPED | OUTPATIENT
Start: 2023-09-27

## 2023-09-27 RX ORDER — METOPROLOL SUCCINATE 100 MG/1
100 TABLET, EXTENDED RELEASE ORAL DAILY
Qty: 90 TABLET | Refills: 1 | Status: SHIPPED | OUTPATIENT
Start: 2023-09-27

## 2023-09-27 RX ORDER — DESOXIMETASONE 0.5 MG/G
CREAM TOPICAL 2 TIMES DAILY
Qty: 15 G | Refills: 3 | Status: SHIPPED | OUTPATIENT
Start: 2023-09-27

## 2023-09-27 RX ORDER — ALLOPURINOL 300 MG/1
300 TABLET ORAL DAILY
Qty: 90 TABLET | Refills: 1 | Status: SHIPPED | OUTPATIENT
Start: 2023-09-27

## 2023-09-27 RX ORDER — CLOPIDOGREL BISULFATE 75 MG/1
75 TABLET ORAL DAILY
Qty: 90 TABLET | Refills: 1 | Status: SHIPPED | OUTPATIENT
Start: 2023-09-27 | End: 2023-10-04 | Stop reason: ALTCHOICE

## 2023-09-27 RX ORDER — GLIMEPIRIDE 4 MG/1
4 TABLET ORAL 2 TIMES DAILY
Qty: 180 TABLET | Refills: 1 | Status: SHIPPED | OUTPATIENT
Start: 2023-09-27 | End: 2023-10-04 | Stop reason: SDUPTHER

## 2023-09-27 RX ORDER — QUETIAPINE FUMARATE 50 MG/1
50 TABLET, FILM COATED ORAL DAILY
Qty: 90 TABLET | Refills: 1 | Status: SHIPPED | OUTPATIENT
Start: 2023-09-27

## 2023-09-27 RX ORDER — EZETIMIBE 10 MG/1
10 TABLET ORAL DAILY
Qty: 90 TABLET | Refills: 1 | Status: SHIPPED | OUTPATIENT
Start: 2023-09-27

## 2023-09-27 RX ORDER — SACUBITRIL AND VALSARTAN 24; 26 MG/1; MG/1
1 TABLET, FILM COATED ORAL 2 TIMES DAILY
Qty: 180 TABLET | Refills: 1 | Status: SHIPPED | OUTPATIENT
Start: 2023-09-27

## 2023-09-27 RX ORDER — HYDROCODONE BITARTRATE AND ACETAMINOPHEN 10; 325 MG/1; MG/1
TABLET ORAL
Status: CANCELLED | OUTPATIENT
Start: 2023-09-27

## 2023-09-27 RX ORDER — ASPIRIN 81 MG/1
81 TABLET ORAL DAILY
Qty: 90 TABLET | Refills: 1 | Status: SHIPPED | OUTPATIENT
Start: 2023-09-27 | End: 2023-10-04 | Stop reason: ALTCHOICE

## 2023-09-27 RX ORDER — BUMETANIDE 2 MG/1
2 TABLET ORAL DAILY
Qty: 90 TABLET | Refills: 1 | Status: SHIPPED | OUTPATIENT
Start: 2023-09-27 | End: 2023-10-27

## 2023-09-28 ENCOUNTER — TELEPHONE (OUTPATIENT)
Dept: FAMILY MEDICINE | Age: 68
End: 2023-09-28

## 2023-09-28 ENCOUNTER — TELEPHONE (OUTPATIENT)
Dept: OTHER | Age: 68
End: 2023-09-28

## 2023-09-29 ENCOUNTER — TELEPHONE (OUTPATIENT)
Dept: SURGERY | Age: 68
End: 2023-09-29

## 2023-10-03 ENCOUNTER — TELEPHONE (OUTPATIENT)
Dept: FAMILY MEDICINE | Age: 68
End: 2023-10-03

## 2023-10-04 ENCOUNTER — TELEPHONE (OUTPATIENT)
Dept: FAMILY MEDICINE | Age: 68
End: 2023-10-04

## 2023-10-04 RX ORDER — GLIMEPIRIDE 4 MG/1
4 TABLET ORAL
Qty: 180 TABLET | Refills: 1 | Status: SHIPPED | COMMUNITY
Start: 2023-10-04

## 2023-10-10 ENCOUNTER — EXTERNAL RECORD (OUTPATIENT)
Dept: OTHER | Age: 68
End: 2023-10-10

## 2023-10-10 ENCOUNTER — NURSE TRIAGE (OUTPATIENT)
Dept: FAMILY MEDICINE | Age: 68
End: 2023-10-10

## 2023-10-10 ENCOUNTER — EXTERNAL RECORD (OUTPATIENT)
Dept: NEPHROLOGY | Age: 68
End: 2023-10-10

## 2023-10-10 PROCEDURE — 99222 1ST HOSP IP/OBS MODERATE 55: CPT | Performed by: INTERNAL MEDICINE

## 2023-10-10 PROCEDURE — 99223 1ST HOSP IP/OBS HIGH 75: CPT | Performed by: HOSPITALIST

## 2023-10-10 PROCEDURE — 99223 1ST HOSP IP/OBS HIGH 75: CPT | Performed by: INTERNAL MEDICINE

## 2023-10-10 PROCEDURE — 93010 ELECTROCARDIOGRAM REPORT: CPT | Performed by: INTERNAL MEDICINE

## 2023-10-11 ENCOUNTER — IMAGING SERVICES (OUTPATIENT)
Dept: OTHER | Age: 68
End: 2023-10-11

## 2023-10-11 ENCOUNTER — EXTERNAL RECORD (OUTPATIENT)
Dept: HEALTH INFORMATION MANAGEMENT | Facility: OTHER | Age: 68
End: 2023-10-11

## 2023-10-11 PROCEDURE — 99232 SBSQ HOSP IP/OBS MODERATE 35: CPT | Performed by: INTERNAL MEDICINE

## 2023-10-11 PROCEDURE — 99233 SBSQ HOSP IP/OBS HIGH 50: CPT | Performed by: HOSPITALIST

## 2023-10-11 PROCEDURE — 99233 SBSQ HOSP IP/OBS HIGH 50: CPT | Performed by: INTERNAL MEDICINE

## 2023-10-11 PROCEDURE — 93308 TTE F-UP OR LMTD: CPT | Performed by: INTERNAL MEDICINE

## 2023-10-12 ENCOUNTER — EXTERNAL LAB (OUTPATIENT)
Dept: OTHER | Age: 68
End: 2023-10-12

## 2023-10-12 LAB — LAB RESULT: NORMAL

## 2023-10-12 PROCEDURE — 99233 SBSQ HOSP IP/OBS HIGH 50: CPT | Performed by: HOSPITALIST

## 2023-10-12 PROCEDURE — 99232 SBSQ HOSP IP/OBS MODERATE 35: CPT | Performed by: INTERNAL MEDICINE

## 2023-10-13 ENCOUNTER — IMAGING SERVICES (OUTPATIENT)
Dept: OTHER | Age: 68
End: 2023-10-13

## 2023-10-13 PROCEDURE — 99233 SBSQ HOSP IP/OBS HIGH 50: CPT | Performed by: HOSPITALIST

## 2023-10-13 PROCEDURE — 93010 ELECTROCARDIOGRAM REPORT: CPT | Performed by: INTERNAL MEDICINE

## 2023-10-13 PROCEDURE — 99232 SBSQ HOSP IP/OBS MODERATE 35: CPT | Performed by: INTERNAL MEDICINE

## 2023-10-14 ENCOUNTER — OFF PREMISE (OUTPATIENT)
Dept: NEPHROLOGY | Age: 68
End: 2023-10-14

## 2023-10-14 DIAGNOSIS — I50.22 CHRONIC SYSTOLIC HEART FAILURE (CMD): ICD-10-CM

## 2023-10-14 DIAGNOSIS — D69.6 THROMBOCYTOPENIA (CMD): ICD-10-CM

## 2023-10-14 DIAGNOSIS — D63.1 ANEMIA OF CHRONIC RENAL FAILURE, UNSPECIFIED CKD STAGE: ICD-10-CM

## 2023-10-14 DIAGNOSIS — N17.9 AKI (ACUTE KIDNEY INJURY) (CMD): Primary | ICD-10-CM

## 2023-10-14 DIAGNOSIS — N18.9 ANEMIA OF CHRONIC RENAL FAILURE, UNSPECIFIED CKD STAGE: ICD-10-CM

## 2023-10-14 DIAGNOSIS — N18.31 STAGE 3A CHRONIC KIDNEY DISEASE (CMD): ICD-10-CM

## 2023-10-14 PROCEDURE — 99232 SBSQ HOSP IP/OBS MODERATE 35: CPT | Performed by: INTERNAL MEDICINE

## 2023-10-14 PROCEDURE — 99233 SBSQ HOSP IP/OBS HIGH 50: CPT | Performed by: HOSPITALIST

## 2023-10-15 ENCOUNTER — IMAGING SERVICES (OUTPATIENT)
Dept: OTHER | Age: 68
End: 2023-10-15

## 2023-10-15 ENCOUNTER — OFF PREMISE (OUTPATIENT)
Dept: NEPHROLOGY | Age: 68
End: 2023-10-15

## 2023-10-15 DIAGNOSIS — N17.9 AKI (ACUTE KIDNEY INJURY) (CMD): Primary | ICD-10-CM

## 2023-10-15 DIAGNOSIS — I50.22 CHRONIC SYSTOLIC HEART FAILURE (CMD): ICD-10-CM

## 2023-10-15 DIAGNOSIS — I10 BENIGN ESSENTIAL HTN: ICD-10-CM

## 2023-10-15 DIAGNOSIS — N18.31 STAGE 3A CHRONIC KIDNEY DISEASE (CMD): ICD-10-CM

## 2023-10-15 DIAGNOSIS — D63.1 ANEMIA OF CHRONIC RENAL FAILURE, STAGE 3A (CMD): ICD-10-CM

## 2023-10-15 DIAGNOSIS — D69.6 THROMBOCYTOPENIA (CMD): ICD-10-CM

## 2023-10-15 DIAGNOSIS — N18.31 ANEMIA OF CHRONIC RENAL FAILURE, STAGE 3A (CMD): ICD-10-CM

## 2023-10-15 PROCEDURE — 99232 SBSQ HOSP IP/OBS MODERATE 35: CPT | Performed by: INTERNAL MEDICINE

## 2023-10-15 PROCEDURE — 99233 SBSQ HOSP IP/OBS HIGH 50: CPT | Performed by: HOSPITALIST

## 2023-10-16 ENCOUNTER — OFF PREMISE (OUTPATIENT)
Dept: NEPHROLOGY | Age: 68
End: 2023-10-16

## 2023-10-16 DIAGNOSIS — N17.9 AKI (ACUTE KIDNEY INJURY) (CMD): Primary | ICD-10-CM

## 2023-10-16 PROCEDURE — 99233 SBSQ HOSP IP/OBS HIGH 50: CPT | Performed by: HOSPITALIST

## 2023-10-16 PROCEDURE — 99231 SBSQ HOSP IP/OBS SF/LOW 25: CPT | Performed by: INTERNAL MEDICINE

## 2023-10-17 ENCOUNTER — OFF PREMISE (OUTPATIENT)
Dept: NEPHROLOGY | Age: 68
End: 2023-10-17

## 2023-10-17 DIAGNOSIS — I10 BENIGN ESSENTIAL HTN: ICD-10-CM

## 2023-10-17 DIAGNOSIS — N17.9 AKI (ACUTE KIDNEY INJURY) (CMD): Primary | ICD-10-CM

## 2023-10-17 PROCEDURE — 99231 SBSQ HOSP IP/OBS SF/LOW 25: CPT | Performed by: INTERNAL MEDICINE

## 2023-10-17 PROCEDURE — 99232 SBSQ HOSP IP/OBS MODERATE 35: CPT | Performed by: HOSPITALIST

## 2023-10-18 ENCOUNTER — OFF PREMISE (OUTPATIENT)
Dept: NEPHROLOGY | Age: 68
End: 2023-10-18

## 2023-10-18 DIAGNOSIS — N17.9 AKI (ACUTE KIDNEY INJURY) (CMD): Primary | ICD-10-CM

## 2023-10-18 DIAGNOSIS — I10 BENIGN ESSENTIAL HTN: ICD-10-CM

## 2023-10-18 PROCEDURE — 99231 SBSQ HOSP IP/OBS SF/LOW 25: CPT | Performed by: INTERNAL MEDICINE

## 2023-10-18 PROCEDURE — 99232 SBSQ HOSP IP/OBS MODERATE 35: CPT | Performed by: HOSPITALIST

## 2023-10-19 PROCEDURE — 99239 HOSP IP/OBS DSCHRG MGMT >30: CPT | Performed by: HOSPITALIST

## 2023-10-19 PROCEDURE — 99231 SBSQ HOSP IP/OBS SF/LOW 25: CPT | Performed by: INTERNAL MEDICINE

## 2023-10-20 ENCOUNTER — EXTERNAL RECORD (OUTPATIENT)
Dept: OTHER | Age: 68
End: 2023-10-20

## 2023-10-20 ENCOUNTER — TELEPHONE (OUTPATIENT)
Dept: HEMATOLOGY/ONCOLOGY | Age: 68
End: 2023-10-20

## 2023-10-27 DIAGNOSIS — I50.9 CONGESTIVE HEART FAILURE, UNSPECIFIED HF CHRONICITY, UNSPECIFIED HEART FAILURE TYPE (CMD): Primary | ICD-10-CM

## 2023-10-27 RX ORDER — BUMETANIDE 2 MG/1
2 TABLET ORAL DAILY
Qty: 30 TABLET | Refills: 0 | Status: SHIPPED | OUTPATIENT
Start: 2023-10-27

## 2023-11-17 ENCOUNTER — APPOINTMENT (OUTPATIENT)
Dept: CARDIOLOGY | Age: 68
End: 2023-11-17

## 2023-11-17 ENCOUNTER — OFFICE VISIT (OUTPATIENT)
Dept: INFUSION THERAPY | Age: 68
End: 2023-11-17
Attending: INTERNAL MEDICINE

## 2023-11-17 VITALS
HEIGHT: 70 IN | RESPIRATION RATE: 18 BRPM | HEART RATE: 95 BPM | SYSTOLIC BLOOD PRESSURE: 126 MMHG | TEMPERATURE: 97.4 F | WEIGHT: 201.94 LBS | BODY MASS INDEX: 28.91 KG/M2 | DIASTOLIC BLOOD PRESSURE: 76 MMHG | OXYGEN SATURATION: 96 %

## 2023-11-17 DIAGNOSIS — C18.7 MALIGNANT NEOPLASM OF SIGMOID COLON (CMD): Primary | ICD-10-CM

## 2023-11-17 LAB — VIA MED ONC PATHWAYS TAG: NORMAL

## 2023-11-17 PROCEDURE — 3078F DIAST BP <80 MM HG: CPT | Performed by: INTERNAL MEDICINE

## 2023-11-17 PROCEDURE — 3074F SYST BP LT 130 MM HG: CPT | Performed by: INTERNAL MEDICINE

## 2023-11-17 PROCEDURE — 99205 OFFICE O/P NEW HI 60 MIN: CPT | Performed by: INTERNAL MEDICINE

## 2023-11-17 ASSESSMENT — PAIN SCALES - GENERAL: PAINLEVEL: 0

## 2023-11-17 ASSESSMENT — PATIENT HEALTH QUESTIONNAIRE - PHQ9
2. FEELING DOWN, DEPRESSED OR HOPELESS: NOT AT ALL
SUM OF ALL RESPONSES TO PHQ9 QUESTIONS 1 AND 2: 0
CLINICAL INTERPRETATION OF PHQ2 SCORE: NO FURTHER SCREENING NEEDED
SUM OF ALL RESPONSES TO PHQ9 QUESTIONS 1 AND 2: 0
1. LITTLE INTEREST OR PLEASURE IN DOING THINGS: NOT AT ALL

## 2023-11-17 ASSESSMENT — ENCOUNTER SYMPTOMS
GASTROINTESTINAL NEGATIVE: 1
HEMATOLOGIC/LYMPHATIC NEGATIVE: 1
CONSTITUTIONAL NEGATIVE: 1
NEUROLOGICAL NEGATIVE: 1
SHORTNESS OF BREATH: 1

## 2023-11-20 ENCOUNTER — TELEPHONE (OUTPATIENT)
Dept: CT IMAGING | Age: 68
End: 2023-11-20

## 2023-11-20 ENCOUNTER — TELEPHONE (OUTPATIENT)
Dept: FAMILY MEDICINE | Age: 68
End: 2023-11-20

## 2023-11-21 RX ORDER — SPIRONOLACTONE 25 MG/1
25 TABLET ORAL DAILY
Qty: 90 TABLET | Refills: 1 | OUTPATIENT
Start: 2023-11-21

## 2023-12-07 ENCOUNTER — TELEPHONE (OUTPATIENT)
Dept: CARDIOLOGY | Age: 68
End: 2023-12-07

## 2023-12-07 RX ORDER — BUMETANIDE 2 MG/1
2 TABLET ORAL DAILY
Qty: 30 TABLET | Refills: 5 | Status: SHIPPED | OUTPATIENT
Start: 2023-12-07

## 2024-01-01 ENCOUNTER — APPOINTMENT (OUTPATIENT)
Dept: GENERAL RADIOLOGY | Facility: HOSPITAL | Age: 69
DRG: 871 | End: 2024-01-01
Attending: INTERNAL MEDICINE
Payer: MEDICARE

## 2024-01-01 ENCOUNTER — APPOINTMENT (OUTPATIENT)
Dept: GENERAL RADIOLOGY | Facility: HOSPITAL | Age: 69
End: 2024-01-01
Attending: INTERNAL MEDICINE
Payer: MEDICARE

## 2024-01-01 ENCOUNTER — APPOINTMENT (OUTPATIENT)
Dept: GENERAL RADIOLOGY | Facility: HOSPITAL | Age: 69
End: 2024-01-01
Attending: EMERGENCY MEDICINE
Payer: MEDICARE

## 2024-01-01 ENCOUNTER — APPOINTMENT (OUTPATIENT)
Dept: CT IMAGING | Facility: HOSPITAL | Age: 69
DRG: 871 | End: 2024-01-01
Attending: EMERGENCY MEDICINE
Payer: MEDICARE

## 2024-01-01 ENCOUNTER — APPOINTMENT (OUTPATIENT)
Dept: GENERAL RADIOLOGY | Facility: HOSPITAL | Age: 69
DRG: 871 | End: 2024-01-01
Attending: EMERGENCY MEDICINE
Payer: MEDICARE

## 2024-01-01 ENCOUNTER — APPOINTMENT (OUTPATIENT)
Dept: GENERAL RADIOLOGY | Facility: HOSPITAL | Age: 69
End: 2024-01-01
Attending: NURSE PRACTITIONER
Payer: MEDICARE

## 2024-01-01 ENCOUNTER — APPOINTMENT (OUTPATIENT)
Dept: GENERAL RADIOLOGY | Facility: HOSPITAL | Age: 69
End: 2024-01-01
Attending: HOSPITALIST
Payer: MEDICARE

## 2024-01-01 ENCOUNTER — APPOINTMENT (OUTPATIENT)
Dept: CV DIAGNOSTICS | Facility: HOSPITAL | Age: 69
DRG: 871 | End: 2024-01-01
Attending: INTERNAL MEDICINE
Payer: MEDICARE

## 2024-01-01 ENCOUNTER — APPOINTMENT (OUTPATIENT)
Dept: GENERAL RADIOLOGY | Facility: HOSPITAL | Age: 69
End: 2024-01-01
Attending: SPECIALIST
Payer: MEDICARE

## 2024-01-01 ENCOUNTER — APPOINTMENT (OUTPATIENT)
Dept: GENERAL RADIOLOGY | Facility: HOSPITAL | Age: 69
DRG: 871 | End: 2024-01-01
Payer: MEDICARE

## 2024-01-01 ENCOUNTER — HOSPITAL ENCOUNTER (INPATIENT)
Facility: HOSPITAL | Age: 69
LOS: 5 days | DRG: 871 | End: 2024-01-01
Attending: EMERGENCY MEDICINE | Admitting: HOSPITALIST
Payer: MEDICARE

## 2024-01-01 ENCOUNTER — APPOINTMENT (OUTPATIENT)
Dept: CT IMAGING | Facility: HOSPITAL | Age: 69
End: 2024-01-01
Attending: EMERGENCY MEDICINE
Payer: MEDICARE

## 2024-01-01 ENCOUNTER — TELEPHONE (OUTPATIENT)
Dept: ENDOCRINOLOGY CLINIC | Facility: CLINIC | Age: 69
End: 2024-01-01

## 2024-01-01 ENCOUNTER — APPOINTMENT (OUTPATIENT)
Dept: GENERAL RADIOLOGY | Facility: HOSPITAL | Age: 69
DRG: 871 | End: 2024-01-01
Attending: SPECIALIST
Payer: MEDICARE

## 2024-01-01 ENCOUNTER — APPOINTMENT (OUTPATIENT)
Dept: GENERAL RADIOLOGY | Facility: HOSPITAL | Age: 69
End: 2024-01-01
Payer: MEDICARE

## 2024-01-01 ENCOUNTER — APPOINTMENT (OUTPATIENT)
Dept: GENERAL RADIOLOGY | Facility: HOSPITAL | Age: 69
DRG: 871 | End: 2024-01-01
Attending: HOSPITALIST
Payer: MEDICARE

## 2024-01-01 ENCOUNTER — HOSPITAL ENCOUNTER (INPATIENT)
Facility: HOSPITAL | Age: 69
LOS: 5 days | End: 2024-01-01
Attending: EMERGENCY MEDICINE | Admitting: HOSPITALIST
Payer: MEDICARE

## 2024-01-01 ENCOUNTER — APPOINTMENT (OUTPATIENT)
Dept: CV DIAGNOSTICS | Facility: HOSPITAL | Age: 69
End: 2024-01-01
Attending: INTERNAL MEDICINE
Payer: MEDICARE

## 2024-01-01 ENCOUNTER — APPOINTMENT (OUTPATIENT)
Dept: GENERAL RADIOLOGY | Facility: HOSPITAL | Age: 69
DRG: 871 | End: 2024-01-01
Attending: NURSE PRACTITIONER
Payer: MEDICARE

## 2024-01-01 VITALS
TEMPERATURE: 107 F | HEIGHT: 69 IN | OXYGEN SATURATION: 90 % | WEIGHT: 212.38 LBS | DIASTOLIC BLOOD PRESSURE: 36 MMHG | HEART RATE: 124 BPM | BODY MASS INDEX: 31.45 KG/M2 | RESPIRATION RATE: 14 BRPM | SYSTOLIC BLOOD PRESSURE: 51 MMHG

## 2024-01-01 DIAGNOSIS — K66.8 PNEUMOPERITONEUM: ICD-10-CM

## 2024-01-01 DIAGNOSIS — I26.99 BILATERAL PULMONARY EMBOLISM (HCC): ICD-10-CM

## 2024-01-01 DIAGNOSIS — I51.3 RV (RIGHT VENTRICULAR) MURAL THROMBUS: ICD-10-CM

## 2024-01-01 DIAGNOSIS — I95.9 HYPOTENSION, UNSPECIFIED HYPOTENSION TYPE: Primary | ICD-10-CM

## 2024-01-01 LAB
ALBUMIN SERPL-MCNC: 2.1 G/DL (ref 3.2–4.8)
ALBUMIN SERPL-MCNC: 2.2 G/DL (ref 3.2–4.8)
ALBUMIN SERPL-MCNC: 2.3 G/DL (ref 3.2–4.8)
ALBUMIN SERPL-MCNC: 2.3 G/DL (ref 3.2–4.8)
ALBUMIN SERPL-MCNC: 2.7 G/DL (ref 3.2–4.8)
ALBUMIN SERPL-MCNC: 2.8 G/DL (ref 3.2–4.8)
ALBUMIN SERPL-MCNC: 2.8 G/DL (ref 3.2–4.8)
ALBUMIN/GLOB SERPL: 1 {RATIO} (ref 1–2)
ALBUMIN/GLOB SERPL: 1.3 {RATIO} (ref 1–2)
ALBUMIN/GLOB SERPL: 1.3 {RATIO} (ref 1–2)
ALP LIVER SERPL-CCNC: 111 U/L
ALP LIVER SERPL-CCNC: 111 U/L
ALP LIVER SERPL-CCNC: 116 U/L
ALP LIVER SERPL-CCNC: 123 U/L
ALP LIVER SERPL-CCNC: 128 U/L
ALP LIVER SERPL-CCNC: 92 U/L
ALP LIVER SERPL-CCNC: 99 U/L
ALT SERPL-CCNC: 24 U/L
ALT SERPL-CCNC: 28 U/L
ALT SERPL-CCNC: 35 U/L
ALT SERPL-CCNC: 35 U/L
ALT SERPL-CCNC: 36 U/L
ALT SERPL-CCNC: 42 U/L
ALT SERPL-CCNC: 45 U/L
AMYLASE SERPL-CCNC: 155 U/L (ref 30–118)
ANION GAP SERPL CALC-SCNC: 13 MMOL/L (ref 0–18)
ANION GAP SERPL CALC-SCNC: 16 MMOL/L (ref 0–18)
ANION GAP SERPL CALC-SCNC: 8 MMOL/L (ref 0–18)
ANTIBODY SCREEN: NEGATIVE
APTT PPP: 100.6 SECONDS (ref 23.3–35.6)
APTT PPP: 100.8 SECONDS (ref 23.3–35.6)
APTT PPP: 106.4 SECONDS (ref 23.3–35.6)
APTT PPP: 106.6 SECONDS (ref 23.3–35.6)
APTT PPP: 30.7 SECONDS (ref 23.3–35.6)
APTT PPP: 68 SECONDS (ref 23.3–35.6)
APTT PPP: 70.4 SECONDS (ref 23.3–35.6)
APTT PPP: 78 SECONDS (ref 23.3–35.6)
APTT PPP: 84.6 SECONDS (ref 23.3–35.6)
APTT PPP: 85 SECONDS (ref 23.3–35.6)
APTT PPP: 88.8 SECONDS (ref 23.3–35.6)
APTT PPP: 90.2 SECONDS (ref 23.3–35.6)
APTT PPP: 92.4 SECONDS (ref 23.3–35.6)
APTT PPP: 92.9 SECONDS (ref 23.3–35.6)
APTT PPP: 93.1 SECONDS (ref 23.3–35.6)
APTT PPP: 95.2 SECONDS (ref 23.3–35.6)
AST SERPL-CCNC: 34 U/L (ref ?–34)
AST SERPL-CCNC: 34 U/L (ref ?–34)
AST SERPL-CCNC: 36 U/L (ref ?–34)
AST SERPL-CCNC: 40 U/L (ref ?–34)
AST SERPL-CCNC: 43 U/L (ref ?–34)
AST SERPL-CCNC: 45 U/L (ref ?–34)
AST SERPL-CCNC: 47 U/L (ref ?–34)
ATRIAL RATE: 93 BPM
BASE EXCESS BLD CALC-SCNC: -10.9 MMOL/L (ref ?–2)
BASE EXCESS BLD CALC-SCNC: -6.8 MMOL/L (ref ?–2)
BASE EXCESS BLD CALC-SCNC: -7.1 MMOL/L (ref ?–2)
BASOPHILS # BLD AUTO: 0.02 X10(3) UL (ref 0–0.2)
BASOPHILS # BLD AUTO: 0.05 X10(3) UL (ref 0–0.2)
BASOPHILS # BLD AUTO: 0.06 X10(3) UL (ref 0–0.2)
BASOPHILS # BLD AUTO: 0.1 X10(3) UL (ref 0–0.2)
BASOPHILS # BLD: 0 X10(3) UL (ref 0–0.2)
BASOPHILS NFR BLD AUTO: 0.3 %
BASOPHILS NFR BLD AUTO: 0.4 %
BASOPHILS NFR BLD AUTO: 0.5 %
BASOPHILS NFR BLD AUTO: 0.5 %
BASOPHILS NFR BLD: 0 %
BILIRUB DIRECT SERPL-MCNC: 3.1 MG/DL (ref ?–0.3)
BILIRUB DIRECT SERPL-MCNC: 3.6 MG/DL (ref ?–0.3)
BILIRUB SERPL-MCNC: 4.3 MG/DL (ref 0.2–1.1)
BILIRUB SERPL-MCNC: 4.9 MG/DL (ref 0.2–1.1)
BILIRUB SERPL-MCNC: 5.1 MG/DL (ref 0.2–1.1)
BILIRUB SERPL-MCNC: 5.4 MG/DL (ref 0.2–1.1)
BILIRUB SERPL-MCNC: 5.5 MG/DL (ref 0.2–1.1)
BILIRUB SERPL-MCNC: 5.8 MG/DL (ref 0.2–1.1)
BILIRUB SERPL-MCNC: 6.4 MG/DL (ref 0.2–1.1)
BILIRUB UR QL: NEGATIVE
BUN BLD-MCNC: 41 MG/DL (ref 9–23)
BUN BLD-MCNC: 45 MG/DL (ref 9–23)
BUN BLD-MCNC: 51 MG/DL (ref 9–23)
BUN BLD-MCNC: 51 MG/DL (ref 9–23)
BUN BLD-MCNC: 52 MG/DL (ref 9–23)
BUN/CREAT SERPL: 21.4 (ref 10–20)
BUN/CREAT SERPL: 22 (ref 10–20)
BUN/CREAT SERPL: 22.9 (ref 10–20)
BUN/CREAT SERPL: 25 (ref 10–20)
BUN/CREAT SERPL: 26.3 (ref 10–20)
CALCIUM BLD-MCNC: 7.2 MG/DL (ref 8.7–10.4)
CALCIUM BLD-MCNC: 7.3 MG/DL (ref 8.7–10.4)
CALCIUM BLD-MCNC: 7.4 MG/DL (ref 8.7–10.4)
CALCIUM BLD-MCNC: 7.9 MG/DL (ref 8.7–10.4)
CALCIUM BLD-MCNC: 8 MG/DL (ref 8.7–10.4)
CHLORIDE SERPL-SCNC: 95 MMOL/L (ref 98–112)
CHLORIDE SERPL-SCNC: 96 MMOL/L (ref 98–112)
CHLORIDE SERPL-SCNC: 96 MMOL/L (ref 98–112)
CHLORIDE SERPL-SCNC: 98 MMOL/L (ref 98–112)
CHLORIDE SERPL-SCNC: 99 MMOL/L (ref 98–112)
CK SERPL-CCNC: 219 U/L
CLARITY UR: CLEAR
CO2 SERPL-SCNC: 16 MMOL/L (ref 21–32)
CO2 SERPL-SCNC: 22 MMOL/L (ref 21–32)
CO2 SERPL-SCNC: 23 MMOL/L (ref 21–32)
CO2 SERPL-SCNC: 27 MMOL/L (ref 21–32)
CO2 SERPL-SCNC: 28 MMOL/L (ref 21–32)
COLOR UR: YELLOW
CREAT BLD-MCNC: 1.86 MG/DL
CREAT BLD-MCNC: 1.94 MG/DL
CREAT BLD-MCNC: 2.04 MG/DL
CREAT BLD-MCNC: 2.1 MG/DL
CREAT BLD-MCNC: 2.27 MG/DL
CREAT UR-SCNC: 100.4 MG/DL
DEPRECATED RDW RBC AUTO: 56.8 FL (ref 35.1–46.3)
DEPRECATED RDW RBC AUTO: 56.8 FL (ref 35.1–46.3)
DEPRECATED RDW RBC AUTO: 56.9 FL (ref 35.1–46.3)
DEPRECATED RDW RBC AUTO: 57.3 FL (ref 35.1–46.3)
DEPRECATED RDW RBC AUTO: 57.3 FL (ref 35.1–46.3)
DEPRECATED RDW RBC AUTO: 57.5 FL (ref 35.1–46.3)
DEPRECATED RDW RBC AUTO: 57.8 FL (ref 35.1–46.3)
DEPRECATED RDW RBC AUTO: 58.2 FL (ref 35.1–46.3)
EGFRCR SERPLBLD CKD-EPI 2021: 31 ML/MIN/1.73M2 (ref 60–?)
EGFRCR SERPLBLD CKD-EPI 2021: 34 ML/MIN/1.73M2 (ref 60–?)
EGFRCR SERPLBLD CKD-EPI 2021: 35 ML/MIN/1.73M2 (ref 60–?)
EGFRCR SERPLBLD CKD-EPI 2021: 37 ML/MIN/1.73M2 (ref 60–?)
EGFRCR SERPLBLD CKD-EPI 2021: 39 ML/MIN/1.73M2 (ref 60–?)
EOSINOPHIL # BLD AUTO: 0 X10(3) UL (ref 0–0.7)
EOSINOPHIL # BLD AUTO: 0.06 X10(3) UL (ref 0–0.7)
EOSINOPHIL # BLD: 0 X10(3) UL (ref 0–0.7)
EOSINOPHIL NFR BLD AUTO: 0 %
EOSINOPHIL NFR BLD AUTO: 0.5 %
EOSINOPHIL NFR BLD: 0 %
ERYTHROCYTE [DISTWIDTH] IN BLOOD BY AUTOMATED COUNT: 23 % (ref 11–15)
ERYTHROCYTE [DISTWIDTH] IN BLOOD BY AUTOMATED COUNT: 23 % (ref 11–15)
ERYTHROCYTE [DISTWIDTH] IN BLOOD BY AUTOMATED COUNT: 23.2 % (ref 11–15)
ERYTHROCYTE [DISTWIDTH] IN BLOOD BY AUTOMATED COUNT: 23.2 % (ref 11–15)
ERYTHROCYTE [DISTWIDTH] IN BLOOD BY AUTOMATED COUNT: 23.3 % (ref 11–15)
ERYTHROCYTE [DISTWIDTH] IN BLOOD BY AUTOMATED COUNT: 24.1 % (ref 11–15)
GLOBULIN PLAS-MCNC: 2.1 G/DL (ref 2–3.5)
GLOBULIN PLAS-MCNC: 2.2 G/DL (ref 2.8–4.4)
GLOBULIN PLAS-MCNC: 2.3 G/DL (ref 2–3.5)
GLUCOSE BLD-MCNC: 180 MG/DL (ref 70–99)
GLUCOSE BLD-MCNC: 180 MG/DL (ref 70–99)
GLUCOSE BLD-MCNC: 217 MG/DL (ref 70–99)
GLUCOSE BLD-MCNC: 220 MG/DL (ref 70–99)
GLUCOSE BLD-MCNC: 242 MG/DL (ref 70–99)
GLUCOSE BLDC GLUCOMTR-MCNC: 125 MG/DL (ref 70–99)
GLUCOSE BLDC GLUCOMTR-MCNC: 153 MG/DL (ref 70–99)
GLUCOSE BLDC GLUCOMTR-MCNC: 159 MG/DL (ref 70–99)
GLUCOSE BLDC GLUCOMTR-MCNC: 160 MG/DL (ref 70–99)
GLUCOSE BLDC GLUCOMTR-MCNC: 163 MG/DL (ref 70–99)
GLUCOSE BLDC GLUCOMTR-MCNC: 177 MG/DL (ref 70–99)
GLUCOSE BLDC GLUCOMTR-MCNC: 186 MG/DL (ref 70–99)
GLUCOSE BLDC GLUCOMTR-MCNC: 196 MG/DL (ref 70–99)
GLUCOSE BLDC GLUCOMTR-MCNC: 210 MG/DL (ref 70–99)
GLUCOSE BLDC GLUCOMTR-MCNC: 224 MG/DL (ref 70–99)
GLUCOSE BLDC GLUCOMTR-MCNC: 225 MG/DL (ref 70–99)
GLUCOSE BLDC GLUCOMTR-MCNC: 234 MG/DL (ref 70–99)
GLUCOSE BLDC GLUCOMTR-MCNC: 237 MG/DL (ref 70–99)
GLUCOSE BLDC GLUCOMTR-MCNC: 252 MG/DL (ref 70–99)
GLUCOSE BLDC GLUCOMTR-MCNC: 253 MG/DL (ref 70–99)
GLUCOSE BLDC GLUCOMTR-MCNC: 258 MG/DL (ref 70–99)
GLUCOSE BLDC GLUCOMTR-MCNC: 268 MG/DL (ref 70–99)
GLUCOSE BLDC GLUCOMTR-MCNC: 271 MG/DL (ref 70–99)
GLUCOSE BLDC GLUCOMTR-MCNC: 319 MG/DL (ref 70–99)
GLUCOSE BLDC GLUCOMTR-MCNC: 95 MG/DL (ref 70–99)
GLUCOSE BLDC GLUCOMTR-MCNC: 97 MG/DL (ref 70–99)
GLUCOSE UR-MCNC: NORMAL MG/DL
HCO3 BLDA-SCNC: 16.2 MEQ/L (ref 21–27)
HCO3 BLDA-SCNC: 19.3 MEQ/L (ref 21–27)
HCO3 BLDA-SCNC: 19.3 MEQ/L (ref 21–27)
HCT VFR BLD AUTO: 38.3 %
HCT VFR BLD AUTO: 38.3 %
HCT VFR BLD AUTO: 38.8 %
HCT VFR BLD AUTO: 42.7 %
HCT VFR BLD AUTO: 42.7 %
HCT VFR BLD AUTO: 43.4 %
HCT VFR BLD AUTO: 47 %
HCT VFR BLD AUTO: 48.8 %
HELMET CELLS BLD QL SMEAR: PRESENT
HEPARIN AB PPP QL PL AGG: NEGATIVE
HGB BLD-MCNC: 13.2 G/DL
HGB BLD-MCNC: 13.2 G/DL
HGB BLD-MCNC: 13.3 G/DL
HGB BLD-MCNC: 14.8 G/DL
HGB BLD-MCNC: 14.8 G/DL
HGB BLD-MCNC: 14.9 G/DL
HGB BLD-MCNC: 16.5 G/DL
HGB BLD-MCNC: 16.6 G/DL
HGB UR QL STRIP.AUTO: NEGATIVE
IMM GRANULOCYTES # BLD AUTO: 0.04 X10(3) UL (ref 0–1)
IMM GRANULOCYTES # BLD AUTO: 0.1 X10(3) UL (ref 0–1)
IMM GRANULOCYTES # BLD AUTO: 0.1 X10(3) UL (ref 0–1)
IMM GRANULOCYTES # BLD AUTO: 0.44 X10(3) UL (ref 0–1)
IMM GRANULOCYTES NFR BLD: 0.5 %
IMM GRANULOCYTES NFR BLD: 0.8 %
IMM GRANULOCYTES NFR BLD: 1.1 %
IMM GRANULOCYTES NFR BLD: 2.1 %
INR BLD: 1.65 (ref 0.8–1.2)
INR BLD: 6.1 (ref 0.8–1.2)
INR BLD: 6.79 (ref 0.8–1.2)
INR BLD: 7.07 (ref 0.8–1.2)
INSPIRATION SETTING TIME VENT: 1 SEC (ref 0.1–5)
KETONES UR-MCNC: NEGATIVE MG/DL
LACTATE BLD-SCNC: 4 MMOL/L (ref 0.5–2)
LACTATE BLD-SCNC: 4.3 MMOL/L (ref 0.5–2)
LACTATE BLD-SCNC: 6.4 MMOL/L (ref 0.5–2)
LACTATE SERPL-SCNC: 4.3 MMOL/L (ref 0.5–2)
LACTATE SERPL-SCNC: 4.6 MMOL/L (ref 0.5–2)
LACTATE SERPL-SCNC: 4.8 MMOL/L (ref 0.5–2)
LEUKOCYTE ESTERASE UR QL STRIP.AUTO: NEGATIVE
LIPASE SERPL-CCNC: 40 U/L (ref 12–53)
LYMPHOCYTES # BLD AUTO: 0.47 X10(3) UL (ref 1–4)
LYMPHOCYTES # BLD AUTO: 0.48 X10(3) UL (ref 1–4)
LYMPHOCYTES # BLD AUTO: 0.63 X10(3) UL (ref 1–4)
LYMPHOCYTES # BLD AUTO: 0.79 X10(3) UL (ref 1–4)
LYMPHOCYTES NFR BLD AUTO: 12.8 %
LYMPHOCYTES NFR BLD AUTO: 3.1 %
LYMPHOCYTES NFR BLD AUTO: 3.6 %
LYMPHOCYTES NFR BLD AUTO: 3.8 %
LYMPHOCYTES NFR BLD: 0.37 X10(3) UL (ref 1–4)
LYMPHOCYTES NFR BLD: 2 %
MAGNESIUM SERPL-MCNC: 1.7 MG/DL (ref 1.6–2.6)
MAGNESIUM SERPL-MCNC: 1.7 MG/DL (ref 1.6–2.6)
MAGNESIUM SERPL-MCNC: 1.8 MG/DL (ref 1.6–2.6)
MAGNESIUM SERPL-MCNC: 2 MG/DL (ref 1.6–2.6)
MCH RBC QN AUTO: 24.6 PG (ref 26–34)
MCH RBC QN AUTO: 24.6 PG (ref 26–34)
MCH RBC QN AUTO: 24.7 PG (ref 26–34)
MCH RBC QN AUTO: 24.7 PG (ref 26–34)
MCH RBC QN AUTO: 24.9 PG (ref 26–34)
MCH RBC QN AUTO: 25 PG (ref 26–34)
MCH RBC QN AUTO: 25 PG (ref 26–34)
MCH RBC QN AUTO: 25.6 PG (ref 26–34)
MCHC RBC AUTO-ENTMCNC: 34 G/DL (ref 31–37)
MCHC RBC AUTO-ENTMCNC: 34.3 G/DL (ref 31–37)
MCHC RBC AUTO-ENTMCNC: 34.3 G/DL (ref 31–37)
MCHC RBC AUTO-ENTMCNC: 34.5 G/DL (ref 31–37)
MCHC RBC AUTO-ENTMCNC: 34.5 G/DL (ref 31–37)
MCHC RBC AUTO-ENTMCNC: 34.7 G/DL (ref 31–37)
MCHC RBC AUTO-ENTMCNC: 34.7 G/DL (ref 31–37)
MCHC RBC AUTO-ENTMCNC: 35.1 G/DL (ref 31–37)
MCV RBC AUTO: 71.6 FL
MCV RBC AUTO: 71.6 FL
MCV RBC AUTO: 71.7 FL
MCV RBC AUTO: 72.1 FL
MCV RBC AUTO: 72.1 FL
MCV RBC AUTO: 72.2 FL
MCV RBC AUTO: 72.6 FL
MCV RBC AUTO: 73 FL
MONOCYTES # BLD AUTO: 0.3 X10(3) UL (ref 0.1–1)
MONOCYTES # BLD AUTO: 0.87 X10(3) UL (ref 0.1–1)
MONOCYTES # BLD AUTO: 0.92 X10(3) UL (ref 0.1–1)
MONOCYTES # BLD AUTO: 1.05 X10(3) UL (ref 0.1–1)
MONOCYTES # BLD: 0 X10(3) UL (ref 0.1–1)
MONOCYTES NFR BLD AUTO: 4.2 %
MONOCYTES NFR BLD AUTO: 4.6 %
MONOCYTES NFR BLD AUTO: 7.9 %
MONOCYTES NFR BLD AUTO: 8.2 %
MONOCYTES NFR BLD: 0 %
NEUTROPHILS # BLD AUTO: 11.48 X10 (3) UL (ref 1.5–7.7)
NEUTROPHILS # BLD AUTO: 11.48 X10(3) UL (ref 1.5–7.7)
NEUTROPHILS # BLD AUTO: 16.72 X10 (3) UL (ref 1.5–7.7)
NEUTROPHILS # BLD AUTO: 18.3 X10 (3) UL (ref 1.5–7.7)
NEUTROPHILS # BLD AUTO: 18.3 X10(3) UL (ref 1.5–7.7)
NEUTROPHILS # BLD AUTO: 18.47 X10 (3) UL (ref 1.5–7.7)
NEUTROPHILS # BLD AUTO: 18.47 X10(3) UL (ref 1.5–7.7)
NEUTROPHILS # BLD AUTO: 2.83 X10 (3) UL (ref 1.5–7.7)
NEUTROPHILS # BLD AUTO: 2.83 X10(3) UL (ref 1.5–7.7)
NEUTROPHILS NFR BLD AUTO: 77.4 %
NEUTROPHILS NFR BLD AUTO: 86.8 %
NEUTROPHILS NFR BLD AUTO: 89.4 %
NEUTROPHILS NFR BLD AUTO: 91.5 %
NEUTROPHILS NFR BLD: 94 %
NEUTS BAND NFR BLD: 4 %
NEUTS HYPERSEG # BLD: 18.03 X10(3) UL (ref 1.5–7.7)
NITRITE UR QL STRIP.AUTO: NEGATIVE
O2 CT BLD-SCNC: 20.8 VOL% (ref 15–23)
O2 CT BLD-SCNC: 20.8 VOL% (ref 15–23)
O2 CT BLD-SCNC: 21.3 VOL% (ref 15–23)
O2/TOTAL GAS SETTING VFR VENT: 100 %
O2/TOTAL GAS SETTING VFR VENT: 70 %
OSMOLALITY SERPL CALC.SUM OF ELEC: 284 MOSM/KG (ref 275–295)
OSMOLALITY SERPL CALC.SUM OF ELEC: 285 MOSM/KG (ref 275–295)
OSMOLALITY SERPL CALC.SUM OF ELEC: 292 MOSM/KG (ref 275–295)
OSMOLALITY SERPL CALC.SUM OF ELEC: 294 MOSM/KG (ref 275–295)
OSMOLALITY SERPL CALC.SUM OF ELEC: 294 MOSM/KG (ref 275–295)
OXYGEN LITERS/MINUTE: 5 L/MIN
P AXIS: 82 DEGREES
P-R INTERVAL: 164 MS
PAW @ PEAK INSP FLOW SETTING VENT: 20 CM H2O (ref 1–60)
PAW @ PEAK INSP FLOW SETTING VENT: 20 CM H2O (ref 1–60)
PCO2 BLDA: 28 MM HG (ref 35–45)
PCO2 BLDA: 28 MM HG (ref 35–45)
PCO2 BLDA: 30 MM HG (ref 35–45)
PEEP SETTING VENT: 5 CM H2O
PEEP SETTING VENT: 5 CM H2O
PH BLDA: 7.3 [PH] (ref 7.35–7.45)
PH BLDA: 7.36 [PH] (ref 7.35–7.45)
PH BLDA: 7.38 [PH] (ref 7.35–7.45)
PH UR: 5 [PH] (ref 5–8)
PHOSPHATE SERPL-MCNC: 4.4 MG/DL (ref 2.4–5.1)
PHOSPHATE SERPL-MCNC: 4.6 MG/DL (ref 2.4–5.1)
PHOSPHATE SERPL-MCNC: 5.6 MG/DL (ref 2.4–5.1)
PHOSPHATE SERPL-MCNC: 6.1 MG/DL (ref 2.4–5.1)
PLATELET # BLD AUTO: 110 10(3)UL (ref 150–450)
PLATELET # BLD AUTO: 110 10(3)UL (ref 150–450)
PLATELET # BLD AUTO: 123 10(3)UL (ref 150–450)
PLATELET # BLD AUTO: 136 10(3)UL (ref 150–450)
PLATELET # BLD AUTO: 157 10(3)UL (ref 150–450)
PLATELET # BLD AUTO: 157 10(3)UL (ref 150–450)
PLATELET # BLD AUTO: 182 10(3)UL (ref 150–450)
PLATELET # BLD AUTO: 90 10(3)UL (ref 150–450)
PLATELET MORPHOLOGY: NORMAL
PLATELET MORPHOLOGY: NORMAL
PLATELETS.RETICULATED NFR BLD AUTO: 13 % (ref 0–7)
PLATELETS.RETICULATED NFR BLD AUTO: 13.6 % (ref 0–7)
PLATELETS.RETICULATED NFR BLD AUTO: 13.6 % (ref 0–7)
PLATELETS.RETICULATED NFR BLD AUTO: 14.4 % (ref 0–7)
PLATELETS.RETICULATED NFR BLD AUTO: 14.4 % (ref 0–7)
PLATELETS.RETICULATED NFR BLD AUTO: 14.9 % (ref 0–7)
PLATELETS.RETICULATED NFR BLD AUTO: 17.6 % (ref 0–7)
PO2 BLDA: 60 MM HG (ref 80–100)
PO2 BLDA: 67 MM HG (ref 80–100)
PO2 BLDA: 89 MM HG (ref 80–100)
POTASSIUM SERPL-SCNC: 3.2 MMOL/L (ref 3.5–5.1)
POTASSIUM SERPL-SCNC: 3.3 MMOL/L (ref 3.5–5.1)
POTASSIUM SERPL-SCNC: 3.6 MMOL/L (ref 3.5–5.1)
POTASSIUM SERPL-SCNC: 3.6 MMOL/L (ref 3.5–5.1)
POTASSIUM SERPL-SCNC: 4.2 MMOL/L (ref 3.5–5.1)
PROT SERPL-MCNC: 4.1 G/DL (ref 5.7–8.2)
PROT SERPL-MCNC: 4.2 G/DL (ref 5.7–8.2)
PROT SERPL-MCNC: 4.4 G/DL (ref 5.7–8.2)
PROT SERPL-MCNC: 4.6 G/DL (ref 5.7–8.2)
PROT SERPL-MCNC: 5 G/DL (ref 5.7–8.2)
PROT UR-MCNC: NEGATIVE MG/DL
PROTHROMBIN TIME: 20.5 SECONDS (ref 11.6–14.8)
PROTHROMBIN TIME: 58 SECONDS (ref 11.6–14.8)
PROTHROMBIN TIME: 63.1 SECONDS (ref 11.6–14.8)
PROTHROMBIN TIME: 65.2 SECONDS (ref 11.6–14.8)
PUNCTURE CHARGE: NO
Q-T INTERVAL: 454 MS
QRS DURATION: 118 MS
QTC CALCULATION (BEZET): 564 MS
R AXIS: -72 DEGREES
RBC # BLD AUTO: 5.35 X10(6)UL
RBC # BLD AUTO: 5.35 X10(6)UL
RBC # BLD AUTO: 5.41 X10(6)UL
RBC # BLD AUTO: 5.92 X10(6)UL
RBC # BLD AUTO: 5.92 X10(6)UL
RBC # BLD AUTO: 5.98 X10(6)UL
RBC # BLD AUTO: 6.44 X10(6)UL
RBC # BLD AUTO: 6.76 X10(6)UL
RESP RATE: 14 BPM
RESP RATE: 14 BPM
RH BLOOD TYPE: POSITIVE
SAO2 % BLDA: 91.8 % (ref 94–100)
SAO2 % BLDA: 93.1 % (ref 94–100)
SAO2 % BLDA: 98.2 % (ref 94–100)
SODIUM SERPL-SCNC: 12 MMOL/L
SODIUM SERPL-SCNC: 128 MMOL/L (ref 136–145)
SODIUM SERPL-SCNC: 130 MMOL/L (ref 136–145)
SODIUM SERPL-SCNC: 130 MMOL/L (ref 136–145)
SODIUM SERPL-SCNC: 132 MMOL/L (ref 136–145)
SODIUM SERPL-SCNC: 133 MMOL/L (ref 136–145)
SP GR UR STRIP: 1.01 (ref 1–1.03)
T AXIS: 94 DEGREES
TOTAL CELLS COUNTED BLD: 100
TRIGL SERPL-MCNC: 196 MG/DL (ref 30–149)
UROBILINOGEN UR STRIP-ACNC: 2
VANCOMYCIN TROUGH SERPL-MCNC: 14.6 UG/ML (ref 10–20)
VENTRICULAR RATE: 93 BPM
WBC # BLD AUTO: 13.2 X10(3) UL (ref 4–11)
WBC # BLD AUTO: 18.4 X10(3) UL (ref 4–11)
WBC # BLD AUTO: 19.9 X10(3) UL (ref 4–11)
WBC # BLD AUTO: 19.9 X10(3) UL (ref 4–11)
WBC # BLD AUTO: 20.8 X10(3) UL (ref 4–11)
WBC # BLD AUTO: 20.8 X10(3) UL (ref 4–11)
WBC # BLD AUTO: 3.7 X10(3) UL (ref 4–11)
WBC # BLD AUTO: 6.5 X10(3) UL (ref 4–11)

## 2024-01-01 PROCEDURE — 99233 SBSQ HOSP IP/OBS HIGH 50: CPT | Performed by: INTERNAL MEDICINE

## 2024-01-01 PROCEDURE — 02H633Z INSERTION OF INFUSION DEVICE INTO RIGHT ATRIUM, PERCUTANEOUS APPROACH: ICD-10-PCS | Performed by: INTERNAL MEDICINE

## 2024-01-01 PROCEDURE — 99222 1ST HOSP IP/OBS MODERATE 55: CPT | Performed by: NURSE PRACTITIONER

## 2024-01-01 PROCEDURE — 93306 TTE W/DOPPLER COMPLETE: CPT | Performed by: INTERNAL MEDICINE

## 2024-01-01 PROCEDURE — 71045 X-RAY EXAM CHEST 1 VIEW: CPT

## 2024-01-01 PROCEDURE — 99291 CRITICAL CARE FIRST HOUR: CPT | Performed by: INTERNAL MEDICINE

## 2024-01-01 PROCEDURE — 5A1945Z RESPIRATORY VENTILATION, 24-96 CONSECUTIVE HOURS: ICD-10-PCS | Performed by: INTERNAL MEDICINE

## 2024-01-01 PROCEDURE — 99231 SBSQ HOSP IP/OBS SF/LOW 25: CPT | Performed by: NURSE PRACTITIONER

## 2024-01-01 PROCEDURE — 74177 CT ABD & PELVIS W/CONTRAST: CPT | Performed by: EMERGENCY MEDICINE

## 2024-01-01 PROCEDURE — 71275 CT ANGIOGRAPHY CHEST: CPT | Performed by: EMERGENCY MEDICINE

## 2024-01-01 PROCEDURE — 71045 X-RAY EXAM CHEST 1 VIEW: CPT | Performed by: NURSE PRACTITIONER

## 2024-01-01 PROCEDURE — 99232 SBSQ HOSP IP/OBS MODERATE 35: CPT | Performed by: SURGERY

## 2024-01-01 PROCEDURE — 36410 VNPNXR 3YR/> PHY/QHP DX/THER: CPT | Performed by: NURSE PRACTITIONER

## 2024-01-01 PROCEDURE — 99222 1ST HOSP IP/OBS MODERATE 55: CPT | Performed by: INTERNAL MEDICINE

## 2024-01-01 PROCEDURE — 0BH17EZ INSERTION OF ENDOTRACHEAL AIRWAY INTO TRACHEA, VIA NATURAL OR ARTIFICIAL OPENING: ICD-10-PCS | Performed by: HOSPITALIST

## 2024-01-01 PROCEDURE — 99285 EMERGENCY DEPT VISIT HI MDM: CPT | Performed by: HOSPITALIST

## 2024-01-01 PROCEDURE — 71045 X-RAY EXAM CHEST 1 VIEW: CPT | Performed by: EMERGENCY MEDICINE

## 2024-01-01 PROCEDURE — 71045 X-RAY EXAM CHEST 1 VIEW: CPT | Performed by: INTERNAL MEDICINE

## 2024-01-01 PROCEDURE — 74175 CTA ABDOMEN W/CONTRAST: CPT | Performed by: EMERGENCY MEDICINE

## 2024-01-01 PROCEDURE — 32556 INSERT CATH PLEURA W/O IMAGE: CPT | Performed by: INTERNAL MEDICINE

## 2024-01-01 PROCEDURE — 99232 SBSQ HOSP IP/OBS MODERATE 35: CPT | Performed by: INTERNAL MEDICINE

## 2024-01-01 PROCEDURE — 99223 1ST HOSP IP/OBS HIGH 75: CPT | Performed by: INTERNAL MEDICINE

## 2024-01-01 PROCEDURE — 76937 US GUIDE VASCULAR ACCESS: CPT | Performed by: INTERNAL MEDICINE

## 2024-01-01 PROCEDURE — 3E0436Z INTRODUCTION OF NUTRITIONAL SUBSTANCE INTO CENTRAL VEIN, PERCUTANEOUS APPROACH: ICD-10-PCS | Performed by: INTERNAL MEDICINE

## 2024-01-01 PROCEDURE — 36556 INSERT NON-TUNNEL CV CATH: CPT | Performed by: INTERNAL MEDICINE

## 2024-01-01 PROCEDURE — 74018 RADEX ABDOMEN 1 VIEW: CPT | Performed by: SPECIALIST

## 2024-01-01 PROCEDURE — 71045 X-RAY EXAM CHEST 1 VIEW: CPT | Performed by: HOSPITALIST

## 2024-01-01 PROCEDURE — 31500 INSERT EMERGENCY AIRWAY: CPT | Performed by: HOSPITALIST

## 2024-01-01 PROCEDURE — 0W9930Z DRAINAGE OF RIGHT PLEURAL CAVITY WITH DRAINAGE DEVICE, PERCUTANEOUS APPROACH: ICD-10-PCS | Performed by: INTERNAL MEDICINE

## 2024-01-01 RX ORDER — BISACODYL 10 MG
10 SUPPOSITORY, RECTAL RECTAL
Status: DISCONTINUED | OUTPATIENT
Start: 2024-01-01 | End: 2024-01-01

## 2024-01-01 RX ORDER — ACETAMINOPHEN 160 MG/5ML
650 SOLUTION ORAL EVERY 4 HOURS PRN
Status: DISCONTINUED | OUTPATIENT
Start: 2024-01-01 | End: 2024-01-01

## 2024-01-01 RX ORDER — ENEMA 19; 7 G/133ML; G/133ML
1 ENEMA RECTAL ONCE AS NEEDED
Status: DISCONTINUED | OUTPATIENT
Start: 2024-01-01 | End: 2024-01-01

## 2024-01-01 RX ORDER — INSULIN DEGLUDEC 100 U/ML
14 INJECTION, SOLUTION SUBCUTANEOUS DAILY
Status: DISCONTINUED | OUTPATIENT
Start: 2024-01-01 | End: 2024-01-01

## 2024-01-01 RX ORDER — ACETAMINOPHEN 650 MG/1
650 SUPPOSITORY RECTAL EVERY 4 HOURS PRN
Status: DISCONTINUED | OUTPATIENT
Start: 2024-01-01 | End: 2024-01-01

## 2024-01-01 RX ORDER — ARGATROBAN 1 MG/ML
0.09 INJECTION, SOLUTION INTRAVENOUS CONTINUOUS
Status: DISCONTINUED | OUTPATIENT
Start: 2024-01-01 | End: 2024-01-01

## 2024-01-01 RX ORDER — MORPHINE SULFATE 2 MG/ML
1 INJECTION, SOLUTION INTRAMUSCULAR; INTRAVENOUS EVERY 4 HOURS PRN
Status: DISCONTINUED | OUTPATIENT
Start: 2024-01-01 | End: 2024-01-01

## 2024-01-01 RX ORDER — ETOMIDATE 2 MG/ML
INJECTION INTRAVENOUS
Status: DISPENSED
Start: 2024-01-01 | End: 2024-01-01

## 2024-01-01 RX ORDER — LIDOCAINE HYDROCHLORIDE 10 MG/ML
5 INJECTION, SOLUTION EPIDURAL; INFILTRATION; INTRACAUDAL; PERINEURAL
Status: COMPLETED | OUTPATIENT
Start: 2024-01-01 | End: 2024-01-01

## 2024-01-01 RX ORDER — POTASSIUM CHLORIDE 14.9 MG/ML
20 INJECTION INTRAVENOUS ONCE
Qty: 100 ML | Refills: 0 | Status: COMPLETED | OUTPATIENT
Start: 2024-01-01 | End: 2024-01-01

## 2024-01-01 RX ORDER — DEXTROSE AND SODIUM CHLORIDE 5; .45 G/100ML; G/100ML
INJECTION, SOLUTION INTRAVENOUS CONTINUOUS
Status: DISCONTINUED | OUTPATIENT
Start: 2024-01-01 | End: 2024-01-01

## 2024-01-01 RX ORDER — VANCOMYCIN HYDROCHLORIDE
1250
Status: DISCONTINUED | OUTPATIENT
Start: 2024-01-01 | End: 2024-01-01

## 2024-01-01 RX ORDER — SENNOSIDES 8.8 MG/5ML
10 LIQUID ORAL NIGHTLY PRN
Status: DISCONTINUED | OUTPATIENT
Start: 2024-01-01 | End: 2024-01-01

## 2024-01-01 RX ORDER — DEXTROSE MONOHYDRATE 25 G/50ML
50 INJECTION, SOLUTION INTRAVENOUS
Status: DISCONTINUED | OUTPATIENT
Start: 2024-01-01 | End: 2024-01-01

## 2024-01-01 RX ORDER — ACETAMINOPHEN 10 MG/ML
1000 INJECTION, SOLUTION INTRAVENOUS EVERY 6 HOURS PRN
Status: DISCONTINUED | OUTPATIENT
Start: 2024-01-01 | End: 2024-01-01

## 2024-01-01 RX ORDER — ETOMIDATE 2 MG/ML
10 INJECTION INTRAVENOUS ONCE
Status: COMPLETED | OUTPATIENT
Start: 2024-01-01 | End: 2024-01-01

## 2024-01-01 RX ORDER — VANCOMYCIN HYDROCHLORIDE
15 ONCE
Status: COMPLETED | OUTPATIENT
Start: 2024-01-01 | End: 2024-01-01

## 2024-01-01 RX ORDER — CHLORHEXIDINE GLUCONATE ORAL RINSE 1.2 MG/ML
15 SOLUTION DENTAL
Status: DISCONTINUED | OUTPATIENT
Start: 2024-01-01 | End: 2024-01-01

## 2024-01-01 RX ORDER — POLYETHYLENE GLYCOL 3350 17 G/17G
17 POWDER, FOR SOLUTION ORAL DAILY PRN
Status: DISCONTINUED | OUTPATIENT
Start: 2024-01-01 | End: 2024-01-01

## 2024-01-01 RX ORDER — ACETAMINOPHEN 325 MG/1
650 TABLET ORAL EVERY 4 HOURS PRN
Status: DISCONTINUED | OUTPATIENT
Start: 2024-01-01 | End: 2024-01-01

## 2024-01-01 RX ORDER — NICOTINE POLACRILEX 4 MG
30 LOZENGE BUCCAL
Status: DISCONTINUED | OUTPATIENT
Start: 2024-01-01 | End: 2024-01-01

## 2024-01-01 RX ORDER — POTASSIUM CHLORIDE 29.8 MG/ML
40 INJECTION INTRAVENOUS ONCE
Status: COMPLETED | OUTPATIENT
Start: 2024-01-01 | End: 2024-01-01

## 2024-01-01 RX ORDER — SODIUM CHLORIDE 9 MG/ML
INJECTION, SOLUTION INTRAVENOUS CONTINUOUS
Status: DISCONTINUED | OUTPATIENT
Start: 2024-01-01 | End: 2024-01-01

## 2024-01-01 RX ORDER — VANCOMYCIN 1.75 GRAM/500 ML IN 0.9 % SODIUM CHLORIDE INTRAVENOUS
25 ONCE
Status: COMPLETED | OUTPATIENT
Start: 2024-01-01 | End: 2024-01-01

## 2024-01-01 RX ORDER — DEXTROSE AND SODIUM CHLORIDE 5; .9 G/100ML; G/100ML
INJECTION, SOLUTION INTRAVENOUS CONTINUOUS
Status: DISCONTINUED | OUTPATIENT
Start: 2024-01-01 | End: 2024-01-01

## 2024-01-01 RX ORDER — KETOROLAC TROMETHAMINE 15 MG/ML
INJECTION, SOLUTION INTRAMUSCULAR; INTRAVENOUS
Status: COMPLETED
Start: 2024-01-01 | End: 2024-01-01

## 2024-01-01 RX ORDER — INSULIN DEGLUDEC 100 U/ML
10 INJECTION, SOLUTION SUBCUTANEOUS DAILY
Status: DISCONTINUED | OUTPATIENT
Start: 2024-01-01 | End: 2024-01-01

## 2024-01-01 RX ORDER — NICOTINE POLACRILEX 4 MG
15 LOZENGE BUCCAL
Status: DISCONTINUED | OUTPATIENT
Start: 2024-01-01 | End: 2024-01-01

## 2024-01-01 RX ORDER — KETOROLAC TROMETHAMINE 15 MG/ML
15 INJECTION, SOLUTION INTRAMUSCULAR; INTRAVENOUS ONCE
Status: COMPLETED | OUTPATIENT
Start: 2024-01-01 | End: 2024-01-01

## 2024-01-01 RX ORDER — MAGNESIUM SULFATE 1 G/100ML
1 INJECTION INTRAVENOUS ONCE
Qty: 100 ML | Refills: 0 | Status: COMPLETED | OUTPATIENT
Start: 2024-01-01 | End: 2024-01-01

## 2024-01-08 ENCOUNTER — APPOINTMENT (OUTPATIENT)
Dept: CT IMAGING | Age: 69
End: 2024-01-08
Attending: INTERNAL MEDICINE

## 2024-01-16 ENCOUNTER — TELEPHONE (OUTPATIENT)
Dept: INFUSION THERAPY | Age: 69
End: 2024-01-16

## 2024-01-16 ENCOUNTER — TELEPHONE (OUTPATIENT)
Dept: CARDIOLOGY | Age: 69
End: 2024-01-16

## 2024-01-16 DIAGNOSIS — I50.9 CONGESTIVE HEART FAILURE, UNSPECIFIED HF CHRONICITY, UNSPECIFIED HEART FAILURE TYPE (CMD): Primary | ICD-10-CM

## 2024-01-17 RX ORDER — BUMETANIDE 2 MG/1
2 TABLET ORAL 2 TIMES DAILY
Qty: 30 TABLET | Refills: 5 | Status: SHIPPED | COMMUNITY
Start: 2024-01-17 | End: 2024-03-22

## 2024-01-18 ENCOUNTER — APPOINTMENT (OUTPATIENT)
Dept: INFUSION THERAPY | Age: 69
End: 2024-01-18
Attending: INTERNAL MEDICINE

## 2024-01-29 ENCOUNTER — TELEPHONE (OUTPATIENT)
Dept: CARDIOLOGY | Age: 69
End: 2024-01-29

## 2024-02-05 ENCOUNTER — HOSPITAL ENCOUNTER (INPATIENT)
Facility: HOSPITAL | Age: 69
LOS: 6 days | Discharge: HOME | End: 2024-02-11
Attending: EMERGENCY MEDICINE | Admitting: HOSPITALIST
Payer: MEDICARE

## 2024-02-05 DIAGNOSIS — D64.9 ANEMIA, UNSPECIFIED TYPE: Primary | ICD-10-CM

## 2024-02-05 DIAGNOSIS — I50.9 ACUTE ON CHRONIC CONGESTIVE HEART FAILURE, UNSPECIFIED HEART FAILURE TYPE (HCC): ICD-10-CM

## 2024-02-05 DIAGNOSIS — K62.5 RECTAL BLEEDING: ICD-10-CM

## 2024-02-05 LAB
ALBUMIN SERPL-MCNC: 3.7 G/DL (ref 3.2–4.8)
ALBUMIN/GLOB SERPL: 1.5 {RATIO} (ref 1–2)
ALP LIVER SERPL-CCNC: 158 U/L
ALT SERPL-CCNC: 37 U/L
ANION GAP SERPL CALC-SCNC: 7 MMOL/L (ref 0–18)
ANTIBODY SCREEN: NEGATIVE
AST SERPL-CCNC: 27 U/L (ref ?–34)
BASOPHILS # BLD AUTO: 0.05 X10(3) UL (ref 0–0.2)
BASOPHILS NFR BLD AUTO: 0.7 %
BILIRUB SERPL-MCNC: 1 MG/DL (ref 0.2–1.1)
BNP SERPL-MCNC: 1168 PG/ML
BUN BLD-MCNC: 50 MG/DL (ref 9–23)
BUN/CREAT SERPL: 32.3 (ref 10–20)
CALCIUM BLD-MCNC: 8.6 MG/DL (ref 8.7–10.4)
CHLORIDE SERPL-SCNC: 100 MMOL/L (ref 98–112)
CO2 SERPL-SCNC: 26 MMOL/L (ref 21–32)
CREAT BLD-MCNC: 1.55 MG/DL
DEPRECATED HBV CORE AB SER IA-ACNC: 14 NG/ML
DEPRECATED RDW RBC AUTO: 43.8 FL (ref 35.1–46.3)
EGFRCR SERPLBLD CKD-EPI 2021: 49 ML/MIN/1.73M2 (ref 60–?)
EOSINOPHIL # BLD AUTO: 0.1 X10(3) UL (ref 0–0.7)
EOSINOPHIL NFR BLD AUTO: 1.4 %
ERYTHROCYTE [DISTWIDTH] IN BLOOD BY AUTOMATED COUNT: 17.3 % (ref 11–15)
GLOBULIN PLAS-MCNC: 2.4 G/DL (ref 2.8–4.4)
GLUCOSE BLD-MCNC: 187 MG/DL (ref 70–99)
GLUCOSE BLDC GLUCOMTR-MCNC: 151 MG/DL (ref 70–99)
GLUCOSE BLDC GLUCOMTR-MCNC: 170 MG/DL (ref 70–99)
HCT VFR BLD AUTO: 27.3 %
HGB BLD-MCNC: 8.2 G/DL
IMM GRANULOCYTES # BLD AUTO: 0.03 X10(3) UL (ref 0–1)
IMM GRANULOCYTES NFR BLD: 0.4 %
IRON SATN MFR SERPL: 4 %
IRON SERPL-MCNC: 19 UG/DL
LYMPHOCYTES # BLD AUTO: 0.92 X10(3) UL (ref 1–4)
LYMPHOCYTES NFR BLD AUTO: 13 %
MCH RBC QN AUTO: 21.2 PG (ref 26–34)
MCHC RBC AUTO-ENTMCNC: 30 G/DL (ref 31–37)
MCV RBC AUTO: 70.7 FL
MONOCYTES # BLD AUTO: 0.83 X10(3) UL (ref 0.1–1)
MONOCYTES NFR BLD AUTO: 11.7 %
NEUTROPHILS # BLD AUTO: 5.14 X10 (3) UL (ref 1.5–7.7)
NEUTROPHILS # BLD AUTO: 5.14 X10(3) UL (ref 1.5–7.7)
NEUTROPHILS NFR BLD AUTO: 72.8 %
OSMOLALITY SERPL CALC.SUM OF ELEC: 294 MOSM/KG (ref 275–295)
PLATELET # BLD AUTO: 208 10(3)UL (ref 150–450)
POTASSIUM SERPL-SCNC: 4.3 MMOL/L (ref 3.5–5.1)
PROT SERPL-MCNC: 6.1 G/DL (ref 5.7–8.2)
RBC # BLD AUTO: 3.86 X10(6)UL
RH BLOOD TYPE: POSITIVE
SODIUM SERPL-SCNC: 133 MMOL/L (ref 136–145)
TIBC SERPL-MCNC: 495 UG/DL (ref 250–425)
TRANSFERRIN SERPL-MCNC: 332 MG/DL (ref 215–365)
WBC # BLD AUTO: 7.1 X10(3) UL (ref 4–11)

## 2024-02-05 PROCEDURE — 99223 1ST HOSP IP/OBS HIGH 75: CPT | Performed by: INTERNAL MEDICINE

## 2024-02-05 PROCEDURE — 99223 1ST HOSP IP/OBS HIGH 75: CPT | Performed by: HOSPITALIST

## 2024-02-05 RX ORDER — METOPROLOL SUCCINATE 100 MG/1
100 TABLET, EXTENDED RELEASE ORAL DAILY
COMMUNITY
End: 2024-02-11

## 2024-02-05 RX ORDER — NICOTINE POLACRILEX 4 MG
30 LOZENGE BUCCAL
Status: DISCONTINUED | OUTPATIENT
Start: 2024-02-05 | End: 2024-02-11

## 2024-02-05 RX ORDER — ACETAMINOPHEN 500 MG
500 TABLET ORAL EVERY 4 HOURS PRN
Status: DISCONTINUED | OUTPATIENT
Start: 2024-02-05 | End: 2024-02-11

## 2024-02-05 RX ORDER — BUMETANIDE 0.25 MG/ML
2 INJECTION INTRAMUSCULAR; INTRAVENOUS
Status: DISCONTINUED | OUTPATIENT
Start: 2024-02-05 | End: 2024-02-07

## 2024-02-05 RX ORDER — NICOTINE POLACRILEX 4 MG
15 LOZENGE BUCCAL
Status: DISCONTINUED | OUTPATIENT
Start: 2024-02-05 | End: 2024-02-11

## 2024-02-05 RX ORDER — GLIMEPIRIDE 4 MG/1
4 TABLET ORAL
COMMUNITY

## 2024-02-05 RX ORDER — METOCLOPRAMIDE HYDROCHLORIDE 5 MG/ML
10 INJECTION INTRAMUSCULAR; INTRAVENOUS EVERY 8 HOURS PRN
Status: DISCONTINUED | OUTPATIENT
Start: 2024-02-05 | End: 2024-02-07

## 2024-02-05 RX ORDER — ONDANSETRON 2 MG/ML
4 INJECTION INTRAMUSCULAR; INTRAVENOUS EVERY 6 HOURS PRN
Status: DISCONTINUED | OUTPATIENT
Start: 2024-02-05 | End: 2024-02-11

## 2024-02-05 RX ORDER — CLOPIDOGREL BISULFATE 75 MG/1
75 TABLET ORAL DAILY
COMMUNITY

## 2024-02-05 RX ORDER — DEXTROSE MONOHYDRATE 25 G/50ML
50 INJECTION, SOLUTION INTRAVENOUS
Status: DISCONTINUED | OUTPATIENT
Start: 2024-02-05 | End: 2024-02-11

## 2024-02-05 NOTE — H&P
Is this a shared or split note between Advanced Practice Provider and Physician? 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    Pt states that he has had a dry cough for 4 weeks with progressively worsening leg and ABD swelling for the past 10 days.  He current is unable to lay flat without SOB.  He reports his only GI symptom being mucus in his stools, which he has had for 2-3 weeks.  He denies nausea, vomiting, heartburn, ABD pain, diarrhea, black/bloody stools, weight loss, fever and chills.  He is a poor historian but believes his last colonoscopy was 1 year ago with his colon resection in the fall.  He denies NSAID use but notes DAPT along with eliquis.  He has taken both in the past 24 hours.     Pertinent Family Hx:  - No known history of esophageal, gastric or colon cancers  - No known IBD  - No known liver pathologies    Endoscopy Hx:  -Pt unable to relay hx, noted in care everywhere:  - EGD/Colonoscopy 7/2024  \"Esophagus stomach and duodenum were unremarkable. Colonoscopy however showed 2 polyps and an infiltrative nonobstructing medium-sized mass in the sigmoid colon that was noncircumferential. This was biopsied. Colon polyps were tubular adenomas and the sigmoid mass was adenocarcinoma.\"     Social Hx:  - No tobacco use/No ETOH  - Denies illicit drug use  - Lives alone  - Occupation: on disability, retired fire  fighter    History:  Past Medical History:   Diagnosis Date    Coronary heart disease     2 stents in place, pstient sees Dr. Westbrook    Essential hypertension     Heart attack (HCC) 2018     maker w/ 5 stents    Hyperlipidemia     Melanoma in situ of left upper extremity (HCC) 1998    right thumb    RLS (restless legs syndrome)      Past Surgical History:   Procedure Laterality Date    OTHER SURGICAL HISTORY      2 stents      Family History   Problem Relation Age of Onset    Cancer Father         sarcoma of back      reports that he quit smoking about 28 years ago. His smoking use included cigarettes. He smoked an average of 1 pack per day. He has never used smokeless tobacco. He reports that he does not drink alcohol and does not use drugs.    Allergies:  Allergies   Allergen Reactions    Heparin HIVES       Medications:  No current facility-administered medications for this encounter.    Review of Systems:   CONSTITUTIONAL:  negative for fevers, chills, unintentional weight loss   EYES:  negative for diplopia or change in vision   RESPIRATORY:  + for severe shortness of breath  CARDIOVASCULAR:  negative for crushing sub-sternal chest pain  GASTROINTESTINAL:  see HPI  GENITOURINARY:  negative for dysuria or gross hematuria  INTEGUMENT/BREAST:  SKIN:  negative for jaundice or new rash   ALLERGIC/IMMUNOLOGIC:  negative for hay fever   ENDOCRINE:  negative for cold intolerance and heat intolerance  MUSCULOSKELETAL:  negative for joint effusion/severe erythema  NEURO: negative for new loss of consciousness or dizziness   BEHAVIOR/PSYCH:  negative for psychotic behavior    Physical Exam:    Blood pressure 111/86, pulse 110, temperature 97.1 °F (36.2 °C), temperature source Temporal, resp. rate 20, height 5' 10\" (1.778 m), weight 195 lb (88.5 kg), SpO2 95%. Body mass index is 27.98 kg/m².    Gen: awake, alert patient, NAD  HEENT: EOMI, the sclera appears anicteric, oropharynx clear, mucus membranes appear moist  CV:  RRR  Lung: no conversational dyspnea   Abdomen: soft NT, distended abdomen with NABS appreciated   Back: No CVA tenderness   Skin: dry, warm, no jaundice  Ext: 3+ LE edema is evident  Neuro: Alert, oriented x3 and interactive, poor historian  Psych: calm, cooperative    Laboratory Data:  Lab Results   Component Value Date    WBC 7.1 02/05/2024    HGB 8.2 02/05/2024    HCT 27.3 02/05/2024    .0 02/05/2024    CREATSERUM 1.55 02/05/2024    BUN 50 02/05/2024     02/05/2024    K 4.3 02/05/2024     02/05/2024    CO2 26.0 02/05/2024     02/05/2024    CA 8.6 02/05/2024    ALB 3.7 02/05/2024    ALKPHO 158 02/05/2024    BILT 1.0 02/05/2024    TP 6.1 02/05/2024    AST 27 02/05/2024    ALT 37 02/05/2024       Imaging:  No results found.    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    #Anemia  #Adenocarcinoma of sigmoid colon  -Labs on admission notable for Hgb 8.2, BNP 1168, BUN 50, Cr 1.55, Na 133.  -Pt presents with acute CHF exacerbation but noted to have anemia.  He remains on eliquis as well as DAPT, which he has taken in the past 24 hours.  He denies melena, hematochezia, or BRBPR but notes mucus in his stool for the past 2-3 weeks.  He reports orthopnea/SOB.  He has a hx of syncopal episodes and falls at home but denies any recent episodes.  He denies weight loss, N/V, heartburn, ABD/rectal pain, and constipation/diarrhea, No NSAID use.  -Last EGD/CLN 7/2024 with no significant findings on EGD but noted sigmoid adenocarcinoma that has since been resected.  Pt denies radiation or chemotherapy.  -Pt deferred rectal exam for this provider, discussed exam completed by ED physician, which showed  brown stool.  -No current iron studies noted but Pt has Hx of RADHA  -Possible component of acute on chronic anemia with CKD, GI loss on blood thinners, or recurrence of colon CA.  -Would not recommend endoscopic evaluation at this time given acute CHF exacerbation.  Will need cardiac clearance and improvement in clinical status prior to procedures.    Recommend:  -Iron studies, IV venofer if indicated  -PPI BID  -Trend CBC  -Monitor for overt GIB  -Pending cardiac clearance and clinical course, possible endoscopic evaluation    Thank you for the opportunity to participate in the care of this patient.    Case discussed with Minna Cerda MD.    Miriam Winters DNP, FNP-[facility]  2/5/2024

## 2024-02-05 NOTE — ED INITIAL ASSESSMENT (HPI)
Patient from home with c/o lower extremity swelling for the past 5-7 days. Denies shortness of breath. Reports dry cough for 3 weeks.

## 2024-02-05 NOTE — CONSULTS
[facility]  Cardiology Consultation    Cristhian Lopez Patient Status:  Emergency    9/3/1955 MRN [id number]   [facility] [facility] EMERGENCY DEPARTMENT Attending Mando Barnett MD   Hosp Day # 0 PCP None Pcp           Reason for Consultation   Coronary artery disease cardiomyopathy with CHF exacerbation        History of Present Illness     Cristhian Lopez is a  68 year old male multiple medical problems including hypertension, diabetes, coronary artery disease status post stents around 8 years back followed by CABG 5 years back, cardiomyopathy with EF 25-30% ,hyperlipidemia, gout, CKD, pulmonary embolism chronically anticoagulated with Eliquis.  Patient is also on dual anti-platelet therapy. ( He apparently declined ICD )  Patient states that he has been experiencing worsening shortness of breath with progressive increase in leg edema for a few weeks.  Patient lately has been complaining of orthopnea and difficulty in sleeping.  Denies any chest pain.  Patient was diagnosed with colonic tumor in September and underwent surgery for the same.   In the ER, CBC showed hemoglobin of 8.2.  Rectal exam was positive for Hemoccult blood.  Chemistry showed BUN and creatinine of 50 and 1.55.  GFR is 49.  Denies alcohol use.  Past medical history: As above  Past surgical history: PCI and stents CABG 5 years back  :Abd Surgery for colon cancer  Allergies: Noted to get hives with heparin  Family History: Not pertinent  Social history: Ex-Smoker quit many years back, no alcohol, or drugs  Review of systems: Negative except as mentioned in HPI      History:     Past Medical History:   Diagnosis Date    Coronary heart disease     2 stents in place, pstient sees Dr. Westbrook    Essential hypertension     Heart attack (HCC) 2018     maker w/ 5 stents    Hyperlipidemia     Melanoma in situ of left upper extremity (HCC)     right thumb    RLS (restless legs syndrome)      Past Surgical History:   Procedure  Laterality Date    OTHER SURGICAL HISTORY      2 stents      Family History   Problem Relation Age of Onset    Cancer Father         sarcoma of back      reports that he quit smoking about 28 years ago. His smoking use included cigarettes. He smoked an average of 1 pack per day. He has never used smokeless tobacco. He reports that he does not drink alcohol and does not use drugs.                                                 Physical Exam     Physical Exam   Blood pressure 111/86, pulse 110, temperature 97.1 °F (36.2 °C), temperature source Temporal, resp. rate 20, height 5' 10\" (1.778 m), weight 195 lb (88.5 kg), SpO2 95%.  Temp (24hrs), Av.1 °F (36.2 °C), Min:97.1 °F (36.2 °C), Max:97.1 °F (36.2 °C)    Wt Readings from Last 3 Encounters:   24 195 lb (88.5 kg)   23 224 lb (101.6 kg)   23 220 lb (99.8 kg)     Patient sitting in bed   In no acute distress    No Jvd  Carotids- no bruits  CTA bilaterally  Cardiac-S1 S2 + RRR  Abdomen- Soft,Nontender, normal BS  Extremities- pulses normal 2+ edema                Lab/Radiology Results     Recent Labs   Lab 24  1408   *   BUN 50*   CREATSERUM 1.55*   EGFRCR 49*   CA 8.6*   *   K 4.3      CO2 26.0     Recent Labs   Lab 24  1408   RBC 3.86   HGB 8.2*   HCT 27.3*   MCV 70.7*   MCH 21.2*   MCHC 30.0*   RDW 17.3*   NEPRELIM 5.14   WBC 7.1   .0         [unfilled]  No results for input(s): \"BNP\" in the last 168 hours.  No results found for: \"PT\", \"INR\"  Lab Results   Component Value Date    TROP <0.045 2021                               Acute on chronic CHF/ Ischemic cardiomyopathy:  Coronary artery disease status post PCI and stents and CABG   admitted for CHF exacerbation.  Moderately reduced EF at 30%  BNP 1165  Order serial Troponin I  Asa 81 mg daily  Atorvastatin 40 mg daily  Bumex 1mg IV Bid  12 lead EKG  2D Echo to check LV function  Asa & Plavix -hold    HTN  Continue   Coreg & Entresto    CKD stage  3    History of PE  Eliquis on hold    Anemia & GI bleed  Follow H/H closely  Suggest GI input        Dominguez Lu MD  [address] [facility] [place]  2/5/2024  5:09 PM

## 2024-02-05 NOTE — H&P
[facility]    PATIENT'S NAME: CARLY ROTHMAN   ATTENDING PHYSICIAN: Mando Barnett MD   PATIENT ACCOUNT#:   [id number]    LOCATION:  [place] [address]  MEDICAL RECORD #:   [id number]       YOB: 1955  ADMISSION DATE:       02/05/2024    HISTORY AND PHYSICAL EXAMINATION    CHIEF COMPLAINT:  Gastrointestinal blood loss, anemia, and acute on chronic systolic heart failure.    HISTORY OF PRESENT ILLNESS:  The patient is a 68-year-old  male with history of ischemic cardiomyopathy who presented today to the emergency department for evaluation of progressive leg edema and dyspnea on exertion despite increasing his diuretic dose.  In the emergency room, CBC showed hemoglobin of 8.2.  Last hemoglobin on record was in September in an outside facility in October after a sigmoidectomy.  Rectal exam showed brown stool, strong positive Hemoccult blood.  Chemistry showed BUN and creatinine of 50 and 1.55.  GFR is 49.  Glucose 187, iron level is 19, and ferritin low at 14.  Patient was given IV Lasix, and he will be admitted to the hospital for further management.  Also, started on IV Protonix.    PAST MEDICAL HISTORY:  Coronary artery disease status post LAD and RCA PCI stent; ischemic cardiomyopathy, ejection fraction 25%, declined ICD; hypertension; hyperlipidemia; pulmonary embolism, chronically anticoagulated with Eliquis.  Also, he is on dual antiplatelet therapy with aspirin and Plavix.  Chronic kidney disease stage 3, generalized osteoarthritis, degenerative joint disease of lumbar spine, restless legs syndrome, diabetes mellitus type 2, and gout.  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    PAST SURGICAL HISTORY:  Left vitrectomy, left inguinal hernia repair.  Also, as outlined above.    MEDICATIONS:  Please see medication reconciliation list.  Currently, anticoagulated with Eliquis.  Also, he takes aspirin 81  mg and he reported Plavix.    ALLERGIES:  Heparin.    FAMILY HISTORY:  Mother had diabetes mellitus type 2.  Father had heart disease.    SOCIAL HISTORY:  Ex-tobacco user.  No current tobacco, alcohol, or drug use.  Lives by himself.  Noncompliant with low-salt diet.      REVIEW OF SYSTEMS:  Patient is a poor historian.  He denies any melena.  He had progressive leg edema and dyspnea on exertion.  He is noncompliant with low-salt diet.  Lives by himself.  All his food is a take out.  He denies any dyspepsia.  Denies any over-the-counter NSAIDs use.  Other 12-point review of systems is negative.      PHYSICAL EXAMINATION:    GENERAL:  Alert and oriented to time, place, and person.  Pale in color, mild distress.  VITAL SIGNS:  Temperature 97.1, pulse 110, respiratory rate 20, blood pressure 104/68, pulse ox 95% on room air.    HEENT:  Atraumatic.  Oropharynx clear.  Moist mucous membranes.    NECK:  Supple.  No lymphadenopathy.  Positive jugular venous distention.   LUNGS:  Clear to auscultation bilaterally.  Normal respiratory effort.    HEART:  Regular rate, rhythm.  S1 and S2 auscultated.  No murmur.   ABDOMEN:  Soft, nondistended.  No tenderness.  Positive bowel sounds.    EXTREMITIES:  Edema +2 to 3 both legs.  No clubbing or cyanosis.  NEUROLOGIC:  Motor and sensory intact.      ASSESSMENT:    1.   Gastrointestinal blood loss.  Rule out peptic ulcer disease.  Patient reported colonoscopy done in the last 2 years which was unremarkable per his report.    2.   Ischemic cardiomyopathy with acute on chronic systolic heart failure.  3.   Diabetes mellitus type 2.  4.   Coronary artery disease.  5.   Chronic kidney disease stage 3.    PLAN:  Patient will be admitted to telemetry floor.  IV Bumex.  Monitor his hemodynamic status.  We will hold aspirin and Plavix and Eliquis after reviewing his home medications.  Obtain cardiology and gastroenterology consult.  Place him on IV Protonix.  Clear liquid diet.  Monitor  Accu-Cheks.  Further recommendations to follow.     Dictated By Luke Rockwell MD  d: 02/05/2024 16:28:25  t: 02/05/2024 16:54:49  [facility] [id number]  FB/

## 2024-02-05 NOTE — ED QUICK NOTES
Orders for admission, patient is aware of plan and ready to go upstairs. Any questions, please call ED RN filippo at extension [phone].     Patient Covid vaccination status: Unvaccinated     COVID Test Ordered in ED: None    COVID Suspicion at Admission: N/A    Running Infusions:  None    Mental Status/LOC at time of transport: x4    Other pertinent information:   CIWA score: N/A   NIH score:  N/A

## 2024-02-06 ENCOUNTER — APPOINTMENT (OUTPATIENT)
Dept: CV DIAGNOSTICS | Facility: HOSPITAL | Age: 69
End: 2024-02-06
Attending: HOSPITALIST
Payer: MEDICARE

## 2024-02-06 PROBLEM — D50.0 IRON DEFICIENCY ANEMIA DUE TO CHRONIC BLOOD LOSS: Status: ACTIVE | Noted: 2024-01-01

## 2024-02-06 PROBLEM — D50.0 IRON DEFICIENCY ANEMIA DUE TO CHRONIC BLOOD LOSS: Status: ACTIVE | Noted: 2024-02-06

## 2024-02-06 LAB
ANION GAP SERPL CALC-SCNC: 10 MMOL/L (ref 0–18)
BASOPHILS # BLD AUTO: 0.08 X10(3) UL (ref 0–0.2)
BASOPHILS NFR BLD AUTO: 1 %
BUN BLD-MCNC: 50 MG/DL (ref 9–23)
BUN/CREAT SERPL: 29.6 (ref 10–20)
CALCIUM BLD-MCNC: 9.2 MG/DL (ref 8.7–10.4)
CHLORIDE SERPL-SCNC: 101 MMOL/L (ref 98–112)
CO2 SERPL-SCNC: 24 MMOL/L (ref 21–32)
CREAT BLD-MCNC: 1.69 MG/DL
DEPRECATED RDW RBC AUTO: 44.6 FL (ref 35.1–46.3)
EGFRCR SERPLBLD CKD-EPI 2021: 44 ML/MIN/1.73M2 (ref 60–?)
EOSINOPHIL # BLD AUTO: 0.09 X10(3) UL (ref 0–0.7)
EOSINOPHIL NFR BLD AUTO: 1.1 %
ERYTHROCYTE [DISTWIDTH] IN BLOOD BY AUTOMATED COUNT: 17.8 % (ref 11–15)
GLUCOSE BLD-MCNC: 191 MG/DL (ref 70–99)
GLUCOSE BLDC GLUCOMTR-MCNC: 132 MG/DL (ref 70–99)
GLUCOSE BLDC GLUCOMTR-MCNC: 149 MG/DL (ref 70–99)
GLUCOSE BLDC GLUCOMTR-MCNC: 208 MG/DL (ref 70–99)
GLUCOSE BLDC GLUCOMTR-MCNC: 268 MG/DL (ref 70–99)
GLUCOSE BLDC GLUCOMTR-MCNC: 273 MG/DL (ref 70–99)
HCT VFR BLD AUTO: 28.5 %
HGB BLD-MCNC: 8.7 G/DL
IMM GRANULOCYTES # BLD AUTO: 0.04 X10(3) UL (ref 0–1)
IMM GRANULOCYTES NFR BLD: 0.5 %
LYMPHOCYTES # BLD AUTO: 1 X10(3) UL (ref 1–4)
LYMPHOCYTES NFR BLD AUTO: 12 %
MAGNESIUM SERPL-MCNC: 2.2 MG/DL (ref 1.6–2.6)
MCH RBC QN AUTO: 21.6 PG (ref 26–34)
MCHC RBC AUTO-ENTMCNC: 30.5 G/DL (ref 31–37)
MCV RBC AUTO: 70.7 FL
MONOCYTES # BLD AUTO: 0.96 X10(3) UL (ref 0.1–1)
MONOCYTES NFR BLD AUTO: 11.5 %
NEUTROPHILS # BLD AUTO: 6.16 X10 (3) UL (ref 1.5–7.7)
NEUTROPHILS # BLD AUTO: 6.16 X10(3) UL (ref 1.5–7.7)
NEUTROPHILS NFR BLD AUTO: 73.9 %
OSMOLALITY SERPL CALC.SUM OF ELEC: 298 MOSM/KG (ref 275–295)
PLATELET # BLD AUTO: 224 10(3)UL (ref 150–450)
POTASSIUM SERPL-SCNC: 4.9 MMOL/L (ref 3.5–5.1)
RBC # BLD AUTO: 4.03 X10(6)UL
SODIUM SERPL-SCNC: 135 MMOL/L (ref 136–145)
WBC # BLD AUTO: 8.3 X10(3) UL (ref 4–11)

## 2024-02-06 PROCEDURE — 93306 TTE W/DOPPLER COMPLETE: CPT | Performed by: HOSPITALIST

## 2024-02-06 PROCEDURE — 99233 SBSQ HOSP IP/OBS HIGH 50: CPT | Performed by: INTERNAL MEDICINE

## 2024-02-06 PROCEDURE — 99233 SBSQ HOSP IP/OBS HIGH 50: CPT | Performed by: HOSPITALIST

## 2024-02-06 RX ORDER — DIAZEPAM 5 MG/1
5 TABLET ORAL EVERY 6 HOURS PRN
Status: DISCONTINUED | OUTPATIENT
Start: 2024-02-06 | End: 2024-02-11

## 2024-02-06 RX ORDER — ZOLPIDEM TARTRATE 5 MG/1
5 TABLET ORAL NIGHTLY PRN
Status: DISCONTINUED | OUTPATIENT
Start: 2024-02-06 | End: 2024-02-11

## 2024-02-06 RX ORDER — EZETIMIBE 10 MG/1
10 TABLET ORAL NIGHTLY
Status: DISCONTINUED | OUTPATIENT
Start: 2024-02-06 | End: 2024-02-11

## 2024-02-06 RX ORDER — ATORVASTATIN CALCIUM 40 MG/1
40 TABLET, FILM COATED ORAL NIGHTLY
Status: DISCONTINUED | OUTPATIENT
Start: 2024-02-06 | End: 2024-02-11

## 2024-02-06 RX ORDER — SODIUM CHLORIDE 9 MG/ML
INJECTION, SOLUTION INTRAVENOUS CONTINUOUS
Status: ACTIVE | OUTPATIENT
Start: 2024-02-06 | End: 2024-02-06

## 2024-02-06 RX ORDER — CARVEDILOL 12.5 MG/1
12.5 TABLET ORAL 2 TIMES DAILY WITH MEALS
Status: DISCONTINUED | OUTPATIENT
Start: 2024-02-06 | End: 2024-02-09

## 2024-02-06 RX ORDER — MELATONIN
6 NIGHTLY PRN
Status: DISCONTINUED | OUTPATIENT
Start: 2024-02-06 | End: 2024-02-11

## 2024-02-06 NOTE — PROGRESS NOTES
[facility]    Progress Note    Cristhian Lopez Patient Status:  Inpatient    9/3/1955 MRN [id number]   [facility] [facility] 3W/SW Attending Manuel Lofton MD   Hosp Day # 1 PCP None Pcp       Subjective:     Breathing improved.  C/o insomnia and agrees to ambien.    Objective:   Blood pressure 91/60, pulse 112, temperature 97.3 °F (36.3 °C), temperature source Oral, resp. rate 18, height 5' 10\" (1.778 m), weight 232 lb (105.2 kg), SpO2 98%.    Gen:   NAD.  A and O x 3  CV:   RRR, no m/g/r  Pulm:   CTA bilat  Abd:   +bs, soft, NT, ND  LE:   trace edema bilat  Neuro:   nonfocal    Results:     Lab Results   Component Value Date    WBC 8.3 2024    HGB 8.7 (L) 2024    HCT 28.5 (L) 2024    .0 2024    CREATSERUM 1.69 (H) 2024    BUN 50 (H) 2024     (L) 2024    K 4.9 2024     2024    CO2 24.0 2024     (H) 2024    CA 9.2 2024    ALB 3.7 2024    ALKPHO 158 (H) 2024    BILT 1.0 2024    TP 6.1 2024    AST 27 2024    ALT 37 2024    TSH 0.397 2021    PSA 9.27 (H) 2023    DDIMER 0.93 (H) 2021    MG 2.2 2024    PHOS 3.3 2021    TROP <0.045 2021     (H) 2023    B12 389 2021    ETOH <3 2023       No results found.        Assessment and Plan:     Iron deficiency anemia.  Heme positive in ER.  Hx of colon cancer s/p sigmoidectomy in .  Hgb stable.  - GI on consult  - trend hgb  - IV iron per GI  - cont protonix  - possible endoscopies if hgb drops  - hold aspirin and plavix and Eliquis    Ischemic cardiomyopathy with acute on chronic systolic heart failure.  - cardiology on consult  - cont IV bumex per cards  - cont coreg with parameters  - echo shows EF 25% with grade 3 diastolic dysfunction     Hx of PE  - hold Eliquis    Hx of DM2  - ISS    Hx of CAD  - hold aspirin and plavix  - cont coreg    Hx of CKD3  -  monitor Cr with diuresis    dvt proph:    SCDs    Code status:    Full      MDM:    High Manuel Sam MD  2/6/2024

## 2024-02-06 NOTE — PROGRESS NOTES
Progress Note  Cristhian Lopez Patient Status:  Inpatient    9/3/1955 MRN [id number]   [facility] [facility] 3W/SW Attending Manuel Lofton MD   Hosp Day # 1 PCP None Pcp     Subjective:  Mr. Lopez reports mild improvement today. 3 voids this AM.   No chest pain, palpitations, dizziness/syncope.  Improved edema and orthopnea although still present.    Objective:  Physical Exam:   /84 (BP [facility]: Right arm)   Pulse 110   Temp 98.2 °F (36.8 °C) (Oral)   Resp 20   Ht 5' 10\" (1.778 m)   Wt 232 lb (105.2 kg)   SpO2 95%   BMI 33.29 kg/m²   Temp (24hrs), Av.7 °F (36.5 °C), Min:97.1 °F (36.2 °C), Max:98.2 °F (36.8 °C)       Intake/Output Summary (Last 24 hours) at 2024 0852  Last data filed at 2024 0650  Gross per 24 hour   Intake 428 ml   Output 925 ml   Net -497 ml     Wt Readings from Last 3 Encounters:   24 232 lb (105.2 kg)   23 224 lb (101.6 kg)   23 220 lb (99.8 kg)     Telemetry: sinus tachycardia with brief episodes of NSVT overnight  General: Alert and oriented in no apparent distress seated comfortably on the edge of the bed.  HEENT: No focal deficits.  Neck: No JVD, carotids 2+ no bruits.  Cardiac: Regular rate and rhythm, S1, S2 normal, no murmur, rub or gallop.  Lungs: Clear without wheezes, rhonchi or dullness. Crackles R base.  Normal excursions and effort on room air  Abdomen: Soft, non-tender.   Extremities: Without clubbing, cyanosis . 2+ pitting edema equal bilaterally.  Peripheral pulses are 2+.  Neurologic: Alert and oriented, normal affect.  Skin: Warm and dry.        Intake/Output:    Intake/Output Summary (Last 24 hours) at 2024 0852  Last data filed at 2024 0650  Gross per 24 hour   Intake 428 ml   Output 925 ml   Net -497 ml       Last 3 Weights   24 0405 232 lb (105.2 kg)   24 0400 229 lb (103.9 kg)   24 1753 232 lb 4.8 oz (105.4 kg)   24 1404 195 lb (88.5 kg)   23 0457 224 lb (101.6 kg)   23  0500 225 lb 3.2 oz (102.2 kg)   05/03/23 1727 227 lb 3.2 oz (103.1 kg)   02/20/23 0839 220 lb (99.8 kg)       Labs:  Recent Labs   Lab 02/05/24  1408 02/06/24  0724   * 191*   BUN 50* 50*   CREATSERUM 1.55* 1.69*   EGFRCR 49* 44*   CA 8.6* 9.2   * 135*   K 4.3 4.9    101   CO2 26.0 24.0     Recent Labs   Lab 02/05/24  1408 02/06/24  0724   RBC 3.86 4.03   HGB 8.2* 8.7*   HCT 27.3* 28.5*   MCV 70.7* 70.7*   MCH 21.2* 21.6*   MCHC 30.0* 30.5*   RDW 17.3* 17.8*   NEPRELIM 5.14 6.16   WBC 7.1 8.3   .0 224.0         No results for input(s): \"TROP\", \"TROPHS\", \"CK\" in the last 168 hours.    Diagnostics:   ECHOCARDIOGRAM 2/5/2023:  PENDING    Carotid US 5/3/2024:  Moderate amount of atheromatous plaque in the bilateral carotid arteries without high-grade stenosis using Doppler criteria.     FROM OUTPATIENT RECORDS:  Echo 7/25/2023:  Normal left ventricular size and severely reduced systolic function. EF 15-20%.  Akinesis of the apex, apical septum, and apical inferior walls.  Remainder of walls severely hypokinetic.  No evidence of any left ventricular regional wall motion abnormalities.  Left ventricular wall thickness is normal.  Moderate, grade II, LV diastolic dysfunction (Pseudonormal filling pattern).  Moderate mitral valvular regurgitation.  Unable to accurately assess PASP due to lack of tricuspid regurgitation.      30-day event monitor 4/18/2023-5/18/2023  A total of 9 recordings were obtained. The predominant rhythm was sinus.  PVCs and brief runs of NSVT were noted. Longest NSVT was 6 beats.  No arrhythmias were seen. No pauses or AV block seen.  Patient symptoms: Patient events reviewed which correlated with sinus and PVC.    Carotid Duplex 5/3/2023:  Moderate amount of atheromatous plaque in the bilateral carotid arteries without high-grade stenosis using Doppler criteria.      Cardiac Cath 1/30/2019:  HEMODYNAMICS  1. Heart rate was 123 bpm in sinus rhythm  2. Aortic Pressure was  104/67, mean 79 mmHg on Norepinephrine, Phenylephrine, and Vasopressin infusions  3. Left ventricular end diastolic pressure was 32 mmHg  4. No pullback gradient across the aortic valve    SELECTIVE CORONARY ANGIOGRAPHY  1. The left main was a large vessel which gave rise to the LAD, circumflex, and ramus intermedius. There was a tubular 40% stenosis in the mid to distal segment.  2. The LAD was totally occluded shortly after its take off with YOUSUF-0 flow distally.  3. The left circumflex was a medium caliber vessel. There was a diffuse 80% stenosis in the proximal segment (at OM1) followed by a discrete 90% lesion in the mid segment (at OM2). The OM1 and OM2 were very small. Distally, the LCx was small and gave rise to small OM3 and OM4 branches which had mild atherosclerosis throughout.  4. The ramus intermedius was a medium-to-large vessel with tandem tubular 80% lesion shortly after its take off and a diffuse 70% lesion in the proximal segment while the rest of the vessel had diffuse mild atherosclerosis throughout.  5. The RCA was a large, dominant vessel which bifurcated into a medium RPDA and a small-to-medium RPLB in its mid segment. There was a tubular 70% stenosis in the proximal segment followed by diffuse 70 - 80% lesions in the mid segment before the RPDA and RPLB bifurcation. The proximal RPDA had diffuse 70% stenosis.    PCI RESULTS  1. Successful balloon angioplasty of the ostial 1st septal  with a 2.0 x 15 mm Mini Trek PTCA balloon.  2. Successful PCI of proximal LAD with placement of 2.75 x 23 mm Xience Dilia ALEX; post-dilated with a 3.5 x 12 mm NC Quantum PTCA balloon.  3. Excellent angiographic results with 0% residual stenosis. YOUSUF-3 flow distally. No evidence of dissection, embolization or perforation.   Medications:   bumetanide  2 mg Intravenous BID (Diuretic)    insulin aspart  1-7 Units Subcutaneous TID CC    pantoprazole  40 mg Intravenous Daily         Assessment/Plan:  CAD  s/p CABG (LIMA-LAD, SVG-Diag, SVG -OM 2/2019) with stents prior (most recent LAD 2/2019)  Troponin negative, no CP  OP cardiology note advises Eliquis alone 9/14/2023  Reportedly had been on Plavix, ASA, and Eliquis all on hold with presenting anemia.   Continue statin, Zetia, and BB  Acute on Chronic HFrEF LVEF 25-30%  BNP 1,100 at presentation  On IV bumex 2 mg BID  -500ml since admit, Cr stable  Breathing comfortably on room air  Entresto, aldactone, and Coreg as tolerated by BP, renal function, and electrolytes  Adenocarcinoma sigmoid colon s/p sigmoidectomy 10/2024  CKD IV  Baseline ~ 1.6 - stable  R PE 4/2019 on Eliquis  HTN  At goal  DM II  Hx LV thrombus echocardiogram 12/2021  No indication of continued thrombus on repeat imaging  To remain on Eliquis per outpatient records  HLD  Continue statin and Zetia  Anemia  Improving  Intermittent medical compliance  Refused ICD in past, poor medication/ appt compliance per outpatient records, and initially had declined sigmoid resection in 2019      PLAN  Restart Eliquis alone once primary team and GI feel bleeding risk is reasonable  Continue IV diuresis  Reintroduce cardiac medication as tolerated by BP, HR, renal function, and electrolytes - carvedilol 25 mg BID today  Continue atorvastatin and Zetia for lipid management  Follow I/O's, weights, renal function, and electrolytes closely  Continued education on the importance of medication and dietary compliance given hx of CHF    Dayday Iniguez PA-C  2/6/2024  8:52 AM     Addendum:  Patient seen and examined  Agree with the assessment and the management plan  L 3  Dominguez Lu MD

## 2024-02-06 NOTE — PAYOR COMM NOTE
--------------  ADMISSION REVIEW     Payor: [employer] MEDICARE ADV PPO  Subscriber #:  [id number]  Authorization Number: [id number]    Admit date: 24  Admit time:  5:37 PM       REVIEW DOCUMENTATION:     ED Provider Notes        ED Provider Notes signed by Mando Barnett MD at 2024  1:16 AM       Author: Mando Barnett MD Service: -- Author Type: Physician    Filed: 2024  1:16 AM Date of Service: 2024  3:00 PM Status: Signed    : Mando Barnett MD (Physician)           Patient Seen in: [facility] 3w/    History     Chief Complaint   Patient presents with    Swelling Edema     Stated Complaint: Swelling    HPI    Patient complains of increased leg swelling and fatigue.  Sig swelling despite increasing diureetic to bid then tid.  No chest pain. No fever.  On eliquis.    Alleviating factors: none  Exacerbating factors: activity    Past Medical History:   Diagnosis Date    Coronary heart disease     2 stents in place, pstient sees Dr. 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)     right thumb    RLS (restless legs syndrome)        Past Surgical History:   Procedure Laterality Date    OTHER SURGICAL HISTORY      2 stents             Family History   Problem Relation Age of Onset    Cancer Father         sarcoma of back       Social History     Socioeconomic History    Marital status: Single    Number of children: 0   Occupational History    Occupation: diasable    Tobacco Use    Smoking status: Former     Packs/day: 1     Types: Cigarettes     Quit date: 1995     Years since quittin.3    Smokeless tobacco: Never   Vaping Use    Vaping Use: Never used   Substance and Sexual Activity    Alcohol use: No     Alcohol/week: 0.0 standard drinks of alcohol    Drug use: Never     Social Determinants of Health     Food Insecurity: No Food Insecurity (2024)    Food  Insecurity     Food Insecurity: Never true   Transportation Needs: No Transportation Needs (2/5/2024)    Transportation Needs     Lack of Transportation: No   Housing Stability: Low Risk  (2/5/2024)    Housing Stability     Housing Instability: No       Review of Systems    Positive for stated complaint: Swelling  Other systems are as noted in HPI.  Constitutional and vital signs reviewed.      All other systems reviewed and negative except as noted above.    PSFH elements reviewed from today and agreed except as otherwise stated in HPI.    Physical Exam     ED Triage Vitals [02/05/24 1404]   /68   Pulse 107   Resp 20   Temp 97.1 °F (36.2 °C)   Temp src Temporal   SpO2 99 %   O2 Device None (Room air)       Current:/78 (BP [facility]: Right arm)   Pulse 108   Temp 97.7 °F (36.5 °C) (Oral)   Resp 18   Ht 177.8 cm (5' 10\")   Wt 105.4 kg   SpO2 100%   BMI 33.33 kg/m²    PULSE OX nl  GENERAL: awake alert  HEAD: normocephalic, atraumatic,   EYES: PERRLA, EOMI, conj sclera clear  THROAT: mmm, no lesions  NECK: supple, no meningeal signs  LUNGS: no resp distress, cta bilateral  CARDIO: RRR without murmur  GI: abdomen is soft and non tender, no masses, nl bowel sounds   Rectal-heme + brown stool with some maroon blood noted heme +  EXTREMITIES: from, 5/5 strength in all 4 ext,2+ edema to b thigh  NEURO: alert and oiented *3, 2-12 intact, no focal deficit noted  SKIN: good skin turgor, no  rashes  PSYCH: calm, cooperative,    Differential includes:chf with poss renal failure and gi bleeding    ED Course     Labs Reviewed   COMP METABOLIC PANEL (14) - Abnormal; Notable for the following components:       Result Value    Glucose 187 (*)     Sodium 133 (*)     BUN 50 (*)     Creatinine 1.55 (*)     BUN/CREA Ratio 32.3 (*)     Calcium, Total 8.6 (*)     eGFR-Cr 49 (*)     Alkaline Phosphatase 158 (*)     Globulin  2.4 (*)     All other components within normal limits   BNP (B TYPE NATRIURETIC PEPTIDE) -  Abnormal; Notable for the following components:    Beta Natriuretic Peptide 1,168 (*)     All other components within normal limits   IRON AND TIBC - Abnormal; Notable for the following components:    Iron 19 (*)     Total Iron Binding Capacity 495 (*)     % Saturation 4 (*)     All other components within normal limits   FERRITIN - Abnormal; Notable for the following components:    Ferritin 14.0 (*)     All other components within normal limits   POCT GLUCOSE - Abnormal; Notable for the following components:    POC Glucose  151 (*)     All other components within normal limits   POCT GLUCOSE - Abnormal; Notable for the following components:    POC Glucose  170 (*)     All other components within normal limits   CBC W/ DIFFERENTIAL - Abnormal; Notable for the following components:    HGB 8.2 (*)     HCT 27.3 (*)     MCV 70.7 (*)     MCH 21.2 (*)     MCHC 30.0 (*)     RDW 17.3 (*)     Lymphocyte Absolute 0.92 (*)     All other components within normal limits   CBC WITH DIFFERENTIAL WITH PLATELET    Narrative:     The following orders were created for panel order CBC With Differential With Platelet.  Procedure                               Abnormality         Status                     ---------                               -----------         ------                     CBC W/ DIFFERENTIAL[[id number]]          Abnormal            Final result                 Please view results for these tests on the individual orders.   HEMOGLOBIN A1C    Narrative:     [facility]   HEMOGLOBIN A1C   BASIC METABOLIC PANEL (8)   CBC WITH DIFFERENTIAL WITH PLATELET   MAGNESIUM   TYPE AND SCREEN    Narrative:     The following orders were created for panel order Type and screen.  Procedure                               Abnormality         Status                     ---------                               -----------         ------                     ABORH (Blood Type)[[id number]]                                Final result               Antibody Screen[[id number]]                                  Final result                 Please view results for these tests on the individual orders.   ABORH (BLOOD TYPE)   ANTIBODY SCREEN   RAINBOW DRAW BLUE     No results found.    [facility]       Cardiac Monitor:   Pulse Readings from Last 1 Encounters:   02/05/24 108   , sinus, tachy 106  interpreted by me.    Radiology findings:   No results found.        Medical Decision Making  Consult GI Dr. Cerda.  Consulted cardiology [facility] and spoke with Dr. Rockwell in ER for admission orders.    Problems Addressed:  Acute on chronic congestive heart failure, unspecified heart failure type (HCC): acute illness or injury that poses a threat to life or bodily functions  Anemia, unspecified type: acute illness or injury     Details: Hgb dropped > 3 gm since last hgb few months ago hx sigmoid colon cancer on eliquis  Rectal bleeding: acute illness or injury with systemic symptoms that poses a threat to life or bodily functions     Details: Dec hgb on eliquis likely due to colon cancer    Amount and/or Complexity of Data Reviewed  External Data Reviewed: labs.     Details: Hgb was > 10 now 8.2  Labs: ordered. Decision-making details documented in ED Course.    Risk  Decision regarding hospitalization.        Disposition and Plan     Clinical Impression:  1. Anemia, unspecified type    2. Rectal bleeding    3. Acute on chronic congestive heart failure, unspecified heart failure type (HCC)        Disposition:  Admit    Follow-up:  No follow-up provider specified.    Medications Prescribed:  Current Discharge Medication List          Hospital Problems       Present on Admission  Date Reviewed: 2/20/2023            ICD-10-CM Noted POA    * (Principal) Anemia, unspecified type D64.9 2/5/2024 Unknown    Acute on chronic congestive heart failure, unspecified heart failure type (HCC) I50.9 2/5/2024 Unknown    Rectal bleeding K62.5 2/5/2024 Unknown                   Signed by Mando Barnett MD on 2/6/2024  1:16 AM     HISTORY AND PHYSICAL EXAMINATION   ASSESSMENT:    1.       Gastrointestinal blood loss.  Rule out peptic ulcer disease.  Patient reported colonoscopy done in the last 2 years which was unremarkable per his report.    2.       Ischemic cardiomyopathy with acute on chronic systolic heart failure.  3.       Diabetes mellitus type 2.  4.       Coronary artery disease.  5.       Chronic kidney disease stage 3.     PLAN:  Patient will be admitted to telemetry floor.  IV Bumex.  Monitor his hemodynamic status.  We will hold aspirin and Plavix and Eliquis after reviewing his home medications.  Obtain cardiology and gastroenterology consult.  Place him on IV Protonix.  Clear liquid diet.  Monitor Accu-Cheks.  Further recommendations to follow.   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     #Anemia  #Adenocarcinoma of sigmoid colon  -Labs on admission notable for Hgb 8.2, BNP 1168, BUN 50, Cr 1.55, Na 133.  -Pt presents with acute CHF exacerbation but noted to have anemia.  He remains on eliquis as well as DAPT, which he has taken in the past 24 hours.  He denies melena, hematochezia, or BRBPR but notes mucus in his stool for the past 2-3 weeks.  He reports orthopnea/SOB.  He has a hx of syncopal episodes and falls at home but denies any recent episodes.  He denies weight loss, N/V, heartburn, ABD/rectal pain, and constipation/diarrhea, No NSAID use.  -Last EGD/CLN 7/2024 with no significant findings on EGD but noted sigmoid adenocarcinoma that has since been resected.  Pt denies radiation or chemotherapy.  -Pt deferred rectal exam for this provider,  discussed exam completed by ED physician, which showed brown stool.  -No current iron studies noted but Pt has Hx of RADHA  -Possible component of acute on chronic anemia with CKD, GI loss on blood thinners, or recurrence of colon CA.  -Would not recommend endoscopic evaluation at this time given acute CHF exacerbation.  Will need cardiac clearance and improvement in clinical status prior to procedures.     Recommend:  -Iron studies, IV venofer if indicated  -PPI BID  -Trend CBC  -Monitor for overt GIB  -Pending cardiac clearance and clinical course, possible endoscopic evaluation   . At this time, give IV venofer, transfuse if hgb < 7, empiric PPI. Considerations for EGD and colonoscopy this admission pending hgb trend.    Acute on chronic CHF/ Ischemic cardiomyopathy:  Coronary artery disease status post PCI and stents and CABG   admitted for CHF exacerbation.  Moderately reduced EF at 30%  BNP 1165  Order serial Troponin I  Asa 81 mg daily  Atorvastatin 40 mg daily  Bumex 1mg IV Bid  12 lead EKG  2D Echo to check LV function  Asa & Plavix -hold     HTN  Continue   Coreg & Entresto     CKD stage 3     History of PE  Eliquis on hold     Anemia & GI bleed  Follow H/H closely  Suggest GI input   2/6  Assessment/Plan:  CAD s/p CABG (LIMA-LAD, SVG-Diag, SVG -OM 2/2019) with stents prior (most recent LAD 2/2019)  Troponin negative, no CP  OP cardiology note advises Eliquis alone 9/14/2023  Reportedly had been on Plavix, ASA, and Eliquis all on hold with presenting anemia.   Continue statin, Zetia, and BB  Acute on Chronic HFrEF LVEF 25-30%  BNP 1,100 at presentation  On IV bumex 2 mg BID  -500ml since admit, Cr stable  Breathing comfortably on room air  Entresto, aldactone, and Coreg as tolerated by BP, renal function, and electrolytes  Adenocarcinoma sigmoid colon s/p sigmoidectomy 10/2024  CKD IV  Baseline ~ 1.6 - stable  R PE 4/2019 on Eliquis  HTN  At goal  DM II  Hx LV thrombus echocardiogram 12/2021  No indication  of continued thrombus on repeat imaging  To remain on Eliquis per outpatient records  HLD  Continue statin and Zetia  Anemia  Improving  Intermittent medical compliance  Refused ICD in past, poor medication/ appt compliance per outpatient records, and initially had declined sigmoid resection in 2019        PLAN  Restart Eliquis alone once primary team and GI feel bleeding risk is reasonable  Continue IV diuresis  Reintroduce cardiac medication as tolerated by BP, HR, renal function, and electrolytes - carvedilol 25 mg BID today  Continue atorvastatin and Zetia for lipid management  Follow I/O's, weights, renal function, and electrolytes closely  Continued education on the importance of medication and dietary compliance given hx of CHF  Assessment and Plan:   68M with history of sigmoid adenocarcinoma s/p resection 10/2023, CHF EF 30%, CAD s/p CABG/PCI on DAPT, PE on ELIQUIS who presents with LE swelling and SOB. Currently undergoing diuresis for CHF exacerbation. GI consulted fo anemia, no overt bleeding.  Last EGD and colonoscopy 7/2023 at time of CRC diagnosis.      # RADHA  No overt bleeding  In setting of DAPT, ELIQUIS  Hgb 8.7 today, has not required blood transfusion during admission   Considerations for EGD/colonoscopy +/- VCE during admission [favor this approach given need to resume DAPT/ELIQUIS] or as outpatient once euvolemic  MEDICATIONS ADMINISTERED IN LAST 1 DAY:  bumetanide (Bumex) 0.25 MG/ML injection 2 mg       Date Action Dose Route User    2/6/2024 0837 Given 2 mg Intravenous Luther Moeller RN    2/5/2024 1803 Given 2 mg Intravenous Luther Moeller RN          carvedilol (Coreg) tab 12.5 mg       Date Action Dose Route User    2/6/2024 1021 Given 12.5 mg Oral Luther Moeller RN          insulin aspart (NovoLOG) 100 Units/mL FlexPen 1-7 Units       Date Action Dose Route User    2/6/2024 1200 Given 4 Units Subcutaneous (Left Lower Arm) Luther Moeller RN    2/5/2024 1853 Given 1 Units  Subcutaneous (Right Upper Arm) Luther Moeller RN          melatonin tab 6 mg       Date Action Dose Route User    2/6/2024 0058 Given 6 mg Oral Jade Aguilar RN          pantoprazole (Protonix) 40 mg in sodium chloride 0.9% PF 10 mL IV push       Date Action Dose Route User    2/5/2024 1544 Given 40 mg Intravenous Meghna Flores RN          pantoprazole (Protonix) 40 mg in sodium chloride 0.9% PF 10 mL IV push       Date Action Dose Route User    2/6/2024 0837 Given 40 mg Intravenous Luther Moeller RN          iron sucrose (Venofer) 300 mg in sodium chloride 0.9% 250 mL IVPB       Date Action Dose Route User    2/6/2024 1022 New Bag 300 mg Intravenous Luther Moeller RN            Vitals (last day)       Date/Time Temp Pulse Resp BP SpO2 Weight O2 Device O2 Flow Rate (L/min) Who          CIWA Scores (since admission)       None          PLEASE FAX DAYS CERTIFIED AND NEXT REVIEW DATE [phone]

## 2024-02-06 NOTE — DIETARY NOTE
NUTRITION EDUCATION NOTE     Received consult for heart failure diet education. Declined verbal discussion at this time. Provided with Low Sodium Nutrition Therapy NCM handout to reinforce. Receptive. States he has received heart healthy diet education in the past. Expect fair compliance.        Jaz Ferrari RD, LDN  Clinical Dietitian  P: [phone]

## 2024-02-06 NOTE — PROGRESS NOTES
[facility]     Gastroenterology Progress Note    Cristhian Lopez Patient Status:  Inpatient    9/3/1955 MRN [id number]   [facility] [facility] 3W/SW Attending Manuel Lofton MD   Hosp Day # 1 PCP None Pcp       Subjective:   Miya overnight  Breathing fine  Legs remain swollen  No blood in stool  No abdominal pain, n/v     Objective:   Blood pressure 111/72, pulse 110, temperature 98.2 °F (36.8 °C), temperature source Oral, resp. rate 20, height 5' 10\" (1.778 m), weight 232 lb (105.2 kg), SpO2 95%. Body mass index is 33.29 kg/m².    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 Currently undergoing diuresis for CHF exacerbation. GI consulted fo anemia, no overt bleeding.  Last EGD and colonoscopy 2023 at time of CRC diagnosis.     # RADHA  No overt bleeding  In setting of DAPT, ELIQUIS  Hgb 8.7 today, has not required blood transfusion during admission   Considerations for EGD/colonoscopy +/- VCE during admission [favor this approach given need to resume DAPT/ELIQUIS] or as outpatient once euvolemic       Minna Cerda MD  [facility] Gastroenterology      Results:     Lab Results   Component Value Date    WBC 8.3 2024    HGB 8.7 (L) 2024    HCT 28.5 (L) 2024    .0 2024    CREATSERUM 1.69 (H) 2024    BUN 50 (H) 2024     (L) 2024    K 4.9 2024     2024    CO2 24.0 2024     (H) 2024    CA 9.2  02/06/2024    ALB 3.7 02/05/2024    ALKPHO 158 (H) 02/05/2024    BILT 1.0 02/05/2024    TP 6.1 02/05/2024    AST 27 02/05/2024    ALT 37 02/05/2024    TSH 0.397 12/25/2021    PSA 9.27 (H) 02/20/2023    DDIMER 0.93 (H) 12/22/2021    MG 2.2 02/06/2024    PHOS 3.3 12/24/2021    TROP <0.045 08/25/2021     (H) 05/29/2023    B12 389 12/25/2021    ETOH <3 05/29/2023       No results found.

## 2024-02-06 NOTE — PLAN OF CARE
Melatonin administered for insomnia overnight. SBA to bathroom. No overt signs of bleeding from rectum or stool overnight. Con't IV bumex. Trend Hgb. Plan for 2D echo in AM.    Problem: Patient Centered Care  Goal: Patient preferences are identified and integrated in the patient's plan of care  Description: Interventions:  - What would you like us to know as we care for you? From home alone. Johnny is a retired   - Provide timely, complete, and accurate information to patient/family  - Incorporate patient and family knowledge, values, beliefs, and cultural backgrounds into the planning and delivery of care  - Encourage patient/family to participate in care and decision-making at the level they choose  - Honor patient and family perspectives and choices  Outcome: Progressing     Problem: Diabetes/Glucose Control  Goal: Glucose maintained within prescribed range  Description: INTERVENTIONS:  - Monitor Blood Glucose as ordered  - Assess for signs and symptoms of hyperglycemia and hypoglycemia  - Administer ordered medications to maintain glucose within target range  - Assess barriers to adequate nutritional intake and initiate nutrition consult as needed  - Instruct patient on self management of diabetes  Outcome: Progressing     Problem: Patient/Family Goals  Goal: Patient/Family Long Term Goal  Description: Patient's Long Term Goal: \"to maintain my health so I don't have to be hospitalized\"    Interventions:  - Take medications as prescribed at discharge  - Follow up with PCP and cardiology   - Maintain a low Na, heart healthy diet   - See additional Care Plan goals for specific interventions  Outcome: Progressing  Goal: Patient/Family Short Term Goal  Description: Patient's Short Term Goal: \"to be less SOB and have less swelling in my legs\"    Interventions:   - IV bumex  - 2000 fluid restriction  - I&O  - Daily weights  - See additional Care Plan goals for specific interventions  Outcome: Progressing      Problem: CARDIOVASCULAR - ADULT  Goal: Maintains optimal cardiac output and hemodynamic stability  Description: INTERVENTIONS:  - Monitor vital signs, rhythm, and trends  - Monitor for bleeding, hypotension and signs of decreased cardiac output  - Evaluate effectiveness of vasoactive medications to optimize hemodynamic stability  - Monitor arterial and/or venous puncture sites for bleeding and/or hematoma  - Assess quality of pulses, skin color and temperature  - Assess for signs of decreased coronary artery perfusion - ex. Angina  - Evaluate fluid balance, assess for edema, trend weights  Outcome: Progressing  Goal: Absence of cardiac arrhythmias or at baseline  Description: INTERVENTIONS:  - Continuous cardiac monitoring, monitor vital signs, obtain 12 lead EKG if indicated  - Evaluate effectiveness of antiarrhythmic and heart rate control medications as ordered  - Initiate emergency measures for life threatening arrhythmias  - Monitor electrolytes and administer replacement therapy as ordered  Outcome: Progressing     Problem: METABOLIC/FLUID AND ELECTROLYTES - ADULT  Goal: Hemodynamic stability and optimal renal function maintained  Description: INTERVENTIONS:  - Monitor labs and assess for signs and symptoms of volume excess or deficit  - Monitor intake, output and patient weight  - Monitor urine specific gravity, serum osmolarity and serum sodium as indicated or ordered  - Monitor response to interventions for patient's volume status, including labs, urine output, blood pressure (other measures as available)  - Encourage oral intake as appropriate  - Instruct patient on fluid and nutrition restrictions as appropriate  Outcome: Progressing

## 2024-02-06 NOTE — ED PROVIDER NOTES
Patient Seen in: [facility] 3w/sw    History     Chief Complaint   Patient presents with    Swelling Edema     Stated Complaint: Swelling    HPI    Patient complains of increased leg swelling and fatigue.  Sig swelling despite increasing diureetic to bid then tid.  No chest pain. No fever.  On eliquis.    Alleviating factors: none  Exacerbating factors: activity    Past Medical History:   Diagnosis Date    Coronary heart disease     2 stents in place, pstient sees Dr. 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)     right thumb    RLS (restless legs syndrome)        Past Surgical History:   Procedure Laterality Date    OTHER SURGICAL HISTORY      2 stents             Family History   Problem Relation Age of Onset    Cancer Father         sarcoma of back       Social History     Socioeconomic History    Marital status: Single    Number of children: 0   Occupational History    Occupation: [employer]   Tobacco Use    Smoking status: Former     Packs/day: 1     Types: Cigarettes     Quit date: 1995     Years since quittin.3    Smokeless tobacco: Never   Vaping Use    Vaping Use: Never used   Substance and Sexual Activity    Alcohol use: No     Alcohol/week: 0.0 standard drinks of alcohol    Drug use: Never     Social Determinants of Health     Food Insecurity: No Food Insecurity (2024)    Food Insecurity     Food Insecurity: Never true   Transportation Needs: No Transportation Needs (2024)    Transportation Needs     Lack of Transportation: No   Housing Stability: Low Risk  (2024)    Housing Stability     Housing Instability: No       Review of Systems    Positive for stated complaint: Swelling  Other systems are as noted in HPI.  Constitutional and vital signs reviewed.      All other systems reviewed and negative except as noted above.    PSFH elements reviewed from  today and agreed except as otherwise stated in HPI.    Physical Exam     ED Triage Vitals [02/05/24 1404]   /68   Pulse 107   Resp 20   Temp 97.1 °F (36.2 °C)   Temp src Temporal   SpO2 99 %   O2 Device None (Room air)       Current:/78 (BP [facility]: Right arm)   Pulse 108   Temp 97.7 °F (36.5 °C) (Oral)   Resp 18   Ht 177.8 cm (5' 10\")   Wt 105.4 kg   SpO2 100%   BMI 33.33 kg/m²    PULSE OX nl  GENERAL: awake alert  HEAD: normocephalic, atraumatic,   EYES: PERRLA, EOMI, conj sclera clear  THROAT: mmm, no lesions  NECK: supple, no meningeal signs  LUNGS: no resp distress, cta bilateral  CARDIO: RRR without murmur  GI: abdomen is soft and non tender, no masses, nl bowel sounds   Rectal-heme + brown stool with some maroon blood noted heme +  EXTREMITIES: from, 5/5 strength in all 4 ext,2+ edema to b thigh  NEURO: alert and oiented *3, 2-12 intact, no focal deficit noted  SKIN: good skin turgor, no  rashes  PSYCH: calm, cooperative,    Differential includes:chf with poss renal failure and gi bleeding    ED Course     Labs Reviewed   COMP METABOLIC PANEL (14) - Abnormal; Notable for the following components:       Result Value    Glucose 187 (*)     Sodium 133 (*)     BUN 50 (*)     Creatinine 1.55 (*)     BUN/CREA Ratio 32.3 (*)     Calcium, Total 8.6 (*)     eGFR-Cr 49 (*)     Alkaline Phosphatase 158 (*)     Globulin  2.4 (*)     All other components within normal limits   BNP (B TYPE NATRIURETIC PEPTIDE) - Abnormal; Notable for the following components:    Beta Natriuretic Peptide 1,168 (*)     All other components within normal limits   IRON AND TIBC - Abnormal; Notable for the following components:    Iron 19 (*)     Total Iron Binding Capacity 495 (*)     % Saturation 4 (*)     All other components within normal limits   FERRITIN - Abnormal; Notable for the following components:    Ferritin 14.0 (*)     All other components within normal limits   POCT GLUCOSE - Abnormal; Notable for the  following components:    POC Glucose  151 (*)     All other components within normal limits   POCT GLUCOSE - Abnormal; Notable for the following components:    POC Glucose  170 (*)     All other components within normal limits   CBC W/ DIFFERENTIAL - Abnormal; Notable for the following components:    HGB 8.2 (*)     HCT 27.3 (*)     MCV 70.7 (*)     MCH 21.2 (*)     MCHC 30.0 (*)     RDW 17.3 (*)     Lymphocyte Absolute 0.92 (*)     All other components within normal limits   CBC WITH DIFFERENTIAL WITH PLATELET    Narrative:     The following orders were created for panel order CBC With Differential With Platelet.  Procedure                               Abnormality         Status                     ---------                               -----------         ------                     CBC W/ DIFFERENTIAL[[id number]]          Abnormal            Final result                 Please view results for these tests on the individual orders.   HEMOGLOBIN A1C    Narrative:     Hemoglobin A1C to be confirmed at [facility]   HEMOGLOBIN A1C   BASIC METABOLIC PANEL (8)   CBC WITH DIFFERENTIAL WITH PLATELET   MAGNESIUM   TYPE AND SCREEN    Narrative:     The following orders were created for panel order Type and screen.  Procedure                               Abnormality         Status                     ---------                               -----------         ------                     ABORH (Blood Type)[[id number]]                               Final result               Antibody Screen[[id number]]                                  Final result                 Please view results for these tests on the individual orders.   ABORH (BLOOD TYPE)   ANTIBODY SCREEN   RAINBOW DRAW BLUE     No results found.    [facility]       Cardiac Monitor:   Pulse Readings from Last 1 Encounters:   02/05/24 108   , sinus, tachy 106  interpreted by me.    Radiology findings:   No results found.        Medical Decision Making  Consult GI Dr. Cerda.  Consulted  cardiology [facility] and spoke with Dr. Rockwell in ER for admission orders.    Problems Addressed:  Acute on chronic congestive heart failure, unspecified heart failure type (HCC): acute illness or injury that poses a threat to life or bodily functions  Anemia, unspecified type: acute illness or injury     Details: Hgb dropped > 3 gm since last hgb few months ago hx sigmoid colon cancer on eliquis  Rectal bleeding: acute illness or injury with systemic symptoms that poses a threat to life or bodily functions     Details: Dec hgb on eliquis likely due to colon cancer    Amount and/or Complexity of Data Reviewed  External Data Reviewed: labs.     Details: Hgb was > 10 now 8.2  Labs: ordered. Decision-making details documented in ED Course.    Risk  Decision regarding hospitalization.        Disposition and Plan     Clinical Impression:  1. Anemia, unspecified type    2. Rectal bleeding    3. Acute on chronic congestive heart failure, unspecified heart failure type (HCC)        Disposition:  Admit    Follow-up:  No follow-up provider specified.    Medications Prescribed:  Current Discharge Medication List          Hospital Problems       Present on Admission  Date Reviewed: 2/20/2023            ICD-10-CM Noted POA    * (Principal) Anemia, unspecified type D64.9 2/5/2024 Unknown    Acute on chronic congestive heart failure, unspecified heart failure type (HCC) I50.9 2/5/2024 Unknown    Rectal bleeding K62.5 2/5/2024 Unknown

## 2024-02-07 LAB
ANION GAP SERPL CALC-SCNC: 11 MMOL/L (ref 0–18)
BASOPHILS # BLD AUTO: 0.05 X10(3) UL (ref 0–0.2)
BASOPHILS NFR BLD AUTO: 0.6 %
BUN BLD-MCNC: 58 MG/DL (ref 9–23)
BUN/CREAT SERPL: 31.4 (ref 10–20)
CALCIUM BLD-MCNC: 8.8 MG/DL (ref 8.7–10.4)
CHLORIDE SERPL-SCNC: 101 MMOL/L (ref 98–112)
CO2 SERPL-SCNC: 23 MMOL/L (ref 21–32)
CREAT BLD-MCNC: 1.85 MG/DL
DEPRECATED RDW RBC AUTO: 44.4 FL (ref 35.1–46.3)
EGFRCR SERPLBLD CKD-EPI 2021: 39 ML/MIN/1.73M2 (ref 60–?)
EOSINOPHIL # BLD AUTO: 0.03 X10(3) UL (ref 0–0.7)
EOSINOPHIL NFR BLD AUTO: 0.3 %
ERYTHROCYTE [DISTWIDTH] IN BLOOD BY AUTOMATED COUNT: 18.1 % (ref 11–15)
GLUCOSE BLD-MCNC: 197 MG/DL (ref 70–99)
GLUCOSE BLDC GLUCOMTR-MCNC: 190 MG/DL (ref 70–99)
GLUCOSE BLDC GLUCOMTR-MCNC: 241 MG/DL (ref 70–99)
GLUCOSE BLDC GLUCOMTR-MCNC: 277 MG/DL (ref 70–99)
GLUCOSE BLDC GLUCOMTR-MCNC: 355 MG/DL (ref 70–99)
HCT VFR BLD AUTO: 28.1 %
HGB BLD-MCNC: 8.7 G/DL
HGBA1C: 10.4 %
IMM GRANULOCYTES # BLD AUTO: 0.06 X10(3) UL (ref 0–1)
IMM GRANULOCYTES NFR BLD: 0.7 %
LYMPHOCYTES # BLD AUTO: 0.9 X10(3) UL (ref 1–4)
LYMPHOCYTES NFR BLD AUTO: 10.1 %
MCH RBC QN AUTO: 21.7 PG (ref 26–34)
MCHC RBC AUTO-ENTMCNC: 31 G/DL (ref 31–37)
MCV RBC AUTO: 70.1 FL
MONOCYTES # BLD AUTO: 1.05 X10(3) UL (ref 0.1–1)
MONOCYTES NFR BLD AUTO: 11.8 %
NEUTROPHILS # BLD AUTO: 6.79 X10 (3) UL (ref 1.5–7.7)
NEUTROPHILS # BLD AUTO: 6.79 X10(3) UL (ref 1.5–7.7)
NEUTROPHILS NFR BLD AUTO: 76.5 %
OSMOLALITY SERPL CALC.SUM OF ELEC: 302 MOSM/KG (ref 275–295)
PLATELET # BLD AUTO: 223 10(3)UL (ref 150–450)
POTASSIUM SERPL-SCNC: 4.6 MMOL/L (ref 3.5–5.1)
RBC # BLD AUTO: 4.01 X10(6)UL
SODIUM SERPL-SCNC: 135 MMOL/L (ref 136–145)
WBC # BLD AUTO: 8.9 X10(3) UL (ref 4–11)

## 2024-02-07 PROCEDURE — 99233 SBSQ HOSP IP/OBS HIGH 50: CPT | Performed by: INTERNAL MEDICINE

## 2024-02-07 PROCEDURE — 99233 SBSQ HOSP IP/OBS HIGH 50: CPT | Performed by: HOSPITALIST

## 2024-02-07 RX ORDER — ROPINIROLE 1 MG/1
1 TABLET, FILM COATED ORAL NIGHTLY
Status: DISCONTINUED | OUTPATIENT
Start: 2024-02-07 | End: 2024-02-11

## 2024-02-07 RX ORDER — UBIDECARENONE 75 MG
100 CAPSULE ORAL DAILY
Status: DISCONTINUED | OUTPATIENT
Start: 2024-02-07 | End: 2024-02-11

## 2024-02-07 RX ORDER — ALLOPURINOL 300 MG/1
300 TABLET ORAL DAILY
Status: DISCONTINUED | OUTPATIENT
Start: 2024-02-07 | End: 2024-02-11

## 2024-02-07 RX ORDER — BUMETANIDE 0.25 MG/ML
1 INJECTION INTRAMUSCULAR; INTRAVENOUS
Status: DISCONTINUED | OUTPATIENT
Start: 2024-02-07 | End: 2024-02-10

## 2024-02-07 RX ORDER — CLOPIDOGREL BISULFATE 75 MG/1
75 TABLET ORAL DAILY
Status: DISCONTINUED | OUTPATIENT
Start: 2024-02-07 | End: 2024-02-07

## 2024-02-07 RX ORDER — CLOTRIMAZOLE 1 %
CREAM (GRAM) TOPICAL 2 TIMES DAILY
Status: DISCONTINUED | OUTPATIENT
Start: 2024-02-07 | End: 2024-02-11

## 2024-02-07 RX ORDER — GABAPENTIN 100 MG/1
100 CAPSULE ORAL 3 TIMES DAILY
Status: DISCONTINUED | OUTPATIENT
Start: 2024-02-07 | End: 2024-02-11

## 2024-02-07 RX ORDER — PAROXETINE 10 MG/1
40 TABLET, FILM COATED ORAL EVERY MORNING
Status: DISCONTINUED | OUTPATIENT
Start: 2024-02-07 | End: 2024-02-11

## 2024-02-07 RX ORDER — QUETIAPINE FUMARATE 25 MG/1
50 TABLET, FILM COATED ORAL NIGHTLY
Status: DISCONTINUED | OUTPATIENT
Start: 2024-02-07 | End: 2024-02-11

## 2024-02-07 NOTE — PROGRESS NOTES
Progress Note  Cristhiandasha Lopez Patient Status:  Inpatient    9/3/1955 MRN [id number]   [facility] [facility] 3W/SW Attending Tavo Rueda MD   Hosp Day # 2 PCP None Pcp     Subjective:  Feeling tired and sleepy  Pt stated his swelling improved compared to before. Has some SOB occasionally      Objective:  /79 (BP [facility]: Right arm)   Pulse 89   Temp 98 °F (36.7 °C) (Oral)   Resp 16   Ht 5' 10\" (1.778 m)   Wt 233 lb 6.4 oz (105.9 kg)   SpO2 95%   BMI 33.49 kg/m²     Telemetry: NSR      Intake/Output:    Intake/Output Summary (Last 24 hours) at 2024 1155  Last data filed at 2024 1100  Gross per 24 hour   Intake 1180 ml   Output 925 ml   Net 255 ml       Last 3 Weights   24 0430 233 lb 6.4 oz (105.9 kg)   24 0405 232 lb (105.2 kg)   24 0400 229 lb (103.9 kg)   24 1753 232 lb 4.8 oz (105.4 kg)   24 1404 195 lb (88.5 kg)   23 0457 224 lb (101.6 kg)   23 0500 225 lb 3.2 oz (102.2 kg)   23 1727 227 lb 3.2 oz (103.1 kg)   23 0839 220 lb (99.8 kg)       Labs:  Recent Labs   Lab 24  1408 24  0724 24  0708   * 191* 197*   BUN 50* 50* 58*   CREATSERUM 1.55* 1.69* 1.85*   EGFRCR 49* 44* 39*   CA 8.6* 9.2 8.8   * 135* 135*   K 4.3 4.9 4.6    101 101   CO2 26.0 24.0 23.0     Recent Labs   Lab 24  1408 24  0724 24  0709   RBC 3.86 4.03 4.01   HGB 8.2* 8.7* 8.7*   HCT 27.3* 28.5* 28.1*   MCV 70.7* 70.7* 70.1*   MCH 21.2* 21.6* 21.7*   MCHC 30.0* 30.5* 31.0   RDW 17.3* 17.8* 18.1*   NEPRELIM 5.14 6.16 6.79   WBC 7.1 8.3 8.9   .0 224.0 223.0         No results for input(s): \"TROP\", \"TROPHS\", \"CK\" in the last 168 hours.  No results found for: \"PT\", \"INR\"    Diagnostics:         Review of Systems   Constitutional:  Positive for malaise/fatigue.   Respiratory: Negative.     Cardiovascular: Negative.        Physical Exam:    Physical Exam    Medications:   apixaban  5 mg Oral BID     carvedilol  12.5 mg Oral BID with meals    atorvastatin  40 mg Oral Nightly    ezetimibe  10 mg Oral Nightly    iron sucrose  300 mg Intravenous Daily    bumetanide  2 mg Intravenous BID (Diuretic)    insulin aspart  1-7 Units Subcutaneous TID CC    pantoprazole  40 mg Intravenous Daily         Assessment/Plan:  CAD s/p CABG (LIMA-LAD, SVG-Diag, SVG -OM 2/2019) with stents prior (most recent LAD 2/2019)  -Troponin negative, no CP  -OP cardiology note advises Eliquis alone 9/14/2023  -Reportedly had been on Plavix, ASA, and Eliquis all on hold with presenting anemia.  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    CKD IV   1.8 - stable    R PE 4/2019 on Eliquis    HTN  Well controlled    DM II  -hgb AIC of 10.4  -Mgt by Primary, on insulin    Hx LV thrombus echocardiogram 12/2021  -No indication of continued thrombus on repeat imaging  - on Eliquis at home, on hold due to GIB    HLD  -Continue statin and Zetia    Iron deficiency Anemia  -Hgb Stable    Intermittent medical compliance  -Refused ICD in past, poor medication/ appt compliance per outpatient records, and initially had declined sigmoid resection in 2019      Plan:  - Hgb stable,  - Per GI okay to restart DAPT today and  if hgb stable then resume Eliquis tomorrow.  -  creatinine trending up will reduce the Bumex dose to 1mg BID. Closely monitor kidney function  - continue with coreg, atorvastatin, Zetia   - closely monitor weight, standing weight only  - monitor intake and output strictly           ILYA Burgess  [facility]  2/7/2024  11:55 AM  Addendum:  Patient seen and examined  No acute distress  Comfortable on RA  H/H stable  OK to resume Plavix  Starting Eliquis in am if H/H  continues to be stable.    L3  Dominguez Lu MD

## 2024-02-07 NOTE — PROGRESS NOTES
[facility]  Progress Note     Cristhian Lopez  : 9/3/1955    Status: Inpatient  Day #: 2    Attending: Tavo Rueda MD  PCP: None Pcp     Assessment and Plan:    Iron deficiency anemia  Heme positive in ER.  Hx of colon cancer s/p sigmoidectomy in .  - Hgb stable. No transfusion this admission.  - GI on consult  - d/w cardiology to clarify recs for asa, plavix, eliquis. Was started on eliquis this morning when plan was for eliquis alone but now plan is for plavix as well. Will clarify with GI rec, but perhaps can cont eliquis and resume plavix tomorrow  - IV iron per GI  - cont PPI  - trend hgb  - IV iron per GI  - cont protonix  - possible endoscopies if hgb drops     Ischemic cardiomyopathy with acute on chronic systolic heart failure.  - cardiology on consult  - cont IV bumex per cards  - cont coreg with parameters  - echo shows EF 25% with grade 3 diastolic dysfunction      Hx of PE  - eliquis     DM2  - ISS     Hx of CAD  - plan for plavix  - cont coreg     Hx of CKD3  - monitor Cr with diuresis       DVT Mechanical Prophylaxis:        DVT Pharmacologic Prophylaxis   Medication   None               Subjective:      Initial Chief Complaint:  BASILIO and edema    Breathing easier. No CP. No abd pain or bloody BM.     10 point ROS completed and was negative, except for pertinent positive and negatives stated in subjective.      Objective:      Temp:  [97 °F (36.1 °C)-98.2 °F (36.8 °C)] 98.2 °F (36.8 °C)  Pulse:  [84-89] 86  Resp:  [16-18] 18  BP: ()/(60-79) 93/60  SpO2:  [93 %-99 %] 94 %  General:  Alert, no distress  HEENT:  Normocephalic, atraumatic  Cardiac:  Regular rate, regular rhythm  Pulmonary:  Clear to auscultation bilaterally, respirations unlabored  Gastrointestinal:  Soft, non-tender, normal bowel sounds  Musculoskeletal:  No joint swelling  Extremities:  No edema, no cyanosis, no clubbing  Neurologic:  nonfocal  Psychiatric:  Normal affect, calm and  appropriate    Intake/Output Summary (Last 24 hours) at 2/7/2024 1527  Last data filed at 2/7/2024 1443  Gross per 24 hour   Intake 1180 ml   Output 926 ml   Net 254 ml         Recent Labs   Lab 02/05/24  1408 02/06/24  0724 02/07/24  0709   WBC 7.1 8.3 8.9   HGB 8.2* 8.7* 8.7*   HCT 27.3* 28.5* 28.1*   .0 224.0 223.0   RBC 3.86 4.03 4.01   MCV 70.7* 70.7* 70.1*   MCH 21.2* 21.6* 21.7*   MCHC 30.0* 30.5* 31.0   RDW 17.3* 17.8* 18.1*   NEPRELIM 5.14 6.16 6.79     Recent Labs   Lab 02/05/24  1408 02/06/24  0724 02/07/24  0708   BUN 50* 50* 58*   CREATSERUM 1.55* 1.69* 1.85*   CA 8.6* 9.2 8.8   ALB 3.7  --   --    * 135* 135*   K 4.3 4.9 4.6    101 101   CO2 26.0 24.0 23.0   * 191* 197*   MG  --  2.2  --    BILT 1.0  --   --    AST 27  --   --    ALT 37  --   --    ALKPHO 158*  --   --    TP 6.1  --   --        No results found.      Medications:   bumetanide  1 mg Intravenous BID (Diuretic)    allopurinol  300 mg Oral Daily    cyanocobalamin  100 mcg Oral Daily    gabapentin  100 mg Oral TID    PARoxetine HCl  40 mg Oral QAM    QUEtiapine  50 mg Oral Nightly    rOPINIRole  1 mg Oral Nightly    clopidogrel  75 mg Oral Daily    carvedilol  12.5 mg Oral BID with meals    atorvastatin  40 mg Oral Nightly    ezetimibe  10 mg Oral Nightly    iron sucrose  300 mg Intravenous Daily    insulin aspart  1-7 Units Subcutaneous TID CC    pantoprazole  40 mg Intravenous Daily      PRN Meds: melatonin, diazePAM, zolpidem, acetaminophen, ondansetron, glucose **OR** glucose **OR** glucose-vitamin C **OR** dextrose **OR** glucose **OR** glucose **OR** glucose-vitamin C    Supplementary Documentation:        MDM High. Time spent on chart/note review, review of labs/imaging, discussion with patient, physical exam, discussion with staff, consultants, coordinating care, writing progress note, discussion of plan of care.      Tavo Rueda MD

## 2024-02-07 NOTE — PLAN OF CARE
Hgb stable. Pt very anxious and c/o sleepiness r/t not sleeping at night. Hypotensive this afternoon. MD notified. 250 Bolus given. Valium and ambiem ordered. Diet advanced to low fiber/soft. Continue to monitor labs. Asa/plavix/eliquis on hold.      Problem: Patient Centered Care  Goal: Patient preferences are identified and integrated in the patient's plan of care  Description: Interventions:  - What would you like us to know as we care for you? From home alone. Johnny is a retired   - Provide timely, complete, and accurate information to patient/family  - Incorporate patient and family knowledge, values, beliefs, and cultural backgrounds into the planning and delivery of care  - Encourage patient/family to participate in care and decision-making at the level they choose  - Honor patient and family perspectives and choices  Outcome: Progressing     Problem: Diabetes/Glucose Control  Goal: Glucose maintained within prescribed range  Description: INTERVENTIONS:  - Monitor Blood Glucose as ordered  - Assess for signs and symptoms of hyperglycemia and hypoglycemia  - Administer ordered medications to maintain glucose within target range  - Assess barriers to adequate nutritional intake and initiate nutrition consult as needed  - Instruct patient on self management of diabetes  Outcome: Progressing     Problem: Patient/Family Goals  Goal: Patient/Family Long Term Goal  Description: Patient's Long Term Goal: \"to maintain my health so I don't have to be hospitalized\"    Interventions:  - Take medications as prescribed at discharge  - Follow up with PCP and cardiology   - Maintain a low Na, heart healthy diet   - See additional Care Plan goals for specific interventions  Outcome: Progressing  Goal: Patient/Family Short Term Goal  Description: Patient's Short Term Goal: \"to be less SOB and have less swelling in my legs\"    Interventions:   - IV bumex  - 2000 fluid restriction  - I&O  - Daily weights  - See additional  Care Plan goals for specific interventions  Outcome: Progressing     Problem: CARDIOVASCULAR - ADULT  Goal: Maintains optimal cardiac output and hemodynamic stability  Description: INTERVENTIONS:  - Monitor vital signs, rhythm, and trends  - Monitor for bleeding, hypotension and signs of decreased cardiac output  - Evaluate effectiveness of vasoactive medications to optimize hemodynamic stability  - Monitor arterial and/or venous puncture sites for bleeding and/or hematoma  - Assess quality of pulses, skin color and temperature  - Assess for signs of decreased coronary artery perfusion - ex. Angina  - Evaluate fluid balance, assess for edema, trend weights  Outcome: Progressing  Goal: Absence of cardiac arrhythmias or at baseline  Description: INTERVENTIONS:  - Continuous cardiac monitoring, monitor vital signs, obtain 12 lead EKG if indicated  - Evaluate effectiveness of antiarrhythmic and heart rate control medications as ordered  - Initiate emergency measures for life threatening arrhythmias  - Monitor electrolytes and administer replacement therapy as ordered  Outcome: Progressing     Problem: METABOLIC/FLUID AND ELECTROLYTES - ADULT  Goal: Hemodynamic stability and optimal renal function maintained  Description: INTERVENTIONS:  - Monitor labs and assess for signs and symptoms of volume excess or deficit  - Monitor intake, output and patient weight  - Monitor urine specific gravity, serum osmolarity and serum sodium as indicated or ordered  - Monitor response to interventions for patient's volume status, including labs, urine output, blood pressure (other measures as available)  - Encourage oral intake as appropriate  - Instruct patient on fluid and nutrition restrictions as appropriate  Outcome: Progressing

## 2024-02-07 NOTE — PAYOR COMM NOTE
--------------  CONTINUED STAY REVIEW    Payor: [employer] MEDICARE ADV PPO  Subscriber #:  [id number]  Authorization Number: [id number]    Admit date: 2/5/24  Admit time:  5:37 PM    Admitting Physician: Luke Rockwell MD  Attending Physician:  Tavo Rueda MD  Primary Care Physician: Pcp, None    REVIEW DOCUMENTATION:  2/7  Assessment and Plan:   68M with history of sigmoid adenocarcinoma s/p resection 10/2023, CHF EF 30%, CAD s/p CABG/PCI on DAPT, PE on ELIQUIS who presents with LE swelling and SOB. Currently undergoing diuresis for CHF exacerbation. GI consulted fo anemia, no overt bleeding.  Last EGD and colonoscopy 7/2023 at time of CRC diagnosis.      # RADHA    In setting of DAPT, ELIQUIS  Hgb 8.7 today  Considerations for EGD/colonoscopy +/- VCE during admission or as outpatient once euvolemic -- I discussed the options above with patient and he favors outpatient approach which is not unreasonable if hgb remains stable after resumption of antiplatelets and anticoagulation  Ok to restart DAPT today and if hgb stable tomorrow then can resume ELIQUIS tomorrow  Continue cardiac optimization        MEDICATIONS ADMINISTERED IN LAST 1 DAY:  atorvastatin (Lipitor) tab 40 mg       Date Action Dose Route User    2/6/2024 2200 Given 40 mg Oral Stephanie Smith RN          bumetanide (Bumex) 0.25 MG/ML injection 2 mg       Date Action Dose Route User    2/6/2024 1712 Given 2 mg Intravenous Nateras, Janet    2/6/2024 0837 Given 2 mg Intravenous Luther Moeller RN          carvedilol (Coreg) tab 12.5 mg       Date Action Dose Route User    2/6/2024 1021 Given 12.5 mg Oral Luther Moeller RN          diazePAM (Valium) tab 5 mg       Date Action Dose Route User    2/6/2024 1524 Given 5 mg Oral Luther Moeller RN          ezetimibe (Zetia) tab 10 mg       Date Action Dose Route User    2/6/2024 2200 Given 10 mg Oral Stephanie Smith RN          insulin aspart (NovoLOG) 100 Units/mL FlexPen 1-7 Units       Date Action Dose  Route User    2/6/2024 1829 Given 2 Units Subcutaneous (Right Lower Arm) Luther Moeller RN    2/6/2024 1200 Given 4 Units Subcutaneous (Left Lower Arm) Luther Moeller RN          melatonin tab 6 mg       Date Action Dose Route User    2/6/2024 2200 Given 6 mg Oral Stephanie Smith RN          ondansetron (Zofran) 4 MG/2ML injection 4 mg       Date Action Dose Route User    2/6/2024 1523 Given 4 mg Intravenous Luther Moeller RN          pantoprazole (Protonix) 40 mg in sodium chloride 0.9% PF 10 mL IV push       Date Action Dose Route User    2/6/2024 0837 Given 40 mg Intravenous Luther Moeller RN          Perflutren Lipid Microsphere (DEFINITY) 6.52 MG/ML injection 1.5 mL       Date Action Dose Route User    2/6/2024 1526 Given 1.5 mL Intravenous Pyrchalla, Thi          sodium chloride 0.9% infusion       Date Action Dose Route User    2/6/2024 1528 New Bag (none) Intravenous Luther Moeller RN          iron sucrose (Venofer) 300 mg in sodium chloride 0.9% 250 mL IVPB       Date Action Dose Route User    2/6/2024 1022 New Bag 300 mg Intravenous Luther Moeller RN          zolpidem (Ambien) tab 5 mg       Date Action Dose Route User    2/6/2024 2200 Given 5 mg Oral Stephanie Smith RN            Vitals (last day)       Date/Time Temp Pulse Resp BP SpO2 Weight O2 Device O2 Flow Rate (L/min) [facility]    02/06/24 1500 -- -- -- 76/48 -- -- -- -- [place]    02/06/24 1400 97.3 °F (36.3 °C) -- 18 82/61 98 % -- None (Room air) -- [place]          CIWA Scores (since admission)       None              PLEASE FAX DAYS CERTIFIED AND NEXT REVIEW DATE [phone]

## 2024-02-07 NOTE — PLAN OF CARE
No complains of pain, BP stable at night 95s-100s sbp. Pt slept on and off with prn medications, very restless at night and mentioned at 0600 his restless legs keep him awake at night along with pain in his legs. IV bumex continued, plan is for possible colonoscopy & EGD. Call light within reach, fall precautions in place. No acute changes.    Problem: Patient Centered Care  Goal: Patient preferences are identified and integrated in the patient's plan of care  Description: Interventions:  - What would you like us to know as we care for you? From home alone. Johnny is a retired   - Provide timely, complete, and accurate information to patient/family  - Incorporate patient and family knowledge, values, beliefs, and cultural backgrounds into the planning and delivery of care  - Encourage patient/family to participate in care and decision-making at the level they choose  - Honor patient and family perspectives and choices  Outcome: Progressing     Problem: Diabetes/Glucose Control  Goal: Glucose maintained within prescribed range  Description: INTERVENTIONS:  - Monitor Blood Glucose as ordered  - Assess for signs and symptoms of hyperglycemia and hypoglycemia  - Administer ordered medications to maintain glucose within target range  - Assess barriers to adequate nutritional intake and initiate nutrition consult as needed  - Instruct patient on self management of diabetes  Outcome: Progressing     Problem: Patient/Family Goals  Goal: Patient/Family Long Term Goal  Description: Patient's Long Term Goal: \"to maintain my health so I don't have to be hospitalized\"    Interventions:  - Take medications as prescribed at discharge  - Follow up with PCP and cardiology   - Maintain a low Na, heart healthy diet   - See additional Care Plan goals for specific interventions  Outcome: Progressing  Goal: Patient/Family Short Term Goal  Description: Patient's Short Term Goal: \"to be less SOB and have less swelling in my  legs\"    Interventions:   - IV bumex  - 2000 fluid restriction  - I&O  - Daily weights  - See additional Care Plan goals for specific interventions  Outcome: Progressing     Problem: CARDIOVASCULAR - ADULT  Goal: Maintains optimal cardiac output and hemodynamic stability  Description: INTERVENTIONS:  - Monitor vital signs, rhythm, and trends  - Monitor for bleeding, hypotension and signs of decreased cardiac output  - Evaluate effectiveness of vasoactive medications to optimize hemodynamic stability  - Monitor arterial and/or venous puncture sites for bleeding and/or hematoma  - Assess quality of pulses, skin color and temperature  - Assess for signs of decreased coronary artery perfusion - ex. Angina  - Evaluate fluid balance, assess for edema, trend weights  Outcome: Progressing  Goal: Absence of cardiac arrhythmias or at baseline  Description: INTERVENTIONS:  - Continuous cardiac monitoring, monitor vital signs, obtain 12 lead EKG if indicated  - Evaluate effectiveness of antiarrhythmic and heart rate control medications as ordered  - Initiate emergency measures for life threatening arrhythmias  - Monitor electrolytes and administer replacement therapy as ordered  Outcome: Progressing     Problem: METABOLIC/FLUID AND ELECTROLYTES - ADULT  Goal: Hemodynamic stability and optimal renal function maintained  Description: INTERVENTIONS:  - Monitor labs and assess for signs and symptoms of volume excess or deficit  - Monitor intake, output and patient weight  - Monitor urine specific gravity, serum osmolarity and serum sodium as indicated or ordered  - Monitor response to interventions for patient's volume status, including labs, urine output, blood pressure (other measures as available)  - Encourage oral intake as appropriate  - Instruct patient on fluid and nutrition restrictions as appropriate  Outcome: Progressing

## 2024-02-07 NOTE — PROGRESS NOTES
[facility]     Gastroenterology Progress Note    Cristhian Lopez Patient Status:  Inpatient    9/3/1955 MRN [id number]   [facility] [facility] 3W/SW Attending Manuel Lofton MD   Hosp Day # 2 PCP None Pcp       Subjective:   Miya overnight  Breathing better   States legs swelling is improving but still profound  No abd pain  No blood in stools, he has never seen blood in stools     Objective:   Blood pressure 109/79, pulse 89, temperature 97 °F (36.1 °C), temperature source Oral, resp. rate 18, height 5' 10\" (1.778 m), weight 233 lb 6.4 oz (105.9 kg), SpO2 99%. Body mass index is 33.49 kg/m².    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 Currently undergoing diuresis for CHF exacerbation. GI consulted fo anemia, no overt bleeding.  Last EGD and colonoscopy 2023 at time of CRC diagnosis.     # RADHA  No overt bleeding  In setting of DAPT, ELIQUIS  Hgb 8.7 today, has not required blood transfusion during admission   Considerations for EGD/colonoscopy +/- VCE during admission or as outpatient once euvolemic -- I discussed the options above with patient and he favors outpatient approach which is not unreasonable if hgb remains stable after resumption of antiplatelets and anticoagulation  Ok to restart DAPT today and if hgb stable tomorrow then can resume ELIQUIS tomorrow  Continue cardiac optimization       Minna Cerda MD  [facility] Gastroenterology      Results:     Lab Results    Component Value Date    WBC 8.9 02/07/2024    HGB 8.7 (L) 02/07/2024    HCT 28.1 (L) 02/07/2024    .0 02/07/2024    CREATSERUM 1.69 (H) 02/06/2024    BUN 50 (H) 02/06/2024     (L) 02/06/2024    K 4.9 02/06/2024     02/06/2024    CO2 24.0 02/06/2024     (H) 02/06/2024    CA 9.2 02/06/2024    ALB 3.7 02/05/2024    ALKPHO 158 (H) 02/05/2024    BILT 1.0 02/05/2024    TP 6.1 02/05/2024    AST 27 02/05/2024    ALT 37 02/05/2024    TSH 0.397 12/25/2021    PSA 9.27 (H) 02/20/2023    DDIMER 0.93 (H) 12/22/2021    MG 2.2 02/06/2024    PHOS 3.3 12/24/2021    TROP <0.045 08/25/2021     (H) 05/29/2023    B12 389 12/25/2021    ETOH <3 05/29/2023       No results found.

## 2024-02-07 NOTE — OCCUPATIONAL THERAPY NOTE
OCCUPATIONAL THERAPY EVALUATION - INPATIENT     Room Number: 336/336-A  Evaluation Date: 2/7/2024  Type of Evaluation: Initial  Presenting Problem: anemia, rectal bleeding, acute on chronic CHF  Hx CAD s/p CABG, adenocarcinoma of sigmoid colon s/p sigmoidectomy, HTN, DM2, CKD3     Physician Order: IP Consult to Occupational Therapy  Reason for Therapy: ADL/IADL Dysfunction and Discharge Planning    OCCUPATIONAL THERAPY ASSESSMENT   Patient is a 68 year old male admitted 2/5/2024 for anemia, rectal bleeding, acute on chronic CHF. Prior to admission, patient is independent with ADLs, driving, and fx mobility without a device. Patient is currently functioning near baseline with  ADLs/fx mobility . Patient is requiring contact guard assist as a result of the following impairments: decreased endurance and limited R shoulder strength/ROM . Occupational Therapy will continue to follow for duration of hospitalization.    Patient will benefit from continued skilled OT Services at discharge to promote functional independence and safety with additional support and return home with OP OT to address R shoulder deficits.    PLAN  OT Treatment Plan: Energy conservation/work simplification techniques;Balance activities;ADL training;Functional transfer training;Endurance training;Patient/Family training;Patient/Family education;Equipment eval/education;Compensatory technique education  OT Device Recommendations: TBD    OCCUPATIONAL THERAPY MEDICAL/SOCIAL HISTORY   Problem List  Principal Problem:    Anemia, unspecified type  Active Problems:    Rectal bleeding    Acute on chronic congestive heart failure, unspecified heart failure type (HCC)    Iron deficiency anemia due to chronic blood loss    HOME SITUATION  Type of Home: House  Home Layout: One level  Lives With: Alone  Shower/Tub and Equipment: Walk-in shower (bench)  Occupation/Status: Former  on disability  Hand Dominance: Right  Drives: Yes  Stairs in Home: 1  CHIVO  Use of Assistive Device(s): None; owns RW    Prior Level of [place]: Independent with ADLs, driving, and fx mobility without a device. Reported he has a friend who is able to provide initial 24 hour assist upon discharge.    SUBJECTIVE  \"I'm getting out of here before the Super Bowl.\"    OCCUPATIONAL THERAPY EXAMINATION    OBJECTIVE  Precautions: Bed/chair alarm  Fall Risk: Standard fall risk    PAIN ASSESSMENT  Rating: -- (not rated)  [facility]: chronic back and R shoulder pain d/t \"torn rotator cuff\"  Management Techniques: Activity promotion; Repositioning    ACTIVITY TOLERANCE  Pulse: 86  Heart Rate Source: Monitor     BP: 93/60 (post-ax; patient denied dizziness)  BP [facility]: Right arm  BP Method: Automatic  Patient Position: Sitting    O2 SATURATIONS  Oxygen Therapy  SPO2% Ambulation on Room Air: 92    COGNITION  Overall Cognitive Status:  WFL - within functional limits    SENSATION  Light touch:  intact    RANGE OF MOTION   Upper extremity ROM is within functional limits     STRENGTH ASSESSMENT  Upper extremity strength is within functional limits     COORDINATION  Gross Motor: WFL   Fine Motor: WFL     ACTIVITIES OF DAILY LIVING ASSESSMENT  AM-PAC ‘6-Clicks’ Inpatient Daily Activity Short Form  How much help from another person does the patient currently need…  -   Putting on and taking off regular lower body clothing?: A Little  -   Bathing (including washing, rinsing, drying)?: A Little  -   Toileting, which includes using toilet, bedpan or urinal? : A Little  -   Putting on and taking off regular upper body clothing?: None  -   Taking care of personal grooming such as brushing teeth?: None  -   Eating meals?: None    AM-PAC Score:  Score: 21  Approx Degree of Impairment: 32.79%  Standardized Score (AM-PAC Scale): 44.27  CMS Modifier (G-Code): CJ    BED MOBILITY  Supine to Sit: SUP    FUNCTIONAL TRANSFER ASSESSMENT  Sit to Stand from EOB: SBA  Chair Transfer: SBA    FUNCTIONAL MOBILITY  CGA for  hallway fx mobility without a device. Required brief standing recovery period (~30 seconds) prior to returning to room d/t SOB. Patient reported RPE = 7/10.     FUNCTIONAL ADL ASSESSMENT  Eating: independent seated (per obs)   Grooming: stand-by assist standing at sink (per obs)   UB Dressing: independent seated (per obs)   LB Dressing: contact guard assist  Toileting: supervision seated (per obs)     EDUCATION PROVIDED  Patient: Role of Occupational Therapy; Plan of Care; Discharge Recommendations; Functional Transfer Techniques; Fall Prevention; Posture/Positioning; Energy Conservation; Proper Body Mechanics; Compensatory ADL Techniques  Patient's Response to Education: Returned Demonstration; Verbalized Understanding    The patient's Approx Degree of Impairment: 32.79% has been calculated based on documentation in the [facility] '6 clicks' Inpatient Daily Activity Short Form.  Research supports that patients with this level of impairment may benefit from discharge home.  Final disposition will be made by interdisciplinary medical team.     Patient End of Session: Up in chair;Needs met;Call light within reach;RN aware of session/findings;All patient questions and concerns addressed;Alarm set    OT Goals  Patients self stated goal is: none stated     Patient will complete functional transfer with SUP  Comment:     Patient will complete toileting with SUP  Comment:     Patient will complete LB dressing with SUP  Comment:    Patient will complete item retrieval with SUP  Comment:          Goals  on: 24  Frequency: 3-5x/week    Patient Evaluation Complexity Level:   Occupational Profile/Medical History LOW - Brief history including review of medical or therapy records    Specific performance deficits impacting engagement in ADL/IADL MODERATE  3 - 5 performance deficits   Client Assessment/Performance Deficits MODERATE - Comorbidities and min to mod modifications of tasks    Clinical Decision Making LOW -  Analysis of occupational profile, problem-focused assessments, limited treatment options    Overall Complexity LOW     OT Session Time  Therapeutic Activity: 15 minutes      THELMA Carlton/L  [facility]  #[id number]

## 2024-02-08 LAB
ANION GAP SERPL CALC-SCNC: 10 MMOL/L (ref 0–18)
ATRIAL RATE: 79 BPM
BUN BLD-MCNC: 66 MG/DL (ref 9–23)
BUN/CREAT SERPL: 32.8 (ref 10–20)
CALCIUM BLD-MCNC: 8.3 MG/DL (ref 8.7–10.4)
CHLORIDE SERPL-SCNC: 101 MMOL/L (ref 98–112)
CHOLEST SERPL-MCNC: 82 MG/DL (ref ?–200)
CO2 SERPL-SCNC: 24 MMOL/L (ref 21–32)
CREAT BLD-MCNC: 2.01 MG/DL
DEPRECATED RDW RBC AUTO: 44.3 FL (ref 35.1–46.3)
EGFRCR SERPLBLD CKD-EPI 2021: 36 ML/MIN/1.73M2 (ref 60–?)
ERYTHROCYTE [DISTWIDTH] IN BLOOD BY AUTOMATED COUNT: 18 % (ref 11–15)
GLUCOSE BLD-MCNC: 235 MG/DL (ref 70–99)
GLUCOSE BLDC GLUCOMTR-MCNC: 229 MG/DL (ref 70–99)
GLUCOSE BLDC GLUCOMTR-MCNC: 268 MG/DL (ref 70–99)
GLUCOSE BLDC GLUCOMTR-MCNC: 271 MG/DL (ref 70–99)
GLUCOSE BLDC GLUCOMTR-MCNC: 276 MG/DL (ref 70–99)
HCT VFR BLD AUTO: 26.9 %
HDLC SERPL-MCNC: 21 MG/DL (ref 40–59)
HGB BLD-MCNC: 8.5 G/DL
LDLC SERPL CALC-MCNC: 47 MG/DL (ref ?–100)
MCH RBC QN AUTO: 22.1 PG (ref 26–34)
MCHC RBC AUTO-ENTMCNC: 31.6 G/DL (ref 31–37)
MCV RBC AUTO: 70.1 FL
NONHDLC SERPL-MCNC: 61 MG/DL (ref ?–130)
OSMOLALITY SERPL CALC.SUM OF ELEC: 307 MOSM/KG (ref 275–295)
P AXIS: 92 DEGREES
P-R INTERVAL: 176 MS
PLATELET # BLD AUTO: 179 10(3)UL (ref 150–450)
POTASSIUM SERPL-SCNC: 4.4 MMOL/L (ref 3.5–5.1)
Q-T INTERVAL: 428 MS
QRS DURATION: 114 MS
QTC CALCULATION (BEZET): 490 MS
R AXIS: 217 DEGREES
RBC # BLD AUTO: 3.84 X10(6)UL
SODIUM SERPL-SCNC: 135 MMOL/L (ref 136–145)
T AXIS: -69 DEGREES
TRIGL SERPL-MCNC: 63 MG/DL (ref 30–149)
TROPONIN I SERPL HS-MCNC: 117 NG/L
TROPONIN I SERPL HS-MCNC: 127 NG/L
TROPONIN I SERPL HS-MCNC: 129 NG/L
TROPONIN I SERPL HS-MCNC: 140 NG/L
VENTRICULAR RATE: 79 BPM
VLDLC SERPL CALC-MCNC: 9 MG/DL (ref 0–30)
WBC # BLD AUTO: 9.4 X10(3) UL (ref 4–11)

## 2024-02-08 PROCEDURE — 99233 SBSQ HOSP IP/OBS HIGH 50: CPT | Performed by: HOSPITALIST

## 2024-02-08 PROCEDURE — 99233 SBSQ HOSP IP/OBS HIGH 50: CPT | Performed by: PHYSICIAN ASSISTANT

## 2024-02-08 RX ORDER — CLOPIDOGREL BISULFATE 75 MG/1
75 TABLET ORAL DAILY
Status: DISCONTINUED | OUTPATIENT
Start: 2024-02-08 | End: 2024-02-11

## 2024-02-08 NOTE — DIETARY NOTE
BRIEF DIETITIAN NOTE     Received consult for \"poor appetite.\" Pt was just seen on 2/5 low sodium/HF diet education. Visited pt at bedside again today. Pt reports he has not been eating much lately because he is \"dieting.\" When asked if he is eating well at home pt states \"no, because I am dieting,\" insinuating he is intentionally eating less. Pt stating \"I'm trying to sleep\" and asking for more water. Enforced importance of following fluid restriction. Pt verbalized understanding. Only 1 meal recorded during admission so far of 100%. Discussed with RN who reports pt ate 100% of breakfast tray today. No wt loss, some fluctuations likely d/t diuresis, though bumex now on hold d/t JU. No nutritional risk at this time. Please consult RD if further nutrition intervention is needed. Will f/u per protocol.     Percent Meals Eaten (last 6 days)       Date/Time Percent Meals Eaten (%)    02/06/24 1853 100 %             Patient Weight(s) for the past 336 hrs:   Weight   02/08/24 0408 105.6 kg (232 lb 11.2 oz)   02/07/24 0430 105.9 kg (233 lb 6.4 oz)   02/06/24 0405 105.2 kg (232 lb)   02/06/24 0400 103.9 kg (229 lb)   02/05/24 1753 105.4 kg (232 lb 4.8 oz)   02/05/24 1404 88.5 kg (195 lb)        Wt Readings from Last 6 Encounters:   02/08/24 105.6 kg (232 lb 11.2 oz)   05/05/23 101.6 kg (224 lb)   02/20/23 99.8 kg (220 lb)   03/12/22 88.5 kg (195 lb)   12/26/21 91 kg (200 lb 9.9 oz)   09/01/21 95.3 kg (210 lb)        F/u per protocol or as appropriate.       Jaz Ferrari RD, LDN  Clinical Dietitian  P: [phone]

## 2024-02-08 NOTE — PAYOR COMM NOTE
--------------  CONTINUED STAY REVIEW    Payor: [employer] MEDICARE ADV PPO  Subscriber #:  [id number]  Authorization Number: [id number]    Admit date: 2/5/24  Admit time:  5:37 PM    Admitting Physician: Luke Rockwell MD  Attending Physician:  Tavo Rueda MD  Primary Care Physician: Pcp, None    REVIEW DOCUMENTATION:      Cardiology      Acute on Chronic HFrEF LVEF 25-30%  -BNP 1,100   -On IV bumex 2 mg BID  -625ml over last 24hours,  Cr trending up  - Entresto, aldactone, and Coreg as tolerated by BP, renal function, and electrolytes     Adenocarcinoma sigmoid colon s/p sigmoidectomy 10/2024     CKD IV   1.8 - stable     R PE 4/2019 on Eliquis     HTN  Well controlled    Intermittent medical compliance  -Refused ICD in past, poor medication/ appt compliance per outpatient records, and initially had declined sigmoid resection in 2019        Plan:  - Hgb stable,  - Per GI okay to restart DAPT today and  if hgb stable then resume Eliquis tomorrow.  -  creatinine trending up will reduce the Bumex dose to 1mg BID. Closely monitor kidney function  - continue with coreg, atorvastatin, Zetia   - closely monitor weight, standing weight only  - monitor intake and output strictly       Hospitalist     kelley deficiency anemia  Heme positive in ER.  Hx of colon cancer s/p sigmoidectomy in 2023.  - Hgb stable. No transfusion this admission.  - GI on consult  - d/w cardiology to clarify recs for asa, plavix, eliquis. Was started on eliquis this morning when plan was for eliquis alone but now plan is for plavix as well. Will clarify with GI rec, but perhaps can cont eliquis and resume plavix tomorrow  - IV iron per GI  - cont PPI  - trend hgb  - IV iron per GI  - cont protonix  - possible endoscopies if hgb drops     Ischemic cardiomyopathy with acute on chronic systolic heart failure.  - cardiology on consult  - cont IV bumex per cards  - cont coreg with parameters  - echo shows EF 25% with grade 3 diastolic dysfunction      Hx of  PE  - eliquis     DM2  - ISS     Hx of CAD  - plan for plavix  - cont coreg      Nursing 2/7/24    Continue to monitor Hgb and renal function. IV venofer and IV bumex with strict I+Os. Pt upset with fluid restriction and trys to get water from bathroom faucet. Educated on HF precautions.       2/8/24 Cardiology     Acute on chronic HFrEF, 25-30%  - BNP >1100 on admit . No admission EKG, CXR or troponin  - Echo with EF 25%, severe diffuse hypokinesis, grade 3 dd, mod TR, pasp 62mmHg   - Bumex held due to JU   - On Coreg  - Refused ICD in the past, poor   - Refused ICD in past, poor medication/ appt compliance per outpatient records, and initially had declined sigmoid resection in 2019      CAD, s/p CABG (LIMA-LAD, SVG-Diag, SVG-OM 2/2019) w/ stents (most recent LAD 2/2019)  - Denies angina   - On Coreg, Atorvastatin. Plavix resumed   - ASA not continued to avoid triple therapy     HLD   - On Atorvastatin & Zetia      Adenocarcinoma sigmoid colon s/p sigmoidectomy 10/2024      JU on CKD 3  - Scr up to 2.01. IVP bumex on hold      R PE 4/2019   - On Eliquis      HTN  - Soft BP , monitor closely      T2DM   - On insulin, per PMD      Hx of LV thrombus on 12/2021 echo  - Repeat imaging without evidence of thrombus      Iron deficiency anemia   - No overt signs of bleeding. Hgb stable. GI following      Plan:     Clinically improved. Compensated on exam. SP02 stable on room air   Scr trending up. IVP bumex held . Consider po bumex tomorrow   Soft blood pressure. Monitor closely   Monitor accurate I/o, daily wts & renal fx closely   Check EKG & troponin , as it was not done on admission     2/8/24 Hospitalist    trend hgb - stable  - IV iron per GI  - cont protonix  - cont eliquis, adding back plavix today  - possible endoscopies if hgb drops     Ischemic cardiomyopathy with acute on chronic systolic heart failure.  - cardiology on consult  - hold  IV bumex due to rising cr  - monitor BMP  - monitor  I/O        MEDICATIONS ADMINISTERED IN LAST 1 DAY:  allopurinol (Zyloprim) tab 300 mg       Date Action Dose Route User    2/8/2024 0911 Given 300 mg Oral Paulina Crouch RN          apixaban (Eliquis) tab 5 mg       Date Action Dose Route User    2/8/2024 0911 Given 5 mg Oral Paulina Crouch RN    2/7/2024 2219 Given 5 mg Oral Jenn Dodson RN          atorvastatin (Lipitor) tab 40 mg       Date Action Dose Route User    2/7/2024 2218 Given 40 mg Oral Jenn Dodson RN          bumetanide (Bumex) 0.25 MG/ML injection 1 mg       Date Action Dose Route User    2/7/2024 1712 Given 1 mg Intravenous Luther Moeller RN          carvedilol (Coreg) tab 12.5 mg       Date Action Dose Route User    2/7/2024 1712 Given 12.5 mg Oral Luther Moeller RN          clopidogrel (Plavix) tab 75 mg       Date Action Dose Route User    2/8/2024 1149 Given 75 mg Oral Paulina Crouch RN          clotrimazole (Lotrimin) 1 % cream       Date Action Dose Route User    2/7/2024 2219 Given (none) Topical Jenn Dodson RN          diazePAM (Valium) tab 5 mg       Date Action Dose Route User    2/7/2024 1823 Given 5 mg Oral Luther Moeller RN          ezetimibe (Zetia) tab 10 mg       Date Action Dose Route User    2/7/2024 2219 Given 10 mg Oral Jenn Dodson RN          gabapentin (Neurontin) cap 100 mg       Date Action Dose Route User    2/8/2024 0911 Given 100 mg Oral Paulina Crouch RN    2/7/2024 2219 Given 100 mg Oral Jenn Dodson RN    2/7/2024 1700 Given 100 mg Oral Luther Moeller RN          insulin aspart (NovoLOG) 100 Units/mL FlexPen 1-7 Units       Date Action Dose Route User    2/8/2024 1349 Given 5 Units Subcutaneous (Right Upper Arm) Paulina Crouch RN    2/8/2024 0923 Given 3 Units Subcutaneous (Left Upper Arm) Paulina Crouch RN    2/7/2024 1714 Given 4 Units Subcutaneous (Right Upper Arm) Luther Moeller, RN          ondansetron (Zofran) 4 MG/2ML injection 4 mg       Date Action Dose  Route User    2/7/2024 1823 Given 4 mg Intravenous Luther Moeller RN          pantoprazole (Protonix) 40 mg in sodium chloride 0.9% PF 10 mL IV push       Date Action Dose Route User    2/8/2024 0912 Given 40 mg Intravenous Paulina Crouch RN          PARoxetine (Paxil) tab 40 mg       Date Action Dose Route User    2/8/2024 0912 Given 40 mg Oral Paulina Crouch RN          QUEtiapine (SEROquel) tab 50 mg       Date Action Dose Route User    2/7/2024 2219 Given 50 mg Oral Jenn Dodson RN          rOPINIRole (Requip) tab 1 mg       Date Action Dose Route User    2/7/2024 2218 Given 1 mg Oral Jenn Dodson RN          iron sucrose (Venofer) 300 mg in sodium chloride 0.9% 250 mL IVPB       Date Action Dose Route User    2/8/2024 0923 New Bag 300 mg Intravenous Paulina Crouch RN          cyanocobalamin (Vitamin B12) tab 100 mcg       Date Action Dose Route User    2/8/2024 0930 Given 100 mcg Oral Paulina Crouch RN            Vitals (last day)       Date/Time Temp Pulse Resp BP SpO2 Weight O2 Device O2 Flow Rate (L/min) [facility]    02/08/24 0855 97.2 °F (36.2 °C) 83 20 95/66 94 % -- None (Room air) -- LD    02/08/24 0408 -- 78 22 100/66 94 % 232 lb 11.2 oz None (Room air) -- IM    02/07/24 2029 -- 69 20 145/71 92 % -- None (Room air) -- IM    02/07/24 1443 -- -- -- 93/60 -- -- -- -- FL    02/07/24 1416 -- 86 -- 93/60 -- -- -- --     02/07/24 1401 98.2 °F (36.8 °C) -- 18 85/62 94 % -- None (Room air) -- FL    02/07/24 0845 98 °F (36.7 °C) -- 16 118/79 95 % -- None (Room air) -- FL    02/07/24 0521 97 °F (36.1 °C) -- 18 109/79 99 % -- None (Room air) -- MW    02/07/24 0430 -- -- -- -- -- 233 lb 6.4 oz -- -- RA

## 2024-02-08 NOTE — PLAN OF CARE
Pt anxious at times and impulsive. Continue to monitor Hgb and renal function. IV venofer and IV bumex with strict I+Os. Pt upset with fluid restriction and trys to get water from bathroom faucet. Educated on HF precautions. Soft BP this afternoon. Continue to monitor and educate.      Problem: Patient Centered Care  Goal: Patient preferences are identified and integrated in the patient's plan of care  Description: Interventions:  - What would you like us to know as we care for you? From home alone. Johnny is a retired   - Provide timely, complete, and accurate information to patient/family  - Incorporate patient and family knowledge, values, beliefs, and cultural backgrounds into the planning and delivery of care  - Encourage patient/family to participate in care and decision-making at the level they choose  - Honor patient and family perspectives and choices  2/7/2024 1857 by Luther Moeller, RN  Outcome: Progressing  2/7/2024 0735 by Luther Moeller, RN  Outcome: Progressing     Problem: Diabetes/Glucose Control  Goal: Glucose maintained within prescribed range  Description: INTERVENTIONS:  - Monitor Blood Glucose as ordered  - Assess for signs and symptoms of hyperglycemia and hypoglycemia  - Administer ordered medications to maintain glucose within target range  - Assess barriers to adequate nutritional intake and initiate nutrition consult as needed  - Instruct patient on self management of diabetes  2/7/2024 1857 by Luther Moeller, RN  Outcome: Progressing  2/7/2024 0735 by Luther Moeller, RN  Outcome: Progressing     Problem: Patient/Family Goals  Goal: Patient/Family Long Term Goal  Description: Patient's Long Term Goal: \"to maintain my health so I don't have to be hospitalized\"    Interventions:  - Take medications as prescribed at discharge  - Follow up with PCP and cardiology   - Maintain a low Na, heart healthy diet   - See additional Care Plan goals for specific interventions  2/7/2024  1857 by Luther Moeller, RN  Outcome: Progressing  2/7/2024 0735 by Luther Moeller RN  Outcome: Progressing  Goal: Patient/Family Short Term Goal  Description: Patient's Short Term Goal: \"to be less SOB and have less swelling in my legs\"    Interventions:   - IV bumex  - 2000 fluid restriction  - I&O  - Daily weights  - See additional Care Plan goals for specific interventions  2/7/2024 1857 by Luther Moeller, RN  Outcome: Progressing  2/7/2024 0735 by Luther Moeller RN  Outcome: Progressing     Problem: CARDIOVASCULAR - ADULT  Goal: Maintains optimal cardiac output and hemodynamic stability  Description: INTERVENTIONS:  - Monitor vital signs, rhythm, and trends  - Monitor for bleeding, hypotension and signs of decreased cardiac output  - Evaluate effectiveness of vasoactive medications to optimize hemodynamic stability  - Monitor arterial and/or venous puncture sites for bleeding and/or hematoma  - Assess quality of pulses, skin color and temperature  - Assess for signs of decreased coronary artery perfusion - ex. Angina  - Evaluate fluid balance, assess for edema, trend weights  2/7/2024 1857 by Luther Moeller RN  Outcome: Progressing  2/7/2024 0735 by Luther Moeller RN  Outcome: Progressing  Goal: Absence of cardiac arrhythmias or at baseline  Description: INTERVENTIONS:  - Continuous cardiac monitoring, monitor vital signs, obtain 12 lead EKG if indicated  - Evaluate effectiveness of antiarrhythmic and heart rate control medications as ordered  - Initiate emergency measures for life threatening arrhythmias  - Monitor electrolytes and administer replacement therapy as ordered  2/7/2024 1857 by Luther Moeller RN  Outcome: Progressing  2/7/2024 0735 by Luther Moeller RN  Outcome: Progressing     Problem: METABOLIC/FLUID AND ELECTROLYTES - ADULT  Goal: Hemodynamic stability and optimal renal function maintained  Description: INTERVENTIONS:  - Monitor labs and assess for signs and  symptoms of volume excess or deficit  - Monitor intake, output and patient weight  - Monitor urine specific gravity, serum osmolarity and serum sodium as indicated or ordered  - Monitor response to interventions for patient's volume status, including labs, urine output, blood pressure (other measures as available)  - Encourage oral intake as appropriate  - Instruct patient on fluid and nutrition restrictions as appropriate  2/7/2024 1857 by Luther Moeller, RN  Outcome: Progressing  2/7/2024 0735 by Luther Moeller, RN  Outcome: Progressing

## 2024-02-08 NOTE — PHYSICAL THERAPY NOTE
PHYSICAL THERAPY EVALUATION - INPATIENT     Room Number: 336/336-A  Evaluation Date: 2/8/2024  Type of Evaluation: Initial        Presenting Problem: anemia, rectal bleeding, acute on chronic CHF  Co-Morbidities : htn, RLS, cad/cabg, dm, ckd, cancer  Reason for Therapy: Mobility Dysfunction and Discharge Planning    PHYSICAL THERAPY ASSESSMENT   Patient is a 68 year old male admitted 2/5/2024 for rectal bleeding, anemia, acute on chronic CHF.  Prior to admission, patient's baseline is independent, pt reports he has rw but does not use at home, drives, ind with adls.  Patient is currently functioning at baseline with bed mobility, transfers, and gait.    Pt is currently demonstrating independence with transfers, bed mobility, ambulation without assistive device. Rn present for session, also reports pt has been ambulating in room with [facility] staff without assistive device.   Pt requested to return to supine due to fatigue, left with rn present. Pt encouraged to cont to mobilize independently in order to maintain his strength, balance, and endurance.   PLAN  Patient has been evaluated and presents with no skilled Physical Therapy needs at this time.  Patient will be discharged from Physical Therapy services. Please re-order if a new functional limitation presents during this admission.    PHYSICAL THERAPY MEDICAL/SOCIAL HISTORY        Problem List  Principal Problem:    Anemia, unspecified type  Active Problems:    Rectal bleeding    Acute on chronic congestive heart failure, unspecified heart failure type (HCC)    Iron deficiency anemia due to chronic blood loss      HOME SITUATION  Home Situation  Type of Home: House  Home Layout: One level  Lives With: Alone  Drives: Yes  Patient Owned Equipment: Rolling walker (does not use)  Patient Regularly Uses: None     Prior Level of [place]: independent, no assistive device used    SUBJECTIVE  \"This is pissing me off\"  pt reports referring to activity with  therapist    PHYSICAL THERAPY EXAMINATION   OBJECTIVE  Precautions: Bed/chair alarm  Fall Risk: Standard fall risk    WEIGHT BEARING RESTRICTION  Weight Bearing Restriction: None                PAIN ASSESSMENT  Rating: Unable to rate  [facility]: low back  Management Techniques: Activity promotion  COGNITION  Following Commands:  follows multistep commands consistently    RANGE OF MOTION AND STRENGTH ASSESSMENT  Upper extremity ROM and strength are within functional limits   Lower extremity ROM is within functional limits   Lower extremity strength is within functional limits     BALANCE  Static Sitting: Normal  Dynamic Sitting: Normal  Static Standing: Good  Dynamic Standing: Good      AM-PAC '6-Clicks' INPATIENT SHORT FORM - BASIC MOBILITY  How much difficulty does the patient currently have...  Patient Difficulty: Turning over in bed (including adjusting bedclothes, sheets and blankets)?: None   Patient Difficulty: Sitting down on and standing up from a chair with arms (e.g., wheelchair, bedside commode, etc.): None   Patient Difficulty: Moving from lying on back to sitting on the side of the bed?: None   How much help from another person does the patient currently need...   Help from Another: Moving to and from a bed to a chair (including a wheelchair)?: None   Help from Another: Need to walk in hospital room?: None   Help from Another: Climbing 3-5 steps with a railing?: None     AM-PAC Score:  Raw Score: 24   Approx Degree of Impairment: 0%   Standardized Score (AM-PAC Scale): 61.14   CMS Modifier (G-Code): CH    FUNCTIONAL ABILITY STATUS  Functional Mobility/Gait Assessment  Distance (ft): 100  Assistive Device: Rolling walker  Pattern:  (lateral sway, pt reports due to chronic LBP)  Rolling: independent  Supine to Sit: independent  Sit to Supine: independent  Sit to Stand: independent    Exercise/Education Provided:  Energy conservation  Gait training    The patient's Approx Degree of Impairment: 0% has been  calculated based on documentation in the [facility] '6 clicks' Inpatient Basic Mobility Short Form.  Research supports that patients with this level of impairment may benefit from home.  Final disposition will be made by interdisciplinary medical team.    Patient End of Session: In bed;Needs met;Call light within reach;With  staff;RN aware of session/findings;All patient questions and concerns addressed;Alarm set (rn in room)    Patient was able to achieve the following ...   Patient able to transfer  Safely and independently    Patient able to ambulate on level surfaces  Safely and independently     Patient Evaluation Complexity Level:  History Low - no personal factors and/or co-morbidities   Examination of body systems Low -  addressing 1-2 elements   Clinical Presentation Low- Stable   Clinical Decision Making  Low Complexity

## 2024-02-08 NOTE — PROGRESS NOTES
[facility]     Gastroenterology Progress Note    Cristhian Lopez Patient Status:  Inpatient    9/3/1955 MRN [id number]   [facility] [facility] 3W/SW Attending Tavo Rueda MD   Hosp Day # 3 PCP None Pcp       Subjective:   Patient feeling well.     Had large brown BM this am.   Denies abdominal pain, nausea and vomiting.   Objective:   Blood pressure 95/66, pulse 83, temperature 97.2 °F (36.2 °C), temperature source Axillary, resp. rate 20, height 5' 10\" (1.778 m), weight 232 lb 11.2 oz (105.6 kg), SpO2 94%. Body mass index is 33.39 kg/m².    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 Currently undergoing diuresis for CHF exacerbation. GI consulted fo anemia, no overt bleeding.  Last EGD and colonoscopy 2023 at time of CRC diagnosis.      # RADHA  No overt bleeding  In setting of DAPT, ELIQUIS  Hgb 8.5 today, has not required blood transfusion during admission   Considerations for EGD/colonoscopy +/- VCE during admission or as outpatient once euvolemic -- Patient favors outpatient approach which is not unreasonable if hgb remains stable after resumption of antiplatelets and anticoagulation  Continue cardiac optimization       Recommend:  -Ok to restart DAPT and Eliquis   -trend hemoglobin, goal >7  -can continue to plan for outpatient evaluation for anemia at this time, advise he follow up with his outpatient GI and oncology office    Pt is stable from a GI standpoint.  GI will sign off at this time.  Please, reach out to our team if  new GI concerns arise.  Thank you for the opportunity to participate in this patient's care.      Case discussed with Rafiq Villasenor MD and Paulina VAIL.    Na Atkins PA-C  [facility]astroenter[facility]  2/8/2024      Results:     Lab Results   Component Value Date    WBC 9.4 02/08/2024    HGB 8.5 (L) 02/08/2024    HCT 26.9 (L) 02/08/2024    .0 02/08/2024    CREATSERUM 2.01 (H) 02/08/2024    BUN 66 (H) 02/08/2024     (L) 02/08/2024    K 4.4 02/08/2024     02/08/2024    CO2 24.0 02/08/2024     (H) 02/08/2024    CA 8.3 (L) 02/08/2024    ALB 3.7 02/05/2024    ALKPHO 158 (H) 02/05/2024    BILT 1.0 02/05/2024    TP 6.1 02/05/2024    AST 27 02/05/2024    ALT 37 02/05/2024    TSH 0.397 12/25/2021    PSA 9.27 (H) 02/20/2023    DDIMER 0.93 (H) 12/22/2021    MG 2.2 02/06/2024    PHOS 3.3 12/24/2021    TROP <0.045 08/25/2021     (H) 05/29/2023    B12 389 12/25/2021    ETOH <3 05/29/2023       No results found.

## 2024-02-08 NOTE — PROGRESS NOTES
[facility] Cardiology Progress Note    Cristhian Lopez Patient Status:  Inpatient    9/3/1955 MRN [id number]   [facility] [facility] 3W/SW Attending Tavo Rueda MD   Hosp Day # 3 PCP None Pcp     Subjective:  Denies cp ,sob, orthopnea. Reports improvement to LE edema     Objective:  BP 95/66 (BP [facility]: Right arm)   Pulse 83   Temp 97.2 °F (36.2 °C) (Axillary)   Resp 20   Ht 5' 10\" (1.778 m)   Wt 232 lb 11.2 oz (105.6 kg)   SpO2 94%   BMI 33.39 kg/m²     Telemetry: NSR       Intake/Output:    Intake/Output Summary (Last 24 hours) at 2024 1131  Last data filed at 2024 0911  Gross per 24 hour   Intake 240 ml   Output 901 ml   Net -661 ml       Last 3 Weights   24 0408 232 lb 11.2 oz (105.6 kg)   24 0430 233 lb 6.4 oz (105.9 kg)   24 0405 232 lb (105.2 kg)   24 0400 229 lb (103.9 kg)   24 1753 232 lb 4.8 oz (105.4 kg)   24 1404 195 lb (88.5 kg)   23 0457 224 lb (101.6 kg)   23 0500 225 lb 3.2 oz (102.2 kg)   23 1727 227 lb 3.2 oz (103.1 kg)   23 0839 220 lb (99.8 kg)       Labs:  Recent Labs   Lab 24  0724 24  0708 24  0703   * 197* 235*   BUN 50* 58* 66*   CREATSERUM 1.69* 1.85* 2.01*   EGFRCR 44* 39* 36*   CA 9.2 8.8 8.3*   * 135* 135*   K 4.9 4.6 4.4    101 101   CO2 24.0 23.0 24.0     Recent Labs   Lab 24  1408 24  0724 24  0709 24  0703   RBC 3.86 4.03 4.01 3.84   HGB 8.2* 8.7* 8.7* 8.5*   HCT 27.3* 28.5* 28.1* 26.9*   MCV 70.7* 70.7* 70.1* 70.1*   MCH 21.2* 21.6* 21.7* 22.1*   MCHC 30.0* 30.5* 31.0 31.6   RDW 17.3* 17.8* 18.1* 18.0*   NEPRELIM 5.14 6.16 6.79  --    WBC 7.1 8.3 8.9 9.4   .0 224.0 223.0 179.0         No results for input(s): \"TROP\", \"TROPHS\", \"CK\" in the last 168 hours.    Diagnostics:   24 Echo  1. Left ventricle: The cavity size was mildly to moderately increased. Wall      thickness was normal. Systolic function was markedly  reduced. The      estimated ejection fraction was 25%, by visual assessment. There was      severe diffuse hypokinesis with akinetic apex. Doppler parameters are      consistent with a reversible restrictive pattern, indicative of decreased      left ventricular diastolic compliance and/or increased left atrial      pressure - grade 3 diastolic dysfunction.   2. Right ventricle: The cavity size was increased. Systolic function was      reduced.   3. Mitral valve: The annulus was moderately calcified. There was moderate      regurgitation.   4. Left atrium: The atrium was markedly dilated.   5. Tricuspid valve: There was moderate regurgitation.   6. Right atrium: The atrium was dilated.   7. Pulmonary arteries: Systolic pressure was moderately increased, estimated      to be 62mm Hg.   Impressions:  Compared to the previous study, these findings indicate   worsening.         Review of Systems   Respiratory: Negative.     Cardiovascular: Negative.      Physical Exam:    General: Alert and oriented x 3. No apparent distress.   HEENT: Normocephalic, anicteric sclera, neck supple, no thyromegaly or adenopathy.  Neck: No JVD, carotids 2+, no bruits.  Cardiac: Regular rate & rhythm. S1, S2 normal. No murmur, pericardial rub, S3, or extra cardiac sounds.  Lungs: Clear without wheezes, rales, rhonchi or dullness.  Normal excursions and effort.  Abdomen: Soft, non-tender. No organosplenomegally, mass or rebound, BS-present.  Extremities: Without clubbing or cyanosis.  No left lower extremity edema, no right lower extremity edema.  Neurologic: Alert and oriented, normal affect. No focal defects  Skin: Warm and dry.       Medications:   clopidogrel  75 mg Oral Daily    [Held by provider] bumetanide  1 mg Intravenous BID (Diuretic)    allopurinol  300 mg Oral Daily    cyanocobalamin  100 mcg Oral Daily    gabapentin  100 mg Oral TID    PARoxetine HCl  40 mg Oral QAM    QUEtiapine  50 mg Oral Nightly    rOPINIRole  1 mg Oral  Nightly    apixaban  5 mg Oral BID    clotrimazole   Topical BID    carvedilol  12.5 mg Oral BID with meals    atorvastatin  40 mg Oral Nightly    ezetimibe  10 mg Oral Nightly    insulin aspart  1-7 Units Subcutaneous TID CC    pantoprazole  40 mg Intravenous Daily         Assessment:  67 y/o male with hx of ICM presented with progressive BASILIO & LE edema     Acute on chronic HFrEF, 25-30%  - BNP >1100 on admit . No admission EKG, CXR or troponin  - Echo with EF 25%, severe diffuse hypokinesis, grade 3 dd, mod TR, pasp 62mmHg   - Bumex held due to JU   - On Coreg  - Refused ICD in the past, poor   - Refused ICD in past, poor medication/ appt compliance per outpatient records, and initially had declined sigmoid resection in 2019     CAD, s/p CABG (LIMA-LAD, SVG-Diag, SVG-OM 2/2019) w/ stents (most recent LAD 2/2019)  - Denies angina   - On Coreg, Atorvastatin. 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 IVP bumex on hold     R PE 4/2019   - On Eliquis     HTN  - Soft BP , monitor closely     T2DM   - On insulin, per PMD     Hx of LV thrombus on 12/2021 echo  - Repeat imaging without evidence of thrombus     Iron deficiency anemia   - No overt signs of bleeding. Hgb stable. GI following     Plan:    Clinically improved. Compensated on exam. SP02 stable on room air   Scr trending up. IVP bumex held . Consider po bumex tomorrow   Soft blood pressure. Monitor closely   Monitor accurate I/o, daily wts & renal fx closely   Check EKG & troponin , as it was not done on admission       Addendum @1337 : Trop 127. No anginal symptoms. Follow until down trending . EKG with no acute ischemic changes. Dr. Lu notified.     VIVIENNE Avendano  2/8/2024  11:31 AM  Ph [phone] ([place])  Ph [phone] ([place])      Addendum:  Patient feeling better  No SoB on room air  Agree  with the assessment and the management plan  L3  Dominguez Lu MD

## 2024-02-08 NOTE — PLAN OF CARE
ASSESSMENT & PLAN: Nevaeh Kurtz is a 30 y o  [facility] with normal gynecologic exam     1   Routine well woman exam done today  2  Pap and HPV:  The patient's last pap was   It was normal     Pap and cotesting was done today  Current ASCCP Guidelines reviewed  3   The following were reviewed in today's visit: breast self exam   4  LLQ pain, history of cysts - check ultrasound     CC:  Annual Gynecologic Examination    HPI: Nevaeh Kurtz is a 30 y o  [facility] Patient with drowsiness overnight. IV lasix and IV venofer continued. Plan is to diurese and monitor hgb.    Problem: Patient Centered Care  Goal: Patient preferences are identified and integrated in the patient's plan of care  Description: Interventions:  - What would you like us to know as we care for you? From home alone. Johnny is a retired   - Provide timely, complete, and accurate information to patient/family  - Incorporate patient and family knowledge, values, beliefs, and cultural backgrounds into the planning and delivery of care  - Encourage patient/family to participate in care and decision-making at the level they choose  - Honor patient and family perspectives and choices  Outcome: Progressing     Problem: Diabetes/Glucose Control  Goal: Glucose maintained within prescribed range  Description: INTERVENTIONS:  - Monitor Blood Glucose as ordered  - Assess for signs and symptoms of hyperglycemia and hypoglycemia  - Administer ordered medications to maintain glucose within target range  - Assess barriers to adequate nutritional intake and initiate nutrition consult as needed  - Instruct patient on self management of diabetes  Outcome: Progressing     Problem: Patient/Family Goals  Goal: Patient/Family Long Term Goal  Description: Patient's Long Term Goal: \"to maintain my health so I don't have to be hospitalized\"    Interventions:  - Take medications as prescribed at discharge  - Follow up with PCP and cardiology   - Maintain a low Na, heart healthy diet   - See additional Care Plan goals for specific interventions  Outcome: Progressing  Goal: Patient/Family Short Term Goal  Description: Patient's Short Term Goal: \"to be less SOB and have less swelling in my legs\"    Interventions:   - IV bumex  - 2000 fluid restriction  - I&O  - Daily weights  - See additional Care Plan goals for specific interventions  Outcome: Progressing     Problem: CARDIOVASCULAR - ADULT  Goal: Maintains optimal cardiac output  and hemodynamic stability  Description: INTERVENTIONS:  - Monitor vital signs, rhythm, and trends  - Monitor for bleeding, hypotension and signs of decreased cardiac output  - Evaluate effectiveness of vasoactive medications to optimize hemodynamic stability  - Monitor arterial and/or venous puncture sites for bleeding and/or hematoma  - Assess quality of pulses, skin color and temperature  - Assess for signs of decreased coronary artery perfusion - ex. Angina  - Evaluate fluid balance, assess for edema, trend weights  Outcome: Progressing  Goal: Absence of cardiac arrhythmias or at baseline  Description: INTERVENTIONS:  - Continuous cardiac monitoring, monitor vital signs, obtain 12 lead EKG if indicated  - Evaluate effectiveness of antiarrhythmic and heart rate control medications as ordered  - Initiate emergency measures for life threatening arrhythmias  - Monitor electrolytes and administer replacement therapy as ordered  Outcome: Progressing     Problem: METABOLIC/FLUID AND ELECTROLYTES - ADULT  Goal: Hemodynamic stability and optimal renal function maintained  Description: INTERVENTIONS:  - Monitor labs and assess for signs and symptoms of volume excess or deficit  - Monitor intake, output and patient weight  - Monitor urine specific gravity, serum osmolarity and serum sodium as indicated or ordered  - Monitor response to interventions for patient's volume status, including labs, urine output, blood pressure (other measures as available)  - Encourage oral intake as appropriate  - Instruct patient on fluid and nutrition restrictions as appropriate  Outcome: Progressing      (

## 2024-02-08 NOTE — CDS QUERY
Clinical Significance - Sodium Value  CLINICAL DOCUMENTATION CLARIFICATION FORM  Dear Dr. Rueda:  Clinical information (provided below) includes documentation of sodium value that is not within the normal range.   PLEASE (X) ALL DIAGNOSES THAT APPLY.  THIS SECTION FOR PROVIDER DOCUMENTATION ONLY  (x  ) Hyponatremia  (  ) Clinically Insignificant below normal sodium laboratory result  (  ) Other (please specify):       Clinical Indicators:  Na level:   2/5 Na 133  2/6 Na 135  2/7 Na 135  2/8 Na 135       Risk Factors:   Acute Kidney Failure  Home Meds- bumetanide        Treatments:  Daily labs  IV Fluids                   If you have any questions, please contact Clinical : Adela Lawrence RN CDS  at [email or web address]    Thank You!  THIS FORM IS A PERMANENT PART OF THE MEDICAL RECORD

## 2024-02-08 NOTE — PROGRESS NOTES
[facility]  Progress Note     Cristhian Lopez  : 9/3/1955    Status: Inpatient  Day #: 3    Attending: Tavo Rueda MD  PCP: None Pcp     Assessment and Plan:    Iron deficiency anemia  Heme positive in ER.  Hx of colon cancer s/p sigmoidectomy in .  - Hgb stable. No transfusion this admission.  - GI on consult  - IV iron per GI  - cont PPI  - trend hgb - stable  - IV iron per GI  - cont protonix  - cont eliquis, adding back plavix today  - possible endoscopies if hgb drops     Ischemic cardiomyopathy with acute on chronic systolic heart failure.  - cardiology on consult  - hold  IV bumex due to rising cr  - monitor BMP  - monitor I/O  - cont coreg with parameters  - echo shows EF 25% with grade 3 diastolic dysfunction      Hx of PE  - eliquis     DM2  - ISS     Hx of CAD  - plavix, eliquis. Asa stopped  - cont coreg     JU on CKD3  - monitor Cr, holding diuretics       DVT Mechanical Prophylaxis:        DVT Pharmacologic Prophylaxis   Medication    apixaban (Eliquis) tab 5 mg               Subjective:      Initial Chief Complaint:  BASILIO and edema    Tired from not sleeping well. No CP, no SOB. Had small BM without blood.    10 point ROS completed and was negative, except for pertinent positive and negatives stated in subjective.      Objective:      Temp:  [97.2 °F (36.2 °C)-98.2 °F (36.8 °C)] 97.2 °F (36.2 °C)  Pulse:  [69-86] 83  Resp:  [18-22] 20  BP: ()/(60-71) 95/66  SpO2:  [92 %-94 %] 94 %  General:  Alert, no distress  HEENT:  Normocephalic, atraumatic  Cardiac:  Regular rate, regular rhythm  Pulmonary:  Clear to auscultation bilaterally, respirations unlabored  Gastrointestinal:  Soft, non-tender, normal bowel sounds  Musculoskeletal:  No joint swelling  Extremities:  No edema, no cyanosis, no clubbing  Neurologic:  nonfocal  Psychiatric:  Normal affect, calm and appropriate    Intake/Output Summary (Last 24 hours) at 2024 1252  Last data filed at 2024 1149  Gross per 24  hour   Intake 390 ml   Output 901 ml   Net -511 ml         Recent Labs   Lab 02/05/24  1408 02/06/24  0724 02/07/24  0709 02/08/24  0703   WBC 7.1 8.3 8.9 9.4   HGB 8.2* 8.7* 8.7* 8.5*   HCT 27.3* 28.5* 28.1* 26.9*   .0 224.0 223.0 179.0   RBC 3.86 4.03 4.01 3.84   MCV 70.7* 70.7* 70.1* 70.1*   MCH 21.2* 21.6* 21.7* 22.1*   MCHC 30.0* 30.5* 31.0 31.6   RDW 17.3* 17.8* 18.1* 18.0*   NEPRELIM 5.14 6.16 6.79  --      Recent Labs   Lab 02/05/24  1408 02/06/24  0724 02/07/24  0708 02/08/24  0703   BUN 50* 50* 58* 66*   CREATSERUM 1.55* 1.69* 1.85* 2.01*   CA 8.6* 9.2 8.8 8.3*   ALB 3.7  --   --   --    * 135* 135* 135*   K 4.3 4.9 4.6 4.4    101 101 101   CO2 26.0 24.0 23.0 24.0   * 191* 197* 235*   MG  --  2.2  --   --    BILT 1.0  --   --   --    AST 27  --   --   --    ALT 37  --   --   --    ALKPHO 158*  --   --   --    TP 6.1  --   --   --        No results found.      Medications:   clopidogrel  75 mg Oral Daily    [Held by provider] bumetanide  1 mg Intravenous BID (Diuretic)    allopurinol  300 mg Oral Daily    cyanocobalamin  100 mcg Oral Daily    gabapentin  100 mg Oral TID    PARoxetine HCl  40 mg Oral QAM    QUEtiapine  50 mg Oral Nightly    rOPINIRole  1 mg Oral Nightly    apixaban  5 mg Oral BID    clotrimazole   Topical BID    carvedilol  12.5 mg Oral BID with meals    atorvastatin  40 mg Oral Nightly    ezetimibe  10 mg Oral Nightly    insulin aspart  1-7 Units Subcutaneous TID CC    pantoprazole  40 mg Intravenous Daily      PRN Meds: melatonin, diazePAM, zolpidem, acetaminophen, ondansetron, glucose **OR** glucose **OR** glucose-vitamin C **OR** dextrose **OR** glucose **OR** glucose **OR** glucose-vitamin C    Supplementary Documentation:        [facility]. Time spent on chart/note review, review of labs/imaging, discussion with patient, physical exam, discussion with staff, consultants, coordinating care, writing progress note, discussion of plan of care.      Tavo Rueda MD

## 2024-02-09 ENCOUNTER — APPOINTMENT (OUTPATIENT)
Dept: ULTRASOUND IMAGING | Facility: HOSPITAL | Age: 69
End: 2024-02-09
Attending: HOSPITALIST
Payer: MEDICARE

## 2024-02-09 LAB
ANION GAP SERPL CALC-SCNC: 7 MMOL/L (ref 0–18)
ANION GAP SERPL CALC-SCNC: 8 MMOL/L (ref 0–18)
BUN BLD-MCNC: 63 MG/DL (ref 9–23)
BUN BLD-MCNC: 68 MG/DL (ref 9–23)
BUN/CREAT SERPL: 28.4 (ref 10–20)
BUN/CREAT SERPL: 32.7 (ref 10–20)
CALCIUM BLD-MCNC: 8.4 MG/DL (ref 8.7–10.4)
CALCIUM BLD-MCNC: 8.4 MG/DL (ref 8.7–10.4)
CHLORIDE SERPL-SCNC: 100 MMOL/L (ref 98–112)
CHLORIDE SERPL-SCNC: 101 MMOL/L (ref 98–112)
CO2 SERPL-SCNC: 24 MMOL/L (ref 21–32)
CO2 SERPL-SCNC: 25 MMOL/L (ref 21–32)
CREAT BLD-MCNC: 2.08 MG/DL
CREAT BLD-MCNC: 2.22 MG/DL
DEPRECATED RDW RBC AUTO: 42.9 FL (ref 35.1–46.3)
EGFRCR SERPLBLD CKD-EPI 2021: 32 ML/MIN/1.73M2 (ref 60–?)
EGFRCR SERPLBLD CKD-EPI 2021: 34 ML/MIN/1.73M2 (ref 60–?)
ERYTHROCYTE [DISTWIDTH] IN BLOOD BY AUTOMATED COUNT: 18.6 % (ref 11–15)
GLUCOSE BLD-MCNC: 199 MG/DL (ref 70–99)
GLUCOSE BLD-MCNC: 251 MG/DL (ref 70–99)
GLUCOSE BLDC GLUCOMTR-MCNC: 206 MG/DL (ref 70–99)
GLUCOSE BLDC GLUCOMTR-MCNC: 239 MG/DL (ref 70–99)
GLUCOSE BLDC GLUCOMTR-MCNC: 248 MG/DL (ref 70–99)
GLUCOSE BLDC GLUCOMTR-MCNC: 341 MG/DL (ref 70–99)
HCT VFR BLD AUTO: 27.9 %
HGB BLD-MCNC: 8.7 G/DL
MAGNESIUM SERPL-MCNC: 2.4 MG/DL (ref 1.6–2.6)
MCH RBC QN AUTO: 21.6 PG (ref 26–34)
MCHC RBC AUTO-ENTMCNC: 31.2 G/DL (ref 31–37)
MCV RBC AUTO: 69.2 FL
OSMOLALITY SERPL CALC.SUM OF ELEC: 300 MOSM/KG (ref 275–295)
OSMOLALITY SERPL CALC.SUM OF ELEC: 302 MOSM/KG (ref 275–295)
PLATELET # BLD AUTO: 198 10(3)UL (ref 150–450)
POTASSIUM SERPL-SCNC: 4.3 MMOL/L (ref 3.5–5.1)
POTASSIUM SERPL-SCNC: 5.2 MMOL/L (ref 3.5–5.1)
RBC # BLD AUTO: 4.03 X10(6)UL
SODIUM SERPL-SCNC: 132 MMOL/L (ref 136–145)
SODIUM SERPL-SCNC: 133 MMOL/L (ref 136–145)
WBC # BLD AUTO: 9.9 X10(3) UL (ref 4–11)

## 2024-02-09 PROCEDURE — 99233 SBSQ HOSP IP/OBS HIGH 50: CPT | Performed by: HOSPITALIST

## 2024-02-09 PROCEDURE — 76775 US EXAM ABDO BACK WALL LIM: CPT | Performed by: HOSPITALIST

## 2024-02-09 RX ORDER — MIDODRINE HYDROCHLORIDE 2.5 MG/1
2.5 TABLET ORAL 3 TIMES DAILY
Status: DISCONTINUED | OUTPATIENT
Start: 2024-02-09 | End: 2024-02-10

## 2024-02-09 NOTE — PROGRESS NOTES
[facility]  Progress Note     Cristhian Lopez  : 9/3/1955    Status: Inpatient  Day #: 4    Attending: Tavo Rueda MD  PCP: None Pcp     Assessment and Plan:    Iron deficiency anemia  Heme positive in ER.  Hx of colon cancer s/p sigmoidectomy in .  - Hgb stable. No transfusion this admission.  - GI on consult  - IV iron per GI  - cont PPI  - trend hgb - stable  - IV iron per GI  - cont protonix  - back on plavix and eliquis  - possible endoscopies only if hgb drops     Ischemic cardiomyopathy with acute on chronic systolic heart failure.  - cardiology on consult  - hold  IV bumex due to rising cr  - monitor BMP  - monitor I/O  - cont coreg with parameters  - echo shows EF 25% with grade 3 diastolic dysfunction      JU on CKD3  Mild hyperkalemia  Hyponatremia  - monitor Cr, holding diuretics  - check bladder scan, renal US     Hx of PE  - eliquis     DM2  - ISS     Hx of CAD  - plavix, eliquis. Asa stopped  - cont coreg         DVT Mechanical Prophylaxis:        DVT Pharmacologic Prophylaxis   Medication    apixaban (Eliquis) tab 5 mg               Subjective:      Initial Chief Complaint:  BASILIO and edema    Didn't sleep too well. No CP, no SOB.     10 point ROS completed and was negative, except for pertinent positive and negatives stated in subjective.      Objective:      Temp:  [96.5 °F (35.8 °C)-97.2 °F (36.2 °C)] 97.1 °F (36.2 °C)  Pulse:  [69-76] 73  Resp:  [18-20] 18  BP: ()/(54-89) 87/58  SpO2:  [92 %-97 %] 93 %  General:  Alert, no distress  HEENT:  Normocephalic, atraumatic  Cardiac:  Regular rate, regular rhythm  Pulmonary:  Clear to auscultation bilaterally, respirations unlabored  Gastrointestinal:  Soft, non-tender, normal bowel sounds  Musculoskeletal:  No joint swelling  Extremities:  +b/l LE edema  Neurologic:  nonfocal  Psychiatric:  Normal affect, calm and appropriate    Intake/Output Summary (Last 24 hours) at 2024 1410  Last data filed at 2024 1313  Gross  per 24 hour   Intake 930 ml   Output 825 ml   Net 105 ml         Recent Labs   Lab 02/05/24  1408 02/06/24  0724 02/07/24  0709 02/08/24  0703 02/09/24  0509   WBC 7.1 8.3 8.9 9.4 9.9   HGB 8.2* 8.7* 8.7* 8.5* 8.7*   HCT 27.3* 28.5* 28.1* 26.9* 27.9*   .0 224.0 223.0 179.0 198.0   RBC 3.86 4.03 4.01 3.84 4.03   MCV 70.7* 70.7* 70.1* 70.1* 69.2*   MCH 21.2* 21.6* 21.7* 22.1* 21.6*   MCHC 30.0* 30.5* 31.0 31.6 31.2   RDW 17.3* 17.8* 18.1* 18.0* 18.6*   NEPRELIM 5.14 6.16 6.79  --   --      Recent Labs   Lab 02/05/24  1408 02/06/24  0724 02/07/24  0708 02/08/24  0703 02/09/24  0510   BUN 50* 50* 58* 66* 63*   CREATSERUM 1.55* 1.69* 1.85* 2.01* 2.22*   CA 8.6* 9.2 8.8 8.3* 8.4*   ALB 3.7  --   --   --   --    * 135* 135* 135* 133*   K 4.3 4.9 4.6 4.4 5.2*    101 101 101 101   CO2 26.0 24.0 23.0 24.0 24.0   * 191* 197* 235* 199*   MG  --  2.2  --   --   --    BILT 1.0  --   --   --   --    AST 27  --   --   --   --    ALT 37  --   --   --   --    ALKPHO 158*  --   --   --   --    TP 6.1  --   --   --   --        No results found.    Medications:   metoprolol succinate ER  12.5 mg Oral Daily Beta Blocker    clopidogrel  75 mg Oral Daily    [Held by provider] bumetanide  1 mg Intravenous BID (Diuretic)    allopurinol  300 mg Oral Daily    cyanocobalamin  100 mcg Oral Daily    gabapentin  100 mg Oral TID    PARoxetine HCl  40 mg Oral QAM    QUEtiapine  50 mg Oral Nightly    rOPINIRole  1 mg Oral Nightly    apixaban  5 mg Oral BID    clotrimazole   Topical BID    atorvastatin  40 mg Oral Nightly    ezetimibe  10 mg Oral Nightly    insulin aspart  1-7 Units Subcutaneous TID CC    pantoprazole  40 mg Intravenous Daily      PRN Meds: melatonin, diazePAM, zolpidem, acetaminophen, ondansetron, glucose **OR** glucose **OR** glucose-vitamin C **OR** dextrose **OR** glucose **OR** glucose **OR** glucose-vitamin C    Supplementary Documentation:        [facility]. Time spent on chart/note review, review of  labs/imaging, discussion with patient, physical exam, discussion with staff, consultants, coordinating care, writing progress note, discussion of plan of care.      Tavo Rueda MD

## 2024-02-09 NOTE — PAYOR COMM NOTE
--------------  CONTINUED STAY REVIEW    Payor: [employer] MEDICARE ADV PPO  Subscriber #:  [id number]  Authorization Number: [id number]    Admit date: 2/5/24  Admit time:  5:37 PM    Admitting Physician: Luke Rockwell MD  Attending Physician:  Tavo Rueda MD  Primary Care Physician: Pcp, None    REVIEW DOCUMENTATION:    Cardiology 2/9/24    Assessment:  67 y/o male with hx of ICM presented with progressive BASILIO & LE edema      Acute on chronic HFrEF, 25-30%  - BNP >1100 on admit   - Echo with EF 25%, severe diffuse hypokinesis, grade 3 dd, mod TR, pasp 62mmHg   - Scr trending up. Diuretics on hold. Mild LE edema, otherwise compensated   - Coreg often held due to hypotension   - Refused ICD in past, poor medication/ appt compliance per outpatient records      CAD, s/p CABG (LIMA-LAD, SVG-Diag, SVG-OM 2/2019) w/ stents (most recent LAD 2/2019)  - Denies anginal symptoms   - Mildly elevated in the setting of acute HF . Likely demand ischemia  - No acute ischemic changes on EKG   - On Atorvastatin, Plavix. Coreg often held due to hypotension   - ASA discontinued to avoid triple therapy     HLD   - On Atorvastatin & Zetia      Adenocarcinoma sigmoid colon s/p sigmoidectomy 10/2024      JU on CKD 3  - Scr trending up. Diuretics on hold      R PE 4/2019   - On Eliquis      HTN  - Soft BP , monitor closely      T2DM   - On insulin, per PMD      Hx of LV thrombus on 12/2021 echo  - Repeat imaging without evidence of thrombus      Iron deficiency anemia   - No overt signs of bleeding. Hgb stable. GI following      Hyperkalemia   - K+ 5.2 , monitor closely . Not on ACEi / ARB      Plan:     Scr trending up. Diuretics on hold, resume when renal function improves   Mild BLE edema, otherwise compensated. Apply compression stockings   Monitor accurate I/o, daily wts & renal fx closely   Soft blood pressure requiring coreg to be held at times   Occasional PVCs & transient NSVT noted on tele. Switch coreg to toprol XL 12.5mg daily  with hold parameters  K+ 5.2, recheck BMP & Mg at 1500 . Consider Nephrology consult      VIVIENNE Avendano    Addendum: Add Midrodine 2.5mg tid to improve blood pressure & increase renal perfusion. Can up titrate if needed      VIVIENNE Avendano    Sob better No CP  Agree with the assessment and the management plan  Follow Bun/ Creat & H/H closely  L3  Dominguez Lu MD        Hospitalist 2/9/24     back on plavix and eliquis  - possible endoscopies only if hgb drops     Ischemic cardiomyopathy with acute on chronic systolic heart failure.  - cardiology on consult  - hold  IV bumex due to rising cr  - monitor BMP  - monitor I/O  - cont coreg with parameters  - echo shows EF 25% with grade 3 diastolic dysfunction      JU on CKD3  Mild hyperkalemia  Hyponatremia  - monitor Cr, holding diuretics  - check bladder scan, renal US          Laboratory      Latest Reference Range & Units 02/05/24 14:08 02/05/24 17:54 02/06/24 07:24 02/07/24 07:08 02/08/24 07:03 02/09/24 05:10   Glucose 70 - 99 mg/dL 187 (H)  191 (H) 197 (H) 235 (H) 199 (H)   HEMOGLOBIN A1c 4.8 - 5.6 %  -   10.4 (H)  See chart for results       ESTIMATED AVERAGE GLUCOSE   See chart for results       Sodium 136 - 145 mmol/L 133 (L)  135 (L) 135 (L) 135 (L) 133 (L)   Potassium 3.5 - 5.1 mmol/L 4.3  4.9 4.6 4.4 5.2 (H)   Chloride 98 - 112 mmol/L 100  101 101 101 101   Carbon Dioxide, Total 21.0 - 32.0 mmol/L 26.0  24.0 23.0 24.0 24.0   BUN 9 - 23 mg/dL 50 (H)  50 (H) 58 (H) 66 (H) 63 (H)   CREATININE 0.70 - 1.30 mg/dL 1.55 (H)  1.69 (H) 1.85 (H) 2.01 (H) 2.22 (H)   CALCIUM 8.7 - 10.4 mg/dL 8.6 (L)  9.2 8.8 8.3 (L) 8.4 (L)   BUN/CREATININE RATIO 10.0 - 20.0  32.3 (H)  29.6 (H) 31.4 (H) 32.8 (H) 28.4 (H)   EGFR >=60 mL/min/1.73m2 49 (L)  44 (L) 39 (L) 36 (L) 32 (L)   ANION GAP 0 - 18 mmol/L 7  10 11 10 8   CALCULATED OSMOLALITY 275 - 295 mOsm/kg 294  298 (H) 302 (H) 307 (H) 300 (H)        [facility] Reference Range & Units 02/08/24 12:18 02/08/24 15:05 02/08/24  17:48 02/08/24 20:48   Troponin I (High Sensitivity) <=53 ng/L 127 (HH) 140 (HH) 129 (HH) 117 (HH)   (HH): Data is critically high     Latest Reference Range & Units 02/08/24 07:03 02/09/24 05:09   WBC 4.0 - 11.0 x10(3) uL 9.4 9.9   Hemoglobin 13.0 - 17.5 g/dL 8.5 (L) 8.7 (L)   Hematocrit 39.0 - 53.0 % 26.9 (L) 27.9 (L)   Platelet Count 150.0 - 450.0 10(3)uL 179.0 198.0   (L): Data is abnormally low      MEDICATIONS ADMINISTERED IN LAST 1 DAY:  acetaminophen (Tylenol Extra Strength) tab 500 mg       Date Action Dose Route User    2/8/2024 1854 Given 500 mg Oral Paulina Crouch RN          allopurinol (Zyloprim) tab 300 mg       Date Action Dose Route User    2/9/2024 0839 Given 300 mg Oral Paulina Crouch RN          apixaban (Eliquis) tab 5 mg       Date Action Dose Route User    2/9/2024 0839 Given 5 mg Oral Paulina Crouch RN    2/8/2024 2004 Given 5 mg Oral Cecile Pandey RN          atorvastatin (Lipitor) tab 40 mg       Date Action Dose Route User    2/8/2024 2004 Given 40 mg Oral Cecile Pandey RN          clopidogrel (Plavix) tab 75 mg       Date Action Dose Route User    2/9/2024 0838 Given 75 mg Oral Paulina Crouch RN          clotrimazole (Lotrimin) 1 % cream       Date Action Dose Route User    2/8/2024 2006 Given (none) Topical (Genitals) Cecile Pandey RN          ezetimibe (Zetia) tab 10 mg       Date Action Dose Route User    2/8/2024 2004 Given 10 mg Oral Cecile Pandey RN          gabapentin (Neurontin) cap 100 mg       Date Action Dose Route User    2/9/2024 0838 Given 100 mg Oral Paulina Crouch RN    2/8/2024 2004 Given 100 mg Oral Cecile Pandey RN    2/8/2024 1804 Given 100 mg Oral Paulina Crouch RN          insulin aspart (NovoLOG) 100 Units/mL FlexPen 1-7 Units       Date Action Dose Route User    2/9/2024 1247 Given 4 Units Subcutaneous (Right Upper Arm) Paulina Crouch RN    2/9/2024 0837 Given 2 Units Subcutaneous (Right Upper Arm) Paulina Crouch RN    2/8/2024 1820 Given  4 Units Subcutaneous (Right Upper Arm) Paulina Crouch RN    2/8/2024 1349 Given 5 Units Subcutaneous (Right Upper Arm) Paulina Crouch RN          melatonin tab 6 mg       Date Action Dose Route User    2/8/2024 2005 Given 6 mg Oral Cecile Pandey RN          ondansetron (Zofran) 4 MG/2ML injection 4 mg       Date Action Dose Route User    2/8/2024 1957 Given 4 mg Intravenous Cecile Pandey RN          pantoprazole (Protonix) 40 mg in sodium chloride 0.9% PF 10 mL IV push       Date Action Dose Route User    2/9/2024 0839 Given 40 mg Intravenous Paulina Crouch RN          PARoxetine (Paxil) tab 40 mg       Date Action Dose Route User    2/9/2024 0838 Given 40 mg Oral Paulina Crouch RN          QUEtiapine (SEROquel) tab 50 mg       Date Action Dose Route User    2/8/2024 2004 Given 50 mg Oral Cecile Pandey RN          rOPINIRole (Requip) tab 1 mg       Date Action Dose Route User    2/8/2024 2004 Given 1 mg Oral Cecile Pandey RN          cyanocobalamin (Vitamin B12) tab 100 mcg       Date Action Dose Route User    2/9/2024 0838 Given 100 mcg Oral Paulina Crouch RN            Vitals (last day)       Date/Time Temp Pulse Resp BP SpO2 Weight O2 Device O2 Flow Rate (L/min) [facility]    02/09/24 0835 97.1 °F (36.2 °C) 76 18 98/79 -- -- None (Room air) -- LD    02/09/24 0534 96.5 °F (35.8 °C) 69 18 101/71 95 % -- None (Room air) -- ML    02/09/24 0513 -- -- -- -- -- 234 lb -- -- JR    02/08/24 1957 97.2 °F (36.2 °C) 70 20 100/89 97 % -- None (Room air) -- ML    02/08/24 1801 97 °F (36.1 °C) 76 20 96/72 93 % -- None (Room air) -- LD    02/08/24 0855 97.2 °F (36.2 °C) 83 20 95/66 94 % -- None (Room air) -- LD    02/08/24 0408 -- 78 22 100/66 94 % 232 lb 11.2 oz None (Room air) -- IM

## 2024-02-09 NOTE — PLAN OF CARE
Patient is Aox4. No acute distress, reports pain in BLE due to restless leg syndrome. On room air. Standby assist. Reinforced fluid restriction education. Had episode of nausea and dry heaving at night, improved with PRN zofran. Trops trending downward. Monitor Hgb. IVP protonix. Discharge home when cleared.    Problem: CARDIOVASCULAR - ADULT  Goal: Maintains optimal cardiac output and hemodynamic stability  Description: INTERVENTIONS:  - Monitor vital signs, rhythm, and trends  - Monitor for bleeding, hypotension and signs of decreased cardiac output  - Evaluate effectiveness of vasoactive medications to optimize hemodynamic stability  - Monitor arterial and/or venous puncture sites for bleeding and/or hematoma  - Assess quality of pulses, skin color and temperature  - Assess for signs of decreased coronary artery perfusion - ex. Angina  - Evaluate fluid balance, assess for edema, trend weights  Outcome: Progressing

## 2024-02-09 NOTE — PLAN OF CARE
Patient is A&Ox4, room air, stand by assist. Pt c/o of pain PRN meds given. Strict I&O's, fluid restriction. IV venofer given. Call light within reach and fall precautions.     Problem: Patient Centered Care  Goal: Patient preferences are identified and integrated in the patient's plan of care  Description: Interventions:  - What would you like us to know as we care for you? From home alone. Johnny is a retired   - Provide timely, complete, and accurate information to patient/family  - Incorporate patient and family knowledge, values, beliefs, and cultural backgrounds into the planning and delivery of care  - Encourage patient/family to participate in care and decision-making at the level they choose  - Honor patient and family perspectives and choices  Outcome: Progressing     Problem: Diabetes/Glucose Control  Goal: Glucose maintained within prescribed range  Description: INTERVENTIONS:  - Monitor Blood Glucose as ordered  - Assess for signs and symptoms of hyperglycemia and hypoglycemia  - Administer ordered medications to maintain glucose within target range  - Assess barriers to adequate nutritional intake and initiate nutrition consult as needed  - Instruct patient on self management of diabetes  Outcome: Progressing     Problem: Patient/Family Goals  Goal: Patient/Family Long Term Goal  Description: Patient's Long Term Goal: \"to maintain my health so I don't have to be hospitalized\"    Interventions:  - Take medications as prescribed at discharge  - Follow up with PCP and cardiology   - Maintain a low Na, heart healthy diet   - See additional Care Plan goals for specific interventions  Outcome: Progressing  Goal: Patient/Family Short Term Goal  Description: Patient's Short Term Goal: \"to be less SOB and have less swelling in my legs\"    Interventions:   - IV bumex  - 2000 fluid restriction  - I&O  - Daily weights  - See additional Care Plan goals for specific interventions  Outcome: Progressing      Problem: CARDIOVASCULAR - ADULT  Goal: Maintains optimal cardiac output and hemodynamic stability  Description: INTERVENTIONS:  - Monitor vital signs, rhythm, and trends  - Monitor for bleeding, hypotension and signs of decreased cardiac output  - Evaluate effectiveness of vasoactive medications to optimize hemodynamic stability  - Monitor arterial and/or venous puncture sites for bleeding and/or hematoma  - Assess quality of pulses, skin color and temperature  - Assess for signs of decreased coronary artery perfusion - ex. Angina  - Evaluate fluid balance, assess for edema, trend weights  Outcome: Progressing  Goal: Absence of cardiac arrhythmias or at baseline  Description: INTERVENTIONS:  - Continuous cardiac monitoring, monitor vital signs, obtain 12 lead EKG if indicated  - Evaluate effectiveness of antiarrhythmic and heart rate control medications as ordered  - Initiate emergency measures for life threatening arrhythmias  - Monitor electrolytes and administer replacement therapy as ordered  Outcome: Progressing     Problem: METABOLIC/FLUID AND ELECTROLYTES - ADULT  Goal: Hemodynamic stability and optimal renal function maintained  Description: INTERVENTIONS:  - Monitor labs and assess for signs and symptoms of volume excess or deficit  - Monitor intake, output and patient weight  - Monitor urine specific gravity, serum osmolarity and serum sodium as indicated or ordered  - Monitor response to interventions for patient's volume status, including labs, urine output, blood pressure (other measures as available)  - Encourage oral intake as appropriate  - Instruct patient on fluid and nutrition restrictions as appropriate  Outcome: Progressing

## 2024-02-09 NOTE — PLAN OF CARE
Per DUTCH Gates if Troponin level less than previous critical lab value no need to contact again.    [Follow Up] : a follow up visit [Patient] : patient [Mother] : mother [FreeTextEntry1] : low back pain

## 2024-02-09 NOTE — PLAN OF CARE
Addendum: Add Midrodine 2.5mg tid to improve blood pressure & increase renal perfusion. Can up titrate if needed     VIVIENNE Avendano  2/9/2024  2:46 PM  Ph [phone] ([place])  Ph [phone] ([place])

## 2024-02-09 NOTE — PROGRESS NOTES
[facility] Cardiology Progress Note    Cristhian Lopez Patient Status:  Inpatient    9/3/1955 MRN [id number]   [facility] [facility] 3W/SW Attending Tavo Rueda MD   Hosp Day # 4 PCP None Pcp     Subjective:  Denies cp ,sob, orthopnea     Objective:  BP 98/79 (BP [facility]: Right arm)   Pulse 76   Temp 97.1 °F (36.2 °C)   Resp 18   Ht 5' 10\" (1.778 m)   Wt 234 lb (106.1 kg)   SpO2 95%   BMI 33.58 kg/m²     Telemetry: NSR       Intake/Output:    Intake/Output Summary (Last 24 hours) at 2024 1240  Last data filed at 2024 1022  Gross per 24 hour   Intake 580 ml   Output 825 ml   Net -245 ml       Last 3 Weights   24 0513 234 lb (106.1 kg)   24 0408 232 lb 11.2 oz (105.6 kg)   24 0430 233 lb 6.4 oz (105.9 kg)   24 0405 232 lb (105.2 kg)   24 0400 229 lb (103.9 kg)   24 1753 232 lb 4.8 oz (105.4 kg)   24 1404 195 lb (88.5 kg)   23 0457 224 lb (101.6 kg)   23 0500 225 lb 3.2 oz (102.2 kg)   23 1727 227 lb 3.2 oz (103.1 kg)   23 0839 220 lb (99.8 kg)       Labs:  Recent Labs   Lab 24  0708 24  0703 24  0510   * 235* 199*   BUN 58* 66* 63*   CREATSERUM 1.85* 2.01* 2.22*   EGFRCR 39* 36* 32*   CA 8.8 8.3* 8.4*   * 135* 133*   K 4.6 4.4 5.2*    101 101   CO2 23.0 24.0 24.0     Recent Labs   Lab 24  1408 24  0724 24  0709 24  0703 24  0509   RBC 3.86 4.03 4.01 3.84 4.03   HGB 8.2* 8.7* 8.7* 8.5* 8.7*   HCT 27.3* 28.5* 28.1* 26.9* 27.9*   MCV 70.7* 70.7* 70.1* 70.1* 69.2*   MCH 21.2* 21.6* 21.7* 22.1* 21.6*   MCHC 30.0* 30.5* 31.0 31.6 31.2   RDW 17.3* 17.8* 18.1* 18.0* 18.6*   NEPRELIM 5.14 6.16 6.79  --   --    WBC 7.1 8.3 8.9 9.4 9.9   .0 224.0 223.0 179.0 198.0         Recent Labs   Lab 24  1505 24  1748 24  2048   TROPHS 140* 129* 117*       Diagnostics:   24 Echo  1. Left ventricle: The cavity size was mildly to moderately  increased. Wall      thickness was normal. Systolic function was markedly reduced. The      estimated ejection fraction was 25%, by visual assessment. There was      severe diffuse hypokinesis with akinetic apex. Doppler parameters are      consistent with a reversible restrictive pattern, indicative of decreased      left ventricular diastolic compliance and/or increased left atrial      pressure - grade 3 diastolic dysfunction.   2. Right ventricle: The cavity size was increased. Systolic function was      reduced.   3. Mitral valve: The annulus was moderately calcified. There was moderate      regurgitation.   4. Left atrium: The atrium was markedly dilated.   5. Tricuspid valve: There was moderate regurgitation.   6. Right atrium: The atrium was dilated.   7. Pulmonary arteries: Systolic pressure was moderately increased, estimated      to be 62mm Hg.   Impressions:  Compared to the previous study, these findings indicate   worsening.         Review of Systems   Respiratory: Negative.     Cardiovascular: Negative.      Physical Exam:    General: Alert and oriented x 3. No apparent distress.   HEENT: Normocephalic, anicteric sclera, neck supple, no thyromegaly or adenopathy.  Neck: No JVD, carotids 2+, no bruits.  Cardiac: Regular rate & rhythm. S1, S2 normal. No murmur, pericardial rub, S3, or extra cardiac sounds.  Lungs: Clear without wheezes, rales, rhonchi or dullness.  Normal excursions and effort.  Abdomen: Soft, non-tender. No organosplenomegally, mass or rebound, BS-present.  Extremities: Without clubbing or cyanosis.  +Mild LE edema   Neurologic: Alert and oriented, normal affect. No focal defects  Skin: Warm and dry.       Medications:   clopidogrel  75 mg Oral Daily    [Held by provider] bumetanide  1 mg Intravenous BID (Diuretic)    allopurinol  300 mg Oral Daily    cyanocobalamin  100 mcg Oral Daily    gabapentin  100 mg Oral TID    PARoxetine HCl  40 mg Oral QAM    QUEtiapine  50 mg Oral Nightly     rOPINIRole  1 mg Oral Nightly    apixaban  5 mg Oral BID    clotrimazole   Topical BID    carvedilol  12.5 mg Oral BID with meals    atorvastatin  40 mg Oral Nightly    ezetimibe  10 mg Oral Nightly    insulin aspart  1-7 Units Subcutaneous TID CC    pantoprazole  40 mg Intravenous Daily         Assessment:  69 y/o male with hx of ICM presented with progressive BASILIO & LE edema     Acute on chronic HFrEF, 25-30%  - BNP >1100 on admit   - Echo with EF 25%, severe diffuse hypokinesis, grade 3 dd, mod TR, pasp 62mmHg   - Scr trending up. Diuretics on hold. Mild LE edema, otherwise compensated   - Coreg often held due to hypotension   - Refused ICD in past, poor medication/ appt compliance per outpatient records     CAD, s/p CABG (LIMA-LAD, SVG-Diag, SVG-OM 2/2019) w/ stents (most recent LAD 2/2019)  - Denies anginal symptoms   - Mildly elevated in the setting of acute HF . Likely demand ischemia  - No acute ischemic changes on EKG   - On Atorvastatin, Plavix. Coreg often held due to hypotension   - ASA discontinued to avoid triple therapy    HLD   - On Atorvastatin & Zetia     Adenocarcinoma sigmoid colon s/p sigmoidectomy 10/2024     JU on CKD 3  - Scr trending up. Diuretics on hold     R PE 4/2019   - On Eliquis     HTN  - Soft BP , monitor closely     T2DM   - On insulin, per PMD     Hx of LV thrombus on 12/2021 echo  - Repeat imaging without evidence of thrombus     Iron deficiency anemia   - No overt signs of bleeding. Hgb stable. GI following     Hyperkalemia   - K+ 5.2 , monitor closely . Not on ACEi / ARB     Plan:    Scr trending up. Diuretics on hold, resume when renal function improves   Mild BLE edema, otherwise compensated. Apply compression stockings   Monitor accurate I/o, daily wts & renal fx closely   Soft blood pressure requiring coreg to be held at times   Occasional PVCs & transient NSVT noted on tele. Switch coreg to toprol XL 12.5mg daily with hold parameters  K+ 5.2, recheck BMP & Mg at 1500 .  Consider Nephrology consult     VIVIENNE Avendano  2/9/2024  12:47 PM  Ph [phone] ([place])  Ph [phone] ([place])      Addendum:  Patient resting in the couch  Sob better No CP  Agree with the assessment and the management plan  Follow Bun/ Creat & H/H closely  L3  Dominguez Lu MD

## 2024-02-10 LAB
ANION GAP SERPL CALC-SCNC: 6 MMOL/L (ref 0–18)
BUN BLD-MCNC: 66 MG/DL (ref 9–23)
BUN/CREAT SERPL: 34.4 (ref 10–20)
CALCIUM BLD-MCNC: 8.4 MG/DL (ref 8.7–10.4)
CHLORIDE SERPL-SCNC: 101 MMOL/L (ref 98–112)
CO2 SERPL-SCNC: 26 MMOL/L (ref 21–32)
CREAT BLD-MCNC: 1.92 MG/DL
DEPRECATED RDW RBC AUTO: 43.5 FL (ref 35.1–46.3)
EGFRCR SERPLBLD CKD-EPI 2021: 38 ML/MIN/1.73M2 (ref 60–?)
ERYTHROCYTE [DISTWIDTH] IN BLOOD BY AUTOMATED COUNT: 18.2 % (ref 11–15)
GLUCOSE BLD-MCNC: 174 MG/DL (ref 70–99)
GLUCOSE BLDC GLUCOMTR-MCNC: 179 MG/DL (ref 70–99)
GLUCOSE BLDC GLUCOMTR-MCNC: 223 MG/DL (ref 70–99)
GLUCOSE BLDC GLUCOMTR-MCNC: 246 MG/DL (ref 70–99)
GLUCOSE BLDC GLUCOMTR-MCNC: 316 MG/DL (ref 70–99)
HCT VFR BLD AUTO: 26.8 %
HGB BLD-MCNC: 8.6 G/DL
MCH RBC QN AUTO: 22.9 PG (ref 26–34)
MCHC RBC AUTO-ENTMCNC: 32.1 G/DL (ref 31–37)
MCV RBC AUTO: 71.3 FL
OSMOLALITY SERPL CALC.SUM OF ELEC: 299 MOSM/KG (ref 275–295)
PLATELET # BLD AUTO: 185 10(3)UL (ref 150–450)
POTASSIUM SERPL-SCNC: 4.3 MMOL/L (ref 3.5–5.1)
RBC # BLD AUTO: 3.76 X10(6)UL
SODIUM SERPL-SCNC: 133 MMOL/L (ref 136–145)
WBC # BLD AUTO: 8.4 X10(3) UL (ref 4–11)

## 2024-02-10 PROCEDURE — 1159F MED LIST DOCD IN RCRD: CPT | Performed by: HOSPITALIST

## 2024-02-10 PROCEDURE — 3074F SYST BP LT 130 MM HG: CPT | Performed by: HOSPITALIST

## 2024-02-10 PROCEDURE — 3078F DIAST BP <80 MM HG: CPT | Performed by: HOSPITALIST

## 2024-02-10 PROCEDURE — 99233 SBSQ HOSP IP/OBS HIGH 50: CPT | Performed by: HOSPITALIST

## 2024-02-10 PROCEDURE — 3008F BODY MASS INDEX DOCD: CPT | Performed by: HOSPITALIST

## 2024-02-10 RX ORDER — PANTOPRAZOLE SODIUM 40 MG/1
40 TABLET, DELAYED RELEASE ORAL
Qty: 30 TABLET | Refills: 0 | Status: SHIPPED | OUTPATIENT
Start: 2024-02-10

## 2024-02-10 RX ORDER — SACUBITRIL AND VALSARTAN 24; 26 MG/1; MG/1
1 TABLET, FILM COATED ORAL 2 TIMES DAILY
Status: SHIPPED | COMMUNITY
Start: 2024-02-10

## 2024-02-10 RX ORDER — METOPROLOL SUCCINATE 25 MG/1
12.5 TABLET, EXTENDED RELEASE ORAL
Qty: 30 TABLET | Refills: 0 | Status: SHIPPED | OUTPATIENT
Start: 2024-02-11

## 2024-02-10 NOTE — PROGRESS NOTES
[facility]  Progress Note     Cristhian Lopez  : 9/3/1955    Status: Inpatient  Day #: 5    Attending: Tavo Rueda MD  PCP: None Pcp     Assessment and Plan:    Iron deficiency anemia  Heme positive in ER.  Hx of colon cancer s/p sigmoidectomy in .  - Hgb stable. No transfusion this admission.  - GI on consult  - IV iron per GI  - cont PPI  - trend hgb - stable  - back on plavix and eliquis  - endoscopies only if hgb drops     Ischemic cardiomyopathy with acute on chronic systolic heart failure.  - cardiology on consult  - hold  IV bumex due to rising cr  - monitor BMP  - monitor I/O  - cont coreg with parameters  - echo shows EF 25% with grade 3 diastolic dysfunction      JU on CKD3  Mild hyperkalemia - resolved  Hyponatremia  - monitor Cr, holding diuretics  - renal US ok  - Cr trending down    Hypotension  -asymptomatic  -midodrine started per cardiology     Hx of PE  - eliquis     DM2  - ISS     Hx of CAD  - plavix, eliquis. Asa stopped  - cont metoprolol         DVT Mechanical Prophylaxis:        DVT Pharmacologic Prophylaxis   Medication    apixaban (Eliquis) tab 5 mg               Subjective:      Initial Chief Complaint:  BASILIO and edema    Slept better last night. No CP, no SOB.     10 point ROS completed and was negative, except for pertinent positive and negatives stated in subjective.      Objective:      Temp:  [96.9 °F (36.1 °C)-97.2 °F (36.2 °C)] 96.9 °F (36.1 °C)  Pulse:  [71-81] 76  Resp:  [16-18] 18  BP: (87-97)/(53-65) 93/57  SpO2:  [91 %-97 %] 93 %  General:  Alert, no distress  HEENT:  Normocephalic, atraumatic  Cardiac:  Regular rate, regular rhythm  Pulmonary:  Clear to auscultation bilaterally, respirations unlabored  Gastrointestinal:  Soft, non-tender, normal bowel sounds  Musculoskeletal:  No joint swelling  Extremities:  +b/l LE edema, improved  Neurologic:  nonfocal  Psychiatric:  Normal affect, calm and appropriate    Intake/Output Summary (Last 24 hours) at  2/10/2024 1357  Last data filed at 2/10/2024 1041  Gross per 24 hour   Intake 1120 ml   Output 1060 ml   Net 60 ml         Recent Labs   Lab 02/05/24  1408 02/06/24  0724 02/07/24  0709 02/08/24  0703 02/09/24  0509 02/10/24  0640   WBC 7.1 8.3 8.9 9.4 9.9 8.4   HGB 8.2* 8.7* 8.7* 8.5* 8.7* 8.6*   HCT 27.3* 28.5* 28.1* 26.9* 27.9* 26.8*   .0 224.0 223.0 179.0 198.0 185.0   RBC 3.86 4.03 4.01 3.84 4.03 3.76*   MCV 70.7* 70.7* 70.1* 70.1* 69.2* 71.3*   MCH 21.2* 21.6* 21.7* 22.1* 21.6* 22.9*   MCHC 30.0* 30.5* 31.0 31.6 31.2 32.1   RDW 17.3* 17.8* 18.1* 18.0* 18.6* 18.2*   NEPRELIM 5.14 6.16 6.79  --   --   --      Recent Labs   Lab 02/05/24  1408 02/06/24  0724 02/07/24  0708 02/09/24  0510 02/09/24  1453 02/10/24  0640   BUN 50* 50*   < > 63* 68* 66*   CREATSERUM 1.55* 1.69*   < > 2.22* 2.08* 1.92*   CA 8.6* 9.2   < > 8.4* 8.4* 8.4*   ALB 3.7  --   --   --   --   --    * 135*   < > 133* 132* 133*   K 4.3 4.9   < > 5.2* 4.3 4.3    101   < > 101 100 101   CO2 26.0 24.0   < > 24.0 25.0 26.0   * 191*   < > 199* 251* 174*   MG  --  2.2  --   --  2.4  --    BILT 1.0  --   --   --   --   --    AST 27  --   --   --   --   --    ALT 37  --   --   --   --   --    ALKPHO 158*  --   --   --   --   --    TP 6.1  --   --   --   --   --     < > = values in this interval not displayed.       No results found.    Medications:   metoprolol succinate ER  12.5 mg Oral Daily Beta Blocker    midodrine  2.5 mg Oral TID    clopidogrel  75 mg Oral Daily    [Held by provider] bumetanide  1 mg Intravenous BID (Diuretic)    allopurinol  300 mg Oral Daily    cyanocobalamin  100 mcg Oral Daily    gabapentin  100 mg Oral TID    PARoxetine HCl  40 mg Oral QAM    QUEtiapine  50 mg Oral Nightly    rOPINIRole  1 mg Oral Nightly    apixaban  5 mg Oral BID    clotrimazole   Topical BID    atorvastatin  40 mg Oral Nightly    ezetimibe  10 mg Oral Nightly    insulin aspart  1-7 Units Subcutaneous TID CC    pantoprazole  40 mg  Intravenous Daily      PRN Meds: melatonin, diazePAM, zolpidem, acetaminophen, ondansetron, glucose **OR** glucose **OR** glucose-vitamin C **OR** dextrose **OR** glucose **OR** glucose **OR** glucose-vitamin C    Supplementary Documentation:        [facility]. Time spent on chart/note review, review of labs/imaging, discussion with patient, physical exam, discussion with staff, consultants, coordinating care, writing progress note, discussion of plan of care.      Tavo Rueda MD

## 2024-02-10 NOTE — PLAN OF CARE
Patient is Aox4. Denies pain. On room air. Standby assist. Soft BP, PO metoprolol and PO midodrine. Urine output 550mL for one occurrence this AM, post void bladder scan 349mL.     Problem: CARDIOVASCULAR - ADULT  Goal: Maintains optimal cardiac output and hemodynamic stability  Description: INTERVENTIONS:  - Monitor vital signs, rhythm, and trends  - Monitor for bleeding, hypotension and signs of decreased cardiac output  - Evaluate effectiveness of vasoactive medications to optimize hemodynamic stability  - Monitor arterial and/or venous puncture sites for bleeding and/or hematoma  - Assess quality of pulses, skin color and temperature  - Assess for signs of decreased coronary artery perfusion - ex. Angina  - Evaluate fluid balance, assess for edema, trend weights  Outcome: Progressing     Problem: Diabetes/Glucose Control  Goal: Glucose maintained within prescribed range  Description: INTERVENTIONS:  - Monitor Blood Glucose as ordered  - Assess for signs and symptoms of hyperglycemia and hypoglycemia  - Administer ordered medications to maintain glucose within target range  - Assess barriers to adequate nutritional intake and initiate nutrition consult as needed  - Instruct patient on self management of diabetes  Outcome: Progressing

## 2024-02-10 NOTE — PROGRESS NOTES
Progress Note  Cristhiandasha Lopez Patient Status:  Inpatient    9/3/1955 MRN [id number]   [facility] [facility] 3W/SW Attending Tavo Rueda MD   Hosp Day # 5 PCP None Pcp     Subjective:  Patient denies shortness of breath, orthopnea. On room air. States his LE swelling is improved. Stating he wants to go home. Pt's friend at bedside.     Objective:  BP 93/57 (BP [facility]: Left arm)   Pulse 76   Temp 96.9 °F (36.1 °C) (Axillary)   Resp 18   Ht 5' 10\" (1.778 m)   Wt 233 lb 9.6 oz (106 kg)   SpO2 93%   BMI 33.52 kg/m²     Telemetry: NSR, 3 beats NSVT last night    Intake/Output:    Intake/Output Summary (Last 24 hours) at 2/10/2024 1246  Last data filed at 2/10/2024 1041  Gross per 24 hour   Intake 1270 ml   Output 1060 ml   Net 210 ml       Last 3 Weights   02/10/24 0622 233 lb 9.6 oz (106 kg)   24 0513 234 lb (106.1 kg)   24 0408 232 lb 11.2 oz (105.6 kg)   24 0430 233 lb 6.4 oz (105.9 kg)   24 0405 232 lb (105.2 kg)   24 0400 229 lb (103.9 kg)   24 1753 232 lb 4.8 oz (105.4 kg)   24 1404 195 lb (88.5 kg)   23 0457 224 lb (101.6 kg)   23 0500 225 lb 3.2 oz (102.2 kg)   23 1727 227 lb 3.2 oz (103.1 kg)   23 0839 220 lb (99.8 kg)       Labs:  Recent Labs   Lab 24  0510 24  1453 02/10/24  0640   * 251* 174*   BUN 63* 68* 66*   CREATSERUM 2.22* 2.08* 1.92*   EGFRCR 32* 34* 38*   CA 8.4* 8.4* 8.4*   * 132* 133*   K 5.2* 4.3 4.3    100 101   CO2 24.0 25.0 26.0     Recent Labs   Lab 24  1408 24  0724 24  0709 24  0703 24  0509 02/10/24  0640   RBC 3.86 4.03 4.01 3.84 4.03 3.76*   HGB 8.2* 8.7* 8.7* 8.5* 8.7* 8.6*   HCT 27.3* 28.5* 28.1* 26.9* 27.9* 26.8*   MCV 70.7* 70.7* 70.1* 70.1* 69.2* 71.3*   MCH 21.2* 21.6* 21.7* 22.1* 21.6* 22.9*   MCHC 30.0* 30.5* 31.0 31.6 31.2 32.1   RDW 17.3* 17.8* 18.1* 18.0* 18.6* 18.2*   NEPRELIM 5.14 6.16 6.79  --   --   --    WBC 7.1 8.3 8.9 9.4 9.9  8.4   .0 224.0 223.0 179.0 198.0 185.0         Recent Labs   Lab 02/08/24  1505 02/08/24  1748 02/08/24 2048   TROPHS 140* 129* 117*       Diagnostics:  TTE 2/6/2024  Conclusions:     1. Left ventricle: The cavity size was mildly to moderately increased. Wall      thickness was normal. Systolic function was markedly reduced. The      estimated ejection fraction was 25%, by visual assessment. There was      severe diffuse hypokinesis with akinetic apex. Doppler parameters are      consistent with a reversible restrictive pattern, indicative of decreased      left ventricular diastolic compliance and/or increased left atrial      pressure - grade 3 diastolic dysfunction.   2. Right ventricle: The cavity size was increased. Systolic function was      reduced.   3. Mitral valve: The annulus was moderately calcified. There was moderate      regurgitation.   4. Left atrium: The atrium was markedly dilated.   5. Tricuspid valve: There was moderate regurgitation.   6. Right atrium: The atrium was dilated.   7. Pulmonary arteries: Systolic pressure was moderately increased, estimated      to be 62mm Hg.   No results found.   Review of Systems   Cardiovascular:  Positive for leg swelling. Negative for chest pain, dyspnea on exertion, orthopnea and paroxysmal nocturnal dyspnea.   Respiratory:  Negative for cough and shortness of breath.    Gastrointestinal:  Positive for bloating.       Physical Exam:    Gen: alert, oriented x 3, NAD  Heent: pupils equal, reactive. Mucous membranes moist.   Neck: no jvd  Cardiac: regular rate and rhythm, normal S1,S2, no murmur, clicks, rub or gallop  Lungs: Diminished  Abd: distended, +bs  Ext: BLE 1+ ankle/pedal edema  Skin: Warm, dry  Neuro: No focal deficits      Medications:     metoprolol succinate ER  12.5 mg Oral Daily Beta Blocker    midodrine  2.5 mg Oral TID    clopidogrel  75 mg Oral Daily    [Held by provider] bumetanide  1 mg Intravenous BID (Diuretic)    allopurinol  300  mg Oral Daily    cyanocobalamin  100 mcg Oral Daily    gabapentin  100 mg Oral TID    PARoxetine HCl  40 mg Oral QAM    QUEtiapine  50 mg Oral Nightly    rOPINIRole  1 mg Oral Nightly    apixaban  5 mg Oral BID    clotrimazole   Topical BID    atorvastatin  40 mg Oral Nightly    ezetimibe  10 mg Oral Nightly    insulin aspart  1-7 Units Subcutaneous TID CC    pantoprazole  40 mg Intravenous Daily         Assessment:  Acute on Chronic HrEF, Ischemic Cardiomyopathy, LVEF 25%  Presented with worsening SOB, LE edema, orthopnea  BNP 1168 on admit   TTE LVEF 25%, severe diffuse hypokinesis, akinetic apex, Grade 3 DD, reduced RV systolic function, moderate MR, moderate TR, biatrial dilation, PASP 62mmHg  Historically on toprol, entresto, spironolactone, bumetanide at home - ARNI, MRA currently on hold d/t renal function, low BP  IV Bumex x1 dose; has been on hold due to hypotension, renal function   Refused ICD in the past, poor medication/follow up compliance per op visit notes  Still appears overloaded on exam with abd distention, LE edema  CAD with hx STEMI, PCI-LAD 1/30/2019, 3v CABG 2/15/19), PCI RCA 2002, LAD 2003  Troponin 127>140>129>117 on 2/8  Denies chest pain  On plavix, eliquis, statin, zetia  Asa discontinued to avoid triple therapy  Acute on Chronic Anemia  + hemeoccult blood on rectal exam in ED  Hgb 8.6 today - stable  IV venofer, PPI  GI following   JU on CKD  Cr slightly decreased today, still up from admit - 1.92 today  Holding diuretics  Hx Adenocarcinoma Sigmoid Colon s/p sigmoidectomy 10/2023  Hx Pulmonary Embolism 4/2019 - on Eliquis  Hx Hypertension   Has been hypotensive this admit  Initially on coreg; changed to toprol yesterday  Low dose Midodrine started yesterday  Hyperlipidemia - LDL 47 on statin, zetia  DMII - SSI per primary  Hx LV Thrombus 2021 - no thrombus noted on current echo    Plan:  Diuretics have been on hold due to renal function - still appears mildly volume overloaded. Will  resume oral Bumex on discharge.   BP remains on low side but stable, asymptomatic. Continue low dose toprol. Will resume 1/2 tab of low dose entresto on discharge.   Discontinue midodrine  Continue plavix, eliquis, atorvastatin, zetia  Continue to hold spironolactone for now and reassess as outpatient once renal function improves.   Recommend 14 day MCT on discharge to assess for any arrhythmias.  Would likely benefit from future RHC and close follow up in the heart failure clinic. Patient wishes to establish care with [facility], Dr Fay.    Plan of care discussed with patient, RN.    Halley Courtney, APRN  2/10/2024  12:46 PM  [phone] [facility]  [phone] [facility]        PM rounds addendum:    Newly reduced EF: He appears back to his baseline however still likely in mild decompensated heart failure.  He would like to go home however long-term should have a right and left heart cath and further titration of his heart failure medications he is agreed to follow-up with us in our clinic for further discussion.    Patient seen and examined independently.  Agree with exam.  Labs reviewed.  Changes to assessment and plan as above.    Guanakito Fay MD  Interventional Cardiology  [facility]

## 2024-02-11 VITALS
WEIGHT: 233.63 LBS | TEMPERATURE: 98 F | HEIGHT: 70 IN | BODY MASS INDEX: 33.45 KG/M2 | SYSTOLIC BLOOD PRESSURE: 102 MMHG | HEART RATE: 80 BPM | DIASTOLIC BLOOD PRESSURE: 76 MMHG | RESPIRATION RATE: 16 BRPM | OXYGEN SATURATION: 96 %

## 2024-02-11 LAB
ANION GAP SERPL CALC-SCNC: 6 MMOL/L (ref 0–18)
BUN BLD-MCNC: 57 MG/DL (ref 9–23)
BUN/CREAT SERPL: 32.8 (ref 10–20)
CALCIUM BLD-MCNC: 8.4 MG/DL (ref 8.7–10.4)
CHLORIDE SERPL-SCNC: 101 MMOL/L (ref 98–112)
CO2 SERPL-SCNC: 27 MMOL/L (ref 21–32)
CREAT BLD-MCNC: 1.74 MG/DL
EGFRCR SERPLBLD CKD-EPI 2021: 42 ML/MIN/1.73M2 (ref 60–?)
GLUCOSE BLD-MCNC: 190 MG/DL (ref 70–99)
GLUCOSE BLDC GLUCOMTR-MCNC: 210 MG/DL (ref 70–99)
HCT VFR BLD AUTO: 30.6 %
HGB BLD-MCNC: 9.5 G/DL
OSMOLALITY SERPL CALC.SUM OF ELEC: 299 MOSM/KG (ref 275–295)
POTASSIUM SERPL-SCNC: 4.9 MMOL/L (ref 3.5–5.1)
SODIUM SERPL-SCNC: 134 MMOL/L (ref 136–145)

## 2024-02-11 PROCEDURE — 99239 HOSP IP/OBS DSCHRG MGMT >30: CPT | Performed by: HOSPITALIST

## 2024-02-11 NOTE — PLAN OF CARE
Patient is alert and oriented times four. Patient iv was replaced today. All safety measures are in place and call light is within reach.    Problem: Patient Centered Care  Goal: Patient preferences are identified and integrated in the patient's plan of care  Description: Interventions:  - What would you like us to know as we care for you? From home alone. Johnny is a retired   - Provide timely, complete, and accurate information to patient/family  - Incorporate patient and family knowledge, values, beliefs, and cultural backgrounds into the planning and delivery of care  - Encourage patient/family to participate in care and decision-making at the level they choose  - Honor patient and family perspectives and choices  Outcome: Progressing     Problem: Diabetes/Glucose Control  Goal: Glucose maintained within prescribed range  Description: INTERVENTIONS:  - Monitor Blood Glucose as ordered  - Assess for signs and symptoms of hyperglycemia and hypoglycemia  - Administer ordered medications to maintain glucose within target range  - Assess barriers to adequate nutritional intake and initiate nutrition consult as needed  - Instruct patient on self management of diabetes  Outcome: Progressing     Problem: Patient/Family Goals  Goal: Patient/Family Long Term Goal  Description: Patient's Long Term Goal: \"to maintain my health so I don't have to be hospitalized\"    Interventions:  - Take medications as prescribed at discharge  - Follow up with PCP and cardiology   - Maintain a low Na, heart healthy diet   - See additional Care Plan goals for specific interventions  Outcome: Progressing  Goal: Patient/Family Short Term Goal  Description: Patient's Short Term Goal: \"to be less SOB and have less swelling in my legs\"    Interventions:   - IV bumex  - 2000 fluid restriction  - I&O  - Daily weights  - See additional Care Plan goals for specific interventions  Outcome: Progressing     Problem: CARDIOVASCULAR - ADULT  Goal:  Maintains optimal cardiac output and hemodynamic stability  Description: INTERVENTIONS:  - Monitor vital signs, rhythm, and trends  - Monitor for bleeding, hypotension and signs of decreased cardiac output  - Evaluate effectiveness of vasoactive medications to optimize hemodynamic stability  - Monitor arterial and/or venous puncture sites for bleeding and/or hematoma  - Assess quality of pulses, skin color and temperature  - Assess for signs of decreased coronary artery perfusion - ex. Angina  - Evaluate fluid balance, assess for edema, trend weights  Outcome: Progressing  Goal: Absence of cardiac arrhythmias or at baseline  Description: INTERVENTIONS:  - Continuous cardiac monitoring, monitor vital signs, obtain 12 lead EKG if indicated  - Evaluate effectiveness of antiarrhythmic and heart rate control medications as ordered  - Initiate emergency measures for life threatening arrhythmias  - Monitor electrolytes and administer replacement therapy as ordered  Outcome: Progressing     Problem: METABOLIC/FLUID AND ELECTROLYTES - ADULT  Goal: Hemodynamic stability and optimal renal function maintained  Description: INTERVENTIONS:  - Monitor labs and assess for signs and symptoms of volume excess or deficit  - Monitor intake, output and patient weight  - Monitor urine specific gravity, serum osmolarity and serum sodium as indicated or ordered  - Monitor response to interventions for patient's volume status, including labs, urine output, blood pressure (other measures as available)  - Encourage oral intake as appropriate  - Instruct patient on fluid and nutrition restrictions as appropriate  Outcome: Progressing

## 2024-02-11 NOTE — PROGRESS NOTES
Discharge instruction discussed with patient and caregiver. All questions answered.  IV removed and reinforced. Tele box off. Patient wheeled down with belongings.

## 2024-02-11 NOTE — CM/SW NOTE
02/11/24 0900   Discharge disposition   Expected discharge disposition Home or Self   Post Acute Care Provider Home   Home services after discharge None   Discharge transportation Private car     The patient received a MDO for discharge.     The patient will be transported home via private car.    The patient has no questions or concerns at this time.    SW/CM to remain available for support and/or discharge planning.     La Helm MSW, LSW  Discharge Planner [id number]

## 2024-02-11 NOTE — DISCHARGE SUMMARY
[facility]  Discharge Summary     Cristhian Lopez  : 9/3/1955    Status: Inpatient  Day #: 6    Attending: Tavo Rueda MD  PCP: None Pcp     Date of Admission:  2024  Date of Discharge:  2024     Hospital Discharge Diagnoses:     Iron deficiency anemia  Heme positive in ER.  Hx of colon cancer s/p sigmoidectomy in .  Ischemic cardiomyopathy with acute on chronic systolic heart failure  JU on CKD3  Mild hyperkalemia - resolved  Hyponatremia  Hypotension  H/o PE  DM2  CAD      History of Present Illness:     Copied from Admission H&P:    The patient is a 68-year-old  male with history of ischemic cardiomyopathy who presented today to the emergency department for evaluation of progressive leg edema and dyspnea on exertion despite increasing his diuretic dose. In the emergency room, CBC showed hemoglobin of 8.2. Last hemoglobin on record was in September in an outside facility in October after a sigmoidectomy. Rectal exam showed brown stool, strong positive Hemoccult blood. Chemistry showed BUN and creatinine of 50 and 1.55. GFR is 49. Glucose 187, iron level is 19, and ferritin low at 14. Patient was given IV Lasix, and he will be admitted to the hospital for further management. Also, started on IV Protonix.       Hospital Course:     Iron deficiency anemia  Heme positive in ER.  Hx of colon cancer s/p sigmoidectomy in .  - Hgb stable. No transfusion this admission.  - GI on consult  - IV iron given  - cont PPI  - trend hgb - stable  - back on plavix and eliquis     Ischemic cardiomyopathy with acute on chronic systolic heart failure.  - cardiology on consult  - hold  IV bumex due to rising cr. Cr improved. Resume oral bumex on discharge per cards  - metoprolol with parameters  - echo shows EF 25% with grade 3 diastolic dysfunction   - entresto started     JU on CKD3  Mild hyperkalemia - resolved  Hyponatremia  - renal US ok  - Cr improved      Hypotension  -asymptomatic  -midodrine started per cardiology - stop on discharge     Hx of PE  - eliquis     DM2  - cont home meds     Hx of CAD  - plavix, eliquis. Asa stopped  - cont metoprolol     Consultants         Provider   Role Specialty     Minna Cerda MD  Consulting Physician GASTROENTEROLOGY     Tavon Maier MD  Consulting Physician Cardiovascular Diseases          Surgical Procedures       Case IDs Date Procedure Surgeon [facility] Status    [id number] 2/6/24 FLEXIBLE SIGMOIDOSCOPY Minna Cerda MD [facility] ENDOSCOPY Can           Physical Exam:   Blood pressure 102/76, pulse 80, temperature 97.5 °F (36.4 °C), temperature source Oral, resp. rate 16, height 5' 10\" (1.778 m), weight 233 lb 9.6 oz (106 kg), SpO2 96%.  General:  Alert, no distress  HEENT:  Normocephalic, atraumatic  Cardiac:  Regular rate, regular rhythm  Pulmonary:  Clear to auscultation bilaterally, respirations unlabored  Gastrointestinal:  Soft, non-tender, normal bowel sounds  Musculoskeletal:  No joint swelling  Extremities:  No edema, no cyanosis, no clubbing  Neurologic:  nonfocal  Psychiatric:  Normal affect, calm and appropriate         Discharge Medications        START taking these medications        Instructions Prescription details   pantoprazole 40 MG Tbec  Commonly known as: Protonix      Take 1 tablet (40 mg total) by mouth every morning before breakfast.   Quantity: 30 tablet  Refills: 0            CHANGE how you take these medications        Instructions Prescription details   Entresto 24-26 MG Tabs  Generic drug: sacubitril-valsartan  What changed: additional instructions      Take 1 tablet by mouth 2 (two) times daily. Take 1/2 tab of entresto   Refills: 0     metoprolol succinate ER 25 MG Tb24  Commonly known as: Toprol XL  What changed:   medication strength  how much to take  when to take this      Take 0.5 tablets (12.5 mg total) by mouth Daily Beta Blocker.   Quantity: 30 tablet  Refills: 0            CONTINUE  taking these medications        Instructions Prescription details   allopurinol 300 MG Tabs  Commonly known as: Zyloprim      Take 1 tablet (300 mg total) by mouth daily.   Refills: 0     apixaban 5 MG Tabs  Commonly known as: Eliquis      Take 1 tablet (5 mg total) by mouth 2 (two) times daily.   Refills: 0     atorvastatin 80 MG Tabs  Commonly known as: Lipitor      Take 1 tablet (80 mg total) by mouth daily.   Refills: 0     bumetanide 2 MG Tabs  Commonly known as: Bumex      Take 1 tablet (2 mg total) by mouth daily.   Refills: 0     clopidogrel 75 MG Tabs  Commonly known as: Plavix      Take 1 tablet (75 mg total) by mouth daily.   Refills: 0     cyanocobalamin 100 MCG Tabs      Take 1 tablet (100 mcg total) by mouth daily.   Quantity: 90 tablet  Refills: 3     gabapentin 100 MG Caps  Commonly known as: Neurontin      Take 1 capsule (100 mg total) by mouth 3 (three) times daily.   Quantity: 90 capsule  Refills: 1     glimepiride 4 MG Tabs  Commonly known as: Amaryl      Take 1 tablet (4 mg total) by mouth every morning before breakfast.   Refills: 0     metFORMIN HCl 1000 MG Tabs  Commonly known as: GLUCOPHAGE      Take 1 tablet (1,000 mg total) by mouth in the morning and 1 tablet (1,000 mg total) before bedtime.   Refills: 0     PARoxetine HCl 40 MG Tabs  Commonly known as: PAXIL      Take 1 tablet (40 mg total) by mouth every morning.   Refills: 0     QUEtiapine 50 MG Tabs  Commonly known as: SEROquel      Take 1 tablet (50 mg total) by mouth nightly.   Quantity: 30 tablet  Refills: 1     rOPINIRole 1 MG Tabs  Commonly known as: Requip      Take 1 tablet (1 mg total) by mouth nightly.   Refills: 0     Zetia 10 MG Tabs  Generic drug: ezetimibe       Refills: 0            STOP taking these medications      aspirin 81 MG Chew        carvedilol 12.5 MG Tabs  Commonly known as: Coreg        spironolactone 25 MG Tabs  Commonly known as: Aldactone                  Where to Get Your Medications        These  medications were sent to [id number] - [place], [place] - [phone], [phone]  [address], [facility] [address]      Phone: [phone]   metoprolol succinate ER 25 MG Tb24  pantoprazole 40 MG Tbec        Follow-up Information       Guanakito Fay MD Follow up in 1 week(s).    Specialties: Interventional, Cardiology, CARDIOLOGY  Why: Office will call you to schedule follow up  Contact information:  [address] [facility]  [address]  [facility] [id number] [phone]               [facility]. Schedule an appointment as soon as possible for a visit in 1 week(s).    Specialty: Cardiology  Contact information:  [address]  [facility] [id number] [phone]  Additional information:  Your appointment is located at [address] in [place], [place].  Please park in the Yellow lot and go through the [facility] entrance.  Then proceed to [address].      Masks are optional for all patients and visitors, unless otherwise indicated.                           Hospital Discharge Diagnoses: acute on chronic systolic CHF    Lace+ Score: 63  59-90 High Risk  29-58 Medium Risk  0-28   Low Risk.    TCM Follow-Up Recommendation:  LACE > 58: High Risk of readmission after discharge from the hospital.        I spent >30 minutes on this discharge. Discussed treatment and discharge plans.       Tavo Rueda MD

## 2024-02-11 NOTE — PLAN OF CARE
Plan is for discharge home today. No acute changes overnight.   Problem: Diabetes/Glucose Control  Goal: Glucose maintained within prescribed range  Description: INTERVENTIONS:  - Monitor Blood Glucose as ordered  - Assess for signs and symptoms of hyperglycemia and hypoglycemia  - Administer ordered medications to maintain glucose within target range  - Assess barriers to adequate nutritional intake and initiate nutrition consult as needed  - Instruct patient on self management of diabetes  Outcome: Progressing     Problem: CARDIOVASCULAR - ADULT  Goal: Maintains optimal cardiac output and hemodynamic stability  Description: INTERVENTIONS:  - Monitor vital signs, rhythm, and trends  - Monitor for bleeding, hypotension and signs of decreased cardiac output  - Evaluate effectiveness of vasoactive medications to optimize hemodynamic stability  - Monitor arterial and/or venous puncture sites for bleeding and/or hematoma  - Assess quality of pulses, skin color and temperature  - Assess for signs of decreased coronary artery perfusion - ex. Angina  - Evaluate fluid balance, assess for edema, trend weights  Outcome: Progressing  Goal: Absence of cardiac arrhythmias or at baseline  Description: INTERVENTIONS:  - Continuous cardiac monitoring, monitor vital signs, obtain 12 lead EKG if indicated  - Evaluate effectiveness of antiarrhythmic and heart rate control medications as ordered  - Initiate emergency measures for life threatening arrhythmias  - Monitor electrolytes and administer replacement therapy as ordered  Outcome: Progressing     Problem: METABOLIC/FLUID AND ELECTROLYTES - ADULT  Goal: Hemodynamic stability and optimal renal function maintained  Description: INTERVENTIONS:  - Monitor labs and assess for signs and symptoms of volume excess or deficit  - Monitor intake, output and patient weight  - Monitor urine specific gravity, serum osmolarity and serum sodium as indicated or ordered  - Monitor response to  interventions for patient's volume status, including labs, urine output, blood pressure (other measures as available)  - Encourage oral intake as appropriate  - Instruct patient on fluid and nutrition restrictions as appropriate  Outcome: Progressing

## 2024-02-11 NOTE — PLAN OF CARE
Patient is alert and oriented times 4. On RA. Complains of no pain at this time.  Ready for DC.   Problem: Patient Centered Care  Goal: Patient preferences are identified and integrated in the patient's plan of care  Description: Interventions:  - What would you like us to know as we care for you? From home alone. Johnny is a retired   - Provide timely, complete, and accurate information to patient/family  - Incorporate patient and family knowledge, values, beliefs, and cultural backgrounds into the planning and delivery of care  - Encourage patient/family to participate in care and decision-making at the level they choose  - Honor patient and family perspectives and choices  Outcome: Progressing     Problem: Patient/Family Goals  Goal: Patient/Family Short Term Goal  Description: Patient's Short Term Goal: \"to be less SOB and have less swelling in my legs\"    Interventions:   - IV bumex  - 2000 fluid restriction  - I&O  - Daily weights  - See additional Care Plan goals for specific interventions  Outcome: Progressing     Problem: CARDIOVASCULAR - ADULT  Goal: Absence of cardiac arrhythmias or at baseline  Description: INTERVENTIONS:  - Continuous cardiac monitoring, monitor vital signs, obtain 12 lead EKG if indicated  - Evaluate effectiveness of antiarrhythmic and heart rate control medications as ordered  - Initiate emergency measures for life threatening arrhythmias  - Monitor electrolytes and administer replacement therapy as ordered  Outcome: Progressing

## 2024-02-12 ENCOUNTER — PATIENT OUTREACH (OUTPATIENT)
Dept: CASE MANAGEMENT | Age: 69
End: 2024-02-12

## 2024-02-12 NOTE — PROGRESS NOTES
TCM chart review.  No TCM as patient follows with outside [facility] PCP.  Encounter closing.

## 2024-02-13 ENCOUNTER — HOSPITAL ENCOUNTER (EMERGENCY)
Facility: HOSPITAL | Age: 69
Discharge: HOME | End: 2024-02-13
Attending: EMERGENCY MEDICINE
Payer: MEDICARE

## 2024-02-13 VITALS
HEART RATE: 73 BPM | HEIGHT: 70 IN | TEMPERATURE: 97 F | OXYGEN SATURATION: 100 % | DIASTOLIC BLOOD PRESSURE: 67 MMHG | WEIGHT: 230 LBS | SYSTOLIC BLOOD PRESSURE: 97 MMHG | RESPIRATION RATE: 20 BRPM | BODY MASS INDEX: 32.93 KG/M2

## 2024-02-13 DIAGNOSIS — L03.818 CELLULITIS OF OTHER SPECIFIED SITE: ICD-10-CM

## 2024-02-13 DIAGNOSIS — N50.89 SCROTAL EDEMA: Primary | ICD-10-CM

## 2024-02-13 LAB
ANION GAP SERPL CALC-SCNC: 3 MMOL/L (ref 0–18)
ATRIAL RATE: 73 BPM
BASOPHILS # BLD AUTO: 0.03 X10(3) UL (ref 0–0.2)
BASOPHILS NFR BLD AUTO: 0.4 %
BNP SERPL-MCNC: 1917 PG/ML
BUN BLD-MCNC: 51 MG/DL (ref 9–23)
BUN/CREAT SERPL: 31.9 (ref 10–20)
CALCIUM BLD-MCNC: 7.8 MG/DL (ref 8.7–10.4)
CHLORIDE SERPL-SCNC: 100 MMOL/L (ref 98–112)
CO2 SERPL-SCNC: 28 MMOL/L (ref 21–32)
CREAT BLD-MCNC: 1.6 MG/DL
DEPRECATED RDW RBC AUTO: 44.9 FL (ref 35.1–46.3)
EGFRCR SERPLBLD CKD-EPI 2021: 47 ML/MIN/1.73M2 (ref 60–?)
EOSINOPHIL # BLD AUTO: 0.27 X10(3) UL (ref 0–0.7)
EOSINOPHIL NFR BLD AUTO: 3.6 %
ERYTHROCYTE [DISTWIDTH] IN BLOOD BY AUTOMATED COUNT: 22.2 % (ref 11–15)
GLUCOSE BLD-MCNC: 245 MG/DL (ref 70–99)
HCT VFR BLD AUTO: 28.6 %
HGB BLD-MCNC: 8.6 G/DL
IMM GRANULOCYTES # BLD AUTO: 0.03 X10(3) UL (ref 0–1)
IMM GRANULOCYTES NFR BLD: 0.4 %
LYMPHOCYTES # BLD AUTO: 1.17 X10(3) UL (ref 1–4)
LYMPHOCYTES NFR BLD AUTO: 15.5 %
MCH RBC QN AUTO: 22.6 PG (ref 26–34)
MCHC RBC AUTO-ENTMCNC: 30.1 G/DL (ref 31–37)
MCV RBC AUTO: 75.1 FL
MONOCYTES # BLD AUTO: 0.95 X10(3) UL (ref 0.1–1)
MONOCYTES NFR BLD AUTO: 12.6 %
NEUTROPHILS # BLD AUTO: 5.08 X10 (3) UL (ref 1.5–7.7)
NEUTROPHILS # BLD AUTO: 5.08 X10(3) UL (ref 1.5–7.7)
NEUTROPHILS NFR BLD AUTO: 67.5 %
OSMOLALITY SERPL CALC.SUM OF ELEC: 294 MOSM/KG (ref 275–295)
P AXIS: 41 DEGREES
P-R INTERVAL: 170 MS
PLATELET # BLD AUTO: 158 10(3)UL (ref 150–450)
PLATELET MORPHOLOGY: NORMAL
POTASSIUM SERPL-SCNC: 5.3 MMOL/L (ref 3.5–5.1)
Q-T INTERVAL: 428 MS
QRS DURATION: 116 MS
QTC CALCULATION (BEZET): 471 MS
R AXIS: -76 DEGREES
RBC # BLD AUTO: 3.81 X10(6)UL
SODIUM SERPL-SCNC: 131 MMOL/L (ref 136–145)
T AXIS: 18 DEGREES
VENTRICULAR RATE: 73 BPM
WBC # BLD AUTO: 7.5 X10(3) UL (ref 4–11)

## 2024-02-13 PROCEDURE — 36415 COLL VENOUS BLD VENIPUNCTURE: CPT

## 2024-02-13 PROCEDURE — 83880 ASSAY OF NATRIURETIC PEPTIDE: CPT | Performed by: EMERGENCY MEDICINE

## 2024-02-13 PROCEDURE — 85025 COMPLETE CBC W/AUTO DIFF WBC: CPT | Performed by: EMERGENCY MEDICINE

## 2024-02-13 PROCEDURE — 80048 BASIC METABOLIC PNL TOTAL CA: CPT | Performed by: EMERGENCY MEDICINE

## 2024-02-13 PROCEDURE — 93005 ELECTROCARDIOGRAM TRACING: CPT

## 2024-02-13 PROCEDURE — 99284 EMERGENCY DEPT VISIT MOD MDM: CPT

## 2024-02-13 PROCEDURE — 93010 ELECTROCARDIOGRAM REPORT: CPT

## 2024-02-13 RX ORDER — CEPHALEXIN 500 MG/1
500 CAPSULE ORAL 2 TIMES DAILY
Qty: 14 CAPSULE | Refills: 0 | Status: SHIPPED | OUTPATIENT
Start: 2024-02-13 | End: 2024-02-20

## 2024-02-13 NOTE — ED INITIAL ASSESSMENT (HPI)
Johnny arrived through triage with a female friend for c/o swelling to the groin. He was just discharged from [facility] after 5 day hospitalization to manage CHF.

## 2024-02-13 NOTE — PAYOR COMM NOTE
--------------  CONTINUED STAY REVIEW    Payor: BC[employer] MEDICARE ADV PPO  Subscriber #:  [id number]  Authorization Number: [id number]    Admit date: 2/5/24  Admit time:  5:37 PM    Admitting Physician: Luke Rockwell MD  Attending Physician:  No att. providers found  Primary Care Physician: Neida Salazar            REVIEW DOCUMENTATION:    2/10/24    Cardiology     Lungs: Diminished  Abd: distended, +bs  Ext: BLE 1+ ankle/pedal edema    Assessment:  Acute on Chronic HrEF, Ischemic Cardiomyopathy, LVEF 25%  Presented with worsening SOB, LE edema, orthopnea  BNP 1168 on admit   TTE LVEF 25%, severe diffuse hypokinesis, akinetic apex, Grade 3 DD, reduced RV systolic function, moderate MR, moderate TR, biatrial dilation, PASP 62mmHg  Historically on toprol, entresto, spironolactone, bumetanide at home - ARNI, MRA currently on hold d/t renal function, low BP  IV Bumex x1 dose; has been on hold due to hypotension, renal function   Refused ICD in the past, poor medication/follow up compliance per op visit notes  Still appears overloaded on exam with abd distention, LE edema  CAD with hx STEMI, PCI-LAD 1/30/2019, 3v CABG 2/15/19), PCI RCA 2002, LAD 2003  Troponin 127>140>129>117 on 2/8  Denies chest pain  On plavix, eliquis, statin, zetia  Asa discontinued to avoid triple therapy  Acute on Chronic Anemia  + hemeoccult blood on rectal exam in ED  Hgb 8.6 today - stable  IV venofer, PPI  GI following   JU on CKD  Cr slightly decreased today, still up from admit - 1.92 today  Holding diuretics  Hx Adenocarcinoma Sigmoid Colon s/p sigmoidectomy 10/2023  Hx Pulmonary Embolism 4/2019 - on Eliquis  Hx Hypertension   Has been hypotensive this admit  Initially on coreg; changed to toprol yesterday  Low dose Midodrine started yesterday  Hyperlipidemia - LDL 47 on statin, zetia  DMII - SSI per primary  Hx LV Thrombus 2021 - no thrombus noted on current echo     Plan:  Diuretics have been on hold due to renal function - still  appears mildly volume overloaded. Will resume oral Bumex on discharge.   BP remains on low side but stable, asymptomatic. Continue low dose toprol. Will resume 1/2 tab of low dose entresto on discharge.   Discontinue midodrine  Continue plavix, eliquis, atorvastatin, zetia  Continue to hold spironolactone for now and reassess as outpatient once renal function improves.   Recommend 14 day MCT on discharge to assess for any arrhythmias.  Would likely benefit from future RHC and close follow up in the heart failure clinic. Patient wishes to establish care with [facility], Dr Fay.     Plan of care discussed with patient, RN.     Halley Valdez, ILYA  2/10/2024  12:46 PM  PM rounds addendum:    Newly reduced EF: He appears back to his baseline however still likely in mild decompensated heart failure.  He would like to go home however long-term should have a right and left heart cath and further titration of his heart failure medications he is agreed to follow-up with us in our clinic for further discussion.     Patient seen and examined independently.  Agree with exam.  Labs reviewed.  Changes to assessment and plan as above.     Guanakito Fay MD  Interventional Cardiology      2/10/24 Hospitalist    JU on CKD3  Mild hyperkalemia - resolved  Hyponatremia  - monitor Cr, holding diuretics  - renal US ok  - Cr trending down     Hypotension  -asymptomatic  -midodrine started per cardiology    edications:   metoprolol succinate ER  12.5 mg Oral Daily Beta Blocker    midodrine  2.5 mg Oral TID    clopidogrel  75 mg Oral Daily    [Held by provider] bumetanide  1 mg Intravenous BID (Diuretic)    allopurinol  300 mg Oral Daily    cyanocobalamin  100 mcg Oral Daily    gabapentin  100 mg Oral TID    PARoxetine HCl  40 mg Oral QAM    QUEtiapine  50 mg Oral Nightly    rOPINIRole  1 mg Oral Nightly    apixaban  5 mg Oral BID    clotrimazole   Topical BID    atorvastatin  40 mg Oral Nightly    ezetimibe  10 mg Oral Nightly    insulin  aspart  1-7 Units Subcutaneous TID CC    pantoprazole  40 mg Intravenous Daily      PRN Meds: melatonin, diazePAM, zolpidem, acetaminophen, ondansetron, glucose **OR** glucose **OR** glucose-vitamin C **OR** dextrose **OR** glucose **OR** glucose **OR** glucose-vitamin C           2/11/24 Hospitalist    Date of Admission:  2/5/2024  Date of Discharge:  2/11/2024      Hospital Discharge Diagnoses:      Iron deficiency anemia  Heme positive in ER.  Hx of colon cancer s/p sigmoidectomy in 2023.  Ischemic cardiomyopathy with acute on chronic systolic heart failure  JU on CKD3  Mild hyperkalemia - resolved  Hyponatremia  Hypotension  H/o PE  DM2  CAD      History of Present Illness:      Copied from Admission H&P:     The patient is a 68-year-old  male with history of ischemic cardiomyopathy who presented today to the emergency department for evaluation of progressive leg edema and dyspnea on exertion despite increasing his diuretic dose. In the emergency room, CBC showed hemoglobin of 8.2. Last hemoglobin on record was in September in an outside facility in October after a sigmoidectomy. Rectal exam showed brown stool, strong positive Hemoccult blood. Chemistry showed BUN and creatinine of 50 and 1.55. GFR is 49. Glucose 187, iron level is 19, and ferritin low at 14. Patient was given IV Lasix, and he will be admitted to the hospital for further management. Also, started on IV Protonix.       Hospital Course:      Iron deficiency anemia  Heme positive in ER.  Hx of colon cancer s/p sigmoidectomy in 2023.  - Hgb stable. No transfusion this admission.  - GI on consult  - IV iron given  - cont PPI  - trend hgb - stable  - back on plavix and eliquis     Ischemic cardiomyopathy with acute on chronic systolic heart failure.  - cardiology on consult  - hold  IV bumex due to rising cr. Cr improved. Resume oral bumex on discharge per cards  - metoprolol with parameters  - echo shows EF 25% with grade 3 diastolic  dysfunction   - entresto started     JU on CKD3  Mild hyperkalemia - resolved  Hyponatremia  - renal US ok  - Cr improved     Hypotension  -asymptomatic  -midodrine started per cardiology - stop on discharge       Discharge Medications          START taking these medications         Instructions Prescription details   pantoprazole 40 MG Tbec  Commonly known as: Protonix       Take 1 tablet (40 mg total) by mouth every morning before breakfast.    Quantity: 30 tablet  Refills: 0                CHANGE how you take these medications         Instructions Prescription details   Entresto 24-26 MG Tabs  Generic drug: sacubitril-valsartan  What changed: additional instructions       Take 1 tablet by mouth 2 (two) times daily. Take 1/2 tab of entresto    Refills: 0      metoprolol succinate ER 25 MG Tb24  Commonly known as: Toprol XL  What changed:   medication strength  how much to take  when to take this       Take 0.5 tablets (12.5 mg total) by mouth Daily Beta Blocker.    Quantity: 30 tablet  Refills: 0                CONTINUE taking these medications         Instructions Prescription details   allopurinol 300 MG Tabs  Commonly known as: Zyloprim       Take 1 tablet (300 mg total) by mouth daily.    Refills: 0      apixaban 5 MG Tabs  Commonly known as: Eliquis       Take 1 tablet (5 mg total) by mouth 2 (two) times daily.    Refills: 0      atorvastatin 80 MG Tabs  Commonly known as: Lipitor       Take 1 tablet (80 mg total) by mouth daily.    Refills: 0      bumetanide 2 MG Tabs  Commonly known as: Bumex       Take 1 tablet (2 mg total) by mouth daily.    Refills: 0      clopidogrel 75 MG Tabs  Commonly known as: Plavix       Take 1 tablet (75 mg total) by mouth daily.    Refills: 0      cyanocobalamin 100 MCG Tabs       Take 1 tablet (100 mcg total) by mouth daily.    Quantity: 90 tablet  Refills: 3      gabapentin 100 MG Caps  Commonly known as: Neurontin       Take 1 capsule (100 mg total) by mouth 3 (three) times  daily.    Quantity: 90 capsule  Refills: 1      glimepiride 4 MG Tabs  Commonly known as: Amaryl       Take 1 tablet (4 mg total) by mouth every morning before breakfast.    Refills: 0      metFORMIN HCl 1000 MG Tabs  Commonly known as: GLUCOPHAGE       Take 1 tablet (1,000 mg total) by mouth in the morning and 1 tablet (1,000 mg total) before bedtime.    Refills: 0      PARoxetine HCl 40 MG Tabs  Commonly known as: PAXIL       Take 1 tablet (40 mg total) by mouth every morning.    Refills: 0      QUEtiapine 50 MG Tabs  Commonly known as: SEROquel       Take 1 tablet (50 mg total) by mouth nightly.    Quantity: 30 tablet  Refills: 1      rOPINIRole 1 MG Tabs  Commonly known as: Requip       Take 1 tablet (1 mg total) by mouth nightly.    Refills: 0      Zetia 10 MG Tabs  Generic drug: ezetimibe         Refills: 0                STOP taking these medications       aspirin 81 MG Chew         carvedilol 12.5 MG Tabs  Commonly known as: Coreg         spironolactone 25 MG Tabs  Commonly known as: Aldactone                       Where to Get Your Medications          These medications were sent to [id number] - [place], [place] - [address] [phone], [phone]  [address], [facility] [address]        Phone: [phone]   metoprolol succinate ER 25 MG Tb24  pantoprazole 40 MG Tbec           Follow-up Information         Guanakito Fay MD Follow up in 1 week(s).    Specialties: Interventional, Cardiology, CARDIOLOGY  Why: Office will call you to schedule follow up  Contact information:  [address]  [address]  [facility] [id number] [phone]                    [facility]. Schedule an appointment as soon as possible for a visit in 1 week(s).    Specialty: Cardiology  Contact information:  [address]  [facility] [id number] [phone]  Additional information:  Your appointment is located at [address] in [place], [place].  Please park in the Yellow lot and go through the  [facility] entrance.  Then proceed to [address].      Masks are optional for all patients and visitors, unless otherwise indicated.              Latest Reference Range & Units 02/10/24 06:40 02/11/24 06:11   Glucose 70 - 99 mg/dL 174 (H) 190 (H)   Sodium 136 - 145 mmol/L 133 (L) 134 (L)   Potassium 3.5 - 5.1 mmol/L 4.3 4.9   Chloride 98 - 112 mmol/L 101 101   Carbon Dioxide, Total 21.0 - 32.0 mmol/L 26.0 27.0   BUN 9 - 23 mg/dL 66 (H) 57 (H)   CREATININE 0.70 - 1.30 mg/dL 1.92 (H) 1.74 (H)   (H): Data is abnormally high  (L): Data is abnormally low     Latest Reference Range & Units 02/10/24 06:40 02/11/24 06:11   WBC 4.0 - 11.0 x10(3) uL 8.4    Hemoglobin 13.0 - 17.5 g/dL 8.6 (L) 9.5 (L)   Hematocrit 39.0 - 53.0 % 26.8 (L) 30.6 (L)   (L): Data is abnormally low        MEDICATIONS ADMINISTERED IN LAST 1 DAY:          Vitals (last day) before discharge       Date/Time Temp Pulse Resp BP SpO2 Weight O2 Device O2 Flow Rate (L/min) Who    02/11/24 0856 97.5 °F (36.4 °C) 80 16 102/76 96 % -- None (Room air) -- OR    02/11/24 0630 96.8 °F (36 °C) 81 16 99/62 95 % -- None (Room air) -- KG    02/10/24 2050 96.6 °F (35.9 °C) 78 18 103/76 95 % -- None (Room air) -- KG    02/10/24 1442 96.3 °F (35.7 °C) 68 18 99/72 97 % -- None (Room air) -- AL    02/10/24 1041 -- -- -- 93/57 -- -- -- -- AL    02/10/24 1034 96.9 °F (36.1 °C) 76 18 90/53 93 % -- None (Room air) -- AL    02/10/24 0622 96.9 °F (36.1 °C) 71 16 96/65 95 % 233 lb 9.6 oz None (Room air) -- ML

## 2024-02-13 NOTE — ED PROVIDER NOTES
Patient Seen in: [facility] Emergency Department    History     Chief Complaint   Patient presents with    Swelling Edema       HPI    History is provided by patient/independent historian: patient  68-year-old male with history of hypertension, hyperlipidemia, CHF, on bumetanide, recently hospitalized for fluid overload, here with complaints of swelling to his genitals for the past 2 days.  Patient states that he was released from the hospital 2 days ago and in the evening started noticing the swelling.  No difficulty urinating.  No redness.  No fevers.  He otherwise feels well without any Difficulty breathing    History reviewed.   Past Medical History:   Diagnosis Date    Coronary heart disease     2 stents in place, pstient sees Dr. 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   Past Surgical History:   Procedure Laterality Date    OTHER SURGICAL HISTORY      2 stents          Home Medications reviewed :  (Not in a hospital admission)        History reviewed.   Social History     Socioeconomic History    Marital status: Single    Number of children: 0   Occupational History    Occupation: [employer]   Tobacco Use    Smoking status: Former     Packs/day: 1     Types: Cigarettes     Quit date: 1995     Years since quittin.4    Smokeless tobacco: Never   Vaping Use    Vaping Use: Never used   Substance and Sexual Activity    Alcohol use: No     Alcohol/week: 0.0 standard drinks of alcohol    Drug use: Never         ROS  Review of Systems   Respiratory:  Negative for shortness of breath.    Cardiovascular:  Negative for chest pain.   Genitourinary:  Positive for scrotal swelling.   All other systems reviewed and are negative.     All other pertinent organ systems are reviewed and are negative.      Physical  Exam     ED Triage Vitals [02/13/24 0848]   BP 90/60   Pulse 75   Resp (!) 28   Temp 97 °F (36.1 °C)   Temp src Temporal   SpO2 100 %   O2 Device None (Room air)     Vital signs reviewed.      Physical Exam  Vitals and nursing note reviewed.   Cardiovascular:      Pulses: Normal pulses.   Pulmonary:      Effort: No respiratory distress.   Abdominal:      General: There is no distension.   Genitourinary:     Comments: Diffuse scrotal and penile shaft edema, mild scrotal erythema, no crepitus, no testicular tenderness  Neurological:      Mental Status: He is alert.         ED Course       Labs:     Labs Reviewed   BASIC METABOLIC PANEL (8) - Abnormal; Notable for the following components:       Result Value    Glucose 245 (*)     Sodium 131 (*)     Potassium 5.3 (*)     BUN 51 (*)     Creatinine 1.60 (*)     BUN/CREA Ratio 31.9 (*)     Calcium, Total 7.8 (*)     eGFR-Cr 47 (*)     All other components within normal limits   BNP (B TYPE NATRIURETIC PEPTIDE) - Abnormal; Notable for the following components:    Beta Natriuretic Peptide 1,917 (*)     All other components within normal limits   RBC MORPHOLOGY SCAN - Abnormal; Notable for the following components:    RBC Morphology See morphology below (*)     All other components within normal limits   CBC W/ DIFFERENTIAL - Abnormal; Notable for the following components:    HGB 8.6 (*)     HCT 28.6 (*)     MCV 75.1 (*)     MCH 22.6 (*)     MCHC 30.1 (*)     RDW 22.2 (*)     All other components within normal limits   CBC WITH DIFFERENTIAL WITH PLATELET    Narrative:     The following orders were created for panel order CBC With Differential With Platelet.                  Procedure                               Abnormality         Status                                     ---------                               -----------         ------                                     CBC W/ DIFFERENTIAL[[id number]]          Abnormal            Final result                                                  Please view results for these tests on the individual orders.         My EKG Interpretation: EKG    Rate, intervals and axes as noted on EKG Report.  Rate: 73  Rhythm: Sinus Rhythm  Reading: normal for rate, normal for rhythm, septal infarct           As reviewed and Interpreted by me      Imaging Results Available and Reviewed while in ED:   No results found.    Decision rules/scores evaluated: none      Diagnostic labs/tests considered but not ordered: CXR    ED Medications Administered: Medications - No data to display         - given erythema - will treat for early scrotal cellulitis - return precautions discussed    MDM       Medical Decision Making      Differential Diagnosis: After obtaining the patient's history, performing the physical exam and reviewing the diagnostics, multiple initial diagnoses were considered based on the presenting problem including fluid overload, testicular torsion, sera's gangrene, scrotal cellulitis, balanitis    External document review: I personally reviewed available external medical records for any recent pertinent discharge summaries, testing, and procedures - the findings are as follows: 2/11/24 admission for gi bleed and CHF    Complicating Factors: The patient already  has a past medical history of Coronary heart disease, Essential hypertension, Heart attack (HCC) (2018), Hyperlipidemia, 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 to contribute to the complexity of this ED evaluation.    Procedures performed: none    Discussed management with physician/appropriate source: none    Considered admission/deescalation of care for: swelling    Social determinants of health affecting patient care: none    Prescription medications considered: keflex, discussed continuing current medication regimen    The patient requires continuous monitoring for: swelling    Shared decision making: discussed possible  admission        Disposition and Plan     Clinical Impression:  1. Scrotal edema    2. Cellulitis of other specified site        Disposition:  Discharge    Follow-up:  Neida Salazar  [address] [place] [address]   [address] [address] [phone]    Follow up        Medications Prescribed:  Current Discharge Medication List        START taking these medications    Details   cephalexin 500 MG Oral Cap Take 1 capsule (500 mg total) by mouth 2 (two) times daily for 7 days.  Qty: 14 capsule, Refills: 0

## 2024-02-15 ENCOUNTER — TELEPHONE (OUTPATIENT)
Dept: CARDIOLOGY CLINIC | Facility: HOSPITAL | Age: 69
End: 2024-02-15

## 2024-02-15 NOTE — TELEPHONE ENCOUNTER
Unable to leave message on his phone . Calling him to schedule an appt for SCC. Referral sent to us.

## 2024-02-15 NOTE — TELEPHONE ENCOUNTER
S/w his contact friend, Woodrow and he has had trouble also getting a hold of him, and actually went to check on him as he was not answering. Requested for Johnny to call the clinic . Number given.

## 2024-02-19 ENCOUNTER — TELEPHONE (OUTPATIENT)
Dept: CARDIOLOGY CLINIC | Facility: HOSPITAL | Age: 69
End: 2024-02-19

## 2024-02-19 NOTE — TELEPHONE ENCOUNTER
Called to cancel appointment. He noted his swelling is worse than ever before, can't walk swelling up to and including scrotum and penis. Instructed if it is worse than ever, he should go to ER and follow up with our clinic within 1 week of discharge.

## 2024-02-23 ENCOUNTER — HOSPITAL ENCOUNTER (INPATIENT)
Facility: HOSPITAL | Age: 69
LOS: 9 days | Discharge: HOME | End: 2024-03-03
Attending: EMERGENCY MEDICINE | Admitting: HOSPITALIST
Payer: MEDICARE

## 2024-02-23 ENCOUNTER — APPOINTMENT (OUTPATIENT)
Dept: GENERAL RADIOLOGY | Facility: HOSPITAL | Age: 69
End: 2024-02-23
Attending: EMERGENCY MEDICINE
Payer: MEDICARE

## 2024-02-23 DIAGNOSIS — N50.89 SCROTAL EDEMA: ICD-10-CM

## 2024-02-23 DIAGNOSIS — I50.9 ACUTE ON CHRONIC CONGESTIVE HEART FAILURE, UNSPECIFIED HEART FAILURE TYPE (HCC): Primary | ICD-10-CM

## 2024-02-23 LAB
ALBUMIN SERPL-MCNC: 3.4 G/DL (ref 3.2–4.8)
ALBUMIN SERPL-MCNC: 3.6 G/DL (ref 3.2–4.8)
ALBUMIN/GLOB SERPL: 1.6 {RATIO} (ref 1–2)
ALP LIVER SERPL-CCNC: 221 U/L
ALP LIVER SERPL-CCNC: 224 U/L
ALT SERPL-CCNC: 41 U/L
ALT SERPL-CCNC: 43 U/L
ANION GAP SERPL CALC-SCNC: 8 MMOL/L (ref 0–18)
AST SERPL-CCNC: 29 U/L (ref ?–34)
AST SERPL-CCNC: 29 U/L (ref ?–34)
ATRIAL RATE: 98 BPM
BASOPHILS # BLD AUTO: 0.03 X10(3) UL (ref 0–0.2)
BASOPHILS NFR BLD AUTO: 0.4 %
BILIRUB DIRECT SERPL-MCNC: 0.6 MG/DL (ref ?–0.3)
BILIRUB SERPL-MCNC: 1 MG/DL (ref 0.2–1.1)
BILIRUB SERPL-MCNC: 1 MG/DL (ref 0.2–1.1)
BNP SERPL-MCNC: 1707 PG/ML
BUN BLD-MCNC: 52 MG/DL (ref 9–23)
BUN/CREAT SERPL: 29.4 (ref 10–20)
CALCIUM BLD-MCNC: 8 MG/DL (ref 8.7–10.4)
CHLORIDE SERPL-SCNC: 102 MMOL/L (ref 98–112)
CO2 SERPL-SCNC: 28 MMOL/L (ref 21–32)
CREAT BLD-MCNC: 1.77 MG/DL
DEPRECATED RDW RBC AUTO: 74.1 FL (ref 35.1–46.3)
EGFRCR SERPLBLD CKD-EPI 2021: 42 ML/MIN/1.73M2 (ref 60–?)
EOSINOPHIL # BLD AUTO: 0.15 X10(3) UL (ref 0–0.7)
EOSINOPHIL NFR BLD AUTO: 2 %
ERYTHROCYTE [DISTWIDTH] IN BLOOD BY AUTOMATED COUNT: 29.1 % (ref 11–15)
GLOBULIN PLAS-MCNC: 2.3 G/DL (ref 2.8–4.4)
GLUCOSE BLD-MCNC: 186 MG/DL (ref 70–99)
GLUCOSE BLDC GLUCOMTR-MCNC: 198 MG/DL (ref 70–99)
GLUCOSE BLDC GLUCOMTR-MCNC: 86 MG/DL (ref 70–99)
HCT VFR BLD AUTO: 31.6 %
HGB BLD-MCNC: 9.5 G/DL
IMM GRANULOCYTES # BLD AUTO: 0.03 X10(3) UL (ref 0–1)
IMM GRANULOCYTES NFR BLD: 0.4 %
LYMPHOCYTES # BLD AUTO: 0.82 X10(3) UL (ref 1–4)
LYMPHOCYTES NFR BLD AUTO: 11.1 %
MCH RBC QN AUTO: 23.6 PG (ref 26–34)
MCHC RBC AUTO-ENTMCNC: 30.1 G/DL (ref 31–37)
MCV RBC AUTO: 78.6 FL
MONOCYTES # BLD AUTO: 0.66 X10(3) UL (ref 0.1–1)
MONOCYTES NFR BLD AUTO: 8.9 %
NEUTROPHILS # BLD AUTO: 5.72 X10 (3) UL (ref 1.5–7.7)
NEUTROPHILS # BLD AUTO: 5.72 X10(3) UL (ref 1.5–7.7)
NEUTROPHILS NFR BLD AUTO: 77.2 %
OSMOLALITY SERPL CALC.SUM OF ELEC: 305 MOSM/KG (ref 275–295)
P AXIS: 69 DEGREES
P-R INTERVAL: 152 MS
PLATELET # BLD AUTO: 152 10(3)UL (ref 150–450)
PLATELET MORPHOLOGY: NORMAL
POTASSIUM SERPL-SCNC: 3.9 MMOL/L (ref 3.5–5.1)
PROT SERPL-MCNC: 5.8 G/DL (ref 5.7–8.2)
PROT SERPL-MCNC: 5.9 G/DL (ref 5.7–8.2)
Q-T INTERVAL: 400 MS
QRS DURATION: 114 MS
QTC CALCULATION (BEZET): 510 MS
R AXIS: -57 DEGREES
RBC # BLD AUTO: 4.02 X10(6)UL
SODIUM SERPL-SCNC: 138 MMOL/L (ref 136–145)
T AXIS: 84 DEGREES
VENTRICULAR RATE: 98 BPM
WBC # BLD AUTO: 7.4 X10(3) UL (ref 4–11)

## 2024-02-23 PROCEDURE — 99223 1ST HOSP IP/OBS HIGH 75: CPT | Performed by: HOSPITALIST

## 2024-02-23 PROCEDURE — 71045 X-RAY EXAM CHEST 1 VIEW: CPT | Performed by: EMERGENCY MEDICINE

## 2024-02-23 RX ORDER — NICOTINE POLACRILEX 4 MG
15 LOZENGE BUCCAL
Status: DISCONTINUED | OUTPATIENT
Start: 2024-02-23 | End: 2024-03-03

## 2024-02-23 RX ORDER — CLOPIDOGREL BISULFATE 75 MG/1
75 TABLET ORAL DAILY
Status: DISCONTINUED | OUTPATIENT
Start: 2024-02-23 | End: 2024-03-03

## 2024-02-23 RX ORDER — DEXTROSE MONOHYDRATE 25 G/50ML
50 INJECTION, SOLUTION INTRAVENOUS
Status: DISCONTINUED | OUTPATIENT
Start: 2024-02-23 | End: 2024-03-03

## 2024-02-23 RX ORDER — BUMETANIDE 0.25 MG/ML
2 INJECTION INTRAMUSCULAR; INTRAVENOUS ONCE
Status: COMPLETED | OUTPATIENT
Start: 2024-02-23 | End: 2024-02-23

## 2024-02-23 RX ORDER — TEMAZEPAM 15 MG/1
15 CAPSULE ORAL NIGHTLY PRN
Status: DISCONTINUED | OUTPATIENT
Start: 2024-02-23 | End: 2024-03-03

## 2024-02-23 RX ORDER — ATORVASTATIN CALCIUM 80 MG/1
80 TABLET, FILM COATED ORAL DAILY
Status: DISCONTINUED | OUTPATIENT
Start: 2024-02-23 | End: 2024-03-03

## 2024-02-23 RX ORDER — ATORVASTATIN CALCIUM 80 MG/1
80 TABLET, FILM COATED ORAL NIGHTLY
Status: CANCELLED | OUTPATIENT
Start: 2024-02-23

## 2024-02-23 RX ORDER — NICOTINE POLACRILEX 4 MG
30 LOZENGE BUCCAL
Status: DISCONTINUED | OUTPATIENT
Start: 2024-02-23 | End: 2024-03-03

## 2024-02-23 RX ORDER — METOCLOPRAMIDE HYDROCHLORIDE 5 MG/ML
10 INJECTION INTRAMUSCULAR; INTRAVENOUS EVERY 8 HOURS PRN
Status: DISCONTINUED | OUTPATIENT
Start: 2024-02-23 | End: 2024-02-24

## 2024-02-23 RX ORDER — BUMETANIDE 0.25 MG/ML
2 INJECTION INTRAMUSCULAR; INTRAVENOUS
Status: DISCONTINUED | OUTPATIENT
Start: 2024-02-23 | End: 2024-03-01

## 2024-02-23 RX ORDER — ONDANSETRON 2 MG/ML
4 INJECTION INTRAMUSCULAR; INTRAVENOUS EVERY 6 HOURS PRN
Status: DISCONTINUED | OUTPATIENT
Start: 2024-02-23 | End: 2024-03-03

## 2024-02-23 RX ORDER — METOPROLOL SUCCINATE 25 MG/1
25 TABLET, EXTENDED RELEASE ORAL
Status: DISCONTINUED | OUTPATIENT
Start: 2024-02-24 | End: 2024-03-03

## 2024-02-23 RX ORDER — ACETAMINOPHEN 500 MG
500 TABLET ORAL EVERY 4 HOURS PRN
Status: DISCONTINUED | OUTPATIENT
Start: 2024-02-23 | End: 2024-03-03

## 2024-02-23 RX ORDER — CEFAZOLIN SODIUM/WATER 2 G/20 ML
2 SYRINGE (ML) INTRAVENOUS EVERY 8 HOURS
Status: DISCONTINUED | OUTPATIENT
Start: 2024-02-23 | End: 2024-02-29

## 2024-02-23 RX ORDER — CEFAZOLIN SODIUM/WATER 2 G/20 ML
2 SYRINGE (ML) INTRAVENOUS ONCE
Status: COMPLETED | OUTPATIENT
Start: 2024-02-23 | End: 2024-02-23

## 2024-02-23 NOTE — CONSULTS
Cardiology Consult Note    Cristhian Lopez Patient Status:  Emergency    9/3/1955 MRN [id number]   [facility] [facility] EMERGENCY DEPARTMENT Attending [place] MD   Hosp Day # 0 PCP KARI LESTER.  Cristhian Lopez is a 68 year old male with a history of with history of ischemic cardiomyopathy with EF around 25% s/p CABG following acute anterior wall MI with LIMA to LAD and SVGs to diagonal and OM, nonsustained VT ,hypertension hyperlipidemia, diabetes mellitus and CKD who presented to the ER today for worsening leg and anterior wall edema along with shortness of breath -for the last one week.     Denies any chest pains.   In ER,   Troponin I #117: BNP 1707  EKG: NSR, left axis deviation,anteroseptal MI-age indeterminate  CBC: H/H 9.5/31.6  Chemistry BUN 52/creatinine 1.77    Patient was admitted to this hospital about two and a half week back with similar symptoms.  He was found with severe anemia with guaiac positive stools.The patient was on DAPT and Eliquis at that time.  He has remote history of PE.   He had GI workup and he was placed on Plavix and Eliquis only.    Patient is a poor historian and He had undergone colon surgery in 2023 for colon cancer.  The patient was also offered ICD placement for low EF, however, he declined the same.  He was discharged home on Eliquis and Plavix along with po diuretics and other GDMT.  -----------------------------------------------------------------------------------------------------------------  Past Medical History:   Diagnosis Date    Coronary heart disease     2 stents in place, pstient sees Dr. 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)     right thumb    RLS (restless legs syndrome)      Past Surgical History:   Procedure Laterality Date    OTHER SURGICAL HISTORY      2 stents    CABG  Surgery for colon  cancer  Family History   Problem Relation Age of Onset    Cancer Father         sarcoma of back   Social history: Denies smoking, alcohol use or drug use  Family history: Noncontributory  Allergies: Heparin  Medications: Reviewed  Review of systems: Negative except as mentioned in HPI      Objective:   Temp: 97.2 °F (36.2 °C)  Pulse: 91  Resp: 18  BP: 106/74    Intake/Output:   No intake or output data in the 24 hours ending 02/23/24 1421    Physical Exam:     General: Awake  No acute distress  HEENT: Normocephalic, anicteric sclera, neck supple.  Neck: No JVD, carotids 2+, no bruits.  Cardiac: Regular rate and rhythm. S1, S2 normal. No murmur, pericardial rub, S3.  Lungs: Clear without wheezes, rales, rhonchi or dullness.  Normal excursions and effort.  Abdomen: Soft, non-tender. BS-present.  Extremities: Without clubbing, cyanosis or edema.  Peripheral pulses are 2+.  Neurologic: Non-focal  Skin: Warm and dry.       Assessment:      CAD s/p MI, PCI /stents and CABG  Ischemic cardiomyopathy   EF 25%  H/o NSVT/ declining ICD  Moderate to severe MR: Moderate TR  CHF exacerbation    History of PE  On Eliquis    HTN  Under control on meds    DM  Per PCP    JU on CKD  Follow BUN/ creatinine closely      Plan:  Bumex 2 mg IV twice daily  Plavix 75 mg daily  Metoprolol succinate ER 25 mg p.o. daily  Entresto 24-26 mg p.o. twice daily  Atorvastatin 80 mg daily  Eliquis 5 mg p.o. twice daily  Strict I's and O's  Daily weight  Follow BUN/creatinine and electrolytes closely      Level of care: C5    MD Dominguez Piper MD  2/23/2024  2:21 PM

## 2024-02-23 NOTE — ED INITIAL ASSESSMENT (HPI)
PT c/o swelling/edema in his legs. Pt states he was recently hospitalized for same but states swelling is worse.  Pt also c/o penis pain.

## 2024-02-23 NOTE — H&P
[facility]    PATIENT'S NAME: CARLY ROTHMAN   ATTENDING PHYSICIAN: [place] MD   PATIENT ACCOUNT#:   [id number]    LOCATION:  [place] [address]  MEDICAL RECORD #:   [id number]       YOB: 1955  ADMISSION DATE:       02/23/2024    HISTORY AND PHYSICAL EXAMINATION    CHIEF COMPLAINT:  Acute on chronic systolic heart failure.    HISTORY OF PRESENT ILLNESS:  The patient is a 68-year-old  male with underlying ischemic cardiomyopathy who came in today to the emergency department for evaluation of increased dyspnea on exertion and leg edema.  CBC showed white blood cell count of 7.4, hemoglobin of 9.5, MCV 78.6.  The hemoglobin is stable.  Chemistry showed GFR of 42 which is at baseline, BUN and creatinine of 52 and 1.77, glucose 187.  B-natriuretic peptide 1700.  Chest x-ray showed no acute findings.  EKG showed sinus rhythm.  Started on IV Lasix, also started on IV Ancef for lower extremity and scrotal cellulitis.    PAST MEDICAL HISTORY:  Coronary artery disease, status post LAD and RCA PCI stent; ischemic cardiomyopathy, ejection fraction 25% with combined systolic and diastolic heart failure; declined ICD; hypertension; hyperlipidemia; pulmonary embolism, chronically anticoagulated with Eliquis; chronic kidney disease stage 3; anemia of chronic kidney disease and iron deficiency; history of gastrointestinal blood loss, treated conservatively on a recent hospitalization; generalized osteoarthritis; degenerative joint disease of lumbar spine; restless leg syndrome; diabetes mellitus type 2; and gout.    PAST SURGICAL HISTORY:  Sigmoidectomy for adenocarcinoma, left vitrectomy, left inguinal hernia repair.    MEDICATIONS:  Please see medication reconciliation list.  Currently anticoagulated with Eliquis.    ALLERGIES:  Heparin.    FAMILY HISTORY:  Mother had diabetes mellitus type 2.  Father had heart disease.    SOCIAL HISTORY:  Ex-tobacco user.  No current tobacco, alcohol,  or drug use.  Lives by himself.  Noncompliant with low-salt diet.    REVIEW OF SYSTEMS:  The patient reports that he relies on take-out food and fast food for his diet and has been noticing increased leg edema, increased dyspnea on exertion, increase in his scrotal swelling, and erythema of both legs.  Orthopnea and dyspnea on exertion among his complaints.      PHYSICAL EXAMINATION:    GENERAL:  Alert and oriented to time, place, and person.  No acute distress.    VITAL SIGNS:  Temperature 97.2, pulse 101, respiratory rate 20, blood pressure 108/76, pulse ox 99% on room air.  HEENT:  Atraumatic.  Oropharynx clear.  Dry mucous membranes.  Ears and nose normal.  Eyes:  Anicteric sclerae.  NECK:  Supple.  No lymphadenopathy.  Trachea midline.  Full range of motion.  LUNGS:  Clear to auscultation bilaterally.  Normal respiratory effort.  Diminished breathing sounds, both lung bases.  HEART:  Regular rate and rhythm.  S1 and S2 auscultated.    ABDOMEN:  Soft, obese.  Positive bowel sounds.  EXTREMITIES:  Edema +3, both legs, with erythema, punctate in nature, in anterior upper legs and noted also scrotal edema and erythema.  NEUROLOGIC:  Motor and sensory intact.      ASSESSMENT:    1.   Acute on chronic combined systolic and diastolic heart failure.  2.   Bilateral lower extremity cellulitis.  3.   Ischemic cardiomyopathy.  4.   Diabetes mellitus type 2.  5.   Chronic kidney disease, stage 3.  6.   Anemia of iron deficiency and chronic kidney disease.      PLAN:  The patient will be admitted to telemetry floor.  IV Bumex, IV Ancef.  Cardiology consult.  Monitor his electrolytes, kidney function, and hemodynamic status.  Re-educate on the importance of low-salt diet.  Monitor Accu-Cheks.  Further recommendations to follow.      Dictated By Luke Rockwell MD  d: 02/23/2024 12:44:29  t: 02/23/2024 14:09:34  Job [id number]  FB/

## 2024-02-23 NOTE — ED INITIAL ASSESSMENT (HPI)
Johnny reports right mid to lower back pains for a few weeks, worse over the last week. Denies injury. Denies SOB. Denies CP. Denies hematuria.  Worse with movement, denies taking PRN meds for pain.  Ambulatory without issue

## 2024-02-23 NOTE — ED QUICK NOTES
Orders for admission, patient is aware of plan and ready to go upstairs. Any questions, please call ED RN Larissa at extension [phone].     Patient Covid vaccination status: Unvaccinated     COVID Test Ordered in ED: None    COVID Suspicion at Admission: N/A    Running Infusions:  None    Mental Status/LOC at time of transport: x4    Other pertinent information:   CIWA score: N/A   NIH score:  N/A

## 2024-02-23 NOTE — HISTORICAL OFFICE NOTE
[facility]  [address], [address], [place] [address]  [phone]     Cristhian Lopez         Progress Note         Demographics:   Name: Cristhian Lopez YOB: 1955   Age: 66, Male Medical Record No: [id number]   Visited Date/Time: 01/11/2022 01:15 PM           Chief Complaints   follow up      History of Present Illness   Mr. Lopez is here to follow-up an admission to [facility] in late December.  He presented there with dyspnea and edema and was found to be in congestive heart failure.  He diuresed well and was able to discharge home on 12/26/2021.  He has had no further dizziness or falls that he was having fairly frequently before coming into the hospital.  He is wearing an event monitor and taking it off today and we will see what that shows.    He has a history of CAD and had remote PCI of the RCA in 2002 in the LAD in 2003.  He suffered a large anterior wall STEMI in January 2019 and had emergency PCI of the LAD.  At that time he went on to have bypass involving a LIMA to the LAD, and separate SVGs to the diagonal and OM.  He has an EF of about 35% by history and confirmed on recent echo.  The echo in the hospital did seem to suggest a layered thrombus so he was put on Eliquis.  At the present time he denies any chest pain shortness of breath edema or dizziness.  An echo in the hospital suggested moderate to severe mitral regurgitation.  His exam today does not reveal any significant murmur.  Renal function in the hospital was normal.      Cardiac risk factors Former smoker      Past Medical History   1.Unspecified systolic heart failure   2.Moderate tricuspid regurgitation   3.Moderate mitral valve regurgitation   4.Left heart thrombus   5.Cardiomyopathy, Ischemic - CMO   6.History of CABG (coronary artery bypass graft) - Hx   7.Heart failure, unspecified   8.Atherosclerotic heart disease of native coronary artery without angina pectoris      Past  Surgical History   1.Unspecified other replacement of coronary artery      Family History   1. Father - Family history of heart disease - FHx   2. Mother - Family history of heart disease - FHx      Social History      Smoking status Former smoker since 01/04/1966 - 01/04/1995  Tobacco usage - No (Non-smoker for personal reasons (finding))         Review of systems   Cardiovascular No history of Chest pain, Palpitations and Edema   Respiratory No history of SOB   Neurological No history of Numbness and Limb weakness      Physical Examination   Vitals Left Arm Sitting  / 60 mmHg, Pulse rate 76 bpm, Regular, Height in 5' 10\", BMI: 27.3, Weight in 190 lbs (or) 86 kgs and BSA : 2.08 cc/m²   Head/Eyes/Ears/Nose/Mouth/Throat Mucous membranes Moist   Neck Normal carotid pulsations, No carotid bruits and No JVD   Respiratory Unlabored   Cardiovascular Intact distal pulses and   Gastrointestinal Abdomen soft, Non-tender, Normoactive bowel sounds, No organomegaly, No masses, No pulsations and No bruits   Lower Extremities Pulses 2+ and equal bilaterally   Skin Warm and dry   Neurologic / Psychiatric Alert and Oriented      Allergies   1.heparin - Ingredient(Reaction:hives, Severity:Moderate)      Medications (Info obtained by: Verbal)   1.allopurinoL 300 mg tablet, Take 1 tablet orally once a day.   2.carvediloL 12.5 mg tablet, Take 1 tablet orally 2 times a day.   3.Eliquis 5 mg tablet, Take 1 tablet orally 2 times a day.   4.furosemide 40 mg tablet, Take 1 tablet orally once a day.   5.HYDROcodone 10 mg-acetaminophen 325 mg tablet, Take 1 tablet orally once a day as needed.   6.PARoxetine 40 mg tablet, Take 1 tablet orally once a day.   7.potassium chloride ER 20 mEq tablet,extended release, Take 1 tablet orally once a day.   8.ramipriL 10 mg capsule, Take 1 capsule orally once a day.   9.simvastatin 40 mg tablet, Take 1 tablet orally once a day.   10.Zetia 10 mg tablet, Take 1 tablet orally once a day.       Impression   1.Unspecified systolic heart failure   2.Moderate tricuspid regurgitation   3.Moderate mitral valve regurgitation   4.Left heart thrombus   5.Cardiomyopathy, Ischemic - CMO   6.History of CABG (coronary artery bypass graft) - Hx   7.Atherosclerotic heart disease of native coronary artery without angina pectoris      Assessment & Plan   Assessment:  1.  CAD, status post previous large anterior wall MI.  Stable and asymptomatic.  Plan was to do an outpatient stress test after correction of heart failure in the hospital.    2.  Recent admission with acute exacerbation of CHF.  Continues to have moderate LV dysfunction from his old MI.  Euvolemic today on exam.    3.  Echo suggesting moderate to severe MR.  No significant murmur today.  This may have been functional related to LV dilatation from the heart failure.    4.  LV thrombus, on Eliquis    Plan:  1.  Add spironolactone 25 mg every morning.  Dispense 90 with three refills.    2.  Basic metabolic panel in 1 week.    3.  Nuclear treadmill stress test soon.    4.  Echocardiogram and office visit in 3 months.  The echo will be to reassess LV function, reassess mitral regurgitation, and assess for resolution of his apical LV thrombus.  Would consider stopping the Eliquis if there is no obvious thrombus on his next echo.    5.  Office visit after the echo in 3 months.      Medications Ordered   1.spironolactone 25 mg tablet, Take 1 tablet orally once a day.      Labs and Diagnostics ordered   1.BMP (Basic Metabolic Panel) (1 Week)   2.Echocardiography - Complete (3 Months)      Future appointments   1.Follow up visit - Steve Almazan (3 Months)      Miscellaneous   1.Smoking Cessation reviewed with the patient      Nurses documentation   refills: None   Assistive devices: none  Upcoming sx or procedures: none       Patient instructions   Plan:  1.  Add spironolactone 25 mg every morning.  Dispense 90 with three refills.    2.  Basic metabolic panel in 1  week.    3.  Nuclear treadmill stress test soon.    4.  Echocardiogram and office visit in 3 months.  The echo will be to reassess LV function, reassess mitral regurgitation, and assess for resolution of his apical LV thrombus.    5.  Office visit after the echo in 3 months.      CPOE Orders carried out by: Steve Almazan MD and Tamara HICKMAN      Care Providers: Steve Almazan MD, Tamara HICKMAN, Erma HICKMAN and [facility]         Electronically Authenticated by   Steve Almazan MD   01/13/2022 08:34:45 AM      Disclaimer: Components of this note were documented using voice recognition system and are subject to errors not corrected at proofreading. Contact the author of this note for any clarifications.

## 2024-02-23 NOTE — ED PROVIDER NOTES
Patient Seen in: [facility] Emergency Department      History     Chief Complaint   Patient presents with    Swelling Edema     Stated Complaint: swelling in the legs    Subjective:   HPI    68-year-old male with history of hypertension, hyperlipidemia, coronary artery disease post angioplasty and multiple stent placements, anemia, and ischemic cardiomyopathy presents with complaints of increasing lower extremity, scrotal, and penile edema.  The patient was admitted to this hospital from - with similar symptoms.  He was discharged on Entresto and Bumex which he states he has been taking.  He reports lower extremity edema and scrotal and penile edema has progressed since his discharge.  He denies any difficulty breathing.  He reports some discomfort to his scrotum and penis.  He states he is able to void.    Objective:   Past Medical History:   Diagnosis Date    Coronary heart disease     2 stents in place, pstient sees Dr. Westbrook    Essential hypertension     Heart attack ([facility])      maker w/ 5 stents    Hyperlipidemia     Melanoma in situ of left upper extremity ([facility])     right thumb    RLS (restless legs syndrome)               Past Surgical History:   Procedure Laterality Date    OTHER SURGICAL HISTORY      2 stents                 Social History     Socioeconomic History    Marital status: Single    Number of children: 0   Occupational History    Occupation: [employer]   Tobacco Use    Smoking status: Former     Packs/day: 1     Types: Cigarettes     Quit date: 1995     Years since quittin.4    Smokeless tobacco: Never   Vaping Use    Vaping Use: Never used   Substance and Sexual Activity    Alcohol use: No     Alcohol/week: 0.0 standard drinks of alcohol    Drug use: Never     Social Determinants of Health     Food Insecurity: No Food Insecurity (2024)    Food Insecurity     Food Insecurity: Never true   Transportation Needs: No Transportation Needs (2024)     Transportation Needs     Lack of Transportation: No   Housing Stability: Low Risk  (2/5/2024)    Housing Stability     Housing Instability: No              Review of Systems    Positive for stated complaint: swelling in the legs  Other systems are as noted in HPI.  Constitutional and vital signs reviewed.      All other systems reviewed and negative except as noted above.    Physical Exam     ED Triage Vitals [02/23/24 0910]   /76   Pulse 101   Resp 20   Temp 97.2 °F (36.2 °C)   Temp src Temporal   SpO2 99 %   O2 Device        Current:/76   Pulse 101   Temp 97.2 °F (36.2 °C) (Temporal)   Resp 20   Ht 177.8 cm (5' 10\")   Wt 87.1 kg   SpO2 99%   BMI 27.55 kg/m²         Physical Exam      General Appearance: alert, no distress  Eyes: pupils equal and round no pallor or injection  ENT, Mouth: mucous membranes moist  Respiratory: there are no retractions, lungs are clear to auscultation  Cardiovascular: regular rate and rhythm  Gastrointestinal:  abdomen is soft and non tender, no masses, bowel sounds normal  Genitourinary: Marked edema noted to the scrotum and penis.  There is erythema noted to the scrotum extending into the inner surface of bilateral thighs  Neurological: Speech normal.  Moving extremities x 4.  Skin: warm and dry, no rashes.  Musculoskeletal: neck is supple non tender        Extremities are symmetrical, full range of motion.  Marked bilateral lower extremity edema to the waist  Psychiatric: patient is oriented X 3, there is no agitation    DIFFERENTIAL DIAGNOSIS: After history and physical exam differential diagnosis was considered for congestive heart failure, dependent edema, or other        ED Course     Labs Reviewed   COMP METABOLIC PANEL (14) - Abnormal; Notable for the following components:       Result Value    Glucose 186 (*)     BUN 52 (*)     Creatinine 1.77 (*)     BUN/CREA Ratio 29.4 (*)     Calcium, Total 8.0 (*)     Calculated Osmolality 305 (*)     eGFR-Cr 42 (*)      Alkaline Phosphatase 224 (*)     Globulin  2.3 (*)     All other components within normal limits   PRO BETA NATRIURETIC PEPTIDE - Abnormal; Notable for the following components:    Beta Natriuretic Peptide 1,707 (*)     All other components within normal limits   HEPATIC FUNCTION PANEL (7) - Abnormal; Notable for the following components:    Alkaline Phosphatase 221 (*)     Bilirubin, Direct 0.6 (*)     All other components within normal limits   CBC W/ DIFFERENTIAL - Abnormal; Notable for the following components:    HGB 9.5 (*)     HCT 31.6 (*)     MCV 78.6 (*)     MCH 23.6 (*)     MCHC 30.1 (*)     RDW-SD 74.1 (*)     RDW 29.1 (*)     Lymphocyte Absolute 0.82 (*)     All other components within normal limits   CBC WITH DIFFERENTIAL WITH PLATELET    Narrative:     The following orders were created for panel order CBC With Differential With Platelet.  Procedure                               Abnormality         Status                     ---------                               -----------         ------                     CBC W/ DIFFERENTIAL[[id number]]          Abnormal            Final result                 Please view results for these tests on the individual orders.   RBC MORPHOLOGY SCAN   RAINBOW DRAW LAVENDER   RAINBOW DRAW LIGHT GREEN   RAINBOW DRAW BLUE   RAINBOW DRAW GOLD     EKG    Rate, intervals and axes as noted on EKG Report.  Rate: 98  Rhythm: Sinus Rhythm  Axis: Left  Reading: Nonspecific EKG                        MDM      Lab, x-ray, and EKG results noted.  Will admit for congestive heart failure and severe edema.  Will cover for suspected cellulitis with antibiotics.  Discussed with Dr. Rockwell, hospitalist.  Also communicated with [facility].  Admission disposition: 2/23/2024 12:48 PM                                        Medical Decision Making      Disposition and Plan     Clinical Impression:  1. Acute on chronic congestive heart failure, unspecified heart failure type  (HCC)    2. Scrotal edema         Disposition:  Admit  2/23/2024 12:48 pm    Follow-up:  No follow-up provider specified.  We recommend that you schedule follow up care with a primary care provider within the next three months to obtain basic health screening including reassessment of your blood pressure.      Medications Prescribed:  Current Discharge Medication List                            Hospital Problems       Present on Admission  Date Reviewed: 2/20/2023            ICD-10-CM Noted POA    * (Principal) Acute on chronic congestive heart failure, unspecified heart failure type (HCC) I50.9 2/5/2024 Unknown

## 2024-02-24 LAB
ANION GAP SERPL CALC-SCNC: 7 MMOL/L (ref 0–18)
BASOPHILS # BLD AUTO: 0.03 X10(3) UL (ref 0–0.2)
BASOPHILS NFR BLD AUTO: 0.5 %
BUN BLD-MCNC: 36 MG/DL (ref 9–23)
BUN/CREAT SERPL: 28.8 (ref 10–20)
CALCIUM BLD-MCNC: 7.8 MG/DL (ref 8.7–10.4)
CHLORIDE SERPL-SCNC: 106 MMOL/L (ref 98–112)
CO2 SERPL-SCNC: 30 MMOL/L (ref 21–32)
CREAT BLD-MCNC: 1.25 MG/DL
DEPRECATED RDW RBC AUTO: 74.3 FL (ref 35.1–46.3)
EGFRCR SERPLBLD CKD-EPI 2021: 63 ML/MIN/1.73M2 (ref 60–?)
EOSINOPHIL # BLD AUTO: 0.2 X10(3) UL (ref 0–0.7)
EOSINOPHIL NFR BLD AUTO: 3.2 %
ERYTHROCYTE [DISTWIDTH] IN BLOOD BY AUTOMATED COUNT: 29.2 % (ref 11–15)
GLUCOSE BLD-MCNC: 94 MG/DL (ref 70–99)
GLUCOSE BLDC GLUCOMTR-MCNC: 103 MG/DL (ref 70–99)
GLUCOSE BLDC GLUCOMTR-MCNC: 163 MG/DL (ref 70–99)
GLUCOSE BLDC GLUCOMTR-MCNC: 183 MG/DL (ref 70–99)
GLUCOSE BLDC GLUCOMTR-MCNC: 188 MG/DL (ref 70–99)
HCT VFR BLD AUTO: 31 %
HGB BLD-MCNC: 9.2 G/DL
IMM GRANULOCYTES # BLD AUTO: 0.02 X10(3) UL (ref 0–1)
IMM GRANULOCYTES NFR BLD: 0.3 %
LYMPHOCYTES # BLD AUTO: 0.82 X10(3) UL (ref 1–4)
LYMPHOCYTES NFR BLD AUTO: 13 %
MAGNESIUM SERPL-MCNC: 1.8 MG/DL (ref 1.6–2.6)
MCH RBC QN AUTO: 23.1 PG (ref 26–34)
MCHC RBC AUTO-ENTMCNC: 29.7 G/DL (ref 31–37)
MCV RBC AUTO: 77.7 FL
MONOCYTES # BLD AUTO: 0.6 X10(3) UL (ref 0.1–1)
MONOCYTES NFR BLD AUTO: 9.5 %
NEUTROPHILS # BLD AUTO: 4.63 X10 (3) UL (ref 1.5–7.7)
NEUTROPHILS # BLD AUTO: 4.63 X10(3) UL (ref 1.5–7.7)
NEUTROPHILS NFR BLD AUTO: 73.5 %
OSMOLALITY SERPL CALC.SUM OF ELEC: 304 MOSM/KG (ref 275–295)
PLATELET # BLD AUTO: 148 10(3)UL (ref 150–450)
POTASSIUM SERPL-SCNC: 3 MMOL/L (ref 3.5–5.1)
POTASSIUM SERPL-SCNC: 3.8 MMOL/L (ref 3.5–5.1)
RBC # BLD AUTO: 3.99 X10(6)UL
SODIUM SERPL-SCNC: 143 MMOL/L (ref 136–145)
WBC # BLD AUTO: 6.3 X10(3) UL (ref 4–11)

## 2024-02-24 PROCEDURE — 99233 SBSQ HOSP IP/OBS HIGH 50: CPT | Performed by: INTERNAL MEDICINE

## 2024-02-24 PROCEDURE — 99497 ADVNCD CARE PLAN 30 MIN: CPT | Performed by: INTERNAL MEDICINE

## 2024-02-24 RX ORDER — GABAPENTIN 100 MG/1
100 CAPSULE ORAL 3 TIMES DAILY
Status: DISCONTINUED | OUTPATIENT
Start: 2024-02-24 | End: 2024-03-03

## 2024-02-24 RX ORDER — ROPINIROLE 1 MG/1
1 TABLET, FILM COATED ORAL NIGHTLY
Status: DISCONTINUED | OUTPATIENT
Start: 2024-02-24 | End: 2024-03-03

## 2024-02-24 RX ORDER — POTASSIUM CHLORIDE 20 MEQ/1
40 TABLET, EXTENDED RELEASE ORAL EVERY 4 HOURS
Status: COMPLETED | OUTPATIENT
Start: 2024-02-24 | End: 2024-02-24

## 2024-02-24 RX ORDER — QUETIAPINE FUMARATE 25 MG/1
50 TABLET, FILM COATED ORAL NIGHTLY
Status: DISCONTINUED | OUTPATIENT
Start: 2024-02-24 | End: 2024-03-03

## 2024-02-24 RX ORDER — MAGNESIUM OXIDE 400 MG/1
400 TABLET ORAL ONCE
Status: COMPLETED | OUTPATIENT
Start: 2024-02-24 | End: 2024-02-24

## 2024-02-24 RX ORDER — PAROXETINE 10 MG/1
40 TABLET, FILM COATED ORAL EVERY MORNING
Status: DISCONTINUED | OUTPATIENT
Start: 2024-02-24 | End: 2024-03-03

## 2024-02-24 NOTE — PHYSICAL THERAPY NOTE
PHYSICAL THERAPY EVALUATION - INPATIENT    Room Number: 331/331-A  Evaluation Date: 2024  Presenting Problem: acute on CHF  Co-Morbidities : PMHx includes but is not limited to ischemic cardiomyopathy with EF around 25% s/p CABG following acute anterior wall MI with LIMA to LAD and SVGs to diagonal and OM, nonsustained VT ,hypertension hyperlipidemia, diabetes mellitus and CKD       Problem List  Principal Problem:    Acute on chronic congestive heart failure, unspecified heart failure type ([facility])  Active Problems:    Scrotal edema      Past Medical History  Past Medical History:   Diagnosis Date    Coronary heart disease     2 stents in place, pstient sees Dr. Westbrook    Essential hypertension     Heart attack ([facility])      maker w/ 5 stents    Hyperlipidemia     Melanoma in situ of left upper extremity ([facility])     right thumb    RLS (restless legs syndrome)        Past Surgical History  Past Surgical History:   Procedure Laterality Date    OTHER SURGICAL HISTORY      2 stents        HOME SITUATION  Home Situation  Type of Home: House (0 CHIVO)  Home Layout: One level  Stairs to Enter : 0  Lives With: Alone  Drives: Yes  Patient Regularly Uses: None     Prior Level of [place]: Lives alone in ranch.  Has aide 2-5x/week as needed for assistance    SUBJECTIVE  Feeling fine    OBJECTIVE  Precautions: Bed/chair alarm  Fall Risk: Standard fall risk    WEIGHT BEARING RESTRICTION                PAIN ASSESSMENT  Ratin         RANGE OF MOTION AND STRENGTH ASSESSMENT  Upper extremity ROM and strength are within functional limits see OT  Lower extremity ROM is within functional limits   Lower extremity strength is within functional limits except for the following:  BLE hips    NEUROLOGICAL FINDINGS                      ACTIVITY TOLERANCE  Pulse: 85        BP: 125/83             O2 WALK       AM-PAC '6-Clicks' INPATIENT SHORT FORM - BASIC MOBILITY  How much difficulty does the patient currently  have...  Patient Difficulty: Turning over in bed (including adjusting bedclothes, sheets and blankets)?: A Little   Patient Difficulty: Sitting down on and standing up from a chair with arms (e.g., wheelchair, bedside commode, etc.): None   Patient Difficulty: Moving from lying on back to sitting on the side of the bed?: A Little   How much help from another person does the patient currently need...   Help from Another: Moving to and from a bed to a chair (including a wheelchair)?: None   Help from Another: Need to walk in hospital room?: A Little   Help from Another: Climbing 3-5 steps with a railing?: A Little     AM-PAC Score:  Raw Score: 20   Approx Degree of Impairment: 35.83%   Standardized Score (AM-PAC Scale): 47.67   CMS Modifier (G-Code):     FUNCTIONAL ABILITY STATUS  Functional Mobility/Gait Assessment  Gait Assistance: Supervision  Distance (ft): 60  Assistive Device: None  Pattern: Ataxic    Skilled Therapy Provided: Chart reviewed.  RN aware.  Patient up in chair.  Sit to stand with sup.  Walked 60 ft with sup and no A.D.  Returned to chair.  Discussed home safety and skin prec's with edema.  All needs left within reach. RN aware.    Patient End of Session: Up in chair;Needs met;Call light within reach;RN aware of session/findings;All patient questions and concerns addressed    ASSESSMENT   Patient is a 68 year old male admitted on 2/23/2024 for acute on CHF, LE and penile edema.  Pertinent comorbidities and personal factors impacting therapy include deconditioning, LE weakness, living alone.  Functional outcome measures completed include AM_PAC.  Based on this evaluation, patient's clinical presentation is evolving and overall evaluation complexity is considered moderate.  PLAN  Patient has been evaluated and presents with no skilled Physical Therapy needs at this time.  Patient discharged from Physical Therapy services.  Please re-order if a new functional limitation presents during this  admission.    GOALS  Patient was able to achieve the following goals ...    Patient was able to transfer Safely and independently   Patient able to ambulate on level surfaces Safely and independently

## 2024-02-24 NOTE — OCCUPATIONAL THERAPY NOTE
OCCUPATIONAL THERAPY EVALUATION - INPATIENT     Room Number: 331/331-A  Evaluation Date: 2/24/2024  Type of Evaluation: Initial   Presenting Problem: Acute on chronic congestive heart failure, scrotal edema, SOB     Co-morbidities include but are not limited to; ischemic cardiomyopathy with EF around 25% s/p CABG following acute anterior wall MI with LIMA to LAD and SVGs to diagonal and OM, nonsustained VT ,hypertension hyperlipidemia, diabetes mellitus and CKD     Physician Order: IP Consult to Occupational Therapy  Reason for Therapy: ADL/IADL Dysfunction and Discharge Planning    OCCUPATIONAL THERAPY ASSESSMENT   Patient is a 68 year old male admitted 2/23/2024 for acute on chronic congestive heart failure with SOB, and scrotal edema.      RN approved patient participation. Prior to admission, patient's baseline is Independent, however, the patient reports a caregiver \"comes whenever [he] needs her\", to assist with cooking, cleaning, and grocery shopping. The patient reports he is independent with ambulation and self cares at baseline.  Patient is currently functioning below baseline with toileting, bathing, upper body dressing, lower body dressing, grooming, transfers, dynamic sitting balance, dynamic standing balance, functional standing tolerance, energy conservation strategies, and performing household tasks.  Patient is requiring  CGA - SBA for short distance functional transfers, and max - total non dependent assist with LE dressing  as a result of the following impairments: decreased functional strength, decreased endurance, pain, impaired dynamic sitting and standing balance, decreased muscular endurance, decreased insight to deficits, decreased safety awareness, and decreased awareness for energy conservation techniques . Occupational Therapy will continue to follow for duration of hospitalization.    Patient will benefit from continued skilled OT Services For duration of hospitalization, however, given  the patient is functioning near baseline level do not anticipate skilled therapy needs at discharge     Recommend increased supervision and caregiver assist PRN upon DC to maximize safety and functional outcomes. Patient verbalized agreement.     PLAN  OT Treatment Plan: Balance activities;Energy conservation/work simplification techniques;ADL training;Visual perceptual training;IADL training;Functional transfer training;UE strengthening/ROM;Endurance training;Patient/Family education;Patient/Family training;Equipment eval/education;Neuromuscluar reeducation;Compensatory technique education  OT Device Recommendations: Reacher; Sock aid    OCCUPATIONAL THERAPY MEDICAL/SOCIAL HISTORY   Problem List  Principal Problem:    Acute on chronic congestive heart failure, unspecified heart failure type (HCC)  Active Problems:    Scrotal edema    HOME SITUATION  Type of Home: House (0 CHIVO)  Home Layout: One level  Lives With: Alone  Toilet and Equipment: Standard height toilet  Shower/Tub and Equipment: Walk-in shower  Other Equipment: None  Occupation/Status: Retired [employer] ; Independent at baseline, however, the patient reports caregiver comes \"whenever needed\".  Drives: Yes  Patient Regularly Uses: None    Use of Assistive Device(s): None     Prior Level of [place]: Independent     SUBJECTIVE  \"I have a caregiver, she comes whenever I need her.\"     OCCUPATIONAL THERAPY EXAMINATION    OBJECTIVE  Precautions: Bed/chair alarm  Fall Risk: Standard fall risk    PAIN ASSESSMENT  [facility]: Patient complains of \"abdominal pressure\".  Management Techniques: Activity promotion; Body mechanics; Relaxation; Breathing techniques; Repositioning; Nurse notified    COGNITION  Overall Cognitive Status:  WFL - within functional limits    RANGE OF MOTION   Upper extremity ROM: Bilateral UE baseline deficits. The patient reports \"rotator cuff injuries to both shoulders\".     STRENGTH ASSESSMENT  Upper extremity strength: Impaired  bilaterally per observation. Did not formally test due to bilateral UE ROM limitations.     COORDINATION  Gross Motor: WFL   Fine Motor: WFL     ACTIVITIES OF DAILY LIVING ASSESSMENT  AM-PAC ‘6-Clicks’ Inpatient Daily Activity Short Form  How much help from another person does the patient currently need…  -   Putting on and taking off regular lower body clothing?: A Lot  -   Bathing (including washing, rinsing, drying)?: A Little  -   Toileting, which includes using toilet, bedpan or urinal? : A Little  -   Putting on and taking off regular upper body clothing?: A Little  -   Taking care of personal grooming such as brushing teeth?: A Little  -   Eating meals?: A Little    AM-PAC Score:  Score: 17  Approx Degree of Impairment: 50.11%  Standardized Score (AM-PAC Scale): 37.26  CMS Modifier (G-Code): CK    FUNCTIONAL TRANSFER ASSESSMENT  Sit to Stand: Chair  Chair: Contact Guard Assist    BED MOBILITY  Patient up in chair upon arrival     BALANCE ASSESSMENT  Dynamic Sitting Balance: Fair (-) ; Patient unable to perform unilateral reaching towards LE while seated in chair. Assessed during LE dressing task.     FUNCTIONAL ADL ASSESSMENT  LB Dressing Seated: Maximum Assist  Patient requiring max - total non dependent assist this date due to inability to perform unilateral reaching, and or figure 4 technique while seated in bedside chair. Patient education for use of reacher, sock aide, dressing stick and long handled shoe horn to maximize safety, and energy conservation techniques. The patient verbalized understanding, and reported he would be open to trialing. Plan to review and demonstrate at next session.     EDUCATION PROVIDED  Patient: Role of Occupational Therapy; Plan of Care; Discharge Recommendations; Adaptive Equipment Recommendations; DME Recommendations; Fall Prevention; Functional Transfer Techniques; Posture/Positioning; Energy Conservation; Proper Body Mechanics; Compensatory ADL Techniques  Patient's  Response to Education: Verbalized Understanding; Returned Demonstration; Requires Further Education    The patient's Approx Degree of Impairment: 50.11% has been calculated based on documentation in the [facility] '6 clicks' Inpatient Daily Activity Short Form.  Research supports that patients with this level of impairment may benefit from SANDY, however, the patient reports he feels confident and comfortable discharging home, with increased assist from caregiver as needed. The patient verbalizes he does not feel he will need therapy upon DC.     Final disposition will be made by interdisciplinary medical team.     Patient End of Session: Up in chair;Needs met;Call light within reach;RN aware of session/findings;All patient questions and concerns addressed;Alarm set    OT Goals  Patients self stated goal is: To return home.      Patient will complete functional transfer with [place]   Comment:     Patient will complete toileting with Dickinson   Comment:     Patient will tolerate standing for 5 minutes in prep for adls with Dickinson    Comment:    Patient will complete item retrieval with Dickinson   Comment:          Goals  on: 3-2-24  Frequency: 1-2 more visits     Patient Evaluation Complexity Level:   Occupational Profile/Medical History LOW - Brief history including review of medical or therapy records    Specific performance deficits impacting engagement in ADL/IADL LOW  1 - 3 performance deficits    Client Assessment/Performance Deficits LOW - No comorbidities nor modifications of tasks    Clinical Decision Making LOW - Analysis of occupational profile, problem-focused assessments, limited treatment options    Overall Complexity LOW     OT Session Charges:   Self-Care Home Management: 8 minutes      THELMA Jama/L  [facility]  PRN - Staff

## 2024-02-24 NOTE — PROGRESS NOTES
Progress Note  Cristhian Lopez Patient Status:  Inpatient    9/3/1955 MRN [id number]   [facility] [facility] 3W/SW Attending Aretha Hazel MD   Hosp Day # 1 PCP KARI BARCLAY     Subjective:  Pt denies any chest pain or SOB  Still has swelling     Objective:  /83 (BP [facility]: Right arm)   Pulse 85   Temp 97.8 °F (36.6 °C) (Oral)   Resp 22   Ht 5' 10\" (1.778 m)   Wt 229 lb 8 oz (104.1 kg)   SpO2 96%   BMI 32.93 kg/m²     Telemetry: NSR with PVC's      Intake/Output:    Intake/Output Summary (Last 24 hours) at 2024 1026  Last data filed at 2024 0319  Gross per 24 hour   Intake 360 ml   Output 1550 ml   Net -1190 ml       Last 3 Weights   24 0314 229 lb 8 oz (104.1 kg)   24 1639 233 lb 9.6 oz (106 kg)   24 0910 192 lb (87.1 kg)   24 0848 230 lb (104.3 kg)   02/10/24 0622 233 lb 9.6 oz (106 kg)   24 0513 234 lb (106.1 kg)   24 0408 232 lb 11.2 oz (105.6 kg)   24 0430 233 lb 6.4 oz (105.9 kg)   24 0405 232 lb (105.2 kg)   24 0400 229 lb (103.9 kg)   24 1753 232 lb 4.8 oz (105.4 kg)   24 1404 195 lb (88.5 kg)       Labs:  Recent Labs   Lab 24  1032 24  0715   * 94   BUN 52* 36*   CREATSERUM 1.77* 1.25   EGFRCR 42* 63   CA 8.0* 7.8*    143   K 3.9 3.0*    106   CO2 28.0 30.0     Recent Labs   Lab 24  1032 24  0715   RBC 4.02 3.99   HGB 9.5* 9.2*   HCT 31.6* 31.0*   MCV 78.6* 77.7*   MCH 23.6* 23.1*   MCHC 30.1* 29.7*   RDW 29.1* 29.2*   NEPRELIM 5.72 4.63   WBC 7.4 6.3   .0 148.0*         No results for input(s): \"TROP\", \"TROPHS\", \"CK\" in the last 168 hours.  No results found for: \"PT\", \"INR\"    Diagnostics:       Review of Systems   Respiratory: Negative.     Cardiovascular:  Positive for leg swelling. Negative for chest pain, palpitations, orthopnea and claudication.       Physical Exam:    Physical Exam  Vitals reviewed.   Constitutional:       General: He is not in  acute distress.     Appearance: He is obese.   Cardiovascular:      Rate and Rhythm: Normal rate and regular rhythm.      Pulses:           Dorsalis pedis pulses are 1+ on the right side and 1+ on the left side.      Heart sounds: S1 normal and S2 normal. No murmur heard.     No friction rub. No gallop.      Comments: Swollen scrotum  Pulmonary:      Effort: Pulmonary effort is normal.      Breath sounds: Normal breath sounds. No stridor. No wheezing or rhonchi.   Chest:      Chest wall: No tenderness.   Abdominal:      General: Abdomen is flat. Bowel sounds are normal.      Palpations: Abdomen is soft.      Tenderness: There is no abdominal tenderness. There is no right CVA tenderness or guarding.   Musculoskeletal:         General: No swelling or tenderness. Normal range of motion.      Cervical back: Normal range of motion and neck supple.      Right lower le+ Edema present.      Left lower le+ Edema present.   Skin:     General: Skin is warm and dry.   Neurological:      Mental Status: He is alert and oriented to person, place, and time.         Medications:   magnesium oxide  400 mg Oral Once    potassium chloride  40 mEq Oral Q4H    ceFAZolin  2 g Intravenous Q8H    bumetanide  2 mg Intravenous BID (Diuretic)    insulin aspart  1-7 Units Subcutaneous TID CC    clopidogrel  75 mg Oral Daily    metoprolol succinate ER  25 mg Oral Daily Beta Blocker    apixaban  5 mg Oral BID    atorvastatin  80 mg Oral Daily    sacubitril-valsartan  1 tablet Oral BID         Assessment/Plan:    Acute on chronic HFrEF  - echo from  with LVEF 25%, severe diffuse hypokinesis with akinetic apex  - BNP  1707  - chest xray- stable enlarged heart, no pulmonary edema  - on entresto, metoprolol, bumex    CAD with hisotry of STEMI  - PCI- LAD 2019, 3V CABG , PIC RCA , LAD   - on plavix, eliquis statin, zetia    HLD  - LDL 47  - on atorvastatin and zetia    H/o PE   - on eliquis     CKD  - creatinine  stable    HTN: well controlled    DMII  -management per primary    H/o LV thrombus 2021  - on xarelto  - not noted on recent echo    Plan:  - continue with IV Bumex  - continue metoprolol, entresto  - electrolyte replacement per protocol  - daily weight and strict intake and output (prefer standing weight)   Plan discussed with pt and RN    ILYA Burgess  [facility]  2/24/2024  10:26 AM    Cardiology attending.    Diuresing but still has significant edema.  Renal function significantly improved.  Stable blood pressure.    Continue diuresis.

## 2024-02-24 NOTE — PLAN OF CARE
Problem: Patient Centered Care  Goal: Patient preferences are identified and integrated in the patient's plan of care  Description: Interventions:  - What would you like us to know as we care for you? From home alone  - Provide timely, complete, and accurate information to patient/family  - Incorporate patient and family knowledge, values, beliefs, and cultural backgrounds into the planning and delivery of care  - Encourage patient/family to participate in care and decision-making at the level they choose  - Honor patient and family perspectives and choices  Outcome: Progressing     Problem: CARDIOVASCULAR - ADULT  Goal: Maintains optimal cardiac output and hemodynamic stability  Description: INTERVENTIONS:  - Monitor vital signs, rhythm, and trends  - Monitor for bleeding, hypotension and signs of decreased cardiac output  - Evaluate effectiveness of vasoactive medications to optimize hemodynamic stability  - Monitor arterial and/or venous puncture sites for bleeding and/or hematoma  - Assess quality of pulses, skin color and temperature  - Assess for signs of decreased coronary artery perfusion - ex. Angina  - Evaluate fluid balance, assess for edema, trend weights  Outcome: Progressing  Goal: Absence of cardiac arrhythmias or at baseline  Description: INTERVENTIONS:  - Continuous cardiac monitoring, monitor vital signs, obtain 12 lead EKG if indicated  - Evaluate effectiveness of antiarrhythmic and heart rate control medications as ordered  - Initiate emergency measures for life threatening arrhythmias  - Monitor electrolytes and administer replacement therapy as ordered  Outcome: Progressing     Problem: RESPIRATORY - ADULT  Goal: Achieves optimal ventilation and oxygenation  Description: INTERVENTIONS:  - Assess for changes in respiratory status  - Assess for changes in mentation and behavior  - Position to facilitate oxygenation and minimize respiratory effort  - Oxygen supplementation based on oxygen  saturation or ABGs  - Provide Smoking Cessation handout, if applicable  - Encourage broncho-pulmonary hygiene including cough, deep breathe, Incentive Spirometry  - Assess the need for suctioning and perform as needed  - Assess and instruct to report SOB or any respiratory difficulty  - Respiratory Therapy support as indicated  - Manage/alleviate anxiety  - Monitor for signs/symptoms of CO2 retention  Outcome: Progressing     Problem: PAIN - ADULT  Goal: Verbalizes/displays adequate comfort level or patient's stated pain goal  Description: INTERVENTIONS:  - Encourage pt to monitor pain and request assistance  - Assess pain using appropriate pain scale  - Administer analgesics based on type and severity of pain and evaluate response  - Implement non-pharmacological measures as appropriate and evaluate response  - Consider cultural and social influences on pain and pain management  - Manage/alleviate anxiety  - Utilize distraction and/or relaxation techniques  - Monitor for opioid side effects  - Notify MD/LIP if interventions unsuccessful or patient reports new pain  - Anticipate increased pain with activity and pre-medicate as appropriate  Outcome: Progressing     Problem: SAFETY ADULT - FALL  Goal: Free from fall injury  Description: INTERVENTIONS:  - Assess pt frequently for physical needs  - Identify cognitive and physical deficits and behaviors that affect risk of falls.  - [place] fall precautions as indicated by assessment.  - Educate pt/family on patient safety including physical limitations  - Instruct pt to call for assistance with activity based on assessment  - Modify environment to reduce risk of injury  - Provide assistive devices as appropriate  - Consider OT/PT consult to assist with strengthening/mobility  - Encourage toileting schedule  Outcome: Progressing     Problem: DISCHARGE PLANNING  Goal: Discharge to home or other facility with appropriate resources  Description: INTERVENTIONS:  - Identify  barriers to discharge w/pt and caregiver  - Include patient/family/discharge partner in discharge planning  - Arrange for needed discharge resources and transportation as appropriate  - Identify discharge learning needs (meds, wound care, etc)  - Arrange for interpreters to assist at discharge as needed  - Consider post-discharge preferences of patient/family/discharge partner  - Complete POLST form as appropriate  - Assess patient's ability to be responsible for managing their own health  - Refer to Case Management Department for coordinating discharge planning if the patient needs post-hospital services based on physician/LIP order or complex needs related to functional status, cognitive ability or social support system  Outcome: Progressing    No complaints of pain or sob. Up in chair for meals. Plan; continue to direuse.

## 2024-02-24 NOTE — PLAN OF CARE
Problem: Patient Centered Care  Goal: Patient preferences are identified and integrated in the patient's plan of care  Description: Interventions:  - What would you like us to know as we care for you? From home alone.   - Provide timely, complete, and accurate information to patient/family  - Incorporate patient and family knowledge, values, beliefs, and cultural backgrounds into the planning and delivery of care  - Encourage patient/family to participate in care and decision-making at the level they choose  - Honor patient and family perspectives and choices  Outcome: Progressing       Iv Bumex. No complaints of sob or pain. Plan; direuse and cards consult.

## 2024-02-24 NOTE — PROGRESS NOTES
[facility]     Hospitalist Progress Note     Cristhian Lopez Patient Status:  Inpatient    9/3/1955 MRN [id number]   [facility] [facility] 3W/SW Attending Aretha Hazel MD   Hosp Day # 1 PCP KARI BARCLAY     Subjective:     Patient sitting up in a chair and in NAD. Denied any active complaints at the time of interview. All questions and concerns addressed.      Objective:    Review of Systems:   ROS completed; pertinent positive and negatives stated in subjective.      Vital signs:  Temp:  [97.4 °F (36.3 °C)-97.8 °F (36.6 °C)] 97.8 °F (36.6 °C)  Pulse:  [] 85  Resp:  [18-22] 22  BP: (104-125)/(67-83) 125/83  SpO2:  [92 %-99 %] 96 %      Physical Exam:    Gen: NAD AO x3  Chest: good air entry CTABL  CVS: normal s1 and s2 RR  Abd: NABS soft NT ND  Neuro: CN 2-12 grossly intact  Ext: 2-3+ edema in bilateral LE      Diagnostic Data:    Labs:  Recent Labs   Lab 24  1032 24  0715   WBC 7.4 6.3   HGB 9.5* 9.2*   MCV 78.6* 77.7*   .0 148.0*       Recent Labs   Lab 24  1032 24  0715   * 94   BUN 52* 36*   CREATSERUM 1.77* 1.25   CA 8.0* 7.8*   ALB 3.4  3.6  --     143   K 3.9 3.0*    106   CO2 28.0 30.0   ALKPHO 221*  224*  --    AST 29  29  --    ALT 43  41  --    BILT 1.0  1.0  --    TP 5.8  5.9  --        Estimated Creatinine Clearance: 59.2 mL/min (based on SCr of 1.25 mg/dL).    No results for input(s): \"PTP\", \"INR\" in the last 168 hours.           Imaging: Imaging data reviewed in Epic.    Medications:    magnesium oxide  400 mg Oral Once    potassium chloride  40 mEq Oral Q4H    ceFAZolin  2 g Intravenous Q8H    bumetanide  2 mg Intravenous BID (Diuretic)    insulin aspart  1-7 Units Subcutaneous TID CC    clopidogrel  75 mg Oral Daily    metoprolol succinate ER  25 mg Oral Daily Beta Blocker    apixaban  5 mg Oral BID    atorvastatin  80 mg Oral Daily    sacubitril-valsartan  1 tablet Oral BID       Assessment & Plan:     Acute  CHF exacerbation  CAD s/p PCI and CABG  Hx of NSVT/declining ICD  Moderate to severe MR, moderate TR  JU on CKD  CXR reviewed  Saturating well on room air  Recent EF 25%  Repeat Echo pending  Cardiology on consult  Bumex 2 mg IV BID  Continue Eliquis 5mg BID, Plavix 75 mg daily  Continue Metoprolol 25 mg daily, Entresto 24-26 BID  Strict Is and Os, daily weights  Monitor renal function  Bilateral LE cellulitis  Started on IV Cefazolin  Deescalate as indicated  Discharge on PO abx  Hypokalemia  Replaced  Continue to monitor  Telemetry monitoring  Hx of PE  Continue home meds  Saturating well on room air  HTN  BP well controlled  Continue home meds  Monitor vitals  T2DM  SSI and frequent accuchecks  Iron deficiency anemia  Hx of colon cancer s/p sigmoidectomy in 2023  Monitor H&H, transfuse as indicated  PPI  Outpatient follow up  Advanced care planning  DNAR  ~31 minutes spent      Plan of care discussed with patient or family at bedside.      Supplementary Documentation:     Quality:  DVT Prophylaxis: Eliquis  CODE status: DNAR      Estimated date of discharge: TBD  Discharge is dependent on: clinical stability  At this point Mr. Lopez is expected to be discharge to: home            **Certification      PHYSICIAN Certification of Need for Inpatient Hospitalization - Initial Certification    Patient will require inpatient services that will reasonably be expected to span two midnight's based on the clinical documentation in H+P.   Based on patients current state of illness, I anticipate that, after discharge, patient will require TBD.      Aretha Hazel MD  Hospitalist    MDM: High, I personally reviewed the available laboratories, imaging including XR. I discussed the case with RN. I ordered laboratories, studies including AM labs. I adjusted medications including home meds.   Medical decision making high, risk is high.       The 21st Century Cures Act makes medical notes like these available to patients in the  interest of transparency. Please be advised this is a medical document. Medical documents are intended to carry relevant information, facts as evident, and the clinical opinion of the practitioner. The medical note is intended as peer to peer communication and may appear blunt or direct. It is written in medical language and may contain abbreviations or verbiage that are unfamiliar.

## 2024-02-24 NOTE — PLAN OF CARE
Good UO, had incontinent episode overnight.  Up w/ SBA to bathroom. Plan: IV bumex BID, IV abx for cellulitis    Call light within reach, fall precautions in place  Problem: Patient Centered Care  Goal: Patient preferences are identified and integrated in the patient's plan of care  Description: Interventions:  - What would you like us to know as we care for you? From home alone  - Provide timely, complete, and accurate information to patient/family  - Incorporate patient and family knowledge, values, beliefs, and cultural backgrounds into the planning and delivery of care  - Encourage patient/family to participate in care and decision-making at the level they choose  - Honor patient and family perspectives and choices  Outcome: Progressing     Problem: CARDIOVASCULAR - ADULT  Goal: Maintains optimal cardiac output and hemodynamic stability  Description: INTERVENTIONS:  - Monitor vital signs, rhythm, and trends  - Monitor for bleeding, hypotension and signs of decreased cardiac output  - Evaluate effectiveness of vasoactive medications to optimize hemodynamic stability  - Monitor arterial and/or venous puncture sites for bleeding and/or hematoma  - Assess quality of pulses, skin color and temperature  - Assess for signs of decreased coronary artery perfusion - ex. Angina  - Evaluate fluid balance, assess for edema, trend weights  Outcome: Progressing  Goal: Absence of cardiac arrhythmias or at baseline  Description: INTERVENTIONS:  - Continuous cardiac monitoring, monitor vital signs, obtain 12 lead EKG if indicated  - Evaluate effectiveness of antiarrhythmic and heart rate control medications as ordered  - Initiate emergency measures for life threatening arrhythmias  - Monitor electrolytes and administer replacement therapy as ordered  Outcome: Progressing     Problem: RESPIRATORY - ADULT  Goal: Achieves optimal ventilation and oxygenation  Description: INTERVENTIONS:  - Assess for changes in respiratory status  -  Assess for changes in mentation and behavior  - Position to facilitate oxygenation and minimize respiratory effort  - Oxygen supplementation based on oxygen saturation or ABGs  - Provide Smoking Cessation handout, if applicable  - Encourage broncho-pulmonary hygiene including cough, deep breathe, Incentive Spirometry  - Assess the need for suctioning and perform as needed  - Assess and instruct to report SOB or any respiratory difficulty  - Respiratory Therapy support as indicated  - Manage/alleviate anxiety  - Monitor for signs/symptoms of CO2 retention  Outcome: Progressing     Problem: PAIN - ADULT  Goal: Verbalizes/displays adequate comfort level or patient's stated pain goal  Description: INTERVENTIONS:  - Encourage pt to monitor pain and request assistance  - Assess pain using appropriate pain scale  - Administer analgesics based on type and severity of pain and evaluate response  - Implement non-pharmacological measures as appropriate and evaluate response  - Consider cultural and social influences on pain and pain management  - Manage/alleviate anxiety  - Utilize distraction and/or relaxation techniques  - Monitor for opioid side effects  - Notify MD/LIP if interventions unsuccessful or patient reports new pain  - Anticipate increased pain with activity and pre-medicate as appropriate  Outcome: Progressing     Problem: SAFETY ADULT - FALL  Goal: Free from fall injury  Description: INTERVENTIONS:  - Assess pt frequently for physical needs  - Identify cognitive and physical deficits and behaviors that affect risk of falls.  - [place] fall precautions as indicated by assessment.  - Educate pt/family on patient safety including physical limitations  - Instruct pt to call for assistance with activity based on assessment  - Modify environment to reduce risk of injury  - Provide assistive devices as appropriate  - Consider OT/PT consult to assist with strengthening/mobility  - Encourage toileting  schedule  Outcome: Progressing     Problem: DISCHARGE PLANNING  Goal: Discharge to home or other facility with appropriate resources  Description: INTERVENTIONS:  - Identify barriers to discharge w/pt and caregiver  - Include patient/family/discharge partner in discharge planning  - Arrange for needed discharge resources and transportation as appropriate  - Identify discharge learning needs (meds, wound care, etc)  - Arrange for interpreters to assist at discharge as needed  - Consider post-discharge preferences of patient/family/discharge partner  - Complete POLST form as appropriate  - Assess patient's ability to be responsible for managing their own health  - Refer to Case Management Department for coordinating discharge planning if the patient needs post-hospital services based on physician/LIP order or complex needs related to functional status, cognitive ability or social support system  Outcome: Progressing

## 2024-02-24 NOTE — DIETARY NOTE
Brief Nutrition Note:    Pt admitted for acute on chronic congestive heart failure. RD consulted for heart failure diet education. Pt provided with with Low Sodium Nutrition Therapy NCM handout on 2/5/24. RD to remain available prn. RD discussed with RN and cleared current consult. F/u per protocol.    Bobbi Craig MS, AVA, RDN, LDN  Clinical Dietitian  P: [phone]

## 2024-02-25 LAB
ALBUMIN SERPL-MCNC: 3.1 G/DL (ref 3.2–4.8)
ALBUMIN/GLOB SERPL: 1.4 {RATIO} (ref 1–2)
ALP LIVER SERPL-CCNC: 174 U/L
ALT SERPL-CCNC: 9 U/L
ANION GAP SERPL CALC-SCNC: 6 MMOL/L (ref 0–18)
AST SERPL-CCNC: 24 U/L (ref ?–34)
BASOPHILS # BLD AUTO: 0.02 X10(3) UL (ref 0–0.2)
BASOPHILS NFR BLD AUTO: 0.3 %
BILIRUB SERPL-MCNC: 0.9 MG/DL (ref 0.2–1.1)
BUN BLD-MCNC: 34 MG/DL (ref 9–23)
BUN/CREAT SERPL: 27.9 (ref 10–20)
C DIFF TOX B STL QL: NEGATIVE
CALCIUM BLD-MCNC: 7.8 MG/DL (ref 8.7–10.4)
CHLORIDE SERPL-SCNC: 106 MMOL/L (ref 98–112)
CO2 SERPL-SCNC: 29 MMOL/L (ref 21–32)
CREAT BLD-MCNC: 1.22 MG/DL
DEPRECATED RDW RBC AUTO: 74.5 FL (ref 35.1–46.3)
EGFRCR SERPLBLD CKD-EPI 2021: 65 ML/MIN/1.73M2 (ref 60–?)
EOSINOPHIL # BLD AUTO: 0.21 X10(3) UL (ref 0–0.7)
EOSINOPHIL NFR BLD AUTO: 3.7 %
ERYTHROCYTE [DISTWIDTH] IN BLOOD BY AUTOMATED COUNT: 28.9 % (ref 11–15)
GLOBULIN PLAS-MCNC: 2.2 G/DL (ref 2.8–4.4)
GLUCOSE BLD-MCNC: 187 MG/DL (ref 70–99)
GLUCOSE BLDC GLUCOMTR-MCNC: 166 MG/DL (ref 70–99)
GLUCOSE BLDC GLUCOMTR-MCNC: 205 MG/DL (ref 70–99)
GLUCOSE BLDC GLUCOMTR-MCNC: 219 MG/DL (ref 70–99)
GLUCOSE BLDC GLUCOMTR-MCNC: 274 MG/DL (ref 70–99)
HCT VFR BLD AUTO: 29.3 %
HGB BLD-MCNC: 8.8 G/DL
IMM GRANULOCYTES # BLD AUTO: 0.02 X10(3) UL (ref 0–1)
IMM GRANULOCYTES NFR BLD: 0.3 %
LYMPHOCYTES # BLD AUTO: 0.99 X10(3) UL (ref 1–4)
LYMPHOCYTES NFR BLD AUTO: 17.3 %
MAGNESIUM SERPL-MCNC: 1.7 MG/DL (ref 1.6–2.6)
MCH RBC QN AUTO: 23.3 PG (ref 26–34)
MCHC RBC AUTO-ENTMCNC: 30 G/DL (ref 31–37)
MCV RBC AUTO: 77.5 FL
MONOCYTES # BLD AUTO: 0.57 X10(3) UL (ref 0.1–1)
MONOCYTES NFR BLD AUTO: 9.9 %
NEUTROPHILS # BLD AUTO: 3.92 X10 (3) UL (ref 1.5–7.7)
NEUTROPHILS # BLD AUTO: 3.92 X10(3) UL (ref 1.5–7.7)
NEUTROPHILS NFR BLD AUTO: 68.5 %
OSMOLALITY SERPL CALC.SUM OF ELEC: 305 MOSM/KG (ref 275–295)
PLATELET # BLD AUTO: 139 10(3)UL (ref 150–450)
POTASSIUM SERPL-SCNC: 3.8 MMOL/L (ref 3.5–5.1)
PROT SERPL-MCNC: 5.3 G/DL (ref 5.7–8.2)
RBC # BLD AUTO: 3.78 X10(6)UL
SODIUM SERPL-SCNC: 141 MMOL/L (ref 136–145)
WBC # BLD AUTO: 5.7 X10(3) UL (ref 4–11)

## 2024-02-25 PROCEDURE — 99233 SBSQ HOSP IP/OBS HIGH 50: CPT | Performed by: INTERNAL MEDICINE

## 2024-02-25 RX ORDER — DOBUTAMINE HYDROCHLORIDE 200 MG/100ML
5 INJECTION INTRAVENOUS CONTINUOUS
Status: DISPENSED | OUTPATIENT
Start: 2024-02-25 | End: 2024-03-02

## 2024-02-25 RX ORDER — METOLAZONE 2.5 MG/1
2.5 TABLET ORAL ONCE
Qty: 1 TABLET | Refills: 0 | Status: COMPLETED | OUTPATIENT
Start: 2024-02-26 | End: 2024-02-26

## 2024-02-25 RX ORDER — MAGNESIUM OXIDE 400 MG/1
400 TABLET ORAL ONCE
Status: COMPLETED | OUTPATIENT
Start: 2024-02-25 | End: 2024-02-25

## 2024-02-25 NOTE — PLAN OF CARE
Patient denies pain. Shortness of breath with exertion. Patient reporting multiple loose bowel movements this morning. IV diuretics continued. Plan is to continue to diuresis and continue IV antibiotics.  Patient updated on plan of care.     Problem: Patient Centered Care  Goal: Patient preferences are identified and integrated in the patient's plan of care  Description: Interventions:  - What would you like us to know as we care for you? From home alone  - Provide timely, complete, and accurate information to patient/family  - Incorporate patient and family knowledge, values, beliefs, and cultural backgrounds into the planning and delivery of care  - Encourage patient/family to participate in care and decision-making at the level they choose  - Honor patient and family perspectives and choices  Outcome: Progressing     Problem: CARDIOVASCULAR - ADULT  Goal: Maintains optimal cardiac output and hemodynamic stability  Description: INTERVENTIONS:  - Monitor vital signs, rhythm, and trends  - Monitor for bleeding, hypotension and signs of decreased cardiac output  - Evaluate effectiveness of vasoactive medications to optimize hemodynamic stability  - Monitor arterial and/or venous puncture sites for bleeding and/or hematoma  - Assess quality of pulses, skin color and temperature  - Assess for signs of decreased coronary artery perfusion - ex. Angina  - Evaluate fluid balance, assess for edema, trend weights  Outcome: Progressing  Goal: Absence of cardiac arrhythmias or at baseline  Description: INTERVENTIONS:  - Continuous cardiac monitoring, monitor vital signs, obtain 12 lead EKG if indicated  - Evaluate effectiveness of antiarrhythmic and heart rate control medications as ordered  - Initiate emergency measures for life threatening arrhythmias  - Monitor electrolytes and administer replacement therapy as ordered  Outcome: Progressing     Problem: RESPIRATORY - ADULT  Goal: Achieves optimal ventilation and  oxygenation  Description: INTERVENTIONS:  - Assess for changes in respiratory status  - Assess for changes in mentation and behavior  - Position to facilitate oxygenation and minimize respiratory effort  - Oxygen supplementation based on oxygen saturation or ABGs  - Provide Smoking Cessation handout, if applicable  - Encourage broncho-pulmonary hygiene including cough, deep breathe, Incentive Spirometry  - Assess the need for suctioning and perform as needed  - Assess and instruct to report SOB or any respiratory difficulty  - Respiratory Therapy support as indicated  - Manage/alleviate anxiety  - Monitor for signs/symptoms of CO2 retention  Outcome: Progressing     Problem: PAIN - ADULT  Goal: Verbalizes/displays adequate comfort level or patient's stated pain goal  Description: INTERVENTIONS:  - Encourage pt to monitor pain and request assistance  - Assess pain using appropriate pain scale  - Administer analgesics based on type and severity of pain and evaluate response  - Implement non-pharmacological measures as appropriate and evaluate response  - Consider cultural and social influences on pain and pain management  - Manage/alleviate anxiety  - Utilize distraction and/or relaxation techniques  - Monitor for opioid side effects  - Notify MD/LIP if interventions unsuccessful or patient reports new pain  - Anticipate increased pain with activity and pre-medicate as appropriate  Outcome: Progressing     Problem: SAFETY ADULT - FALL  Goal: Free from fall injury  Description: INTERVENTIONS:  - Assess pt frequently for physical needs  - Identify cognitive and physical deficits and behaviors that affect risk of falls.  - Junior fall precautions as indicated by assessment.  - Educate pt/family on patient safety including physical limitations  - Instruct pt to call for assistance with activity based on assessment  - Modify environment to reduce risk of injury  - Provide assistive devices as appropriate  - Consider  OT/PT consult to assist with strengthening/mobility  - Encourage toileting schedule  Outcome: Progressing     Problem: DISCHARGE PLANNING  Goal: Discharge to home or other facility with appropriate resources  Description: INTERVENTIONS:  - Identify barriers to discharge w/pt and caregiver  - Include patient/family/discharge partner in discharge planning  - Arrange for needed discharge resources and transportation as appropriate  - Identify discharge learning needs (meds, wound care, etc)  - Arrange for interpreters to assist at discharge as needed  - Consider post-discharge preferences of patient/family/discharge partner  - Complete POLST form as appropriate  - Assess patient's ability to be responsible for managing their own health  - Refer to Case Management Department for coordinating discharge planning if the patient needs post-hospital services based on physician/LIP order or complex needs related to functional status, cognitive ability or social support system  Outcome: Progressing

## 2024-02-25 NOTE — PROGRESS NOTES
[facility]     Hospitalist Progress Note     Cristhian Lopez Patient Status:  Inpatient    9/3/1955 MRN [id number]   [facility] [facility] 3W/SW Attending Aretha Hazel MD   Hosp Day # 2 PCP KARI BARCLAY     Subjective:     Patient sitting up in a chair and in NAD. Denied any active complaints at the time of interview. All questions and concerns addressed.  No acute overnight events reported by the nursing staff.    Objective:    Review of Systems:   ROS completed; pertinent positive and negatives stated in subjective.      Vital signs:  Temp:  [97.5 °F (36.4 °C)-98.8 °F (37.1 °C)] 97.5 °F (36.4 °C)  Pulse:  [89-98] 89  Resp:  [18-20] 18  BP: ()/(59-82) 101/69  SpO2:  [93 %-97 %] 95 %      Physical Exam:    Gen: NAD AO x3  Chest: good air entry CTABL  CVS: normal s1 and s2 RR  Abd: NABS soft NT ND  Neuro: CN 2-12 grossly intact  Ext: 2-3+ edema in bilateral LE      Diagnostic Data:    Labs:  Recent Labs   Lab 24  1032 24  0715 24  0611   WBC 7.4 6.3 5.7   HGB 9.5* 9.2* 8.8*   MCV 78.6* 77.7* 77.5*   .0 148.0* 139.0*       Recent Labs   Lab 24  1032 24  0715 24  1738 24  0611   * 94  --  187*   BUN 52* 36*  --  34*   CREATSERUM 1.77* 1.25  --  1.22   CA 8.0* 7.8*  --  7.8*   ALB 3.4  3.6  --   --  3.1*    143  --  141   K 3.9 3.0* 3.8 3.8    106  --  106   CO2 28.0 30.0  --  29.0   ALKPHO 221*  224*  --   --  174*   AST 29  29  --   --  24   ALT 43  41  --   --  9*   BILT 1.0  1.0  --   --  0.9   TP 5.8  5.9  --   --  5.3*       Estimated Creatinine Clearance: 60.7 mL/min (based on SCr of 1.22 mg/dL).    No results for input(s): \"PTP\", \"INR\" in the last 168 hours.           Imaging: Imaging data reviewed in Epic.    Medications:    gabapentin  100 mg Oral TID    PARoxetine HCl  40 mg Oral QAM    QUEtiapine  50 mg Oral Nightly    rOPINIRole  1 mg Oral Nightly    ceFAZolin  2 g Intravenous Q8H    bumetanide  2 mg  Intravenous BID (Diuretic)    insulin aspart  1-7 Units Subcutaneous TID CC    clopidogrel  75 mg Oral Daily    metoprolol succinate ER  25 mg Oral Daily Beta Blocker    apixaban  5 mg Oral BID    atorvastatin  80 mg Oral Daily    sacubitril-valsartan  1 tablet Oral BID       Assessment & Plan:     Acute CHF exacerbation  CAD s/p PCI and CABG  Hx of NSVT/declining ICD  Moderate to severe MR, moderate TR  JU on CKD - resolved  CXR reviewed  Saturating well on room air  Recent EF 25%  Cardiology on consult  Bumex 2 mg IV BID  Continue Eliquis 5mg BID, Plavix 75 mg daily  Continue Metoprolol 25 mg daily, Entresto 24-26 BID  Strict Is and Os, daily weights  Net IO Since Admission: -500 mL [02/25/24 1130]  Echo pending  Renal function at baseline  Continue present management  Telemetry monitoring  Bilateral LE cellulitis  Started on IV Cefazolin  Deescalate as indicated  Discharge on PO abx  Hypokalemia  Replaced  Continue to monitor  Telemetry monitoring  Hx of PE  Continue home meds  Saturating well on room air  HTN  BP well controlled  Continue home meds  Monitor vitals  T2DM  SSI and frequent accuchecks  Iron deficiency anemia  Hx of colon cancer s/p sigmoidectomy in 2023  Monitor H&H, transfuse as indicated  PPI  Outpatient follow up  Advanced care planning  DNAR  ~31 minutes spent      Plan of care discussed with patient or family at bedside.      Supplementary Documentation:     Quality:  DVT Prophylaxis: Eliquis  CODE status: DNAR      Estimated date of discharge: TBD  Discharge is dependent on: clinical stability  At this point Mr. Lopez is expected to be discharge to: home            **Certification      PHYSICIAN Certification of Need for Inpatient Hospitalization - Initial Certification    Patient will require inpatient services that will reasonably be expected to span two midnight's based on the clinical documentation in H+P.   Based on patients current state of illness, I anticipate that, after  discharge, patient will require TBD.      Aretha Hazel MD  Hospitalist    MDM: High, I personally reviewed the available laboratories, imaging including XR. I discussed the case with RN. I ordered laboratories, studies including AM labs. I adjusted medications including home meds.   Medical decision making high, risk is high.       The 21st Century Cures Act makes medical notes like these available to patients in the interest of transparency. Please be advised this is a medical document. Medical documents are intended to carry relevant information, facts as evident, and the clinical opinion of the practitioner. The medical note is intended as peer to peer communication and may appear blunt or direct. It is written in medical language and may contain abbreviations or verbiage that are unfamiliar.

## 2024-02-25 NOTE — PLAN OF CARE
Pt educated on limiting water intake but frequently requested more water. IV abx continued. SBA in room. Plan: continue diuresis    Call light within reach, fall precautions in place  Problem: Patient Centered Care  Goal: Patient preferences are identified and integrated in the patient's plan of care  Description: Interventions:  - What would you like us to know as we care for you? From home alone  - Provide timely, complete, and accurate information to patient/family  - Incorporate patient and family knowledge, values, beliefs, and cultural backgrounds into the planning and delivery of care  - Encourage patient/family to participate in care and decision-making at the level they choose  - Honor patient and family perspectives and choices  Outcome: Progressing     Problem: CARDIOVASCULAR - ADULT  Goal: Maintains optimal cardiac output and hemodynamic stability  Description: INTERVENTIONS:  - Monitor vital signs, rhythm, and trends  - Monitor for bleeding, hypotension and signs of decreased cardiac output  - Evaluate effectiveness of vasoactive medications to optimize hemodynamic stability  - Monitor arterial and/or venous puncture sites for bleeding and/or hematoma  - Assess quality of pulses, skin color and temperature  - Assess for signs of decreased coronary artery perfusion - ex. Angina  - Evaluate fluid balance, assess for edema, trend weights  Outcome: Progressing  Goal: Absence of cardiac arrhythmias or at baseline  Description: INTERVENTIONS:  - Continuous cardiac monitoring, monitor vital signs, obtain 12 lead EKG if indicated  - Evaluate effectiveness of antiarrhythmic and heart rate control medications as ordered  - Initiate emergency measures for life threatening arrhythmias  - Monitor electrolytes and administer replacement therapy as ordered  Outcome: Progressing     Problem: RESPIRATORY - ADULT  Goal: Achieves optimal ventilation and oxygenation  Description: INTERVENTIONS:  - Assess for changes in  respiratory status  - Assess for changes in mentation and behavior  - Position to facilitate oxygenation and minimize respiratory effort  - Oxygen supplementation based on oxygen saturation or ABGs  - Provide Smoking Cessation handout, if applicable  - Encourage broncho-pulmonary hygiene including cough, deep breathe, Incentive Spirometry  - Assess the need for suctioning and perform as needed  - Assess and instruct to report SOB or any respiratory difficulty  - Respiratory Therapy support as indicated  - Manage/alleviate anxiety  - Monitor for signs/symptoms of CO2 retention  Outcome: Progressing     Problem: PAIN - ADULT  Goal: Verbalizes/displays adequate comfort level or patient's stated pain goal  Description: INTERVENTIONS:  - Encourage pt to monitor pain and request assistance  - Assess pain using appropriate pain scale  - Administer analgesics based on type and severity of pain and evaluate response  - Implement non-pharmacological measures as appropriate and evaluate response  - Consider cultural and social influences on pain and pain management  - Manage/alleviate anxiety  - Utilize distraction and/or relaxation techniques  - Monitor for opioid side effects  - Notify MD/LIP if interventions unsuccessful or patient reports new pain  - Anticipate increased pain with activity and pre-medicate as appropriate  Outcome: Progressing     Problem: SAFETY ADULT - FALL  Goal: Free from fall injury  Description: INTERVENTIONS:  - Assess pt frequently for physical needs  - Identify cognitive and physical deficits and behaviors that affect risk of falls.  - [place] fall precautions as indicated by assessment.  - Educate pt/family on patient safety including physical limitations  - Instruct pt to call for assistance with activity based on assessment  - Modify environment to reduce risk of injury  - Provide assistive devices as appropriate  - Consider OT/PT consult to assist with strengthening/mobility  - Encourage  toileting schedule  Outcome: Progressing     Problem: DISCHARGE PLANNING  Goal: Discharge to home or other facility with appropriate resources  Description: INTERVENTIONS:  - Identify barriers to discharge w/pt and caregiver  - Include patient/family/discharge partner in discharge planning  - Arrange for needed discharge resources and transportation as appropriate  - Identify discharge learning needs (meds, wound care, etc)  - Arrange for interpreters to assist at discharge as needed  - Consider post-discharge preferences of patient/family/discharge partner  - Complete POLST form as appropriate  - Assess patient's ability to be responsible for managing their own health  - Refer to Case Management Department for coordinating discharge planning if the patient needs post-hospital services based on physician/LIP order or complex needs related to functional status, cognitive ability or social support system  Outcome: Progressing

## 2024-02-25 NOTE — PROGRESS NOTES
Progress Note  Cristhian Lopez Patient Status:  Inpatient    9/3/1955 MRN [id number]   [facility] [facility] 3W/SW Attending Aretha Hazel MD   Hosp Day # 2 PCP KARI BARCLAY     Subjective:  Pt laying in bed   Denies any chest pain or SOB  No acute event over night. Pt stated to have good urine output but had missed the hat that was placed on the toilet.    Objective:  /69 (BP [facility]: Right arm)   Pulse 89   Temp 97.5 °F (36.4 °C) (Oral)   Resp 18   Ht 5' 10\" (1.778 m)   Wt 233 lb 9.6 oz (106 kg)   SpO2 95%   BMI 33.52 kg/m²     Telemetry: NSR HR 92  Occasional PVC's      Intake/Output:    Intake/Output Summary (Last 24 hours) at 2024 1135  Last data filed at 2024 0930  Gross per 24 hour   Intake 1790 ml   Output 1100 ml   Net 690 ml       Last 3 Weights   24 0522 233 lb 9.6 oz (106 kg)   24 1154 236 lb 12.8 oz (107.4 kg)   24 0314 229 lb 8 oz (104.1 kg)   24 1639 233 lb 9.6 oz (106 kg)   24 0910 192 lb (87.1 kg)   24 0848 230 lb (104.3 kg)   02/10/24 0622 233 lb 9.6 oz (106 kg)   24 0513 234 lb (106.1 kg)   24 0408 232 lb 11.2 oz (105.6 kg)   24 0430 233 lb 6.4 oz (105.9 kg)   24 0405 232 lb (105.2 kg)   24 0400 229 lb (103.9 kg)   24 1753 232 lb 4.8 oz (105.4 kg)   24 1404 195 lb (88.5 kg)       Labs:  Recent Labs   Lab 24  1032 24  0715 24  1738 24  0611   * 94  --  187*   BUN 52* 36*  --  34*   CREATSERUM 1.77* 1.25  --  1.22   EGFRCR 42* 63  --  65   CA 8.0* 7.8*  --  7.8*    143  --  141   K 3.9 3.0* 3.8 3.8    106  --  106   CO2 28.0 30.0  --  29.0     Recent Labs   Lab 24  1032 24  0715 24  0611   RBC 4.02 3.99 3.78*   HGB 9.5* 9.2* 8.8*   HCT 31.6* 31.0* 29.3*   MCV 78.6* 77.7* 77.5*   MCH 23.6* 23.1* 23.3*   MCHC 30.1* 29.7* 30.0*   RDW 29.1* 29.2* 28.9*   NEPRELIM 5.72 4.63 3.92   WBC 7.4 6.3 5.7   .0 148.0* 139.0*          No results for input(s): \"TROP\", \"TROPHS\", \"CK\" in the last 168 hours.  No results found for: \"PT\", \"INR\"    Diagnostics:       Review of Systems   Respiratory:  Positive for cough. Negative for hemoptysis, sputum production, shortness of breath and wheezing.    Cardiovascular: Negative.        Physical Exam:    Physical Exam  Vitals reviewed.   Constitutional:       General: He is not in acute distress.     Appearance: He is obese.   Cardiovascular:      Rate and Rhythm: Normal rate and regular rhythm.      Pulses:           Dorsalis pedis pulses are 1+ on the right side and 1+ on the left side.      Heart sounds: S1 normal and S2 normal. No murmur heard.     No friction rub. No gallop.      Comments: Swollen scrotum  Pulmonary:      Effort: Pulmonary effort is normal.      Breath sounds: No wheezing or rhonchi.      Comments: Crackles to bases  Chest:      Chest wall: No tenderness.   Abdominal:      General: Abdomen is flat. Bowel sounds are normal.      Palpations: Abdomen is soft.      Tenderness: There is no abdominal tenderness. There is no right CVA tenderness or guarding.   Genitourinary:     Comments: Swollen scrotum  Musculoskeletal:         General: No swelling or tenderness. Normal range of motion.      Cervical back: Normal range of motion and neck supple.      Right lower le+ Edema present.      Left lower le+ Edema present.   Skin:     General: Skin is warm and dry.   Neurological:      Mental Status: He is alert and oriented to person, place, and time.         Medications:   gabapentin  100 mg Oral TID    PARoxetine HCl  40 mg Oral QAM    QUEtiapine  50 mg Oral Nightly    rOPINIRole  1 mg Oral Nightly    ceFAZolin  2 g Intravenous Q8H    bumetanide  2 mg Intravenous BID (Diuretic)    insulin aspart  1-7 Units Subcutaneous TID CC    clopidogrel  75 mg Oral Daily    metoprolol succinate ER  25 mg Oral Daily Beta Blocker    apixaban  5 mg Oral BID    atorvastatin  80 mg Oral Daily     sacubitril-valsartan  1 tablet Oral BID         Assessment/Plan:    Acute on chronic HFrEF  - echo from 2/24 with LVEF 25%, severe diffuse hypokinesis with akinetic apex  - BNP  1707  - chest xray- stable enlarged heart, no pulmonary edema  - on entresto, metoprolol, bumex  - wt down 3lb from yesterday     CAD with hisotry of STEMI  - PCI- LAD 1/2019, 3V CABG 2/19, PIC RCA 2003, LAD 2003  - on plavix, eliquis statin, zetia    HLD  - LDL 47  - on atorvastatin and zetia     H/o PE 2019  - on eliquis      CKD  - creatinine stable     HTN: well controlled     DMII  -management per primary     H/o LV thrombus 2021  - on xarelto  - not noted on recent echo     Plan:  - Scrotal swelling and BLE swelling improving, continue with IV Bumex  - creatinine stable.   - continue metoprolol, entresto  - electrolyte replacement per protocol  - check daily weight (prefer standing weight)  - need to document intake and output accurately      Plan discussed with pt and RN      ILYA Burgess  [facility]  2/25/2024  11:35 AM    Cardiology attending    Still with significant scrotal swelling and leg edema.  Modest response.    Stable renal function.  Borderline blood pressure.  Cardio renal syndrome    Mild orthostatic dizziness this morning.      Plan: Continue twice daily IV Bumex.    Dobutamine drip.    Dose of Zaroxolyn in the morning.

## 2024-02-26 ENCOUNTER — APPOINTMENT (OUTPATIENT)
Dept: PICC SERVICES | Facility: HOSPITAL | Age: 69
End: 2024-02-26
Attending: INTERNAL MEDICINE
Payer: MEDICARE

## 2024-02-26 ENCOUNTER — APPOINTMENT (OUTPATIENT)
Dept: CV DIAGNOSTICS | Facility: HOSPITAL | Age: 69
End: 2024-02-26
Attending: INTERNAL MEDICINE
Payer: MEDICARE

## 2024-02-26 PROBLEM — Z71.89 GOALS OF CARE, COUNSELING/DISCUSSION: Status: ACTIVE | Noted: 2024-01-01

## 2024-02-26 PROBLEM — Z51.5 PALLIATIVE CARE ENCOUNTER: Status: ACTIVE | Noted: 2024-02-26

## 2024-02-26 PROBLEM — Z51.5 PALLIATIVE CARE ENCOUNTER: Status: ACTIVE | Noted: 2024-01-01

## 2024-02-26 PROBLEM — Z71.89 GOALS OF CARE, COUNSELING/DISCUSSION: Status: ACTIVE | Noted: 2024-02-26

## 2024-02-26 LAB
ALBUMIN SERPL-MCNC: 3.1 G/DL (ref 3.2–4.8)
ALBUMIN/GLOB SERPL: 1.4 {RATIO} (ref 1–2)
ALP LIVER SERPL-CCNC: 174 U/L
ALT SERPL-CCNC: <7 U/L
ANION GAP SERPL CALC-SCNC: 5 MMOL/L (ref 0–18)
ANION GAP SERPL CALC-SCNC: 5 MMOL/L (ref 0–18)
AST SERPL-CCNC: 29 U/L (ref ?–34)
BASOPHILS # BLD AUTO: 0.03 X10(3) UL (ref 0–0.2)
BASOPHILS NFR BLD AUTO: 0.5 %
BILIRUB SERPL-MCNC: 0.8 MG/DL (ref 0.2–1.1)
BUN BLD-MCNC: 34 MG/DL (ref 9–23)
BUN BLD-MCNC: 34 MG/DL (ref 9–23)
BUN/CREAT SERPL: 28.1 (ref 10–20)
BUN/CREAT SERPL: 28.1 (ref 10–20)
CALCIUM BLD-MCNC: 8.1 MG/DL (ref 8.7–10.4)
CALCIUM BLD-MCNC: 8.1 MG/DL (ref 8.7–10.4)
CHLORIDE SERPL-SCNC: 105 MMOL/L (ref 98–112)
CHLORIDE SERPL-SCNC: 105 MMOL/L (ref 98–112)
CO2 SERPL-SCNC: 28 MMOL/L (ref 21–32)
CO2 SERPL-SCNC: 28 MMOL/L (ref 21–32)
CREAT BLD-MCNC: 1.21 MG/DL
CREAT BLD-MCNC: 1.21 MG/DL
DEPRECATED RDW RBC AUTO: 71.8 FL (ref 35.1–46.3)
EGFRCR SERPLBLD CKD-EPI 2021: 66 ML/MIN/1.73M2 (ref 60–?)
EGFRCR SERPLBLD CKD-EPI 2021: 66 ML/MIN/1.73M2 (ref 60–?)
EOSINOPHIL # BLD AUTO: 0.14 X10(3) UL (ref 0–0.7)
EOSINOPHIL NFR BLD AUTO: 2.5 %
ERYTHROCYTE [DISTWIDTH] IN BLOOD BY AUTOMATED COUNT: 28.6 % (ref 11–15)
GLOBULIN PLAS-MCNC: 2.2 G/DL (ref 2.8–4.4)
GLUCOSE BLD-MCNC: 135 MG/DL (ref 70–99)
GLUCOSE BLD-MCNC: 135 MG/DL (ref 70–99)
GLUCOSE BLDC GLUCOMTR-MCNC: 145 MG/DL (ref 70–99)
GLUCOSE BLDC GLUCOMTR-MCNC: 177 MG/DL (ref 70–99)
GLUCOSE BLDC GLUCOMTR-MCNC: 206 MG/DL (ref 70–99)
GLUCOSE BLDC GLUCOMTR-MCNC: 226 MG/DL (ref 70–99)
HCT VFR BLD AUTO: 27.7 %
HGB BLD-MCNC: 8.9 G/DL
IMM GRANULOCYTES # BLD AUTO: 0.02 X10(3) UL (ref 0–1)
IMM GRANULOCYTES NFR BLD: 0.4 %
LYMPHOCYTES # BLD AUTO: 0.95 X10(3) UL (ref 1–4)
LYMPHOCYTES NFR BLD AUTO: 16.8 %
MAGNESIUM SERPL-MCNC: 1.7 MG/DL (ref 1.6–2.6)
MCH RBC QN AUTO: 24.5 PG (ref 26–34)
MCHC RBC AUTO-ENTMCNC: 32.1 G/DL (ref 31–37)
MCV RBC AUTO: 76.3 FL
MONOCYTES # BLD AUTO: 0.59 X10(3) UL (ref 0.1–1)
MONOCYTES NFR BLD AUTO: 10.4 %
NEUTROPHILS # BLD AUTO: 3.93 X10 (3) UL (ref 1.5–7.7)
NEUTROPHILS # BLD AUTO: 3.93 X10(3) UL (ref 1.5–7.7)
NEUTROPHILS NFR BLD AUTO: 69.4 %
OSMOLALITY SERPL CALC.SUM OF ELEC: 296 MOSM/KG (ref 275–295)
OSMOLALITY SERPL CALC.SUM OF ELEC: 296 MOSM/KG (ref 275–295)
PLATELET # BLD AUTO: 117 10(3)UL (ref 150–450)
POTASSIUM SERPL-SCNC: 3.9 MMOL/L (ref 3.5–5.1)
POTASSIUM SERPL-SCNC: 3.9 MMOL/L (ref 3.5–5.1)
PROT SERPL-MCNC: 5.3 G/DL (ref 5.7–8.2)
RBC # BLD AUTO: 3.63 X10(6)UL
SODIUM SERPL-SCNC: 138 MMOL/L (ref 136–145)
SODIUM SERPL-SCNC: 138 MMOL/L (ref 136–145)
WBC # BLD AUTO: 5.7 X10(3) UL (ref 4–11)

## 2024-02-26 PROCEDURE — 93308 TTE F-UP OR LMTD: CPT | Performed by: INTERNAL MEDICINE

## 2024-02-26 PROCEDURE — 93306 TTE W/DOPPLER COMPLETE: CPT | Performed by: INTERNAL MEDICINE

## 2024-02-26 PROCEDURE — 99222 1ST HOSP IP/OBS MODERATE 55: CPT | Performed by: NURSE PRACTITIONER

## 2024-02-26 PROCEDURE — 99233 SBSQ HOSP IP/OBS HIGH 50: CPT | Performed by: INTERNAL MEDICINE

## 2024-02-26 RX ORDER — MAGNESIUM OXIDE 400 MG/1
400 TABLET ORAL ONCE
Status: COMPLETED | OUTPATIENT
Start: 2024-02-26 | End: 2024-02-26

## 2024-02-26 NOTE — PROGRESS NOTES
[facility]     Hospitalist Progress Note     Cristhian Lopez Patient Status:  Inpatient    9/3/1955 MRN [id number]   [facility] [facility] 3W/SW Attending Aretha Hazel MD   Hosp Day # 3 PCP KARI BARCLAY     Subjective:     Resting comfortably and in NAD. Denied any active complaints at the time of interview.   All questions and concerns addressed.    Objective:    Review of Systems:   ROS completed; pertinent positive and negatives stated in subjective.      Vital signs:  Temp:  [97.5 °F (36.4 °C)-98.2 °F (36.8 °C)] 97.8 °F (36.6 °C)  Pulse:  [84-99] 88  Resp:  [16-18] 18  BP: ()/(62-74) 114/63  SpO2:  [95 %-98 %] 98 %      Physical Exam:    Gen: NAD AO x3  Chest: good air entry CTABL  CVS: normal s1 and s2 RR  Abd: NABS soft NT ND  Neuro: CN 2-12 grossly intact  Ext: 2+ edema in bilateral LE - improving      Diagnostic Data:    Labs:  Recent Labs   Lab 24  1032 24  0715 24  0611 24  0609   WBC 7.4 6.3 5.7 5.7   HGB 9.5* 9.2* 8.8* 8.9*   MCV 78.6* 77.7* 77.5* 76.3*   .0 148.0* 139.0* 117.0*       Recent Labs   Lab 24  1032 24  0715 24  1738 24  0611 24  0609   * 94  --  187* 135*  135*   BUN 52* 36*  --  34* 34*  34*   CREATSERUM 1.77* 1.25  --  1.22 1.21  1.21   CA 8.0* 7.8*  --  7.8* 8.1*  8.1*   ALB 3.4  3.6  --   --  3.1* 3.1*    143  --  141 138  138   K 3.9 3.0* 3.8 3.8 3.9  3.9    106  --  106 105  105   CO2 28.0 30.0  --  29.0 28.0  28.0   ALKPHO 221*  224*  --   --  174* 174*   AST 29  29  --   --  24 29   ALT 43  41  --   --  9* <7*   BILT 1.0  1.0  --   --  0.9 0.8   TP 5.8  5.9  --   --  5.3* 5.3*       Estimated Creatinine Clearance: 61.2 mL/min (based on SCr of 1.21 mg/dL).    No results for input(s): \"PTP\", \"INR\" in the last 168 hours.           Imaging: Imaging data reviewed in Epic.    Medications:    gabapentin  100 mg Oral TID    PARoxetine HCl  40 mg Oral QAM     QUEtiapine  50 mg Oral Nightly    rOPINIRole  1 mg Oral Nightly    ceFAZolin  2 g Intravenous Q8H    bumetanide  2 mg Intravenous BID (Diuretic)    insulin aspart  1-7 Units Subcutaneous TID CC    clopidogrel  75 mg Oral Daily    metoprolol succinate ER  25 mg Oral Daily Beta Blocker    apixaban  5 mg Oral BID    atorvastatin  80 mg Oral Daily    sacubitril-valsartan  1 tablet Oral BID       Assessment & Plan:     Acute CHF exacerbation  CAD s/p PCI and CABG  Hx of NSVT/declining ICD  Moderate to severe MR, moderate TR  JU on CKD - resolved  CXR reviewed  Saturating well on room air  Recent EF 25%  Cardiology on consult  Continue Bumex 2 mg IV BID  Continue Eliquis 5mg BID, Plavix 75 mg daily  Continue Metoprolol 25 mg daily, Entresto 24-26 BID  Strict Is and Os, daily weights  Net IO Since Admission: -1,130 mL [02/26/24 1218]  Echo pending  Renal function at baseline - continue to monitor  Continue present management  Telemetry monitoring  Bilateral LE cellulitis  Started on IV Cefazolin  Deescalate as indicated  Discharge on PO abx  Hypokalemia  Replaced  Continue to monitor  Telemetry monitoring  Hx of PE  Continue home meds  Saturating well on room air  HTN  BP well controlled  Continue home meds  Monitor vitals  T2DM  SSI and frequent accuchecks  Iron deficiency anemia  Hx of colon cancer s/p sigmoidectomy in 2023  Monitor H&H, transfuse as indicated  PPI  Outpatient follow up  Advanced care planning  DNAR  ~31 minutes spent      Plan of care discussed with patient or family at bedside.      Supplementary Documentation:     Quality:  DVT Prophylaxis: Eliquis  CODE status: DNAR      Estimated date of discharge: TBD  Discharge is dependent on: clinical stability  At this point Mr. Lopez is expected to be discharge to: home            **Certification      PHYSICIAN Certification of Need for Inpatient Hospitalization - Initial Certification    Patient will require inpatient services that will reasonably be  expected to span two midnight's based on the clinical documentation in H+P.   Based on patients current state of illness, I anticipate that, after discharge, patient will require TBD.      Aretha Hazel MD  Hospitalist    MDM: High, I personally reviewed the available laboratories, imaging including XR. I discussed the case with RN. I ordered laboratories, studies including AM labs. I adjusted medications including home meds.   Medical decision making high, risk is high.       The 21st Century Cures Act makes medical notes like these available to patients in the interest of transparency. Please be advised this is a medical document. Medical documents are intended to carry relevant information, facts as evident, and the clinical opinion of the practitioner. The medical note is intended as peer to peer communication and may appear blunt or direct. It is written in medical language and may contain abbreviations or verbiage that are unfamiliar.

## 2024-02-26 NOTE — PLAN OF CARE
Problem: Patient Centered Care  Goal: Patient preferences are identified and integrated in the patient's plan of care  Description: Interventions:  - What would you like us to know as we care for you? From home alone  - Provide timely, complete, and accurate information to patient/family  - Incorporate patient and family knowledge, values, beliefs, and cultural backgrounds into the planning and delivery of care  - Encourage patient/family to participate in care and decision-making at the level they choose  - Honor patient and family perspectives and choices  Outcome: Progressing     Problem: CARDIOVASCULAR - ADULT  Goal: Maintains optimal cardiac output and hemodynamic stability  Description: INTERVENTIONS:  - Monitor vital signs, rhythm, and trends  - Monitor for bleeding, hypotension and signs of decreased cardiac output  - Evaluate effectiveness of vasoactive medications to optimize hemodynamic stability  - Monitor arterial and/or venous puncture sites for bleeding and/or hematoma  - Assess quality of pulses, skin color and temperature  - Assess for signs of decreased coronary artery perfusion - ex. Angina  - Evaluate fluid balance, assess for edema, trend weights  Outcome: Progressing  Goal: Absence of cardiac arrhythmias or at baseline  Description: INTERVENTIONS:  - Continuous cardiac monitoring, monitor vital signs, obtain 12 lead EKG if indicated  - Evaluate effectiveness of antiarrhythmic and heart rate control medications as ordered  - Initiate emergency measures for life threatening arrhythmias  - Monitor electrolytes and administer replacement therapy as ordered  Outcome: Progressing     Problem: RESPIRATORY - ADULT  Goal: Achieves optimal ventilation and oxygenation  Description: INTERVENTIONS:  - Assess for changes in respiratory status  - Assess for changes in mentation and behavior  - Position to facilitate oxygenation and minimize respiratory effort  - Oxygen supplementation based on oxygen  saturation or ABGs  - Provide Smoking Cessation handout, if applicable  - Encourage broncho-pulmonary hygiene including cough, deep breathe, Incentive Spirometry  - Assess the need for suctioning and perform as needed  - Assess and instruct to report SOB or any respiratory difficulty  - Respiratory Therapy support as indicated  - Manage/alleviate anxiety  - Monitor for signs/symptoms of CO2 retention  Outcome: Progressing     Problem: PAIN - ADULT  Goal: Verbalizes/displays adequate comfort level or patient's stated pain goal  Description: INTERVENTIONS:  - Encourage pt to monitor pain and request assistance  - Assess pain using appropriate pain scale  - Administer analgesics based on type and severity of pain and evaluate response  - Implement non-pharmacological measures as appropriate and evaluate response  - Consider cultural and social influences on pain and pain management  - Manage/alleviate anxiety  - Utilize distraction and/or relaxation techniques  - Monitor for opioid side effects  - Notify MD/LIP if interventions unsuccessful or patient reports new pain  - Anticipate increased pain with activity and pre-medicate as appropriate  Outcome: Progressing     Problem: SAFETY ADULT - FALL  Goal: Free from fall injury  Description: INTERVENTIONS:  - Assess pt frequently for physical needs  - Identify cognitive and physical deficits and behaviors that affect risk of falls.  - [place] fall precautions as indicated by assessment.  - Educate pt/family on patient safety including physical limitations  - Instruct pt to call for assistance with activity based on assessment  - Modify environment to reduce risk of injury  - Provide assistive devices as appropriate  - Consider OT/PT consult to assist with strengthening/mobility  - Encourage toileting schedule  Outcome: Progressing     Problem: DISCHARGE PLANNING  Goal: Discharge to home or other facility with appropriate resources  Description: INTERVENTIONS:  - Identify  barriers to discharge w/pt and caregiver  - Include patient/family/discharge partner in discharge planning  - Arrange for needed discharge resources and transportation as appropriate  - Identify discharge learning needs (meds, wound care, etc)  - Arrange for interpreters to assist at discharge as needed  - Consider post-discharge preferences of patient/family/discharge partner  - Complete POLST form as appropriate  - Assess patient's ability to be responsible for managing their own health  - Refer to Case Management Department for coordinating discharge planning if the patient needs post-hospital services based on physician/LIP order or complex needs related to functional status, cognitive ability or social support system  Outcome: Progressing     Problem: Patient/Family Goals  Goal: Patient/Family Long Term Goal  Description: Patient's Long Term Goal: Get stronger    Interventions:  - See additional Care Plan goals for specific interventions  Outcome: Progressing  Goal: Patient/Family Short Term Goal  Description: Patient's Short Term Goal: Go home     Interventions:   - See additional Care Plan goals for specific interventions  Outcome: Progressing

## 2024-02-26 NOTE — PROGRESS NOTES
Cardiology Progress Note    Cristhian Lopez Patient Status:  Inpatient    9/3/1955 MRN [id number]   [facility] [facility] 3W/SW Attending Aretha Hazel MD   Hosp Day # 3 PCP KARI BARCLAY     Interval Note:  More comfortable with decreased shortness of breath  No chest pain or palpitations        --------------------------------------------------------------------------------------------------------------------------------  ROS 12 systems reviewed, pertinent findings above.  ROS    History:  Past Medical History:   Diagnosis Date    Coronary heart disease     2 stents in place, pstient sees Dr. 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)     right thumb    RLS (restless legs syndrome)      Past Surgical History:   Procedure Laterality Date    OTHER SURGICAL HISTORY      2 stents      Family History   Problem Relation Age of Onset    Cancer Father         sarcoma of back         Objective:   Temp: 97.8 °F (36.6 °C)  Pulse: 88  Resp: 18  BP: 114/63    Intake/Output:     Intake/Output Summary (Last 24 hours) at 2024 1451  Last data filed at 2024 0843  Gross per 24 hour   Intake 740 ml   Output 1300 ml   Net -560 ml       Physical Exam:    General: Awake, resting in bed.  No acute distress  HEENT: Normocephalic, anicteric sclera, neck supple.  Neck: No JVD, carotids 2+, no bruits.  Cardiac: Regular rate and rhythm. S1, S2 normal. No murmur, pericardial rub, S3.  Lungs: Clear without wheezes, rales, rhonchi or dullness.  Normal excursions and effort.  Abdomen: Soft, non-tender. BS-present.  Extremities: Without clubbing, cyanosis or edema.  Peripheral pulses are 2+.  Neurologic: Non-focal  Skin: Warm and dry.       Assessment and Plan    CAD status post MI.  PCI/stents and CABG  Ischemic cardiomyopathy ( EF 25% )  Nonsustained VT/declining ICD implantation  Due to severe MR  Moderate  TR    Acute on chronic CHF  Suspect for poor drug compliance  Edema better with IV diuretics    History of PE  On Eliquis    Hypertension  Controlled on meds      Colon CA   s/p surgery    Anemia with guaiac positive stools    JU on CKD    Plan:  Plavix 75 mg daily  Bumetanide 2 mg IV twice daily  Apixaban 5 mg 2 times daily  Statin 80 mg daily  Will also succinate ER 25 mg once daily  Strict I's and O's  Follow BUN and creatinine closely        Dominguez Lu MD  [facility]    L3  2/26/2024  2:51 PM

## 2024-02-26 NOTE — PLAN OF CARE
Patient had 2D Echo yesterday-to have US Venous doppler of BLE today; no c/o shortness of breath, voiding in adequate amounts; taking Norco for chronic back and shoulder pain.   Problem: Patient Centered Care  Goal: Patient preferences are identified and in Patient is calling that she has an appointment with PCP on 3-11-24. Patient will not have insurance after 31-24 and wanted to get her lab work done soon. Issue is the labs don't start until 3-23-24. Please call her back.   balance, assess for edema, trend weights  Outcome: Progressing  Goal: Absence of cardiac arrhythmias or at baseline  Description: INTERVENTIONS:  - Continuous cardiac monitoring, monitor vital signs, obtain 12 lead EKG if indicated  - Evaluate effectivenes evaluate response  - Consider cultural and social influences on pain and pain management  - Manage/alleviate anxiety  - Utilize distraction and/or relaxation techniques  - Monitor for opioid side effects  - Notify MD/LIP if interventions unsuccessful or pa

## 2024-02-26 NOTE — CM/SW NOTE
Social work received a consult to initiate community palliative care referral.    Social work sent [facility] referral to [facility] and notified liaison.    Social work will follow up.    SW/CM to remain available for support and/or discharge planning.     [place] MSW, LSW  Discharge Planner [id number]

## 2024-02-26 NOTE — OCCUPATIONAL THERAPY NOTE
Chart reviewed, RN stated patient okay to attempt. Patient with echo at bedside. OT will f/u as able.    1709: Attempted to see patient. Per RN, patient with PCT in bathroom for shower. Will f/u tomorrow as appropriate/able.    Florina Lino OTR/L  [facility]  #[id number]

## 2024-02-26 NOTE — PAYOR COMM NOTE
--------------  ADMISSION REVIEW     Payor: [employer] MEDICARE ADV PPO  Subscriber #:  [id number]  Authorization Number: [id number]    Admit date: 2/23/24  Admit time:  4:38 PM       REVIEW DOCUMENTATION:  Patient Seen in: [facility] Emergency Department      History     Chief Complaint   Patient presents with    Swelling Edema     Stated Complaint: swelling in the legs    Subjective:   HPI    68-year-old male with history of hypertension, hyperlipidemia, coronary artery disease post angioplasty and multiple stent placements, anemia, and ischemic cardiomyopathy presents with complaints of increasing lower extremity, scrotal, and penile edema.  The patient was admitted to this hospital from 2/5-11 with similar symptoms.  He was discharged on Entresto and Bumex which he states he has been taking.  He reports lower extremity edema and scrotal and penile edema has progressed since his discharge.  He denies any difficulty breathing.  He reports some discomfort to his scrotum and penis.  He states he is able to void.    Objective:   Past Medical History:   Diagnosis Date    Coronary heart disease     2 stents in place, pstient sees Dr. Westbrook    Essential hypertension     Heart attack (HCC) 2018     maker w/ 5 stents    Hyperlipidemia     Melanoma in situ of left upper extremity (HCC) 1998    right thumb    RLS (restless legs syndrome)        Review of Systems    Positive for stated complaint: swelling in the legs  Other systems are as noted in HPI.  Constitutional and vital signs reviewed.      All other systems reviewed and negative except as noted above.    Physical Exam     ED Triage Vitals [02/23/24 0910]   /76   Pulse 101   Resp 20   Temp 97.2 °F (36.2 °C)   Temp src Temporal   SpO2 99 %   O2 Device        Current:/76   Pulse 101   Temp 97.2 °F (36.2 °C) (Temporal)   Resp 20   Ht 177.8 cm (5' 10\")   Wt 87.1 kg   SpO2 99%   BMI 27.55 kg/m²         Physical Exam      General Appearance:  alert, no distress  Eyes: pupils equal and round no pallor or injection  ENT, Mouth: mucous membranes moist  Respiratory: there are no retractions, lungs are clear to auscultation  Cardiovascular: regular rate and rhythm  Gastrointestinal:  abdomen is soft and non tender, no masses, bowel sounds normal  Genitourinary: Marked edema noted to the scrotum and penis.  There is erythema noted to the scrotum extending into the inner surface of bilateral thighs  Neurological: Speech normal.  Moving extremities x 4.  Skin: warm and dry, no rashes.  Musculoskeletal: neck is supple non tender        Extremities are symmetrical, full range of motion.  Marked bilateral lower extremity edema to the waist  Psychiatric: patient is oriented X 3, there is no agitation    DIFFERENTIAL DIAGNOSIS: After history and physical exam differential diagnosis was considered for congestive heart failure, dependent edema, or other        ED Course     Labs Reviewed   COMP METABOLIC PANEL (14) - Abnormal; Notable for the following components:       Result Value    Glucose 186 (*)     BUN 52 (*)     Creatinine 1.77 (*)     BUN/CREA Ratio 29.4 (*)     Calcium, Total 8.0 (*)     Calculated Osmolality 305 (*)     eGFR-Cr 42 (*)     Alkaline Phosphatase 224 (*)     Globulin  2.3 (*)     All other components within normal limits   PRO BETA NATRIURETIC PEPTIDE - Abnormal; Notable for the following components:    Beta Natriuretic Peptide 1,707 (*)     All other components within normal limits   HEPATIC FUNCTION PANEL (7) - Abnormal; Notable for the following components:    Alkaline Phosphatase 221 (*)     Bilirubin, Direct 0.6 (*)     All other components within normal limits   CBC W/ DIFFERENTIAL - Abnormal; Notable for the following components:    HGB 9.5 (*)     HCT 31.6 (*)     MCV 78.6 (*)     MCH 23.6 (*)     MCHC 30.1 (*)     RDW-SD 74.1 (*)     RDW 29.1 (*)     Lymphocyte Absolute 0.82 (*)     All other components within normal limits   CBC WITH  DIFFERENTIAL WITH PLATELET    Narrative:     The following orders were created for panel order CBC With Differential With Platelet.  Procedure                               Abnormality         Status                     ---------                               -----------         ------                     CBC W/ DIFFERENTIAL[[id number]]          Abnormal            Final result                 Please view results for these tests on the individual orders.   RBC MORPHOLOGY SCAN   RAINBOW DRAW LAVENDER   RAINBOW DRAW LIGHT GREEN   RAINBOW DRAW BLUE   RAINBOW DRAW GOLD     EKG    Rate, intervals and axes as noted on EKG Report.  Rate: 98  Rhythm: Sinus Rhythm  Axis: Left  Reading: Nonspecific EKG        MDM      Lab, x-ray, and EKG results noted.  Will admit for congestive heart failure and severe edema.  Will cover for suspected cellulitis with antibiotics.  Discussed with Dr. Rockwell, hospitalist.  Also communicated with [place] [facility].  Admission disposition: 2/23/2024 12:48 PM         Medical Decision Making      Disposition and Plan     Clinical Impression:  1. Acute on chronic congestive heart failure, unspecified heart failure type (HCC)    2. Scrotal edema         Disposition:  Admit  2/23/2024 12:48 pm    Hospital Problems       Present on Admission  Date Reviewed: 2/20/2023            ICD-10-CM Noted POA    * (Principal) Acute on chronic congestive heart failure, unspecified heart failure type (HCC) I50.9 2/5/2024 Unknown         Signed by [place] MD on 2/23/2024 12:49 PM         History and Physical    H&P signed by Luke Rockwell MD at 2/24/2024  6:46 AM       [facility]   HISTORY AND PHYSICAL EXAMINATION     CHIEF COMPLAINT:  Acute on chronic systolic heart failure.     HISTORY OF PRESENT ILLNESS:  The patient is a 68-year-old  male with underlying ischemic cardiomyopathy who came in today to the emergency department for evaluation of increased dyspnea on  exertion and leg edema.  CBC showed white blood cell count of 7.4, hemoglobin of 9.5, MCV 78.6.  The hemoglobin is stable.  Chemistry showed GFR of 42 which is at baseline, BUN and creatinine of 52 and 1.77, glucose 187.  B-natriuretic peptide 1700.  Chest x-ray showed no acute findings.  EKG showed sinus rhythm.  Started on IV Lasix, also started on IV Ancef for lower extremity and scrotal cellulitis.          ASSESSMENT:    1.       Acute on chronic combined systolic and diastolic heart failure.  2.       Bilateral lower extremity cellulitis.  3.       Ischemic cardiomyopathy.  4.       Diabetes mellitus type 2.  5.       Chronic kidney disease, stage 3.  6.       Anemia of iron deficiency and chronic kidney disease.       PLAN:  The patient will be admitted to telemetry floor.  IV Bumex, IV Ancef.  Cardiology consult.  Monitor his electrolytes, kidney function, and hemodynamic status.  Re-educate on the importance of low-salt diet.  Monitor Accu-Cheks.  Further recommendations to follow.       Dictated By Luke Rockwell MD    2/24  Subjective:      Patient sitting up in a chair and in NAD. Denied any active complaints at the time of interview. All questions and concerns addressed.        Objective:    Review of Systems:   ROS completed; pertinent positive and negatives stated in subjective.        Vital signs:  Temp:  [97.4 °F (36.3 °C)-97.8 °F (36.6 °C)] 97.8 °F (36.6 °C)  Pulse:  [] 85  Resp:  [18-22] 22  BP: (104-125)/(67-83) 125/83  SpO2:  [92 %-99 %] 96 %        Physical Exam:    Gen: NAD AO x3  Chest: good air entry CTABL  CVS: normal s1 and s2 RR  Abd: NABS soft NT ND  Neuro: CN 2-12 grossly intact  Ext: 2-3+ edema in bilateral LE        Diagnostic Data:    Labs:       Recent Labs   Lab 02/23/24  1032 02/24/24  0715   WBC 7.4 6.3   HGB 9.5* 9.2*   MCV 78.6* 77.7*   .0 148.0*              Recent Labs   Lab 02/23/24  1032 02/24/24  0715   * 94   BUN 52* 36*   CREATSERUM 1.77* 1.25   CA  8.0* 7.8*   ALB 3.4  3.6  --     143   K 3.9 3.0*    106   CO2 28.0 30.0   ALKPHO 221*  224*  --    AST 29  29  --    ALT 43  41  --    BILT 1.0  1.0  --    TP 5.8  5.9  --          Estimated Creatinine Clearance: 59.2 mL/min (based on SCr of 1.25 mg/dL).     No results for input(s): \"PTP\", \"INR\" in the last 168 hours.           Imaging: Imaging data reviewed in Epic.     Medications:    magnesium oxide  400 mg Oral Once    potassium chloride  40 mEq Oral Q4H    ceFAZolin  2 g Intravenous Q8H    bumetanide  2 mg Intravenous BID (Diuretic)    insulin aspart  1-7 Units Subcutaneous TID CC    clopidogrel  75 mg Oral Daily    metoprolol succinate ER  25 mg Oral Daily Beta Blocker    apixaban  5 mg Oral BID    atorvastatin  80 mg Oral Daily    sacubitril-valsartan  1 tablet Oral BID         Assessment & Plan:      Acute CHF exacerbation  CAD s/p PCI and CABG  Hx of NSVT/declining ICD  Moderate to severe MR, moderate TR  JU on CKD  CXR reviewed  Saturating well on room air  Recent EF 25%  Repeat Echo pending  Cardiology on consult  Bumex 2 mg IV BID  Continue Eliquis 5mg BID, Plavix 75 mg daily  Continue Metoprolol 25 mg daily, Entresto 24-26 BID  Strict Is and Os, daily weights  Monitor renal function  Bilateral LE cellulitis  Started on IV Cefazolin  Deescalate as indicated  Discharge on PO abx  Hypokalemia  Replaced  Continue to monitor  Telemetry monitoring  Hx of PE  Continue home meds  Saturating well on room air  HTN  BP well controlled  Continue home meds  Monitor vitals  T2DM  SSI and frequent accuchecks  Iron deficiency anemia  Hx of colon cancer s/p sigmoidectomy in 2023  Monitor H&H, transfuse as indicated  PPI  Outpatient follow up  Advanced care planning  DNAR  ~31 minutes spent        Plan of care discussed with patient or family at bedside.     2/25  Subjective:      Patient sitting up in a chair and in NAD. Denied any active complaints at the time of interview. All questions and  concerns addressed.  No acute overnight events reported by the nursing staff.     Objective:    Review of Systems:   ROS completed; pertinent positive and negatives stated in subjective.        Vital signs:  Temp:  [97.5 °F (36.4 °C)-98.8 °F (37.1 °C)] 97.5 °F (36.4 °C)  Pulse:  [89-98] 89  Resp:  [18-20] 18  BP: ()/(59-82) 101/69  SpO2:  [93 %-97 %] 95 %        Physical Exam:    Gen: NAD AO x3  Chest: good air entry CTABL  CVS: normal s1 and s2 RR  Abd: NABS soft NT ND  Neuro: CN 2-12 grossly intact  Ext: 2-3+ edema in bilateral LE        Diagnostic Data:    Labs:        Recent Labs   Lab 02/23/24  1032 02/24/24  0715 02/25/24  0611   WBC 7.4 6.3 5.7   HGB 9.5* 9.2* 8.8*   MCV 78.6* 77.7* 77.5*   .0 148.0* 139.0*                Recent Labs   Lab 02/23/24  1032 02/24/24  0715 02/24/24  1738 02/25/24  0611   * 94  --  187*   BUN 52* 36*  --  34*   CREATSERUM 1.77* 1.25  --  1.22   CA 8.0* 7.8*  --  7.8*   ALB 3.4  3.6  --   --  3.1*    143  --  141   K 3.9 3.0* 3.8 3.8    106  --  106   CO2 28.0 30.0  --  29.0   ALKPHO 221*  224*  --   --  174*   AST 29  29  --   --  24   ALT 43  41  --   --  9*   BILT 1.0  1.0  --   --  0.9   TP 5.8  5.9  --   --  5.3*         Estimated Creatinine Clearance: 60.7 mL/min (based on SCr of 1.22 mg/dL).     No results for input(s): \"PTP\", \"INR\" in the last 168 hours.           Imaging: Imaging data reviewed in Epic.     Medications:    gabapentin  100 mg Oral TID    PARoxetine HCl  40 mg Oral QAM    QUEtiapine  50 mg Oral Nightly    rOPINIRole  1 mg Oral Nightly    ceFAZolin  2 g Intravenous Q8H    bumetanide  2 mg Intravenous BID (Diuretic)    insulin aspart  1-7 Units Subcutaneous TID CC    clopidogrel  75 mg Oral Daily    metoprolol succinate ER  25 mg Oral Daily Beta Blocker    apixaban  5 mg Oral BID    atorvastatin  80 mg Oral Daily    sacubitril-valsartan  1 tablet Oral BID         Assessment & Plan:      Acute CHF exacerbation  CAD s/p PCI  and CABG  Hx of NSVT/declining ICD  Moderate to severe MR, moderate TR  JU on CKD - resolved  CXR reviewed  Saturating well on room air  Recent EF 25%  Cardiology on consult  Bumex 2 mg IV BID  Continue Eliquis 5mg BID, Plavix 75 mg daily  Continue Metoprolol 25 mg daily, Entresto 24-26 BID  Strict Is and Os, daily weights  Net IO Since Admission: -500 mL [02/25/24 1130]  Echo pending  Renal function at baseline  Continue present management  Telemetry monitoring  Bilateral LE cellulitis  Started on IV Cefazolin  Deescalate as indicated  Discharge on PO abx  Hypokalemia  Replaced  Continue to monitor  Telemetry monitoring  Hx of PE  Continue home meds  Saturating well on room air  HTN  BP well controlled  Continue home meds  Monitor vitals  T2DM  SSI and frequent accuchecks  Iron deficiency anemia  Hx of colon cancer s/p sigmoidectomy in 2023  Monitor H&H, transfuse as indicated  PPI  Outpatient follow up  Advanced care planning  DNAR  ~31 minutes spent        Plan of care discussed with patient or family at bedside.        2/26  Subjective:      Resting comfortably and in NAD. Denied any active complaints at the time of interview.   All questions and concerns addressed.     Objective:    Review of Systems:   ROS completed; pertinent positive and negatives stated in subjective.        Vital signs:  Temp:  [97.5 °F (36.4 °C)-98.2 °F (36.8 °C)] 97.8 °F (36.6 °C)  Pulse:  [84-99] 88  Resp:  [16-18] 18  BP: ()/(62-74) 114/63  SpO2:  [95 %-98 %] 98 %        Physical Exam:    Gen: NAD AO x3  Chest: good air entry CTABL  CVS: normal s1 and s2 RR  Abd: NABS soft NT ND  Neuro: CN 2-12 grossly intact  Ext: 2+ edema in bilateral LE - improving        Diagnostic Data:    Labs:         Recent Labs   Lab 02/23/24  1032 02/24/24  0715 02/25/24  0611 02/26/24  0609   WBC 7.4 6.3 5.7 5.7   HGB 9.5* 9.2* 8.8* 8.9*   MCV 78.6* 77.7* 77.5* 76.3*   .0 148.0* 139.0* 117.0*                 Recent Labs   Lab 02/23/24  1032  02/24/24  0715 02/24/24  1738 02/25/24  0611 02/26/24  0609   * 94  --  187* 135*  135*   BUN 52* 36*  --  34* 34*  34*   CREATSERUM 1.77* 1.25  --  1.22 1.21  1.21   CA 8.0* 7.8*  --  7.8* 8.1*  8.1*   ALB 3.4  3.6  --   --  3.1* 3.1*    143  --  141 138  138   K 3.9 3.0* 3.8 3.8 3.9  3.9    106  --  106 105  105   CO2 28.0 30.0  --  29.0 28.0  28.0   ALKPHO 221*  224*  --   --  174* 174*   AST 29  29  --   --  24 29   ALT 43  41  --   --  9* <7*   BILT 1.0  1.0  --   --  0.9 0.8   TP 5.8  5.9  --   --  5.3* 5.3*         Estimated Creatinine Clearance: 61.2 mL/min (based on SCr of 1.21 mg/dL).     No results for input(s): \"PTP\", \"INR\" in the last 168 hours.           Imaging: Imaging data reviewed in Epic.     Medications:    gabapentin  100 mg Oral TID    PARoxetine HCl  40 mg Oral QAM    QUEtiapine  50 mg Oral Nightly    rOPINIRole  1 mg Oral Nightly    ceFAZolin  2 g Intravenous Q8H    bumetanide  2 mg Intravenous BID (Diuretic)    insulin aspart  1-7 Units Subcutaneous TID CC    clopidogrel  75 mg Oral Daily    metoprolol succinate ER  25 mg Oral Daily Beta Blocker    apixaban  5 mg Oral BID    atorvastatin  80 mg Oral Daily    sacubitril-valsartan  1 tablet Oral BID         Assessment & Plan:      Acute CHF exacerbation  CAD s/p PCI and CABG  Hx of NSVT/declining ICD  Moderate to severe MR, moderate TR  JU on CKD - resolved  CXR reviewed  Saturating well on room air  Recent EF 25%  Cardiology on consult  Continue Bumex 2 mg IV BID  Continue Eliquis 5mg BID, Plavix 75 mg daily  Continue Metoprolol 25 mg daily, Entresto 24-26 BID  Strict Is and Os, daily weights  Net IO Since Admission: -1,130 mL [02/26/24 1218]  Echo pending  Renal function at baseline - continue to monitor  Continue present management  Telemetry monitoring  Bilateral LE cellulitis  Started on IV Cefazolin  Deescalate as indicated  Discharge on PO abx  Hypokalemia  Replaced  Continue to monitor  Telemetry  monitoring  Hx of PE  Continue home meds  Saturating well on room air  HTN  BP well controlled  Continue home meds  Monitor vitals  T2DM  SSI and frequent accuchecks  Iron deficiency anemia  Hx of colon cancer s/p sigmoidectomy in 2023  Monitor H&H, transfuse as indicated  PPI  Outpatient follow up  Advanced care planning  DNAR  ~31 minutes spent        Plan of care discussed with patient or family at bedside.          MEDICATIONS ADMINISTERED IN LAST 1 DAY:  acetaminophen (Tylenol Extra Strength) tab 500 mg       Date Action Dose Route User    2/26/2024 0509 Given 500 mg Oral Anna Julien RN          apixaban (Eliquis) tab 5 mg       Date Action Dose Route User    2/26/2024 0915 Given by Other 5 mg Oral Radha Werner    2/25/2024 2037 Given 5 mg Oral Anna Julien RN          atorvastatin (Lipitor) tab 80 mg       Date Action Dose Route User    2/26/2024 0915 Given by Other 80 mg Oral Radha Werner          bumetanide (Bumex) 0.25 MG/ML injection 2 mg       Date Action Dose Route User    2/26/2024 0949 Given 2 mg Intravenous Radha Werner          ceFAZolin (Ancef) 2 g in 20mL IV syringe premix       Date Action Dose Route User    2/26/2024 1250 Given 2 g Intravenous Radha Werner    2/26/2024 0506 Given 2 g Intravenous Anna Julien RN    2/25/2024 2038 Given 2 g Intravenous Anna Julien RN          clopidogrel (Plavix) tab 75 mg       Date Action Dose Route User    2/26/2024 0915 Given by Other 75 mg Oral Radha Werner          DOBUTamine in dextrose 5% (Dobutrex) 500 mg/250mL infusion premix       Date Action Dose Route User    2/26/2024 1250 New Bag 5 mcg/kg/min × 106 kg (Dosing Weight) Intravenous Radha Werner    2/25/2024 2041 New Bag 5 mcg/kg/min × 106 kg (Dosing Weight) Intravenous Anna Julien RN          gabapentin (Neurontin) cap 100 mg       Date Action Dose Route User    2/26/2024 0915 Given by Other 100 mg Oral Radha Werner    2/25/2024 2037 Given 100 mg Oral Wily  YARED Castillo    2/25/2024 1654 Given 100 mg Oral Diya Gaytan RN          insulin aspart (NovoLOG) 100 Units/mL FlexPen 1-7 Units       Date Action Dose Route User    2/26/2024 1254 Given 4 Units Subcutaneous (Right Upper Arm) Radha Werner    2/25/2024 1747 Given 3 Units Subcutaneous (Left Upper Arm) Diya Gaytan RN          magnesium oxide (Mag-Ox) tab 400 mg       Date Action Dose Route User    2/26/2024 0915 Given by Other 400 mg Oral Radha Werner          metOLazone (Zaroxolyn) tab 2.5 mg       Date Action Dose Route User    2/26/2024 0915 Given by Other 2.5 mg Oral Radha Werner          metoprolol succinate ER (Toprol XL) 24 hr tab 25 mg       Date Action Dose Route User    2/26/2024 0506 Given 25 mg Oral Anna Julien RN          PARoxetine (Paxil) tab 40 mg       Date Action Dose Route User    2/26/2024 0915 Given by Other 40 mg Oral Radha Werner          QUEtiapine (SEROquel) tab 50 mg       Date Action Dose Route User    2/25/2024 2037 Given 50 mg Oral Anna Julien RN          rOPINIRole (Requip) tab 1 mg       Date Action Dose Route User    2/25/2024 2037 Given 1 mg Oral Anna Julien RN          sacubitril-valsartan (Entresto) 24-26 MG per tab 1 tablet       Date Action Dose Route User    2/26/2024 0915 Given by Other 1 tablet Oral Radha Werner    2/25/2024 2037 Given 1 tablet Oral Anna Julien RN            Vitals (last day)       Date/Time Temp Pulse Resp BP SpO2 Weight O2 Device O2 Flow Rate (L/min) [facility]    02/26/24 0950 -- 88 18 114/63 98 % -- None (Room air) --     02/26/24 0900 97.8 °F (36.6 °C) 84 18 105/62 95 % -- None (Room air) --     02/26/24 0505 98.2 °F (36.8 °C) 94 17 105/71 95 % -- None (Room air) --     02/26/24 0505 -- -- -- -- -- 237 lb -- -- TP    02/25/24 2036 98.2 °F (36.8 °C) 92 17 98/74 98 % -- None (Room air) --     02/25/24 2000 -- 91 -- -- -- -- -- --     02/25/24 1747 -- 99 -- 94/68 -- -- -- --     02/25/24 1746 -- -- -- 96/67 -- --  -- --     02/25/24 1646 -- 99 -- 98/64 -- -- -- --     02/25/24 1435 97.5 °F (36.4 °C) 91 16 98/74 98 % -- None (Room air) --     02/25/24 0932 97.5 °F (36.4 °C) 89 18 101/69 95 % -- None (Room air) --     02/25/24 0522 97.6 °F (36.4 °C) 98 18 108/76 94 % 233 lb 9.6 oz None (Room air) -- TB

## 2024-02-26 NOTE — CM/SW NOTE
Community palliative referral received. Being processed for acceptance. Residential Liaison to update on status accordingly.  Thank you for the privilege.  Christie Rea  Residential Liaison  [phone]

## 2024-02-26 NOTE — CONSULTS
[facility]  Palliative Care Initial Consult Note    Cristhian Lopez Patient Status:  Inpatient    9/3/1955 MRN [id number]   [facility] [facility] 3W/SW Attending Aretha Hazel MD   Hosp Day # 3 PCP KARI BARCLAY     Date of Consult: 2024  Patient seen at: [facility] Inpatient    The  Cures Act makes medical notes like these available to patients in the interest of transparency. Please be advised this is a medical document. Medical documents are intended to carry relevant information, facts as evident, and the clinical opinion of the practitioner. The medical note is intended as peer to peer communication and may appear blunt or direct. It is written in medical language and may contain abbreviations or verbiage that are unfamiliar.     Reason for Consultation: Consult ordered by:: Dr. Hazel for evaluation of Palliative Care needs and goals of care discussion.    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 Work up in our hospital revealed acute on chronic systolic and diastolic heart failure, Bilateral lower extremity edema and ischemic cardiomyopathy.  History was obtained from [facility] and the patient.      This is pts second hospitalization this month.    Today is day #3 of hospitalization.     When I entered the room, the patient was  in the bed awake and alert, eating breakfast and watching TV, he denies pain, denies sob, he has had + cough non productive for \"awhile\".      Palliative Care symptom needs assessed:   Pertinent items are noted  in HPI/below    Fatigue:  Yes  Dyspnea: denies   Current O2 therapy: RA  Cough: + dry cough  Pain Present: denies  Non-verbal signs of pain present: NO  Appetite: Good  Constipation: denies  Diarrhea: denies  Nausea/Vomiting: denies  Depression: denies  Anxiety: denies    Palliative Care Social History:   Marital Status: Single  Children: denies  Living Situation Prior to Admit: lives alone  Is Patient Confused: No  Occupational History: Former  for [employer]    Substance History:  Smoking history: h/o smoking he states he quit in 1995  Hx of Substance Use/Abuse: No    Spiritual Assessment:   None  pt declines  visit    Medical History: obtained from [facility]  Past Medical History:   Diagnosis Date    Coronary heart disease     2 stents in place, pstient sees Dr. Westbrook    Essential hypertension     Heart attack (HCC) 2018     maker w/ 5 stents    Hyperlipidemia     Melanoma in situ of left upper extremity (HCC) 1998    right thumb    RLS (restless legs syndrome)      Past Surgical History:   Procedure Laterality Date    OTHER SURGICAL HISTORY      2 stents        Nutritional Status:  BMI:34 Weight: 237Lbs  Weight changes: TANIA due to fluid volume overload  Current Appetite: good  Dysphagia: denies    Family History: obtained from [facility]  Family History   Problem Relation Age of Onset    Cancer Father         sarcoma of back       Allergies:  Allergies   Allergen Reactions    Heparin HIVES     Patient not sure       Medications:     Current Facility-Administered Medications:     magnesium oxide (Mag-Ox) tab 400 mg, 400 mg, Oral, Once    DOBUTamine in dextrose 5% (Dobutrex) 500 mg/250mL infusion premix, 5 mcg/kg/min (Dosing Weight), Intravenous, Continuous    metOLazone (Zaroxolyn) tab 2.5 mg, 2.5 mg, Oral, Once    gabapentin (Neurontin) cap 100 mg, 100 mg, Oral, TID    PARoxetine (Paxil) tab 40 mg, 40 mg, Oral, QAM    QUEtiapine (SEROquel) tab 50 mg, 50 mg, Oral, Nightly    rOPINIRole (Requip) tab 1  mg, 1 mg, Oral, Nightly    ceFAZolin (Ancef) 2 g in 20mL IV syringe premix, 2 g, Intravenous, Q8H    acetaminophen (Tylenol Extra Strength) tab 500 mg, 500 mg, Oral, Q4H PRN    ondansetron (Zofran) 4 MG/2ML injection 4 mg, 4 mg, Intravenous, Q6H PRN    temazepam (Restoril) cap 15 mg, 15 mg, Oral, Nightly PRN    bumetanide (Bumex) 0.25 MG/ML injection 2 mg, 2 mg, Intravenous, BID (Diuretic)    glucose (Dex4) 15 GM/59ML oral liquid 15 g, 15 g, Oral, Q15 Min PRN **OR** glucose (Glutose) 40% oral gel 15 g, 15 g, Oral, Q15 Min PRN **OR** glucose-vitamin C (Dex-4) chewable tab 4 tablet, 4 tablet, Oral, Q15 Min PRN **OR** dextrose 50% injection 50 mL, 50 mL, Intravenous, Q15 Min PRN **OR** glucose (Dex4) 15 GM/59ML oral liquid 30 g, 30 g, Oral, Q15 Min PRN **OR** glucose (Glutose) 40% oral gel 30 g, 30 g, Oral, Q15 Min PRN **OR** glucose-vitamin C (Dex-4) chewable tab 8 tablet, 8 tablet, Oral, Q15 Min PRN    insulin aspart (NovoLOG) 100 Units/mL FlexPen 1-7 Units, 1-7 Units, Subcutaneous, TID CC    clopidogrel (Plavix) tab 75 mg, 75 mg, Oral, Daily    metoprolol succinate ER (Toprol XL) 24 hr tab 25 mg, 25 mg, Oral, Daily Beta Blocker    apixaban (Eliquis) tab 5 mg, 5 mg, Oral, BID    atorvastatin (Lipitor) tab 80 mg, 80 mg, Oral, Daily    sacubitril-valsartan (Entresto) 24-26 MG per tab 1 tablet, 1 tablet, Oral, BID    Functional Status History:  Falls: no  ADLs: pt is independent with adl's, he states he has a friend who runs errands for him as needed, denies any current home health services. He is cognitively intact.    Palliative Performance Scale:   Prior to admission: (pt/family reported) 90 %  Observed during hospitalization: 60 %  % Ambulation Activity Level Self-Care Intake Consciousness   100 Full  Normal  No Disease Full Normal Full   90 Full  Normal  Some Disease Full Normal Full   80 Full  Normal w/effort  Some Disease Full Normal or reduced Full   70 Reduced  Can't Perform Job  Some Disease Full Normal or  reduced Full   60 Reduced  Can't Perform Hobby   Significant Disease Occ Assist Normal or reduced Full or confused   50 Mainly sit/lie Can't do any work  Extensive Disease Partial Assist Normal or reduced Full or confused   40 Mainly in bed Can't do any work  Extensive Disease Mainly Assist Normal or reduced Full or confused   30 Bed Bound Can't do any work  Extensive Disease Max Assist  Total Care Reduced  Drowsy/confused   20 Bed Bound Can't do any work  Extensive Disease Max Assist  Total Care Minimal  Drowsy/confused   10 Bed Bound Can't do any work  Extensive Disease Max Assist  Total Care Mouth Care  Drowsy/confused   0 Death        Objective      Vital Signs:  Blood pressure 105/71, pulse 94, temperature 98.2 °F (36.8 °C), temperature source Axillary, resp. rate 17, height 5' 10\" (1.778 m), weight 237 lb (107.5 kg), SpO2 95%.  Body mass index is 34.01 kg/m².  Present Level of pain: denies pain  Non-verbal signs of pain present: No    General: pt is in the bed he is awake and alert, oriented X3, on RA eating breakfast, he is in no apparent distress. On Dobutamine drip  HEENT: No focal deficits.  Cardiac: Regular rate and rhythm, S1, S2 normal, slight murmur, No rub or gallop.  Lungs: Clear to diminished without wheezes, fine rales at the bases, normal excursions and effort. On RA, + cough  Abdomen: large obese soft, non-tender, + bowel sounds, no rebound or guarding, + Bms  Extremities: Without clubbing, cyanosis. BLE Edema pitting 2-3+  Neurologic: Alert and oriented X3, moves all extremities, normal affect.  Skin: Warm and dry.    Hematology:  Lab Results   Component Value Date    WBC 5.7 02/26/2024    HGB 8.9 (L) 02/26/2024    HCT 27.7 (L) 02/26/2024    .0 (L) 02/26/2024       Coags:No results found for: \"PT\", \"INR\", \"PTT\"    Chemistry:  Lab Results   Component Value Date    CREATSERUM 1.21 02/26/2024    CREATSERUM 1.21 02/26/2024    BUN 34 (H) 02/26/2024    BUN 34 (H) 02/26/2024     02/26/2024      02/26/2024    K 3.9 02/26/2024    K 3.9 02/26/2024     02/26/2024     02/26/2024    CO2 28.0 02/26/2024    CO2 28.0 02/26/2024     (H) 02/26/2024     (H) 02/26/2024    CA 8.1 (L) 02/26/2024    CA 8.1 (L) 02/26/2024    ALB 3.1 (L) 02/26/2024    ALKPHO 174 (H) 02/26/2024    BILT 0.8 02/26/2024    TP 5.3 (L) 02/26/2024    AST 29 02/26/2024    ALT <7 (L) 02/26/2024    PSA 9.27 (H) 02/20/2023    DDIMER 0.93 (H) 12/22/2021    MG 1.7 02/26/2024    PHOS 3.3 12/24/2021    TROP <0.045 08/25/2021       Imaging:  No results found.      Summary of Discussion      I discussed reason for palliative care consultation with Johnny.    I differentiated the palliative treatment-focus model versus the hospice comfort-focused philosophy of care. I informed the patient/family that having palliative care support does not limit medical treatment options or decisions to those who wish to continue curative or restorative medical therapies. I discussed the benefits of palliative care to include assistance with arising symptom management needs, an extra layer of support, to ensure GOC are respected throughout healthcare continuum, and assist with transition to hospice care when appropriate.      Prognostic awareness/understanding:   Johnny has declined ICD in the past for his cardiomyopathy, Johnny orders a lot of food out to eat, discussed with Johnny take out food has a lot of fat and sodium in it. Johnny is aware of need to eat heart healthy diet. Johnny lives alone and eating take out food is easier for him.   Discussed with Johnny he is on Dobutamine drip and possible need to follow with outpt heart failure clinic for his heart failure at discharge to manage symptoms. Johnny stated he \"is not doing that\"  Discussed with Johnny CPC at discharge [facility] to come to his home every 3 weeks or so to help manage symptoms to prevent rehospitalization's, Johnny was agreeable to CPC at discharge.   We discussed the disease trajectory of  CHF with cardiomyopathy and the need to be compliant to prevent worsening of his heart failure.  Discussed with Johnny the associated symptoms and decline over time.     Hopes/goals:   Johnny hopes to return to home when medically cleared.   Its unclear at this time what Johnny's goals are as he stated he would not be ready for hospice when I discussed CPC to transition to hospice when he is ready. However he declines ICD and heart failure clinic    Advance Care Planning counseling and discussion:   I discussed the importance of advance care planning prior to crisis with Johnny. Johnny stated he has filled out HCPOA pw in the past, \"I have a \". I requested Johnny to provide a copy if available for our records.   Johnny confirmed that his friend Woodrow is his HCPOA. I discussed the risks vs benefits of life sustaining treatments in the setting of declining treatments for his heart failure and ischemic cardiomyopathy along with other moo issues and non compliance with his diet.   I confirmed expressed wishes for DNAR/DNI with selective treatment continue supportive medical care.   POLST form was provided for pt to review he stated we can fill it out tomorrow.   I provided emotional support to pt who is coping adequately.     Assessment and Recommendations         Acute on chronic HFrEF-EF 25%-declines ICD and declines heart failure clinic if recommended.    CAD with history of PCI and Cabg     Cardiorenal syndrome    Moderate to severe MR, moderate TR    Bilateral LE cellulitis    HLD    H/o PE 2019    H/o LV thrombus 2021    JU on CKD    HTN    DM    Noncompliant with his diet    Scrotal edema    H/o colon cancer s/p sigmoidectomy 2023    DJD of the lumbar spine      Goals of care counseling  -see above for details  -Pt is DNAR/DNI with selective treatment  -Agreeable to palliative care following  -Dispo: return to home, pt is agreeable to CPC at discharge, he states he would not want to go to heart failure clinic. SW to  help with dc planning.   -declined  at this time.  -Ongoing GOC discussions will be needed over time.   -Provided emotional support to the pt who is coping adequately.    Advance care planning  -see above for details  -Pt's friend Woodrow Le is his HC surrogate [phone]  - provided HCPOA and POLST form pw for pt to review will fill out tomorrow.    Palliative Performance Scale 60%    Discussed today's visit with Dr. Hazel, Radha VAIL and Ciarra ORTA    Palliative Care Follow Up: Palliative care team will follow up tomorrow. Feel free to contact our team with any questions or concerns.    Thank you for allowing Palliative Care services to participate in the care of Cristhian Lopez.    A total of 55 minutes were spent on this consult, which included all of the following: chart review, direct face to face contact, history taking, physical examination, counseling and coordinating care, and documentation.     Yareli Hooker, ILYA [id number]  2/26/2024  9:54AM

## 2024-02-26 NOTE — CDS QUERY
.How to answer this Query:     1.) DON'T CLICK COSIGN BUTTON FIRST  2.) Click \"3 dots...\" to the right of cosign button and click EDIT on the toolbar.  2.) Type an \"X\" in the bracket for the diagnosis that applies. (You may also add additional clinical details as you feel necessary to substantiate your response).   3.) Finally click \"Sign\" to complete response.  Thank You      CLINICAL DOCUMENTATION CLARIFICATION FORM  Dear Doctor Ilir,   The attending physician is required to clarify conflicting documentation in the medical record.  The following documentation is noted in the medical record:  Diagnosis 1: Acute on chronic combined systolic and diastolic heart failure. Documented by: Dr. Rockwell  [facility]: H&P 2/23  Diagnosis 2: Acute on chronic HFrEF  Documented by: Dr. Almazan [facility]: Cardiology note 2/24    PLEASE (X) DIAGNOSIS THAT APPLIES.    ( X   ) Acute on Chronic Systolic Heart Failure    (    ) Other - please specify:       Risk Factors: 68-year-old male with history of hypertension, hyperlipidemia, coronary artery disease post angioplasty and multiple stent placements, anemia, and ischemic cardiomyopathy presents with complaints of increasing lower extremity, scrotal, and penile edema. Admitted with Acute on Chronic chf .    Clinical Indicators: BNP  1,707. Echo 2/6/24 - The estimated ejection fraction was 25%     Treatment: IV Bumex, Metoprolol, Cardiology Consult          If you have any questions, please contact Clinical :  Miriam TSANG at [phone]     Thank You!     THIS FORM IS A PERMANENT PART OF THE MEDICAL RECORD

## 2024-02-27 ENCOUNTER — APPOINTMENT (OUTPATIENT)
Dept: CARDIOLOGY | Age: 69
End: 2024-02-27

## 2024-02-27 LAB
ALBUMIN SERPL-MCNC: 3.2 G/DL (ref 3.2–4.8)
ALBUMIN/GLOB SERPL: 1.5 {RATIO} (ref 1–2)
ALP LIVER SERPL-CCNC: 168 U/L
ALT SERPL-CCNC: <7 U/L
ANION GAP SERPL CALC-SCNC: 5 MMOL/L (ref 0–18)
ANION GAP SERPL CALC-SCNC: 5 MMOL/L (ref 0–18)
AST SERPL-CCNC: 23 U/L (ref ?–34)
BASOPHILS # BLD AUTO: 0.03 X10(3) UL (ref 0–0.2)
BASOPHILS NFR BLD AUTO: 0.5 %
BILIRUB SERPL-MCNC: 0.9 MG/DL (ref 0.2–1.1)
BUN BLD-MCNC: 29 MG/DL (ref 9–23)
BUN BLD-MCNC: 29 MG/DL (ref 9–23)
BUN/CREAT SERPL: 24.2 (ref 10–20)
BUN/CREAT SERPL: 24.2 (ref 10–20)
CALCIUM BLD-MCNC: 8.4 MG/DL (ref 8.7–10.4)
CALCIUM BLD-MCNC: 8.4 MG/DL (ref 8.7–10.4)
CHLORIDE SERPL-SCNC: 103 MMOL/L (ref 98–112)
CHLORIDE SERPL-SCNC: 103 MMOL/L (ref 98–112)
CO2 SERPL-SCNC: 28 MMOL/L (ref 21–32)
CO2 SERPL-SCNC: 28 MMOL/L (ref 21–32)
CREAT BLD-MCNC: 1.2 MG/DL
CREAT BLD-MCNC: 1.2 MG/DL
DEPRECATED RDW RBC AUTO: 71.5 FL (ref 35.1–46.3)
EGFRCR SERPLBLD CKD-EPI 2021: 66 ML/MIN/1.73M2 (ref 60–?)
EGFRCR SERPLBLD CKD-EPI 2021: 66 ML/MIN/1.73M2 (ref 60–?)
EOSINOPHIL # BLD AUTO: 0.18 X10(3) UL (ref 0–0.7)
EOSINOPHIL NFR BLD AUTO: 3.3 %
ERYTHROCYTE [DISTWIDTH] IN BLOOD BY AUTOMATED COUNT: 28.6 % (ref 11–15)
GLOBULIN PLAS-MCNC: 2.2 G/DL (ref 2.8–4.4)
GLUCOSE BLD-MCNC: 148 MG/DL (ref 70–99)
GLUCOSE BLD-MCNC: 148 MG/DL (ref 70–99)
GLUCOSE BLDC GLUCOMTR-MCNC: 155 MG/DL (ref 70–99)
GLUCOSE BLDC GLUCOMTR-MCNC: 173 MG/DL (ref 70–99)
GLUCOSE BLDC GLUCOMTR-MCNC: 173 MG/DL (ref 70–99)
GLUCOSE BLDC GLUCOMTR-MCNC: 179 MG/DL (ref 70–99)
HCT VFR BLD AUTO: 28.3 %
HGB BLD-MCNC: 8.8 G/DL
IMM GRANULOCYTES # BLD AUTO: 0.01 X10(3) UL (ref 0–1)
IMM GRANULOCYTES NFR BLD: 0.2 %
LYMPHOCYTES # BLD AUTO: 0.83 X10(3) UL (ref 1–4)
LYMPHOCYTES NFR BLD AUTO: 15 %
MAGNESIUM SERPL-MCNC: 1.7 MG/DL (ref 1.6–2.6)
MCH RBC QN AUTO: 23.6 PG (ref 26–34)
MCHC RBC AUTO-ENTMCNC: 31.1 G/DL (ref 31–37)
MCV RBC AUTO: 75.9 FL
MONOCYTES # BLD AUTO: 0.58 X10(3) UL (ref 0.1–1)
MONOCYTES NFR BLD AUTO: 10.5 %
NEUTROPHILS # BLD AUTO: 3.89 X10 (3) UL (ref 1.5–7.7)
NEUTROPHILS # BLD AUTO: 3.89 X10(3) UL (ref 1.5–7.7)
NEUTROPHILS NFR BLD AUTO: 70.5 %
OSMOLALITY SERPL CALC.SUM OF ELEC: 291 MOSM/KG (ref 275–295)
OSMOLALITY SERPL CALC.SUM OF ELEC: 291 MOSM/KG (ref 275–295)
PLATELET # BLD AUTO: 123 10(3)UL (ref 150–450)
POTASSIUM SERPL-SCNC: 3.7 MMOL/L (ref 3.5–5.1)
POTASSIUM SERPL-SCNC: 3.7 MMOL/L (ref 3.5–5.1)
PROT SERPL-MCNC: 5.4 G/DL (ref 5.7–8.2)
RBC # BLD AUTO: 3.73 X10(6)UL
SODIUM SERPL-SCNC: 136 MMOL/L (ref 136–145)
SODIUM SERPL-SCNC: 136 MMOL/L (ref 136–145)
WBC # BLD AUTO: 5.5 X10(3) UL (ref 4–11)

## 2024-02-27 PROCEDURE — 99222 1ST HOSP IP/OBS MODERATE 55: CPT | Performed by: INTERNAL MEDICINE

## 2024-02-27 PROCEDURE — 99233 SBSQ HOSP IP/OBS HIGH 50: CPT | Performed by: INTERNAL MEDICINE

## 2024-02-27 PROCEDURE — 99232 SBSQ HOSP IP/OBS MODERATE 35: CPT | Performed by: NURSE PRACTITIONER

## 2024-02-27 RX ORDER — POTASSIUM CHLORIDE 20 MEQ/1
40 TABLET, EXTENDED RELEASE ORAL ONCE
Status: COMPLETED | OUTPATIENT
Start: 2024-02-27 | End: 2024-02-27

## 2024-02-27 RX ORDER — MAGNESIUM OXIDE 400 MG/1
400 TABLET ORAL ONCE
Status: COMPLETED | OUTPATIENT
Start: 2024-02-27 | End: 2024-02-27

## 2024-02-27 NOTE — CONSULTS
[facility]    Report of Consultation    Cristhian Lopez Patient Status:  Inpatient    9/3/1955 MRN [id number]   [facility] [facility] 3W/SW Attending Aretha Hazel MD   Hosp Day # 4 PCP KARI BARCLAY     Date of Admission:  2024  Date of Consult:  2024    Reason for Consultation:  Inpatient DM management    History of Present Illness:  Cristhian Lopez is a a(n) 68 year old male who is admitted for management of heart failure.  Endocrinology is consulted for inpatient diabetes management.    Patient has type 2 diabetes.  A1c is 10.4% taken in 2024.  Home regimen:  Metformin 1000 mg twice daily  Glimepiride 4 mg daily    Unclear compliance since patient states he did not even know that he had Diabetes till this hospitalization    History:  Past Medical History:   Diagnosis Date    Coronary heart disease     2 stents in place, pstient sees Dr. 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)     right thumb    RLS (restless legs syndrome)      Past Surgical History:   Procedure Laterality Date    OTHER SURGICAL HISTORY      2 stents      Family History   Problem Relation Age of Onset    Cancer Father         sarcoma of back      reports that he quit smoking about 28 years ago. His smoking use included cigarettes. He smoked an average of 1 pack per day. He has never used smokeless tobacco. He reports that he does not drink alcohol and does not use drugs.    Allergies:  Allergies   Allergen Reactions    Heparin HIVES     Patient not sure       Medications:    Current Facility-Administered Medications:     guaiFENesin (Robitussin) 100 MG/5 ML oral liquid 200 mg, 200 mg, Oral, Q4H PRN    insulin aspart (NovoLOG) 100 Units/mL FlexPen 2 Units, 2 Units, Subcutaneous, TID CC    insulin aspart (NovoLOG) 100 Units/mL FlexPen 1-5 Units, 1-5 Units, Subcutaneous, TID CC     DOBUTamine in dextrose 5% (Dobutrex) 500 mg/250mL infusion premix, 5 mcg/kg/min (Dosing Weight), Intravenous, Continuous    gabapentin (Neurontin) cap 100 mg, 100 mg, Oral, TID    PARoxetine (Paxil) tab 40 mg, 40 mg, Oral, QAM    QUEtiapine (SEROquel) tab 50 mg, 50 mg, Oral, Nightly    rOPINIRole (Requip) tab 1 mg, 1 mg, Oral, Nightly    ceFAZolin (Ancef) 2 g in 20mL IV syringe premix, 2 g, Intravenous, Q8H    acetaminophen (Tylenol Extra Strength) tab 500 mg, 500 mg, Oral, Q4H PRN    ondansetron (Zofran) 4 MG/2ML injection 4 mg, 4 mg, Intravenous, Q6H PRN    temazepam (Restoril) cap 15 mg, 15 mg, Oral, Nightly PRN    bumetanide (Bumex) 0.25 MG/ML injection 2 mg, 2 mg, Intravenous, BID (Diuretic)    glucose (Dex4) 15 GM/59ML oral liquid 15 g, 15 g, Oral, Q15 Min PRN **OR** glucose (Glutose) 40% oral gel 15 g, 15 g, Oral, Q15 Min PRN **OR** glucose-vitamin C (Dex-4) chewable tab 4 tablet, 4 tablet, Oral, Q15 Min PRN **OR** dextrose 50% injection 50 mL, 50 mL, Intravenous, Q15 Min PRN **OR** glucose (Dex4) 15 GM/59ML oral liquid 30 g, 30 g, Oral, Q15 Min PRN **OR** glucose (Glutose) 40% oral gel 30 g, 30 g, Oral, Q15 Min PRN **OR** glucose-vitamin C (Dex-4) chewable tab 8 tablet, 8 tablet, Oral, Q15 Min PRN    clopidogrel (Plavix) tab 75 mg, 75 mg, Oral, Daily    metoprolol succinate ER (Toprol XL) 24 hr tab 25 mg, 25 mg, Oral, Daily Beta Blocker    apixaban (Eliquis) tab 5 mg, 5 mg, Oral, BID    atorvastatin (Lipitor) tab 80 mg, 80 mg, Oral, Daily    sacubitril-valsartan (Entresto) 24-26 MG per tab 1 tablet, 1 tablet, Oral, BID    A comprehensive 10 point review of systems was completed.  Pertinent positives and negatives noted in the the HPI.    Physical Exam:  Blood pressure 99/63, pulse 94, temperature 97.8 °F (36.6 °C), temperature source Axillary, resp. rate 18, height 5' 10\" (1.778 m), weight 232 lb (105.2 kg), SpO2 98%.        General Appearance:  alert, well developed, in no acute distress  Head:  Atraumatic  Eyes:  normal conjunctivae, sclera., normal sclera and normal pupils  Throat/Neck: normal sound to voice. Normal hearing, normal speech  Respiratory:  Speaking in full sentences, non-labored. no increased work of breathing, no audible wheezing    Neuro: motor grossly intact, moving all extremities without difficulty  Psychiatric:  oriented to time, self, and place  Extremities: no obvious extremity swelling, no lesions      Impression and Plan:  Patient Active Problem List   Diagnosis    Left inguinal hernia    Coronary artery disease without angina pectoris, unspecified vessel or lesion type, unspecified whether native or transplanted heart    Acute congestive heart failure (HCC)    Acute congestive heart failure, unspecified heart failure type (HCC)    Syncope and collapse    JU (acute kidney injury) (HCC)    Dizziness    Parasomnia    RLS (restless legs syndrome)    Episodic mood disorder (HCC)    Anemia, unspecified type    Rectal bleeding    Acute on chronic congestive heart failure, unspecified heart failure type (HCC)    Iron deficiency anemia due to chronic blood loss    Scrotal edema    Goals of care, counseling/discussion    Palliative care encounter       Type 2 diabetes  NovoLog 2 units 3 times daily with meals along with low-dose correction factor  Accu-Cheks ACHS  Hypoglycemia protocol    Diabetic diet    Discharge planning:   Recommend follow-up in the endocrinology clinic.  Once he is seen as an outpatient we can consider option of GLP agonist/SGLT2 inhibitors.  These might help in better management of diabetes along with a low risk of hypoglycemia.    If patient would like to follow-up in the endocrinology clinic, recommend that he call [phone] and speak with the endocrinology nurse to make an appointment with Dr. Oropeza in 1 to 2 weeks after discharge.    Thank you for the consult.  Will follow.    Renu Morales MD

## 2024-02-27 NOTE — PALLIATIVE CARE NOTE
[facility]  Palliative Care Progress Note    Cristhian Lopez Patient Status:  Inpatient    9/3/1955 MRN [id number]   [facility] [facility] 3W/SW Attending Aretha Hazel MD   Hosp Day # 4 PCP KARI BARCLAY     The  Cures Act makes medical notes like these available to patients in the interest of transparency. Please be advised this is a medical document. Medical documents are intended to carry relevant information, facts as evident, and the clinical opinion of the practitioner. The medical note is intended as peer to peer communication and may appear blunt or direct. It is written in medical language and may contain abbreviations or verbiage that are unfamiliar.     Subjective     When I entered the room, the patient was sitting up in the chair watching TV, took 100% of breakfast, he denies pain, continues to experience a dry cough, he states he has had a cough for about 7 months non productive.     Symptom assessment: cough    Review of pertinent medication requirements in past 24 hours:   No medications    Palliative Care symptom needs assessed:   Pertinent items are noted in HPI/below    Fatigue:  Yes  Dyspnea: with exertion  Current O2 therapy: RA  Cough: + dry cough  Pain Present: denies  Non-verbal signs of pain present: NO  Appetite: good  Constipation: denies  Diarrhea: denies  Nausea/Vomiting: denies  Depression: h/o depression  Anxiety: h/o anxiety    Allergies:  Allergies   Allergen Reactions    Heparin HIVES     Patient not sure       Medications:     Current Facility-Administered Medications:     DOBUTamine in dextrose 5% (Dobutrex) 500 mg/250mL infusion premix, 5 mcg/kg/min (Dosing Weight), Intravenous, Continuous    gabapentin (Neurontin) cap 100 mg, 100 mg, Oral, TID    PARoxetine (Paxil) tab 40 mg, 40 mg, Oral, QAM    QUEtiapine (SEROquel) tab 50 mg, 50 mg, Oral, Nightly    rOPINIRole (Requip) tab 1 mg, 1 mg, Oral, Nightly    ceFAZolin (Ancef) 2 g in 20mL IV  syringe premix, 2 g, Intravenous, Q8H    acetaminophen (Tylenol Extra Strength) tab 500 mg, 500 mg, Oral, Q4H PRN    ondansetron (Zofran) 4 MG/2ML injection 4 mg, 4 mg, Intravenous, Q6H PRN    temazepam (Restoril) cap 15 mg, 15 mg, Oral, Nightly PRN    bumetanide (Bumex) 0.25 MG/ML injection 2 mg, 2 mg, Intravenous, BID (Diuretic)    glucose (Dex4) 15 GM/59ML oral liquid 15 g, 15 g, Oral, Q15 Min PRN **OR** glucose (Glutose) 40% oral gel 15 g, 15 g, Oral, Q15 Min PRN **OR** glucose-vitamin C (Dex-4) chewable tab 4 tablet, 4 tablet, Oral, Q15 Min PRN **OR** dextrose 50% injection 50 mL, 50 mL, Intravenous, Q15 Min PRN **OR** glucose (Dex4) 15 GM/59ML oral liquid 30 g, 30 g, Oral, Q15 Min PRN **OR** glucose (Glutose) 40% oral gel 30 g, 30 g, Oral, Q15 Min PRN **OR** glucose-vitamin C (Dex-4) chewable tab 8 tablet, 8 tablet, Oral, Q15 Min PRN    insulin aspart (NovoLOG) 100 Units/mL FlexPen 1-7 Units, 1-7 Units, Subcutaneous, TID CC    clopidogrel (Plavix) tab 75 mg, 75 mg, Oral, Daily    metoprolol succinate ER (Toprol XL) 24 hr tab 25 mg, 25 mg, Oral, Daily Beta Blocker    apixaban (Eliquis) tab 5 mg, 5 mg, Oral, BID    atorvastatin (Lipitor) tab 80 mg, 80 mg, Oral, Daily    sacubitril-valsartan (Entresto) 24-26 MG per tab 1 tablet, 1 tablet, Oral, BID    Objective     Vital Signs:  Blood pressure 106/66, pulse 86, temperature 98.2 °F (36.8 °C), temperature source Axillary, resp. rate 18, height 5' 10\" (1.778 m), weight 232 lb (105.2 kg), SpO2 95%.  Body mass index is 33.29 kg/m².  Present Level of pain: denies  Non-verbal signs of pain present: No    Physical Exam:  General: pt is sitting up in the chair watching TV, on RA, + dry cough, he is in no apparent distress. On Dobutamine drip  HEENT: No focal deficits.  Cardiac: Regular rate and rhythm, S1, S2 normal, slight murmur, No rub or gallop.  Lungs: Clear to diminished without wheezes, normal excursions and effort. On RA, + dry cough  Abdomen: large obese soft,  non-tender, + bowel sounds, no rebound or guarding, + Bms  Extremities: Without clubbing, cyanosis. BLE Edema pitting 2-3+  Neurologic: Alert and oriented X3, moves all extremities, normal affect.  Skin: Warm and dry.    Hematology:  Lab Results   Component Value Date    WBC 5.5 02/27/2024    HGB 8.8 (L) 02/27/2024    HCT 28.3 (L) 02/27/2024    .0 (L) 02/27/2024       Coags:No results found for: \"PT\", \"INR\", \"PTT\"    Chemistry:  Lab Results   Component Value Date    CREATSERUM 1.20 02/27/2024    CREATSERUM 1.20 02/27/2024    BUN 29 (H) 02/27/2024    BUN 29 (H) 02/27/2024     02/27/2024     02/27/2024    K 3.7 02/27/2024    K 3.7 02/27/2024     02/27/2024     02/27/2024    CO2 28.0 02/27/2024    CO2 28.0 02/27/2024     (H) 02/27/2024     (H) 02/27/2024    CA 8.4 (L) 02/27/2024    CA 8.4 (L) 02/27/2024    ALB 3.2 02/27/2024    ALKPHO 168 (H) 02/27/2024    BILT 0.9 02/27/2024    TP 5.4 (L) 02/27/2024    AST 23 02/27/2024    ALT <7 (L) 02/27/2024    PSA 9.27 (H) 02/20/2023    DDIMER 0.93 (H) 12/22/2021    MG 1.7 02/27/2024    PHOS 3.3 12/24/2021    TROP <0.045 08/25/2021       Imaging:  No results found.      Summary of Discussion    2/27/24 followed up with Garrett today. Garrett denies pain, states he has had a dry cough for about 7 months, which is frustrating. I provided an overall clinical update for Garrett, who expressed concern over how long he may have to live due to his weak heart, had long discussion in regards to heart healthy diet, continued exercise and compliance with medical regimen and plan from the healthcare team. Garrett expressed he has a dog who is 2 yrs old that he enjoys taking for a walk when the weather is warm, I encouraged walking in malls and utilizing on line free walking programs when the weather is cold.   Offered to fill out HCPOA PW with garrett who stated he has filled out HCPOA pw with his , I  requested a copy if Garrett had a chance to ask someone to  bring a copy for our records.   We reviewed and filled out POLST form with expressed wishes for DNAR/DNI with selective treatment and continued medical support.   I provided emotional support to Johnny who is coping adequately.      Assessment and Recommendation     Acute on chronic HFrEF-EF 25%-declines ICD and declines heart failure clinic if recommended.    CAD with history of PCI and Cabg     Cardiorenal syndrome    Moderate to severe MR, moderate TR    Bilateral LE cellulitis    HLD    H/o PE 2019    H/o LV thrombus 2021    JU on CKD    HTN    DM    Noncompliant with his diet    Scrotal edema    H/o colon cancer s/p sigmoidectomy 2023    DJD of the lumbar spine    H/o depression  -on home medications Seroquel, Paxil     H/o DJD of the lumbar spine  -gabapentin 100mg 3 times daily  -Tylenol 500mg every 4 hours as needed    RLS  -Requip 1mg nightly    Cough   -recommend trying honey  -added Robitussin 200mg liquid every 4 hours as needed      Goals of care counseling  -see above for details  -Pt is DNAR/DNI with selective treatment  -Agreeable to palliative care following  -Dispo: return to home pt is agreeable to CPC at discharge, he states he would not want to go to heart failure clinic. SW to help with dc planning.   -declined  at this time.  -Ongoing GOC discussions will be needed over time.   -Provided emotional support to the pt who is coping adequately.     Advance care planning  -see above for details  -Pt's friend Woodrow Le is his HC surrogate [phone]  -Filled out POLST form today, I provided original and a copy to the pt, copy placed in the chart, copy sent to registration to  be scanned into [employer].     Palliative Performance Scale 60%     Discussed today's visit with  Dr. Hazel and Diya VAIL     Palliative Care Follow Up: Palliative care team will follow peripherally for now. Feel free to contact our team with any questions or concerns.    Thank you for allowing Palliative Care services to  participate in the care of Cristhian Lopez.    A total of 35 minutes were spent on this follow-up, which included all of the following: chart review, direct face to face contact, history taking, physical examination, counseling and coordinating care, ACP and documentation.     Yareli Hooker, [id number]  2/27/2024  11:11AM  Palliative Care Services

## 2024-02-27 NOTE — PLAN OF CARE
Patient is alert and oriented x4 and on RA. Electrolyte replacement per protocol. Original IV access lost, consulted Vascular Access Team. Vascular access team placed another IV line, pt pulled line out. Due to loss of IV Dobutamine stopped, cardiology notified. ED nurse placed another IV line, Dobutamine running. Frequent rounding done throughout the shift. Safety precautions in place.    Problem: Patient Centered Care  Goal: Patient preferences are identified and integrated in the patient's plan of care  Description: Interventions:  - What would you like us to know as we care for you? From home alone  - Provide timely, complete, and accurate information to patient/family  - Incorporate patient and family knowledge, values, beliefs, and cultural backgrounds into the planning and delivery of care  - Encourage patient/family to participate in care and decision-making at the level they choose  - Honor patient and family perspectives and choices  Outcome: Progressing     Problem: CARDIOVASCULAR - ADULT  Goal: Maintains optimal cardiac output and hemodynamic stability  Description: INTERVENTIONS:  - Monitor vital signs, rhythm, and trends  - Monitor for bleeding, hypotension and signs of decreased cardiac output  - Evaluate effectiveness of vasoactive medications to optimize hemodynamic stability  - Monitor arterial and/or venous puncture sites for bleeding and/or hematoma  - Assess quality of pulses, skin color and temperature  - Assess for signs of decreased coronary artery perfusion - ex. Angina  - Evaluate fluid balance, assess for edema, trend weights  Outcome: Progressing  Goal: Absence of cardiac arrhythmias or at baseline  Description: INTERVENTIONS:  - Continuous cardiac monitoring, monitor vital signs, obtain 12 lead EKG if indicated  - Evaluate effectiveness of antiarrhythmic and heart rate control medications as ordered  - Initiate emergency measures for life threatening arrhythmias  - Monitor electrolytes  and administer replacement therapy as ordered  Outcome: Progressing     Problem: RESPIRATORY - ADULT  Goal: Achieves optimal ventilation and oxygenation  Description: INTERVENTIONS:  - Assess for changes in respiratory status  - Assess for changes in mentation and behavior  - Position to facilitate oxygenation and minimize respiratory effort  - Oxygen supplementation based on oxygen saturation or ABGs  - Provide Smoking Cessation handout, if applicable  - Encourage broncho-pulmonary hygiene including cough, deep breathe, Incentive Spirometry  - Assess the need for suctioning and perform as needed  - Assess and instruct to report SOB or any respiratory difficulty  - Respiratory Therapy support as indicated  - Manage/alleviate anxiety  - Monitor for signs/symptoms of CO2 retention  Outcome: Progressing     Problem: PAIN - ADULT  Goal: Verbalizes/displays adequate comfort level or patient's stated pain goal  Description: INTERVENTIONS:  - Encourage pt to monitor pain and request assistance  - Assess pain using appropriate pain scale  - Administer analgesics based on type and severity of pain and evaluate response  - Implement non-pharmacological measures as appropriate and evaluate response  - Consider cultural and social influences on pain and pain management  - Manage/alleviate anxiety  - Utilize distraction and/or relaxation techniques  - Monitor for opioid side effects  - Notify MD/LIP if interventions unsuccessful or patient reports new pain  - Anticipate increased pain with activity and pre-medicate as appropriate  Outcome: Progressing     Problem: SAFETY ADULT - FALL  Goal: Free from fall injury  Description: INTERVENTIONS:  - Assess pt frequently for physical needs  - Identify cognitive and physical deficits and behaviors that affect risk of falls.  - [place] fall precautions as indicated by assessment.  - Educate pt/family on patient safety including physical limitations  - Instruct pt to call for assistance  with activity based on assessment  - Modify environment to reduce risk of injury  - Provide assistive devices as appropriate  - Consider OT/PT consult to assist with strengthening/mobility  - Encourage toileting schedule  Outcome: Progressing     Problem: DISCHARGE PLANNING  Goal: Discharge to home or other facility with appropriate resources  Description: INTERVENTIONS:  - Identify barriers to discharge w/pt and caregiver  - Include patient/family/discharge partner in discharge planning  - Arrange for needed discharge resources and transportation as appropriate  - Identify discharge learning needs (meds, wound care, etc)  - Arrange for interpreters to assist at discharge as needed  - Consider post-discharge preferences of patient/family/discharge partner  - Complete POLST form as appropriate  - Assess patient's ability to be responsible for managing their own health  - Refer to Case Management Department for coordinating discharge planning if the patient needs post-hospital services based on physician/LIP order or complex needs related to functional status, cognitive ability or social support system  Outcome: Progressing     Problem: Patient/Family Goals  Goal: Patient/Family Long Term Goal  Description: Patient's Long Term Goal: Get stronger    Interventions:  - See additional Care Plan goals for specific interventions  Outcome: Progressing  Goal: Patient/Family Short Term Goal  Description: Patient's Short Term Goal: Go home     Interventions:   - See additional Care Plan goals for specific interventions  Outcome: Progressing

## 2024-02-27 NOTE — PROGRESS NOTES
[facility]     Hospitalist Progress Note     Cristhian Lopez Patient Status:  Inpatient    9/3/1955 MRN [id number]   [facility] [facility] 3W/SW Attending Aretha Hazel MD   Hosp Day # 4 PCP KARI BARCLAY     Subjective:     Resting comfortably and in NAD. Denied any active complaints at the time of interview.   All questions and concerns addressed.  No acute overnight events reported by the nursing staff.    Objective:    Review of Systems:   ROS completed; pertinent positive and negatives stated in subjective.      Vital signs:  Temp:  [97.5 °F (36.4 °C)-98.2 °F (36.8 °C)] 98.2 °F (36.8 °C)  Pulse:  [85-93] 86  Resp:  [17-18] 18  BP: ()/(61-77) 106/66  SpO2:  [93 %-97 %] 95 %      Physical Exam:    Gen: NAD AO x3  Chest: good air entry CTABL  CVS: normal s1 and s2 RR  Abd: NABS soft NT ND  Neuro: CN 2-12 grossly intact  Ext: 2+ edema in bilateral LE - improving      Diagnostic Data:    Labs:  Recent Labs   Lab 24  1032 24  0715 24  0611 24  0609 24  0706   WBC 7.4 6.3 5.7 5.7 5.5   HGB 9.5* 9.2* 8.8* 8.9* 8.8*   MCV 78.6* 77.7* 77.5* 76.3* 75.9*   .0 148.0* 139.0* 117.0* 123.0*       Recent Labs   Lab 24  0611 24  0609 24  0653   * 135*  135* 148*  148*   BUN 34* 34*  34* 29*  29*   CREATSERUM 1.22 1.21  1.21 1.20  1.20   CA 7.8* 8.1*  8.1* 8.4*  8.4*   ALB 3.1* 3.1* 3.2    138  138 136  136   K 3.8 3.9  3.9 3.7  3.7    105  105 103  103   CO2 29.0 28.0  28.0 28.0  28.0   ALKPHO 174* 174* 168*   AST 24 29 23   ALT 9* <7* <7*   BILT 0.9 0.8 0.9   TP 5.3* 5.3* 5.4*       Estimated Creatinine Clearance: 61.7 mL/min (based on SCr of 1.2 mg/dL).    No results for input(s): \"PTP\", \"INR\" in the last 168 hours.           Imaging: Imaging data reviewed in Epic.    Medications:    gabapentin  100 mg Oral TID    PARoxetine HCl  40 mg Oral QAM    QUEtiapine  50 mg Oral Nightly    rOPINIRole  1 mg Oral  Nightly    ceFAZolin  2 g Intravenous Q8H    bumetanide  2 mg Intravenous BID (Diuretic)    insulin aspart  1-7 Units Subcutaneous TID CC    clopidogrel  75 mg Oral Daily    metoprolol succinate ER  25 mg Oral Daily Beta Blocker    apixaban  5 mg Oral BID    atorvastatin  80 mg Oral Daily    sacubitril-valsartan  1 tablet Oral BID       Assessment & Plan:     Acute CHF exacerbation  CAD s/p PCI and CABG  Hx of NSVT/declining ICD  Moderate to severe MR, moderate TR  JU on CKD - resolved  CXR reviewed  Saturating well on room air  Recent EF 25%  Cardiology on consult  Continue Bumex 2 mg IV BID  Continue Eliquis 5mg BID, Plavix 75 mg daily  Continue Metoprolol 25 mg daily, Entresto BID  Strict Is and Os, daily weights  Net IO Since Admission: -3,119.19 mL [02/27/24 1135]  Echo reviewed - EF 25%  Renal function at baseline - continue to monitor  Continue present management per cardiology  Telemetry monitoring  Bilateral LE cellulitis  Started on IV Cefazolin  Deescalate as indicated  Discharge on PO abx  Hypokalemia  Replaced  Continue to monitor  Telemetry monitoring  Hx of PE  Continue home meds  Saturating well on room air  HTN  BP well controlled  Continue home meds  Monitor vitals  T2DM  SSI and frequent accuchecks  Iron deficiency anemia  Hx of colon cancer s/p sigmoidectomy in 2023  Monitor H&H, transfuse as indicated  PPI  Outpatient follow up  Advanced care planning  DNAR  ~31 minutes spent      Plan of care discussed with patient or family at bedside.      Supplementary Documentation:     Quality:  DVT Prophylaxis: Eliquis  CODE status: DNAR      Estimated date of discharge: TBD  Discharge is dependent on: clinical stability  At this point Mr. Lopez is expected to be discharge to: home      **Certification      PHYSICIAN Certification of Need for Inpatient Hospitalization - Initial Certification      Patient will require inpatient services that will reasonably be expected to span two midnight's based on  the clinical documentation in H+P.     Based on patients current state of illness, I anticipate that, after discharge, patient will require TBD.      Arehta Hazel MD  Hospitalist    MDM: High, I personally reviewed the available laboratories, imaging including XR. I discussed the case with RN. I ordered laboratories, studies including AM labs. I adjusted medications including home meds.   Medical decision making high, risk is high.       The 21st Century Cures Act makes medical notes like these available to patients in the interest of transparency. Please be advised this is a medical document. Medical documents are intended to carry relevant information, facts as evident, and the clinical opinion of the practitioner. The medical note is intended as peer to peer communication and may appear blunt or direct. It is written in medical language and may contain abbreviations or verbiage that are unfamiliar.

## 2024-02-27 NOTE — CM/SW NOTE
12:20p-Writer attempted to meet with patient bedside but appeared patient was in restroom or out of room at time. [facility]idential [facility] literature left in patient chart for review. Thank you for the privilege.  Christie Rea  [facility] Liaison  [phone]

## 2024-02-27 NOTE — PROGRESS NOTES
Progress Note  Cristhian TSANG Jessica Patient Status:  Inpatient    9/3/1955 MRN [id number]   [facility] [facility] 3W/SW Attending Aretha Hazel MD   Hosp Day # 4 PCP KARI BARCLAY     SUBJECTIVE:    Denies chest pain, palpitations, or dyspnea. No dizziness. LE swelling improved but scrotal edema unchanged, no pain.     VITALS:  /66 (BP [facility]: Right arm)   Pulse 86   Temp 98.2 °F (36.8 °C) (Axillary)   Resp 18   Ht 5' 10\" (1.778 m)   Wt 232 lb (105.2 kg)   SpO2 95%   BMI 33.29 kg/m²     INTAKE/OUTPUT:    Intake/Output Summary (Last 24 hours) at 2024 1336  Last data filed at 2024 1305  Gross per 24 hour   Intake 1356.77 ml   Output 5150 ml   Net -3793.23 ml     Last 3 Weights   24 0500 232 lb (105.2 kg)   24 0505 237 lb (107.5 kg)   24 0522 233 lb 9.6 oz (106 kg)   24 1154 236 lb 12.8 oz (107.4 kg)   24 0314 229 lb 8 oz (104.1 kg)   24 1639 233 lb 9.6 oz (106 kg)   24 0910 192 lb (87.1 kg)   24 0848 230 lb (104.3 kg)   02/10/24 0622 233 lb 9.6 oz (106 kg)   24 0513 234 lb (106.1 kg)   24 0408 232 lb 11.2 oz (105.6 kg)   24 0430 233 lb 6.4 oz (105.9 kg)   24 0405 232 lb (105.2 kg)   24 0400 229 lb (103.9 kg)   24 1753 232 lb 4.8 oz (105.4 kg)   24 1404 195 lb (88.5 kg)     LABS:  Recent Labs   Lab 24  0611 24  0609 24  0653   * 135*  135* 148*  148*   BUN 34* 34*  34* 29*  29*   CREATSERUM 1.22 1.21  1.21 1.20  1.20   EGFRCR 65 66  66 66  66   CA 7.8* 8.1*  8.1* 8.4*  8.4*    138  138 136  136   K 3.8 3.9  3.9 3.7  3.7    105  105 103  103   CO2 29.0 28.0  28.0 28.0  28.0     Recent Labs   Lab 24  0611 24  0609 24  0706   RBC 3.78* 3.63* 3.73*   HGB 8.8* 8.9* 8.8*   HCT 29.3* 27.7* 28.3*   MCV 77.5* 76.3* 75.9*   MCH 23.3* 24.5* 23.6*   MCHC 30.0* 32.1 31.1   RDW 28.9* 28.6* 28.6*   NEPRELIM 3.92 3.93 3.89   WBC 5.7 5.7 5.5    .0* 117.0* 123.0*     No results for input(s): \"TROP\", \"CK\" in the last 168 hours.    DIAGNOSTICS:    TELEMETRY: SR/ST    ECHO 2/26/2024:  Conclusions:   1. Left ventricle: The cavity size was increased. Wall thickness was normal.      The estimated ejection fraction was 25%, by visual assessment.   2. Right ventricle: The cavity size was increased. Systolic function was      reduced.   3. Left atrium: The atrium was increased in size.   4. Right atrium: The atrium was markedly dilated.   Impressions:  This study is compared with previous dated 02/26/2024: The   current study is a limited echocardiogram. For comparable findings, there is   no significant change from the prior echocardiogram.     ROS: Negative unless noted above     PHYSICAL EXAM:  General: Alert and oriented x 3. No apparent distress.  HEENT: Normocephalic, sclera are nonicteric. Hearing appropriate bilaterally.  Neck: + JVD. No Carotid bruits. Trachea midline.   Cardiac: Regular rate and rhythm. S1, S2 auscultated. No murmurs, rubs, or gallops appreciated.   Lungs: A/p dullness. Chest expansion symmetrical. Regular effort.  Abdomen: Soft, rounded non-tender, +BS. No hepatosplenomegaly or appreciable masses.   Extremities: Without clubbing, cyanosis. +3 edema that extends from BLE feet to inferior patella, +1 that extends from superior patella to sacrum. +3 scrotal edema   Neurologic: Motor and sensory nerves grossly intact.   Psych: Appropriate affect   Skin: Warm and dry. Right thumb cut about 2 mm deep, bleeding      MEDICATIONS:   gabapentin  100 mg Oral TID    PARoxetine HCl  40 mg Oral QAM    QUEtiapine  50 mg Oral Nightly    rOPINIRole  1 mg Oral Nightly    ceFAZolin  2 g Intravenous Q8H    bumetanide  2 mg Intravenous BID (Diuretic)    insulin aspart  1-7 Units Subcutaneous TID CC    clopidogrel  75 mg Oral Daily    metoprolol succinate ER  25 mg Oral Daily Beta Blocker    apixaban  5 mg Oral BID    atorvastatin  80 mg Oral Daily     sacubitril-valsartan  1 tablet Oral BID      DOBUTamine 5 mcg/kg/min (02/27/24 0940)     ASSESSMENT:    Acute on Chronic  HFrEF/ Right Sided  - BNP 1707, CXR w/o pulmonary edema   - Limited ECHO 2/26/24 with LVEF 25%, of note patient was on Dobutamine during ECHO   - Start on Dobutamine 2/25, Has been on Bumex 2 mg BID since 2/23. Net neg 4L, Wt trends do not appear accurate  - Was given a dose of Zaroxolyn yesterday with excellent response but diuresis has slowed today   - Appears grossly overloaded   - Toprol XL, Low Dose Entresto  - Has declined ICD in the past     CAD, Hx CABG (LIMA-LAD, SVG-Diag, SVG-OM) & STEMI with emergency PCI LAD 2019  Hx PCI LAD 2003 and RCA 2003  - Stable, no ischemic symptoms   - Toprol XL, Plavix, High dose statin therapy, ARNI  - Not on ASA with concurrent use of Eliquis for history of PE     Bilateral LE Cellulitis- IV ABX per primary      HLD- LDL 47, Lipitor 80 mg, Zetia o/p     HTN- Controlled     Electrolyte Disturbance- On Non-Cardiac replacement protocol     Uncontrolled Type II DM-A1c 10.4%    JU on CKD III- Renal fx improved with IV diuresis     Heme positive stool on recent admission/ Iron Deficiency Anemia  - Hgb at baseline   - On recent admission he was treated with IV iron     Hx Colon Ca s/p Sigmoidectomy in 2023    Hx Suspected Layered Thrombus on ECHO 2019- Not noted on inpatient ECHO     Hx Remote PE- Eliquis     PLAN:  - May consider another dose of Zaroxlyn tomorrow   - Will start Aldactone once off IV Bumex and Dobutamine.  - Would also benefit from SGLT-2, discussed with hospitalist, with long standing uncontrolled Type II DM will have endocrinology see patient   - Change electrolyte replacement   - Long discussion with patient regarding outpatient. He is now open to trying the heart failure clinic (referral placed today) he is also willing to discuss ICD placement again     Plan of care discussed with patient and RN.     Sia Scherer,  APRN  2/27/2024  1:36 PM  [phone] ([place])  [phone] ([place])      Addendum:    Patient seen and examined independently  SoB better with decreasing leg edema  Bun/ Creat Stable  Agree with the assessment and the above plan.    Dominguez Lu MD  L3

## 2024-02-27 NOTE — PAYOR COMM NOTE
--------------  CONTINUED STAY REVIEW  2/27  Payor: [employer] MEDICARE ADV PPO  Subscriber #:  [id number]  Authorization Number: [id number]    Admit date: 2/23/24  Admit time:  4:38 PM    Admitting Physician: Luke Rockwell MD  Attending Physician:  Aretha Hazel MD  Primary Care Physician: Neida Salazar    REVIEW DOCUMENTATION:  2/27    Ext: 2+ edema in bilateral LE - improving        Diagnostic Data:    Labs:          Recent Labs   Lab 02/23/24  1032 02/24/24  0715 02/25/24  0611 02/26/24  0609 02/27/24  0706   WBC 7.4 6.3 5.7 5.7 5.5   HGB 9.5* 9.2* 8.8* 8.9* 8.8*   MCV 78.6* 77.7* 77.5* 76.3* 75.9*   .0 148.0* 139.0* 117.0* 123.0*               Recent Labs   Lab 02/25/24  0611 02/26/24  0609 02/27/24  0653   * 135*  135* 148*  148*   BUN 34* 34*  34* 29*  29*   CREATSERUM 1.22 1.21  1.21 1.20  1.20   CA 7.8* 8.1*  8.1* 8.4*  8.4*   ALB 3.1* 3.1* 3.2    138  138 136  136   K 3.8 3.9  3.9 3.7  3.7    105  105 103  103   CO2 29.0 28.0  28.0 28.0  28.0   ALKPHO 174* 174* 168*   AST 24 29 23   ALT 9* <7* <7*   BILT 0.9 0.8 0.9   TP 5.3* 5.3* 5.4*      ceFAZolin  2 g Intravenous Q8H    bumetanide  2 mg Intravenous BID (Diuretic)     Assessment & Plan:   Acute CHF exacerbation  CAD s/p PCI and CABG  Hx of NSVT/declining ICD  Moderate to severe MR, moderate TR  JU on CKD - resolved  CXR reviewed  Saturating well on room air  Recent EF 25%  Cardiology on consult  Continue Bumex 2 mg IV BID  Continue Eliquis 5mg BID, Plavix 75 mg daily  Continue Metoprolol 25 mg daily, Entresto BID  Strict Is and Os, daily weights  Net IO Since Admission: -3,119.19 mL [02/27/24 1135]  Echo reviewed - EF 25%  Renal function at baseline - continue to monitor  Continue present management per cardiology  Telemetry monitoring  Bilateral LE cellulitis  Started on IV Cefazolin  Deescalate as indicated  Discharge on PO abx  Hypokalemia  Replaced  Continue to monitor  Telemetry monitoring  Hx of  PE  Continue home meds  Saturating well on room air  HTN  BP well controlled  Continue home meds  Monitor vitals  T2DM  SSI and frequent accuchecks  Iron deficiency anemia  Hx of colon cancer s/p sigmoidectomy in 2023  Monitor H&H, transfuse as indicated  PPI  Outpatient follow up  Advanced care planning  DNAR  ~31 minutes spent          MEDICATIONS ADMINISTERED IN LAST 1 DAY:  apixaban (Eliquis) tab 5 mg       Date Action Dose Route User    2/27/2024 0858 Given 5 mg Oral Diya Gaytan RN    2/26/2024 2029 Given 5 mg Oral Anna Julien RN          atorvastatin (Lipitor) tab 80 mg       Date Action Dose Route User    2/27/2024 0858 Given 80 mg Oral Diya Gaytan RN          bumetanide (Bumex) 0.25 MG/ML injection 2 mg       Date Action Dose Route User    2/27/2024 0858 Given 2 mg Intravenous Diya Gaytan RN    2/26/2024 1616 Given by Other 2 mg Intravenous Radha Werner          ceFAZolin (Ancef) 2 g in 20mL IV syringe premix       Date Action Dose Route User    2/27/2024 0449 Given by Other 2 g Intravenous Anna Julien RN    2/26/2024 2029 Given 2 g Intravenous Anna Julien RN          clopidogrel (Plavix) tab 75 mg       Date Action Dose Route User    2/27/2024 0858 Given 75 mg Oral Diya Gaytan RN          DOBUTamine in dextrose 5% (Dobutrex) 500 mg/250mL infusion premix       Date Action Dose Route User    2/27/2024 0940 New Bag 5 mcg/kg/min × 106 kg (Dosing Weight) Intravenous Diya Gaytan RN    2/26/2024 1819 Restarted 5 mcg/kg/min × 106 kg (Dosing Weight) Intravenous Radha Werner          gabapentin (Neurontin) cap 100 mg       Date Action Dose Route User    2/27/2024 0858 Given 100 mg Oral Diya Gaytan RN    2/26/2024 2029 Given 100 mg Oral Anna Julien RN    2/26/2024 1616 Given by Other 100 mg Oral Radha Werner          insulin aspart (NovoLOG) 100 Units/mL FlexPen 1-7 Units       Date Action Dose Route User    2/27/2024 0904 Given 1 Units Subcutaneous (Left  Upper Arm) Diya Gaytan RN          magnesium oxide (Mag-Ox) tab 400 mg       Date Action Dose Route User    2/27/2024 0858 Given 400 mg Oral Diya Gaytan RN          metoprolol succinate ER (Toprol XL) 24 hr tab 25 mg       Date Action Dose Route User    2/27/2024 0450 Given by Other 25 mg Oral Anna Julien RN          PARoxetine (Paxil) tab 40 mg       Date Action Dose Route User    2/27/2024 0859 Given 40 mg Oral Diya Gaytan RN          QUEtiapine (SEROquel) tab 50 mg       Date Action Dose Route User    2/26/2024 2029 Given 50 mg Oral Anna Julien RN          rOPINIRole (Requip) tab 1 mg       Date Action Dose Route User    2/26/2024 2029 Given 1 mg Oral Anna Julien RN          sacubitril-valsartan (Entresto) 24-26 MG per tab 1 tablet       Date Action Dose Route User    2/27/2024 0858 Given 1 tablet Oral Diya Gaytan RN    2/26/2024 2029 Given 1 tablet Oral Anna Julien RN            Vitals (last day)       Date/Time Temp Pulse Resp BP SpO2 Weight O2 Device O2 Flow Rate (L/min) [facility]    02/27/24 0855 98.2 °F (36.8 °C) 86 18 106/66 95 % -- None (Room air) --     02/27/24 0500 -- -- -- -- -- 232 lb -- -- TP    02/27/24 0448 98 °F (36.7 °C) 90 17 105/61 93 % -- None (Room air) --     02/26/24 2028 97.8 °F (36.6 °C) 93 17 100/65 96 % -- None (Room air) --     02/26/24 1900 -- 90 -- -- -- -- -- -- GT    02/26/24 1607 -- 85 18 101/77 93 % -- None (Room air) --     02/26/24 1521 97.5 °F (36.4 °C) 87 18 92/66 97 % -- None (Room air) --     02/26/24 0950 -- 88 18 114/63 98 % -- None (Room air) --     02/26/24 0900 97.8 °F (36.6 °C) 84 18 105/62 95 % -- None (Room air) --     02/26/24 0505 98.2 °F (36.8 °C) 94 17 105/71 95 % -- None (Room air) --     02/26/24 0505 -- -- -- -- -- 237 lb -- -- TP

## 2024-02-27 NOTE — PLAN OF CARE
Problem: Patient Centered Care  Goal: Patient preferences are identified and integrated in the patient's plan of care  Description: Interventions:  - What would you like us to know as we care for you? From home alone  - Provide timely, complete, and accurate information to patient/family  - Incorporate patient and family knowledge, values, beliefs, and cultural backgrounds into the planning and delivery of care  - Encourage patient/family to participate in care and decision-making at the level they choose  - Honor patient and family perspectives and choices  Outcome: Progressing     Problem: CARDIOVASCULAR - ADULT  Goal: Maintains optimal cardiac output and hemodynamic stability  Description: INTERVENTIONS:  - Monitor vital signs, rhythm, and trends  - Monitor for bleeding, hypotension and signs of decreased cardiac output  - Evaluate effectiveness of vasoactive medications to optimize hemodynamic stability  - Monitor arterial and/or venous puncture sites for bleeding and/or hematoma  - Assess quality of pulses, skin color and temperature  - Assess for signs of decreased coronary artery perfusion - ex. Angina  - Evaluate fluid balance, assess for edema, trend weights  Outcome: Progressing  Goal: Absence of cardiac arrhythmias or at baseline  Description: INTERVENTIONS:  - Continuous cardiac monitoring, monitor vital signs, obtain 12 lead EKG if indicated  - Evaluate effectiveness of antiarrhythmic and heart rate control medications as ordered  - Initiate emergency measures for life threatening arrhythmias  - Monitor electrolytes and administer replacement therapy as ordered  Outcome: Progressing     Problem: RESPIRATORY - ADULT  Goal: Achieves optimal ventilation and oxygenation  Description: INTERVENTIONS:  - Assess for changes in respiratory status  - Assess for changes in mentation and behavior  - Position to facilitate oxygenation and minimize respiratory effort  - Oxygen supplementation based on oxygen  saturation or ABGs  - Provide Smoking Cessation handout, if applicable  - Encourage broncho-pulmonary hygiene including cough, deep breathe, Incentive Spirometry  - Assess the need for suctioning and perform as needed  - Assess and instruct to report SOB or any respiratory difficulty  - Respiratory Therapy support as indicated  - Manage/alleviate anxiety  - Monitor for signs/symptoms of CO2 retention  Outcome: Progressing     Problem: PAIN - ADULT  Goal: Verbalizes/displays adequate comfort level or patient's stated pain goal  Description: INTERVENTIONS:  - Encourage pt to monitor pain and request assistance  - Assess pain using appropriate pain scale  - Administer analgesics based on type and severity of pain and evaluate response  - Implement non-pharmacological measures as appropriate and evaluate response  - Consider cultural and social influences on pain and pain management  - Manage/alleviate anxiety  - Utilize distraction and/or relaxation techniques  - Monitor for opioid side effects  - Notify MD/LIP if interventions unsuccessful or patient reports new pain  - Anticipate increased pain with activity and pre-medicate as appropriate  Outcome: Progressing     Problem: SAFETY ADULT - FALL  Goal: Free from fall injury  Description: INTERVENTIONS:  - Assess pt frequently for physical needs  - Identify cognitive and physical deficits and behaviors that affect risk of falls.  - [place] fall precautions as indicated by assessment.  - Educate pt/family on patient safety including physical limitations  - Instruct pt to call for assistance with activity based on assessment  - Modify environment to reduce risk of injury  - Provide assistive devices as appropriate  - Consider OT/PT consult to assist with strengthening/mobility  - Encourage toileting schedule  Outcome: Progressing     Problem: DISCHARGE PLANNING  Goal: Discharge to home or other facility with appropriate resources  Description: INTERVENTIONS:  - Identify  barriers to discharge w/pt and caregiver  - Include patient/family/discharge partner in discharge planning  - Arrange for needed discharge resources and transportation as appropriate  - Identify discharge learning needs (meds, wound care, etc)  - Arrange for interpreters to assist at discharge as needed  - Consider post-discharge preferences of patient/family/discharge partner  - Complete POLST form as appropriate  - Assess patient's ability to be responsible for managing their own health  - Refer to Case Management Department for coordinating discharge planning if the patient needs post-hospital services based on physician/LIP order or complex needs related to functional status, cognitive ability or social support system  Outcome: Progressing     Problem: Patient/Family Goals  Goal: Patient/Family Long Term Goal  Description: Patient's Long Term Goal: Get stronger    Interventions:  - See additional Care Plan goals for specific interventions  Outcome: Progressing  Goal: Patient/Family Short Term Goal  Description: Patient's Short Term Goal: Go home     Interventions:   - See additional Care Plan goals for specific interventions  Outcome: Progressing

## 2024-02-28 LAB
ALBUMIN SERPL-MCNC: 3.4 G/DL (ref 3.2–4.8)
ALBUMIN/GLOB SERPL: 1.5 {RATIO} (ref 1–2)
ALP LIVER SERPL-CCNC: 174 U/L
ALT SERPL-CCNC: <7 U/L
ANION GAP SERPL CALC-SCNC: 6 MMOL/L (ref 0–18)
ANION GAP SERPL CALC-SCNC: 6 MMOL/L (ref 0–18)
AST SERPL-CCNC: 21 U/L (ref ?–34)
BASOPHILS # BLD AUTO: 0.03 X10(3) UL (ref 0–0.2)
BASOPHILS NFR BLD AUTO: 0.7 %
BILIRUB SERPL-MCNC: 0.8 MG/DL (ref 0.2–1.1)
BUN BLD-MCNC: 28 MG/DL (ref 9–23)
BUN BLD-MCNC: 28 MG/DL (ref 9–23)
BUN/CREAT SERPL: 25 (ref 10–20)
BUN/CREAT SERPL: 25 (ref 10–20)
CALCIUM BLD-MCNC: 8.5 MG/DL (ref 8.7–10.4)
CALCIUM BLD-MCNC: 8.5 MG/DL (ref 8.7–10.4)
CHLORIDE SERPL-SCNC: 104 MMOL/L (ref 98–112)
CHLORIDE SERPL-SCNC: 104 MMOL/L (ref 98–112)
CO2 SERPL-SCNC: 28 MMOL/L (ref 21–32)
CO2 SERPL-SCNC: 28 MMOL/L (ref 21–32)
CREAT BLD-MCNC: 1.12 MG/DL
CREAT BLD-MCNC: 1.12 MG/DL
DEPRECATED RDW RBC AUTO: 72.3 FL (ref 35.1–46.3)
EGFRCR SERPLBLD CKD-EPI 2021: 72 ML/MIN/1.73M2 (ref 60–?)
EGFRCR SERPLBLD CKD-EPI 2021: 72 ML/MIN/1.73M2 (ref 60–?)
EOSINOPHIL # BLD AUTO: 0.13 X10(3) UL (ref 0–0.7)
EOSINOPHIL NFR BLD AUTO: 2.8 %
ERYTHROCYTE [DISTWIDTH] IN BLOOD BY AUTOMATED COUNT: 28.6 % (ref 11–15)
GLOBULIN PLAS-MCNC: 2.3 G/DL (ref 2.8–4.4)
GLUCOSE BLD-MCNC: 145 MG/DL (ref 70–99)
GLUCOSE BLD-MCNC: 145 MG/DL (ref 70–99)
GLUCOSE BLDC GLUCOMTR-MCNC: 149 MG/DL (ref 70–99)
GLUCOSE BLDC GLUCOMTR-MCNC: 164 MG/DL (ref 70–99)
GLUCOSE BLDC GLUCOMTR-MCNC: 165 MG/DL (ref 70–99)
GLUCOSE BLDC GLUCOMTR-MCNC: 170 MG/DL (ref 70–99)
HCT VFR BLD AUTO: 28.7 %
HGB BLD-MCNC: 8.9 G/DL
IMM GRANULOCYTES # BLD AUTO: 0.01 X10(3) UL (ref 0–1)
IMM GRANULOCYTES NFR BLD: 0.2 %
LYMPHOCYTES # BLD AUTO: 0.81 X10(3) UL (ref 1–4)
LYMPHOCYTES NFR BLD AUTO: 17.6 %
MAGNESIUM SERPL-MCNC: 1.7 MG/DL (ref 1.6–2.6)
MCH RBC QN AUTO: 23.9 PG (ref 26–34)
MCHC RBC AUTO-ENTMCNC: 31 G/DL (ref 31–37)
MCV RBC AUTO: 77.2 FL
MONOCYTES # BLD AUTO: 0.59 X10(3) UL (ref 0.1–1)
MONOCYTES NFR BLD AUTO: 12.9 %
NEUTROPHILS # BLD AUTO: 3.02 X10 (3) UL (ref 1.5–7.7)
NEUTROPHILS # BLD AUTO: 3.02 X10(3) UL (ref 1.5–7.7)
NEUTROPHILS NFR BLD AUTO: 65.8 %
OSMOLALITY SERPL CALC.SUM OF ELEC: 294 MOSM/KG (ref 275–295)
OSMOLALITY SERPL CALC.SUM OF ELEC: 294 MOSM/KG (ref 275–295)
PLATELET # BLD AUTO: 126 10(3)UL (ref 150–450)
POTASSIUM SERPL-SCNC: 3.7 MMOL/L (ref 3.5–5.1)
PROT SERPL-MCNC: 5.7 G/DL (ref 5.7–8.2)
RBC # BLD AUTO: 3.72 X10(6)UL
SODIUM SERPL-SCNC: 138 MMOL/L (ref 136–145)
SODIUM SERPL-SCNC: 138 MMOL/L (ref 136–145)
WBC # BLD AUTO: 4.6 X10(3) UL (ref 4–11)

## 2024-02-28 PROCEDURE — 99233 SBSQ HOSP IP/OBS HIGH 50: CPT | Performed by: HOSPITALIST

## 2024-02-28 PROCEDURE — 99232 SBSQ HOSP IP/OBS MODERATE 35: CPT | Performed by: INTERNAL MEDICINE

## 2024-02-28 RX ORDER — POTASSIUM CHLORIDE 20 MEQ/1
40 TABLET, EXTENDED RELEASE ORAL ONCE
Status: COMPLETED | OUTPATIENT
Start: 2024-02-28 | End: 2024-02-28

## 2024-02-28 NOTE — CM/SW NOTE
Writer met briefly with patient re:CPC. Patient stated that he has [facility] brochure and does not want [facility] services at [place]. [facility] will remain available should patient have change of mind. Thank you for the consideration.  Christie Rea  [facility] Liaison  [phone]

## 2024-02-28 NOTE — PLAN OF CARE
Patient denies pain. Patient remains on dobutamine gtt. IV bumex continued. Plan is to continue to diuresis. Patient updated on plan of care.    Problem: Patient Centered Care  Goal: Patient preferences are identified and integrated in the patient's plan of care  Description: Interventions:  - What would you like us to know as we care for you? From home alone  - Provide timely, complete, and accurate information to patient/family  - Incorporate patient and family knowledge, values, beliefs, and cultural backgrounds into the planning and delivery of care  - Encourage patient/family to participate in care and decision-making at the level they choose  - Honor patient and family perspectives and choices  Outcome: Progressing     Problem: CARDIOVASCULAR - ADULT  Goal: Maintains optimal cardiac output and hemodynamic stability  Description: INTERVENTIONS:  - Monitor vital signs, rhythm, and trends  - Monitor for bleeding, hypotension and signs of decreased cardiac output  - Evaluate effectiveness of vasoactive medications to optimize hemodynamic stability  - Monitor arterial and/or venous puncture sites for bleeding and/or hematoma  - Assess quality of pulses, skin color and temperature  - Assess for signs of decreased coronary artery perfusion - ex. Angina  - Evaluate fluid balance, assess for edema, trend weights  Outcome: Progressing  Goal: Absence of cardiac arrhythmias or at baseline  Description: INTERVENTIONS:  - Continuous cardiac monitoring, monitor vital signs, obtain 12 lead EKG if indicated  - Evaluate effectiveness of antiarrhythmic and heart rate control medications as ordered  - Initiate emergency measures for life threatening arrhythmias  - Monitor electrolytes and administer replacement therapy as ordered  Outcome: Progressing     Problem: RESPIRATORY - ADULT  Goal: Achieves optimal ventilation and oxygenation  Description: INTERVENTIONS:  - Assess for changes in respiratory status  - Assess for changes  in mentation and behavior  - Position to facilitate oxygenation and minimize respiratory effort  - Oxygen supplementation based on oxygen saturation or ABGs  - Provide Smoking Cessation handout, if applicable  - Encourage broncho-pulmonary hygiene including cough, deep breathe, Incentive Spirometry  - Assess the need for suctioning and perform as needed  - Assess and instruct to report SOB or any respiratory difficulty  - Respiratory Therapy support as indicated  - Manage/alleviate anxiety  - Monitor for signs/symptoms of CO2 retention  Outcome: Progressing     Problem: PAIN - ADULT  Goal: Verbalizes/displays adequate comfort level or patient's stated pain goal  Description: INTERVENTIONS:  - Encourage pt to monitor pain and request assistance  - Assess pain using appropriate pain scale  - Administer analgesics based on type and severity of pain and evaluate response  - Implement non-pharmacological measures as appropriate and evaluate response  - Consider cultural and social influences on pain and pain management  - Manage/alleviate anxiety  - Utilize distraction and/or relaxation techniques  - Monitor for opioid side effects  - Notify MD/LIP if interventions unsuccessful or patient reports new pain  - Anticipate increased pain with activity and pre-medicate as appropriate  Outcome: Progressing     Problem: SAFETY ADULT - FALL  Goal: Free from fall injury  Description: INTERVENTIONS:  - Assess pt frequently for physical needs  - Identify cognitive and physical deficits and behaviors that affect risk of falls.  - [place] fall precautions as indicated by assessment.  - Educate pt/family on patient safety including physical limitations  - Instruct pt to call for assistance with activity based on assessment  - Modify environment to reduce risk of injury  - Provide assistive devices as appropriate  - Consider OT/PT consult to assist with strengthening/mobility  - Encourage toileting schedule  Outcome: Progressing      Problem: DISCHARGE PLANNING  Goal: Discharge to home or other facility with appropriate resources  Description: INTERVENTIONS:  - Identify barriers to discharge w/pt and caregiver  - Include patient/family/discharge partner in discharge planning  - Arrange for needed discharge resources and transportation as appropriate  - Identify discharge learning needs (meds, wound care, etc)  - Arrange for interpreters to assist at discharge as needed  - Consider post-discharge preferences of patient/family/discharge partner  - Complete POLST form as appropriate  - Assess patient's ability to be responsible for managing their own health  - Refer to Case Management Department for coordinating discharge planning if the patient needs post-hospital services based on physician/LIP order or complex needs related to functional status, cognitive ability or social support system  Outcome: Progressing     Problem: Patient/Family Goals  Goal: Patient/Family Long Term Goal  Description: Patient's Long Term Goal: Get stronger    Interventions:  - See additional Care Plan goals for specific interventions  Outcome: Progressing  Goal: Patient/Family Short Term Goal  Description: Patient's Short Term Goal: Go home     Interventions:   - See additional Care Plan goals for specific interventions  Outcome: Progressing

## 2024-02-28 NOTE — DISCHARGE INSTRUCTIONS
Diabetes:  Your A1C level is greater than 8%.  It is recommended that you attend an outpatient diabetes education program.  Please discuss with your Primary Care Provider at your next visit to obtain a referral.  If you wish to make an appointment at [facility], call [phone].

## 2024-02-28 NOTE — DIABETES ED
Attempted to see patient for diabetes education. Patient is sleeping will return prior to discharge.

## 2024-02-28 NOTE — DIABETES ED
[facility]    Diabetes Education  Note    Cristhian Lopez Patient Status:  Inpatient   9/3/1955 MRN [id number]  [facility] [facility] 3W/SW Attending Mandeep Garcia MD  Hosp Day # 5 PCP KARI BARCLAY      Labs:    HgbA1C   Date Value   2024      Comment:     Hemoglobin A1C to be confirmed at [facility]   2024 10.4 % (H)         Reason for Visit:Met with patient in room to discuss diabetes management at home. He indicates he takes Metformin medication however is unsure of purpose of medication. Instructed on actions of metformin. He reports he does not have a PCP. Discussed the importance of establishing care with PCP and Endocrinologist to assist with diabetes management. He verbalizes understanding.    Provided patient with diabetes education packet and encouraged him to review. Instructed on current A1c value and goal of value for persons with Diabetes. He declined instruction of use of glucose meter and states he will not monitor his blood sugar at home. Encouraged him to discuss with outpatient medical team.    He currently consumes 1 meal per day and consumes candy everyday.  Instructed on basic meal planning guidelines and Balanced Plate method of meal planning. Reviewed balanced meal planning handout with him. He states he does not consumes vegetables.  Discussed meals he prepares at home and encouraged him to decrease larger portions of pasta, tortilla or beans.     Instructed on the importance of exercise to assist with glucose control. He does not exercise regularly however he has a pet at home and is willing to walk with the pet. Encouraged him to walk the pet 1 or 2 times daily as able.     Discussed benefits of outpatient diabetes education and encouraged him to obtain order from outpatient team.        Education Provided:  Hypoglycemia symptoms/treatment/prevention  Basic Diet Guidelines  Importance of good BG control to prevent short and long term  complications  Importance of close follow up with PCP and medical team  Benefits of physical activity     Patient verbalized understanding and was receptive to information provided.      Recommendations:  Follow up with outpatient medical team as directed.  Attend outpatient diabetes education          Becky Miller RN  Diabetes Educator  2/28/2024  1:07 PM

## 2024-02-28 NOTE — PLAN OF CARE
Problem: Patient Centered Care  Goal: Patient preferences are identified and integrated in the patient's plan of care  Description: Interventions:  - What would you like us to know as we care for you? From home alone  - Provide timely, complete, and accurate information to patient/family  - Incorporate patient and family knowledge, values, beliefs, and cultural backgrounds into the planning and delivery of care  - Encourage patient/family to participate in care and decision-making at the level they choose  - Honor patient and family perspectives and choices  Outcome: Progressing     Problem: CARDIOVASCULAR - ADULT  Goal: Maintains optimal cardiac output and hemodynamic stability  Description: INTERVENTIONS:  - Monitor vital signs, rhythm, and trends  - Monitor for bleeding, hypotension and signs of decreased cardiac output  - Evaluate effectiveness of vasoactive medications to optimize hemodynamic stability  - Monitor arterial and/or venous puncture sites for bleeding and/or hematoma  - Assess quality of pulses, skin color and temperature  - Assess for signs of decreased coronary artery perfusion - ex. Angina  - Evaluate fluid balance, assess for edema, trend weights  Outcome: Progressing  Goal: Absence of cardiac arrhythmias or at baseline  Description: INTERVENTIONS:  - Continuous cardiac monitoring, monitor vital signs, obtain 12 lead EKG if indicated  - Evaluate effectiveness of antiarrhythmic and heart rate control medications as ordered  - Initiate emergency measures for life threatening arrhythmias  - Monitor electrolytes and administer replacement therapy as ordered  Outcome: Progressing     Problem: SAFETY ADULT - FALL  Goal: Free from fall injury  Description: INTERVENTIONS:  - Assess pt frequently for physical needs  - Identify cognitive and physical deficits and behaviors that affect risk of falls.  - [place] fall precautions as indicated by assessment.  - Educate pt/family on patient safety  including physical limitations  - Instruct pt to call for assistance with activity based on assessment  - Modify environment to reduce risk of injury  - Provide assistive devices as appropriate  - Consider OT/PT consult to assist with strengthening/mobility  - Encourage toileting schedule  Outcome: Progressing     Problem: DISCHARGE PLANNING  Goal: Discharge to home or other facility with appropriate resources  Description: INTERVENTIONS:  - Identify barriers to discharge w/pt and caregiver  - Include patient/family/discharge partner in discharge planning  - Arrange for needed discharge resources and transportation as appropriate  - Identify discharge learning needs (meds, wound care, etc)  - Arrange for interpreters to assist at discharge as needed  - Consider post-discharge preferences of patient/family/discharge partner  - Complete POLST form as appropriate  - Assess patient's ability to be responsible for managing their own health  - Refer to Case Management Department for coordinating discharge planning if the patient needs post-hospital services based on physician/LIP order or complex needs related to functional status, cognitive ability or social support system  Outcome: Progressing     Problem: Patient/Family Goals  Goal: Patient/Family Long Term Goal  Description: Patient's Long Term Goal: Get stronger    Interventions:  - See additional Care Plan goals for specific interventions  Outcome: Progressing  Goal: Patient/Family Short Term Goal  Description: Patient's Short Term Goal: Go home     Interventions:   - See additional Care Plan goals for specific interventions  Outcome: Progressing

## 2024-02-28 NOTE — PLAN OF CARE
Strict I&Os, hat in toilet. Pt met with palliative care and diabetes eduction today. Dobutamine gtt.     Problem: Patient Centered Care  Goal: Patient preferences are identified and integrated in the patient's plan of care  Description: Interventions:  - What would you like us to know as we care for you? From home alone  - Provide timely, complete, and accurate information to patient/family  - Incorporate patient and family knowledge, values, beliefs, and cultural backgrounds into the planning and delivery of care  - Encourage patient/family to participate in care and decision-making at the level they choose  - Honor patient and family perspectives and choices  Outcome: Progressing     Problem: CARDIOVASCULAR - ADULT  Goal: Maintains optimal cardiac output and hemodynamic stability  Description: INTERVENTIONS:  - Monitor vital signs, rhythm, and trends  - Monitor for bleeding, hypotension and signs of decreased cardiac output  - Evaluate effectiveness of vasoactive medications to optimize hemodynamic stability  - Monitor arterial and/or venous puncture sites for bleeding and/or hematoma  - Assess quality of pulses, skin color and temperature  - Assess for signs of decreased coronary artery perfusion - ex. Angina  - Evaluate fluid balance, assess for edema, trend weights  Outcome: Progressing  Goal: Absence of cardiac arrhythmias or at baseline  Description: INTERVENTIONS:  - Continuous cardiac monitoring, monitor vital signs, obtain 12 lead EKG if indicated  - Evaluate effectiveness of antiarrhythmic and heart rate control medications as ordered  - Initiate emergency measures for life threatening arrhythmias  - Monitor electrolytes and administer replacement therapy as ordered  Outcome: Progressing     Problem: RESPIRATORY - ADULT  Goal: Achieves optimal ventilation and oxygenation  Description: INTERVENTIONS:  - Assess for changes in respiratory status  - Assess for changes in mentation and behavior  - Position to  facilitate oxygenation and minimize respiratory effort  - Oxygen supplementation based on oxygen saturation or ABGs  - Provide Smoking Cessation handout, if applicable  - Encourage broncho-pulmonary hygiene including cough, deep breathe, Incentive Spirometry  - Assess the need for suctioning and perform as needed  - Assess and instruct to report SOB or any respiratory difficulty  - Respiratory Therapy support as indicated  - Manage/alleviate anxiety  - Monitor for signs/symptoms of CO2 retention  Outcome: Progressing     Problem: PAIN - ADULT  Goal: Verbalizes/displays adequate comfort level or patient's stated pain goal  Description: INTERVENTIONS:  - Encourage pt to monitor pain and request assistance  - Assess pain using appropriate pain scale  - Administer analgesics based on type and severity of pain and evaluate response  - Implement non-pharmacological measures as appropriate and evaluate response  - Consider cultural and social influences on pain and pain management  - Manage/alleviate anxiety  - Utilize distraction and/or relaxation techniques  - Monitor for opioid side effects  - Notify MD/LIP if interventions unsuccessful or patient reports new pain  - Anticipate increased pain with activity and pre-medicate as appropriate  Outcome: Progressing     Problem: SAFETY ADULT - FALL  Goal: Free from fall injury  Description: INTERVENTIONS:  - Assess pt frequently for physical needs  - Identify cognitive and physical deficits and behaviors that affect risk of falls.  - [place] fall precautions as indicated by assessment.  - Educate pt/family on patient safety including physical limitations  - Instruct pt to call for assistance with activity based on assessment  - Modify environment to reduce risk of injury  - Provide assistive devices as appropriate  - Consider OT/PT consult to assist with strengthening/mobility  - Encourage toileting schedule  Outcome: Progressing     Problem: DISCHARGE PLANNING  Goal:  Discharge to home or other facility with appropriate resources  Description: INTERVENTIONS:  - Identify barriers to discharge w/pt and caregiver  - Include patient/family/discharge partner in discharge planning  - Arrange for needed discharge resources and transportation as appropriate  - Identify discharge learning needs (meds, wound care, etc)  - Arrange for interpreters to assist at discharge as needed  - Consider post-discharge preferences of patient/family/discharge partner  - Complete POLST form as appropriate  - Assess patient's ability to be responsible for managing their own health  - Refer to Case Management Department for coordinating discharge planning if the patient needs post-hospital services based on physician/LIP order or complex needs related to functional status, cognitive ability or social support system  Outcome: Progressing     Problem: Patient/Family Goals  Goal: Patient/Family Long Term Goal  Description: Patient's Long Term Goal: Get stronger    Interventions:  - See additional Care Plan goals for specific interventions  Outcome: Progressing  Goal: Patient/Family Short Term Goal  Description: Patient's Short Term Goal: Go home     Interventions:   - See additional Care Plan goals for specific interventions  Outcome: Progressing     Problem: Patient Centered Care  Goal: Patient preferences are identified and integrated in the patient's plan of care  Description: Interventions:  - What would you like us to know as we care for you? From home alone  - Provide timely, complete, and accurate information to patient/family  - Incorporate patient and family knowledge, values, beliefs, and cultural backgrounds into the planning and delivery of care  - Encourage patient/family to participate in care and decision-making at the level they choose  - Honor patient and family perspectives and choices  Outcome: Progressing     Problem: CARDIOVASCULAR - ADULT  Goal: Maintains optimal cardiac output and  hemodynamic stability  Description: INTERVENTIONS:  - Monitor vital signs, rhythm, and trends  - Monitor for bleeding, hypotension and signs of decreased cardiac output  - Evaluate effectiveness of vasoactive medications to optimize hemodynamic stability  - Monitor arterial and/or venous puncture sites for bleeding and/or hematoma  - Assess quality of pulses, skin color and temperature  - Assess for signs of decreased coronary artery perfusion - ex. Angina  - Evaluate fluid balance, assess for edema, trend weights  Outcome: Progressing  Goal: Absence of cardiac arrhythmias or at baseline  Description: INTERVENTIONS:  - Continuous cardiac monitoring, monitor vital signs, obtain 12 lead EKG if indicated  - Evaluate effectiveness of antiarrhythmic and heart rate control medications as ordered  - Initiate emergency measures for life threatening arrhythmias  - Monitor electrolytes and administer replacement therapy as ordered  Outcome: Progressing     Problem: RESPIRATORY - ADULT  Goal: Achieves optimal ventilation and oxygenation  Description: INTERVENTIONS:  - Assess for changes in respiratory status  - Assess for changes in mentation and behavior  - Position to facilitate oxygenation and minimize respiratory effort  - Oxygen supplementation based on oxygen saturation or ABGs  - Provide Smoking Cessation handout, if applicable  - Encourage broncho-pulmonary hygiene including cough, deep breathe, Incentive Spirometry  - Assess the need for suctioning and perform as needed  - Assess and instruct to report SOB or any respiratory difficulty  - Respiratory Therapy support as indicated  - Manage/alleviate anxiety  - Monitor for signs/symptoms of CO2 retention  Outcome: Progressing     Problem: PAIN - ADULT  Goal: Verbalizes/displays adequate comfort level or patient's stated pain goal  Description: INTERVENTIONS:  - Encourage pt to monitor pain and request assistance  - Assess pain using appropriate pain scale  - Administer  analgesics based on type and severity of pain and evaluate response  - Implement non-pharmacological measures as appropriate and evaluate response  - Consider cultural and social influences on pain and pain management  - Manage/alleviate anxiety  - Utilize distraction and/or relaxation techniques  - Monitor for opioid side effects  - Notify MD/LIP if interventions unsuccessful or patient reports new pain  - Anticipate increased pain with activity and pre-medicate as appropriate  Outcome: Progressing     Problem: SAFETY ADULT - FALL  Goal: Free from fall injury  Description: INTERVENTIONS:  - Assess pt frequently for physical needs  - Identify cognitive and physical deficits and behaviors that affect risk of falls.  - [place] fall precautions as indicated by assessment.  - Educate pt/family on patient safety including physical limitations  - Instruct pt to call for assistance with activity based on assessment  - Modify environment to reduce risk of injury  - Provide assistive devices as appropriate  - Consider OT/PT consult to assist with strengthening/mobility  - Encourage toileting schedule  Outcome: Progressing     Problem: DISCHARGE PLANNING  Goal: Discharge to home or other facility with appropriate resources  Description: INTERVENTIONS:  - Identify barriers to discharge w/pt and caregiver  - Include patient/family/discharge partner in discharge planning  - Arrange for needed discharge resources and transportation as appropriate  - Identify discharge learning needs (meds, wound care, etc)  - Arrange for interpreters to assist at discharge as needed  - Consider post-discharge preferences of patient/family/discharge partner  - Complete POLST form as appropriate  - Assess patient's ability to be responsible for managing their own health  - Refer to Case Management Department for coordinating discharge planning if the patient needs post-hospital services based on physician/LIP order or complex needs related to  functional status, cognitive ability or social support system  Outcome: Progressing     Problem: Patient/Family Goals  Goal: Patient/Family Long Term Goal  Description: Patient's Long Term Goal: Get stronger    Interventions:  - See additional Care Plan goals for specific interventions  Outcome: Progressing  Goal: Patient/Family Short Term Goal  Description: Patient's Short Term Goal: Go home     Interventions:   - See additional Care Plan goals for specific interventions  Outcome: Progressing     Problem: Patient Centered Care  Goal: Patient preferences are identified and integrated in the patient's plan of care  Description: Interventions:  - What would you like us to know as we care for you? From home alone  - Provide timely, complete, and accurate information to patient/family  - Incorporate patient and family knowledge, values, beliefs, and cultural backgrounds into the planning and delivery of care  - Encourage patient/family to participate in care and decision-making at the level they choose  - Honor patient and family perspectives and choices  Outcome: Progressing     Problem: CARDIOVASCULAR - ADULT  Goal: Maintains optimal cardiac output and hemodynamic stability  Description: INTERVENTIONS:  - Monitor vital signs, rhythm, and trends  - Monitor for bleeding, hypotension and signs of decreased cardiac output  - Evaluate effectiveness of vasoactive medications to optimize hemodynamic stability  - Monitor arterial and/or venous puncture sites for bleeding and/or hematoma  - Assess quality of pulses, skin color and temperature  - Assess for signs of decreased coronary artery perfusion - ex. Angina  - Evaluate fluid balance, assess for edema, trend weights  Outcome: Progressing  Goal: Absence of cardiac arrhythmias or at baseline  Description: INTERVENTIONS:  - Continuous cardiac monitoring, monitor vital signs, obtain 12 lead EKG if indicated  - Evaluate effectiveness of antiarrhythmic and heart rate control  medications as ordered  - Initiate emergency measures for life threatening arrhythmias  - Monitor electrolytes and administer replacement therapy as ordered  Outcome: Progressing     Problem: RESPIRATORY - ADULT  Goal: Achieves optimal ventilation and oxygenation  Description: INTERVENTIONS:  - Assess for changes in respiratory status  - Assess for changes in mentation and behavior  - Position to facilitate oxygenation and minimize respiratory effort  - Oxygen supplementation based on oxygen saturation or ABGs  - Provide Smoking Cessation handout, if applicable  - Encourage broncho-pulmonary hygiene including cough, deep breathe, Incentive Spirometry  - Assess the need for suctioning and perform as needed  - Assess and instruct to report SOB or any respiratory difficulty  - Respiratory Therapy support as indicated  - Manage/alleviate anxiety  - Monitor for signs/symptoms of CO2 retention  Outcome: Progressing     Problem: PAIN - ADULT  Goal: Verbalizes/displays adequate comfort level or patient's stated pain goal  Description: INTERVENTIONS:  - Encourage pt to monitor pain and request assistance  - Assess pain using appropriate pain scale  - Administer analgesics based on type and severity of pain and evaluate response  - Implement non-pharmacological measures as appropriate and evaluate response  - Consider cultural and social influences on pain and pain management  - Manage/alleviate anxiety  - Utilize distraction and/or relaxation techniques  - Monitor for opioid side effects  - Notify MD/LIP if interventions unsuccessful or patient reports new pain  - Anticipate increased pain with activity and pre-medicate as appropriate  Outcome: Progressing     Problem: SAFETY ADULT - FALL  Goal: Free from fall injury  Description: INTERVENTIONS:  - Assess pt frequently for physical needs  - Identify cognitive and physical deficits and behaviors that affect risk of falls.  - [place] fall precautions as indicated by  assessment.  - Educate pt/family on patient safety including physical limitations  - Instruct pt to call for assistance with activity based on assessment  - Modify environment to reduce risk of injury  - Provide assistive devices as appropriate  - Consider OT/PT consult to assist with strengthening/mobility  - Encourage toileting schedule  Outcome: Progressing     Problem: DISCHARGE PLANNING  Goal: Discharge to home or other facility with appropriate resources  Description: INTERVENTIONS:  - Identify barriers to discharge w/pt and caregiver  - Include patient/family/discharge partner in discharge planning  - Arrange for needed discharge resources and transportation as appropriate  - Identify discharge learning needs (meds, wound care, etc)  - Arrange for interpreters to assist at discharge as needed  - Consider post-discharge preferences of patient/family/discharge partner  - Complete POLST form as appropriate  - Assess patient's ability to be responsible for managing their own health  - Refer to Case Management Department for coordinating discharge planning if the patient needs post-hospital services based on physician/LIP order or complex needs related to functional status, cognitive ability or social support system  Outcome: Progressing     Problem: Patient/Family Goals  Goal: Patient/Family Long Term Goal  Description: Patient's Long Term Goal: Get stronger    Interventions:  - See additional Care Plan goals for specific interventions  Outcome: Progressing  Goal: Patient/Family Short Term Goal  Description: Patient's Short Term Goal: Go home     Interventions:   - See additional Care Plan goals for specific interventions  Outcome: Progressing     Problem: Patient Centered Care  Goal: Patient preferences are identified and integrated in the patient's plan of care  Description: Interventions:  - What would you like us to know as we care for you? From home alone  - Provide timely, complete, and accurate information  to patient/family  - Incorporate patient and family knowledge, values, beliefs, and cultural backgrounds into the planning and delivery of care  - Encourage patient/family to participate in care and decision-making at the level they choose  - Honor patient and family perspectives and choices  Outcome: Progressing     Problem: CARDIOVASCULAR - ADULT  Goal: Maintains optimal cardiac output and hemodynamic stability  Description: INTERVENTIONS:  - Monitor vital signs, rhythm, and trends  - Monitor for bleeding, hypotension and signs of decreased cardiac output  - Evaluate effectiveness of vasoactive medications to optimize hemodynamic stability  - Monitor arterial and/or venous puncture sites for bleeding and/or hematoma  - Assess quality of pulses, skin color and temperature  - Assess for signs of decreased coronary artery perfusion - ex. Angina  - Evaluate fluid balance, assess for edema, trend weights  Outcome: Progressing  Goal: Absence of cardiac arrhythmias or at baseline  Description: INTERVENTIONS:  - Continuous cardiac monitoring, monitor vital signs, obtain 12 lead EKG if indicated  - Evaluate effectiveness of antiarrhythmic and heart rate control medications as ordered  - Initiate emergency measures for life threatening arrhythmias  - Monitor electrolytes and administer replacement therapy as ordered  Outcome: Progressing     Problem: RESPIRATORY - ADULT  Goal: Achieves optimal ventilation and oxygenation  Description: INTERVENTIONS:  - Assess for changes in respiratory status  - Assess for changes in mentation and behavior  - Position to facilitate oxygenation and minimize respiratory effort  - Oxygen supplementation based on oxygen saturation or ABGs  - Provide Smoking Cessation handout, if applicable  - Encourage broncho-pulmonary hygiene including cough, deep breathe, Incentive Spirometry  - Assess the need for suctioning and perform as needed  - Assess and instruct to report SOB or any respiratory  difficulty  - Respiratory Therapy support as indicated  - Manage/alleviate anxiety  - Monitor for signs/symptoms of CO2 retention  Outcome: Progressing     Problem: PAIN - ADULT  Goal: Verbalizes/displays adequate comfort level or patient's stated pain goal  Description: INTERVENTIONS:  - Encourage pt to monitor pain and request assistance  - Assess pain using appropriate pain scale  - Administer analgesics based on type and severity of pain and evaluate response  - Implement non-pharmacological measures as appropriate and evaluate response  - Consider cultural and social influences on pain and pain management  - Manage/alleviate anxiety  - Utilize distraction and/or relaxation techniques  - Monitor for opioid side effects  - Notify MD/LIP if interventions unsuccessful or patient reports new pain  - Anticipate increased pain with activity and pre-medicate as appropriate  Outcome: Progressing     Problem: SAFETY ADULT - FALL  Goal: Free from fall injury  Description: INTERVENTIONS:  - Assess pt frequently for physical needs  - Identify cognitive and physical deficits and behaviors that affect risk of falls.  - [place] fall precautions as indicated by assessment.  - Educate pt/family on patient safety including physical limitations  - Instruct pt to call for assistance with activity based on assessment  - Modify environment to reduce risk of injury  - Provide assistive devices as appropriate  - Consider OT/PT consult to assist with strengthening/mobility  - Encourage toileting schedule  Outcome: Progressing     Problem: DISCHARGE PLANNING  Goal: Discharge to home or other facility with appropriate resources  Description: INTERVENTIONS:  - Identify barriers to discharge w/pt and caregiver  - Include patient/family/discharge partner in discharge planning  - Arrange for needed discharge resources and transportation as appropriate  - Identify discharge learning needs (meds, wound care, etc)  - Arrange for interpreters  to assist at discharge as needed  - Consider post-discharge preferences of patient/family/discharge partner  - Complete POLST form as appropriate  - Assess patient's ability to be responsible for managing their own health  - Refer to Case Management Department for coordinating discharge planning if the patient needs post-hospital services based on physician/LIP order or complex needs related to functional status, cognitive ability or social support system  Outcome: Progressing     Problem: Patient/Family Goals  Goal: Patient/Family Long Term Goal  Description: Patient's Long Term Goal: Get stronger    Interventions:  - See additional Care Plan goals for specific interventions  Outcome: Progressing  Goal: Patient/Family Short Term Goal  Description: Patient's Short Term Goal: Go home     Interventions:   - See additional Care Plan goals for specific interventions  Outcome: Progressing     Problem: Patient Centered Care  Goal: Patient preferences are identified and integrated in the patient's plan of care  Description: Interventions:  - What would you like us to know as we care for you? From home alone  - Provide timely, complete, and accurate information to patient/family  - Incorporate patient and family knowledge, values, beliefs, and cultural backgrounds into the planning and delivery of care  - Encourage patient/family to participate in care and decision-making at the level they choose  - Honor patient and family perspectives and choices  Outcome: Progressing     Problem: CARDIOVASCULAR - ADULT  Goal: Maintains optimal cardiac output and hemodynamic stability  Description: INTERVENTIONS:  - Monitor vital signs, rhythm, and trends  - Monitor for bleeding, hypotension and signs of decreased cardiac output  - Evaluate effectiveness of vasoactive medications to optimize hemodynamic stability  - Monitor arterial and/or venous puncture sites for bleeding and/or hematoma  - Assess quality of pulses, skin color and  temperature  - Assess for signs of decreased coronary artery perfusion - ex. Angina  - Evaluate fluid balance, assess for edema, trend weights  Outcome: Progressing  Goal: Absence of cardiac arrhythmias or at baseline  Description: INTERVENTIONS:  - Continuous cardiac monitoring, monitor vital signs, obtain 12 lead EKG if indicated  - Evaluate effectiveness of antiarrhythmic and heart rate control medications as ordered  - Initiate emergency measures for life threatening arrhythmias  - Monitor electrolytes and administer replacement therapy as ordered  Outcome: Progressing     Problem: RESPIRATORY - ADULT  Goal: Achieves optimal ventilation and oxygenation  Description: INTERVENTIONS:  - Assess for changes in respiratory status  - Assess for changes in mentation and behavior  - Position to facilitate oxygenation and minimize respiratory effort  - Oxygen supplementation based on oxygen saturation or ABGs  - Provide Smoking Cessation handout, if applicable  - Encourage broncho-pulmonary hygiene including cough, deep breathe, Incentive Spirometry  - Assess the need for suctioning and perform as needed  - Assess and instruct to report SOB or any respiratory difficulty  - Respiratory Therapy support as indicated  - Manage/alleviate anxiety  - Monitor for signs/symptoms of CO2 retention  Outcome: Progressing     Problem: PAIN - ADULT  Goal: Verbalizes/displays adequate comfort level or patient's stated pain goal  Description: INTERVENTIONS:  - Encourage pt to monitor pain and request assistance  - Assess pain using appropriate pain scale  - Administer analgesics based on type and severity of pain and evaluate response  - Implement non-pharmacological measures as appropriate and evaluate response  - Consider cultural and social influences on pain and pain management  - Manage/alleviate anxiety  - Utilize distraction and/or relaxation techniques  - Monitor for opioid side effects  - Notify MD/LIP if interventions  unsuccessful or patient reports new pain  - Anticipate increased pain with activity and pre-medicate as appropriate  Outcome: Progressing     Problem: SAFETY ADULT - FALL  Goal: Free from fall injury  Description: INTERVENTIONS:  - Assess pt frequently for physical needs  - Identify cognitive and physical deficits and behaviors that affect risk of falls.  - [place] fall precautions as indicated by assessment.  - Educate pt/family on patient safety including physical limitations  - Instruct pt to call for assistance with activity based on assessment  - Modify environment to reduce risk of injury  - Provide assistive devices as appropriate  - Consider OT/PT consult to assist with strengthening/mobility  - Encourage toileting schedule  Outcome: Progressing     Problem: DISCHARGE PLANNING  Goal: Discharge to home or other facility with appropriate resources  Description: INTERVENTIONS:  - Identify barriers to discharge w/pt and caregiver  - Include patient/family/discharge partner in discharge planning  - Arrange for needed discharge resources and transportation as appropriate  - Identify discharge learning needs (meds, wound care, etc)  - Arrange for interpreters to assist at discharge as needed  - Consider post-discharge preferences of patient/family/discharge partner  - Complete POLST form as appropriate  - Assess patient's ability to be responsible for managing their own health  - Refer to Case Management Department for coordinating discharge planning if the patient needs post-hospital services based on physician/LIP order or complex needs related to functional status, cognitive ability or social support system  Outcome: Progressing     Problem: Patient/Family Goals  Goal: Patient/Family Long Term Goal  Description: Patient's Long Term Goal: Get stronger    Interventions:  - See additional Care Plan goals for specific interventions  Outcome: Progressing  Goal: Patient/Family Short Term Goal  Description: Patient's  Short Term Goal: Go home     Interventions:   - See additional Care Plan goals for specific interventions  Outcome: Progressing     Problem: Patient Centered Care  Goal: Patient preferences are identified and integrated in the patient's plan of care  Description: Interventions:  - What would you like us to know as we care for you? From home alone  - Provide timely, complete, and accurate information to patient/family  - Incorporate patient and family knowledge, values, beliefs, and cultural backgrounds into the planning and delivery of care  - Encourage patient/family to participate in care and decision-making at the level they choose  - Honor patient and family perspectives and choices  Outcome: Progressing     Problem: CARDIOVASCULAR - ADULT  Goal: Maintains optimal cardiac output and hemodynamic stability  Description: INTERVENTIONS:  - Monitor vital signs, rhythm, and trends  - Monitor for bleeding, hypotension and signs of decreased cardiac output  - Evaluate effectiveness of vasoactive medications to optimize hemodynamic stability  - Monitor arterial and/or venous puncture sites for bleeding and/or hematoma  - Assess quality of pulses, skin color and temperature  - Assess for signs of decreased coronary artery perfusion - ex. Angina  - Evaluate fluid balance, assess for edema, trend weights  Outcome: Progressing  Goal: Absence of cardiac arrhythmias or at baseline  Description: INTERVENTIONS:  - Continuous cardiac monitoring, monitor vital signs, obtain 12 lead EKG if indicated  - Evaluate effectiveness of antiarrhythmic and heart rate control medications as ordered  - Initiate emergency measures for life threatening arrhythmias  - Monitor electrolytes and administer replacement therapy as ordered  Outcome: Progressing     Problem: RESPIRATORY - ADULT  Goal: Achieves optimal ventilation and oxygenation  Description: INTERVENTIONS:  - Assess for changes in respiratory status  - Assess for changes in mentation  and behavior  - Position to facilitate oxygenation and minimize respiratory effort  - Oxygen supplementation based on oxygen saturation or ABGs  - Provide Smoking Cessation handout, if applicable  - Encourage broncho-pulmonary hygiene including cough, deep breathe, Incentive Spirometry  - Assess the need for suctioning and perform as needed  - Assess and instruct to report SOB or any respiratory difficulty  - Respiratory Therapy support as indicated  - Manage/alleviate anxiety  - Monitor for signs/symptoms of CO2 retention  Outcome: Progressing     Problem: PAIN - ADULT  Goal: Verbalizes/displays adequate comfort level or patient's stated pain goal  Description: INTERVENTIONS:  - Encourage pt to monitor pain and request assistance  - Assess pain using appropriate pain scale  - Administer analgesics based on type and severity of pain and evaluate response  - Implement non-pharmacological measures as appropriate and evaluate response  - Consider cultural and social influences on pain and pain management  - Manage/alleviate anxiety  - Utilize distraction and/or relaxation techniques  - Monitor for opioid side effects  - Notify MD/LIP if interventions unsuccessful or patient reports new pain  - Anticipate increased pain with activity and pre-medicate as appropriate  Outcome: Progressing     Problem: SAFETY ADULT - FALL  Goal: Free from fall injury  Description: INTERVENTIONS:  - Assess pt frequently for physical needs  - Identify cognitive and physical deficits and behaviors that affect risk of falls.  - [place] fall precautions as indicated by assessment.  - Educate pt/family on patient safety including physical limitations  - Instruct pt to call for assistance with activity based on assessment  - Modify environment to reduce risk of injury  - Provide assistive devices as appropriate  - Consider OT/PT consult to assist with strengthening/mobility  - Encourage toileting schedule  Outcome: Progressing     Problem:  DISCHARGE PLANNING  Goal: Discharge to home or other facility with appropriate resources  Description: INTERVENTIONS:  - Identify barriers to discharge w/pt and caregiver  - Include patient/family/discharge partner in discharge planning  - Arrange for needed discharge resources and transportation as appropriate  - Identify discharge learning needs (meds, wound care, etc)  - Arrange for interpreters to assist at discharge as needed  - Consider post-discharge preferences of patient/family/discharge partner  - Complete POLST form as appropriate  - Assess patient's ability to be responsible for managing their own health  - Refer to Case Management Department for coordinating discharge planning if the patient needs post-hospital services based on physician/LIP order or complex needs related to functional status, cognitive ability or social support system  Outcome: Progressing     Problem: Patient/Family Goals  Goal: Patient/Family Long Term Goal  Description: Patient's Long Term Goal: Get stronger    Interventions:  - See additional Care Plan goals for specific interventions  Outcome: Progressing  Goal: Patient/Family Short Term Goal  Description: Patient's Short Term Goal: Go home     Interventions:   - See additional Care Plan goals for specific interventions  Outcome: Progressing     Problem: Patient Centered Care  Goal: Patient preferences are identified and integrated in the patient's plan of care  Description: Interventions:  - What would you like us to know as we care for you? From home alone  - Provide timely, complete, and accurate information to patient/family  - Incorporate patient and family knowledge, values, beliefs, and cultural backgrounds into the planning and delivery of care  - Encourage patient/family to participate in care and decision-making at the level they choose  - Honor patient and family perspectives and choices  Outcome: Progressing     Problem: CARDIOVASCULAR - ADULT  Goal: Maintains optimal  cardiac output and hemodynamic stability  Description: INTERVENTIONS:  - Monitor vital signs, rhythm, and trends  - Monitor for bleeding, hypotension and signs of decreased cardiac output  - Evaluate effectiveness of vasoactive medications to optimize hemodynamic stability  - Monitor arterial and/or venous puncture sites for bleeding and/or hematoma  - Assess quality of pulses, skin color and temperature  - Assess for signs of decreased coronary artery perfusion - ex. Angina  - Evaluate fluid balance, assess for edema, trend weights  Outcome: Progressing  Goal: Absence of cardiac arrhythmias or at baseline  Description: INTERVENTIONS:  - Continuous cardiac monitoring, monitor vital signs, obtain 12 lead EKG if indicated  - Evaluate effectiveness of antiarrhythmic and heart rate control medications as ordered  - Initiate emergency measures for life threatening arrhythmias  - Monitor electrolytes and administer replacement therapy as ordered  Outcome: Progressing     Problem: RESPIRATORY - ADULT  Goal: Achieves optimal ventilation and oxygenation  Description: INTERVENTIONS:  - Assess for changes in respiratory status  - Assess for changes in mentation and behavior  - Position to facilitate oxygenation and minimize respiratory effort  - Oxygen supplementation based on oxygen saturation or ABGs  - Provide Smoking Cessation handout, if applicable  - Encourage broncho-pulmonary hygiene including cough, deep breathe, Incentive Spirometry  - Assess the need for suctioning and perform as needed  - Assess and instruct to report SOB or any respiratory difficulty  - Respiratory Therapy support as indicated  - Manage/alleviate anxiety  - Monitor for signs/symptoms of CO2 retention  Outcome: Progressing     Problem: PAIN - ADULT  Goal: Verbalizes/displays adequate comfort level or patient's stated pain goal  Description: INTERVENTIONS:  - Encourage pt to monitor pain and request assistance  - Assess pain using appropriate pain  scale  - Administer analgesics based on type and severity of pain and evaluate response  - Implement non-pharmacological measures as appropriate and evaluate response  - Consider cultural and social influences on pain and pain management  - Manage/alleviate anxiety  - Utilize distraction and/or relaxation techniques  - Monitor for opioid side effects  - Notify MD/LIP if interventions unsuccessful or patient reports new pain  - Anticipate increased pain with activity and pre-medicate as appropriate  Outcome: Progressing     Problem: SAFETY ADULT - FALL  Goal: Free from fall injury  Description: INTERVENTIONS:  - Assess pt frequently for physical needs  - Identify cognitive and physical deficits and behaviors that affect risk of falls.  - [place] fall precautions as indicated by assessment.  - Educate pt/family on patient safety including physical limitations  - Instruct pt to call for assistance with activity based on assessment  - Modify environment to reduce risk of injury  - Provide assistive devices as appropriate  - Consider OT/PT consult to assist with strengthening/mobility  - Encourage toileting schedule  Outcome: Progressing     Problem: DISCHARGE PLANNING  Goal: Discharge to home or other facility with appropriate resources  Description: INTERVENTIONS:  - Identify barriers to discharge w/pt and caregiver  - Include patient/family/discharge partner in discharge planning  - Arrange for needed discharge resources and transportation as appropriate  - Identify discharge learning needs (meds, wound care, etc)  - Arrange for interpreters to assist at discharge as needed  - Consider post-discharge preferences of patient/family/discharge partner  - Complete POLST form as appropriate  - Assess patient's ability to be responsible for managing their own health  - Refer to Case Management Department for coordinating discharge planning if the patient needs post-hospital services based on physician/LIP order or complex  needs related to functional status, cognitive ability or social support system  Outcome: Progressing     Problem: Patient/Family Goals  Goal: Patient/Family Long Term Goal  Description: Patient's Long Term Goal: Get stronger    Interventions:  - See additional Care Plan goals for specific interventions  Outcome: Progressing  Goal: Patient/Family Short Term Goal  Description: Patient's Short Term Goal: Go home     Interventions:   - See additional Care Plan goals for specific interventions  Outcome: Progressing     Problem: Patient Centered Care  Goal: Patient preferences are identified and integrated in the patient's plan of care  Description: Interventions:  - What would you like us to know as we care for you? From home alone  - Provide timely, complete, and accurate information to patient/family  - Incorporate patient and family knowledge, values, beliefs, and cultural backgrounds into the planning and delivery of care  - Encourage patient/family to participate in care and decision-making at the level they choose  - Honor patient and family perspectives and choices  Outcome: Progressing     Problem: CARDIOVASCULAR - ADULT  Goal: Maintains optimal cardiac output and hemodynamic stability  Description: INTERVENTIONS:  - Monitor vital signs, rhythm, and trends  - Monitor for bleeding, hypotension and signs of decreased cardiac output  - Evaluate effectiveness of vasoactive medications to optimize hemodynamic stability  - Monitor arterial and/or venous puncture sites for bleeding and/or hematoma  - Assess quality of pulses, skin color and temperature  - Assess for signs of decreased coronary artery perfusion - ex. Angina  - Evaluate fluid balance, assess for edema, trend weights  Outcome: Progressing  Goal: Absence of cardiac arrhythmias or at baseline  Description: INTERVENTIONS:  - Continuous cardiac monitoring, monitor vital signs, obtain 12 lead EKG if indicated  - Evaluate effectiveness of antiarrhythmic and  heart rate control medications as ordered  - Initiate emergency measures for life threatening arrhythmias  - Monitor electrolytes and administer replacement therapy as ordered  Outcome: Progressing     Problem: RESPIRATORY - ADULT  Goal: Achieves optimal ventilation and oxygenation  Description: INTERVENTIONS:  - Assess for changes in respiratory status  - Assess for changes in mentation and behavior  - Position to facilitate oxygenation and minimize respiratory effort  - Oxygen supplementation based on oxygen saturation or ABGs  - Provide Smoking Cessation handout, if applicable  - Encourage broncho-pulmonary hygiene including cough, deep breathe, Incentive Spirometry  - Assess the need for suctioning and perform as needed  - Assess and instruct to report SOB or any respiratory difficulty  - Respiratory Therapy support as indicated  - Manage/alleviate anxiety  - Monitor for signs/symptoms of CO2 retention  Outcome: Progressing     Problem: PAIN - ADULT  Goal: Verbalizes/displays adequate comfort level or patient's stated pain goal  Description: INTERVENTIONS:  - Encourage pt to monitor pain and request assistance  - Assess pain using appropriate pain scale  - Administer analgesics based on type and severity of pain and evaluate response  - Implement non-pharmacological measures as appropriate and evaluate response  - Consider cultural and social influences on pain and pain management  - Manage/alleviate anxiety  - Utilize distraction and/or relaxation techniques  - Monitor for opioid side effects  - Notify MD/LIP if interventions unsuccessful or patient reports new pain  - Anticipate increased pain with activity and pre-medicate as appropriate  Outcome: Progressing     Problem: SAFETY ADULT - FALL  Goal: Free from fall injury  Description: INTERVENTIONS:  - Assess pt frequently for physical needs  - Identify cognitive and physical deficits and behaviors that affect risk of falls.  - [place] fall precautions as  indicated by assessment.  - Educate pt/family on patient safety including physical limitations  - Instruct pt to call for assistance with activity based on assessment  - Modify environment to reduce risk of injury  - Provide assistive devices as appropriate  - Consider OT/PT consult to assist with strengthening/mobility  - Encourage toileting schedule  Outcome: Progressing     Problem: DISCHARGE PLANNING  Goal: Discharge to home or other facility with appropriate resources  Description: INTERVENTIONS:  - Identify barriers to discharge w/pt and caregiver  - Include patient/family/discharge partner in discharge planning  - Arrange for needed discharge resources and transportation as appropriate  - Identify discharge learning needs (meds, wound care, etc)  - Arrange for interpreters to assist at discharge as needed  - Consider post-discharge preferences of patient/family/discharge partner  - Complete POLST form as appropriate  - Assess patient's ability to be responsible for managing their own health  - Refer to Case Management Department for coordinating discharge planning if the patient needs post-hospital services based on physician/LIP order or complex needs related to functional status, cognitive ability or social support system  Outcome: Progressing     Problem: Patient/Family Goals  Goal: Patient/Family Long Term Goal  Description: Patient's Long Term Goal: Get stronger    Interventions:  - See additional Care Plan goals for specific interventions  Outcome: Progressing  Goal: Patient/Family Short Term Goal  Description: Patient's Short Term Goal: Go home     Interventions:   - See additional Care Plan goals for specific interventions  Outcome: Progressing     Problem: Patient Centered Care  Goal: Patient preferences are identified and integrated in the patient's plan of care  Description: Interventions:  - What would you like us to know as we care for you? From home alone  - Provide timely, complete, and  accurate information to patient/family  - Incorporate patient and family knowledge, values, beliefs, and cultural backgrounds into the planning and delivery of care  - Encourage patient/family to participate in care and decision-making at the level they choose  - Honor patient and family perspectives and choices  Outcome: Progressing     Problem: CARDIOVASCULAR - ADULT  Goal: Maintains optimal cardiac output and hemodynamic stability  Description: INTERVENTIONS:  - Monitor vital signs, rhythm, and trends  - Monitor for bleeding, hypotension and signs of decreased cardiac output  - Evaluate effectiveness of vasoactive medications to optimize hemodynamic stability  - Monitor arterial and/or venous puncture sites for bleeding and/or hematoma  - Assess quality of pulses, skin color and temperature  - Assess for signs of decreased coronary artery perfusion - ex. Angina  - Evaluate fluid balance, assess for edema, trend weights  Outcome: Progressing  Goal: Absence of cardiac arrhythmias or at baseline  Description: INTERVENTIONS:  - Continuous cardiac monitoring, monitor vital signs, obtain 12 lead EKG if indicated  - Evaluate effectiveness of antiarrhythmic and heart rate control medications as ordered  - Initiate emergency measures for life threatening arrhythmias  - Monitor electrolytes and administer replacement therapy as ordered  Outcome: Progressing     Problem: RESPIRATORY - ADULT  Goal: Achieves optimal ventilation and oxygenation  Description: INTERVENTIONS:  - Assess for changes in respiratory status  - Assess for changes in mentation and behavior  - Position to facilitate oxygenation and minimize respiratory effort  - Oxygen supplementation based on oxygen saturation or ABGs  - Provide Smoking Cessation handout, if applicable  - Encourage broncho-pulmonary hygiene including cough, deep breathe, Incentive Spirometry  - Assess the need for suctioning and perform as needed  - Assess and instruct to report SOB or  any respiratory difficulty  - Respiratory Therapy support as indicated  - Manage/alleviate anxiety  - Monitor for signs/symptoms of CO2 retention  Outcome: Progressing     Problem: PAIN - ADULT  Goal: Verbalizes/displays adequate comfort level or patient's stated pain goal  Description: INTERVENTIONS:  - Encourage pt to monitor pain and request assistance  - Assess pain using appropriate pain scale  - Administer analgesics based on type and severity of pain and evaluate response  - Implement non-pharmacological measures as appropriate and evaluate response  - Consider cultural and social influences on pain and pain management  - Manage/alleviate anxiety  - Utilize distraction and/or relaxation techniques  - Monitor for opioid side effects  - Notify MD/LIP if interventions unsuccessful or patient reports new pain  - Anticipate increased pain with activity and pre-medicate as appropriate  Outcome: Progressing     Problem: SAFETY ADULT - FALL  Goal: Free from fall injury  Description: INTERVENTIONS:  - Assess pt frequently for physical needs  - Identify cognitive and physical deficits and behaviors that affect risk of falls.  - [place] fall precautions as indicated by assessment.  - Educate pt/family on patient safety including physical limitations  - Instruct pt to call for assistance with activity based on assessment  - Modify environment to reduce risk of injury  - Provide assistive devices as appropriate  - Consider OT/PT consult to assist with strengthening/mobility  - Encourage toileting schedule  Outcome: Progressing     Problem: DISCHARGE PLANNING  Goal: Discharge to home or other facility with appropriate resources  Description: INTERVENTIONS:  - Identify barriers to discharge w/pt and caregiver  - Include patient/family/discharge partner in discharge planning  - Arrange for needed discharge resources and transportation as appropriate  - Identify discharge learning needs (meds, wound care, etc)  - Arrange  for interpreters to assist at discharge as needed  - Consider post-discharge preferences of patient/family/discharge partner  - Complete POLST form as appropriate  - Assess patient's ability to be responsible for managing their own health  - Refer to Case Management Department for coordinating discharge planning if the patient needs post-hospital services based on physician/LIP order or complex needs related to functional status, cognitive ability or social support system  Outcome: Progressing     Problem: Patient/Family Goals  Goal: Patient/Family Long Term Goal  Description: Patient's Long Term Goal: Get stronger    Interventions:  - See additional Care Plan goals for specific interventions  Outcome: Progressing  Goal: Patient/Family Short Term Goal  Description: Patient's Short Term Goal: Go home     Interventions:   - See additional Care Plan goals for specific interventions  Outcome: Progressing

## 2024-02-28 NOTE — PROGRESS NOTES
Progress Note  Cristhian TSANG Jessica Patient Status:  Inpatient    9/3/1955 MRN [id number]   [facility] [facility] 3W/SW Attending Aretha Hazel MD   Hosp Day # 5 PCP KARI BARCLAY     SUBJECTIVE:    Denies chest pain, palpitations, or dyspnea. No dizziness. LE swelling and scrotal edema improved. No pain.     VITALS:  /71 (BP [facility]: Left arm)   Pulse 86   Temp 97.8 °F (36.6 °C) (Axillary)   Resp 18   Ht 5' 10\" (1.778 m)   Wt 232 lb (105.2 kg)   SpO2 91%   BMI 33.29 kg/m²     INTAKE/OUTPUT:    Intake/Output Summary (Last 24 hours) at 2024 1207  Last data filed at 2024 1016  Gross per 24 hour   Intake 580 ml   Output 1600 ml   Net -1020 ml     Last 3 Weights   24 0500 232 lb (105.2 kg)   24 0505 237 lb (107.5 kg)   24 0522 233 lb 9.6 oz (106 kg)   24 1154 236 lb 12.8 oz (107.4 kg)   24 0314 229 lb 8 oz (104.1 kg)   24 1639 233 lb 9.6 oz (106 kg)   24 0910 192 lb (87.1 kg)   24 0848 230 lb (104.3 kg)   02/10/24 0622 233 lb 9.6 oz (106 kg)   24 0513 234 lb (106.1 kg)   24 0408 232 lb 11.2 oz (105.6 kg)   24 0430 233 lb 6.4 oz (105.9 kg)   24 0405 232 lb (105.2 kg)   24 0400 229 lb (103.9 kg)   24 1753 232 lb 4.8 oz (105.4 kg)   24 1404 195 lb (88.5 kg)     LABS:  Recent Labs   Lab 24  0609 24  0653 24  0701   *  135* 148*  148* 145*  145*   BUN 34*  34* 29*  29* 28*  28*   CREATSERUM 1.21  1.21 1.20  1.20 1.12  1.12   EGFRCR 66  66 66  66 72  72   CA 8.1*  8.1* 8.4*  8.4* 8.5*  8.5*     138 136  136 138  138   K 3.9  3.9 3.7  3.7 3.7  3.7  3.7     105 103  103 104  104   CO2 28.0  28.0 28.0  28.0 28.0  28.0     Recent Labs   Lab 24  0609 24  0706 24  0724   RBC 3.63* 3.73* 3.72*   HGB 8.9* 8.8* 8.9*   HCT 27.7* 28.3* 28.7*   MCV 76.3* 75.9* 77.2*   MCH 24.5* 23.6* 23.9*   MCHC 32.1 31.1 31.0   RDW 28.6* 28.6* 28.6*    NEPRELIM 3.93 3.89 3.02   WBC 5.7 5.5 4.6   .0* 123.0* 126.0*     No results for input(s): \"TROP\", \"CK\" in the last 168 hours.    DIAGNOSTICS:    TELEMETRY: SR/ST    ECHO 2/26/2024:  Conclusions:   1. Left ventricle: The cavity size was increased. Wall thickness was normal.      The estimated ejection fraction was 25%, by visual assessment.   2. Right ventricle: The cavity size was increased. Systolic function was      reduced.   3. Left atrium: The atrium was increased in size.   4. Right atrium: The atrium was markedly dilated.   Impressions:  This study is compared with previous dated 02/26/2024: The   current study is a limited echocardiogram. For comparable findings, there is   no significant change from the prior echocardiogram.     ROS: Negative unless noted above     PHYSICAL EXAM:  General: Alert and oriented x 3. No apparent distress.  HEENT: Normocephalic, sclera are nonicteric. Hearing appropriate bilaterally.  Neck: + JVD. No Carotid bruits. Trachea midline.   Cardiac: Regular rate and rhythm. S1, S2 auscultated.+2/6 murmur   Lungs: A/p dullness. Chest expansion symmetrical. Regular effort.  Abdomen: Soft, rounded non-tender, +BS. No hepatosplenomegaly or appreciable masses.   Extremities: Without clubbing, cyanosis. +2-3 edema that extends from BLE feet to inferior patella, +1 that extends from superior patella to sacrum (slightly improved from yesterday). +3 scrotal edema   Neurologic: Motor and sensory nerves grossly intact.   Psych: Appropriate affect   Skin: Warm and dry. Right thumb cut  w/ dressing, c,d,i    MEDICATIONS:   insulin aspart  2 Units Subcutaneous TID CC    insulin aspart  1-5 Units Subcutaneous TID CC    gabapentin  100 mg Oral TID    PARoxetine HCl  40 mg Oral QAM    QUEtiapine  50 mg Oral Nightly    rOPINIRole  1 mg Oral Nightly    ceFAZolin  2 g Intravenous Q8H    bumetanide  2 mg Intravenous BID (Diuretic)    clopidogrel  75 mg Oral Daily    metoprolol succinate ER  25  mg Oral Daily Beta Blocker    apixaban  5 mg Oral BID    atorvastatin  80 mg Oral Daily    sacubitril-valsartan  1 tablet Oral BID      DOBUTamine 5 mcg/kg/min (02/28/24 0049)     ASSESSMENT:    Acute on Chronic  HFrEF/ Right Sided  - BNP 1707, CXR w/o pulmonary edema   - Limited ECHO 2/26/24 with LVEF 25%, of note patient was on Dobutamine during ECHO   - Start on Dobutamine 2/25, Has been on Bumex 2 mg BID since 2/23. Net neg 4L, Wt trends do not appear accurate  - Was given a dose of Zaroxolyn 2/26. Diuresising well on Bumex BID  - Appears grossly overloaded but slowly improving   - Toprol XL, Low Dose Entresto  - Plans for SGLT2 o/p as below   - Has declined ICD in the past     CAD, Hx CABG (LIMA-LAD, SVG-Diag, SVG-OM) & STEMI with emergency PCI LAD 2019  Hx PCI LAD 2003 and RCA 2003  - Stable, no ischemic symptoms   - Toprol XL, Plavix, High dose statin therapy, ARNI  - Not on ASA with concurrent use of Eliquis for history of PE     Bilateral LE Cellulitis- IV ABX per primary      HLD- LDL 47, Lipitor 80 mg, Zetia o/p     HTN- Controlled     Electrolyte Disturbance- On Non-Cardiac replacement protocol     Uncontrolled Type II DM  -A1c 10.4%  - Evaluated by endocrinology, will determine SGLT2 candidacy outpatient     JU on CKD III- Renal fx improved with IV diuresis     Heme positive stool on recent admission/ Iron Deficiency Anemia  - Hgb at baseline   - On recent admission he was treated with IV iron     Hx Colon Ca s/p Sigmoidectomy in 2023    Hx Suspected Layered Thrombus on ECHO 2019- Not noted on inpatient ECHO     Hx Remote PE- Eliquis     PLAN:  - Will discuss with cardiologist if Bumex gtt should be initiated to help progression of diuresis at an increased pace  - Will start Aldactone once off IV Bumex and Dobutamine.  - Would also benefit from SGLT-2, endocrinal to evaluate o./p with concurrent uncontrolled diabetes   - Long discussion with patient regarding outpatient. He is now open to trying the  heart failure clinic (referral placed 2/27) he is also willing to discuss ICD placement again     Plan of care discussed with patient and RN.     Sia Ortiz Gilmer, APRN  2/28/2024  12:11 PM  [phone] ([place])  [phone] ([place])      Addendum:    Patient seen and examined independently  SoB better with decreasing leg edema  Bun/ Creat Stable  Agree with the assessment and the above plan.    Dominguez Lu MD  L3

## 2024-02-28 NOTE — PROGRESS NOTES
[facility]     Hospitalist Progress Note     Cristhian Lopez Patient Status:  Inpatient    9/3/1955 MRN [id number]   [facility] [facility] 3W/SW Attending Mandeep Garcia MD   Hosp Day # 5 PCP KARI BARCLAY     Subjective:     Resting comfortably and in NAD  Denies any cp, sob, f,c,n,v abd pain    Objective:    Review of Systems:   ROS completed; pertinent positive and negatives stated in subjective.      Vital signs:  Temp:  [97.7 °F (36.5 °C)-98.3 °F (36.8 °C)] 98.3 °F (36.8 °C)  Pulse:  [86-94] 86  Resp:  [17-18] 18  BP: ()/(59-71) 102/59  SpO2:  [91 %-97 %] 97 %      Physical Exam:    Gen: NAD AO x3  Chest: good air entry CTABL  CVS: normal s1 and s2 RR  Abd: NABS soft NT ND  Neuro: CN 2-12 grossly intact  Ext: 2+ edema in bilateral LE - improving      Diagnostic Data:    Labs:  Recent Labs   Lab 24  0715 24  0611 24  0609 24  0706 24  0724   WBC 6.3 5.7 5.7 5.5 4.6   HGB 9.2* 8.8* 8.9* 8.8* 8.9*   MCV 77.7* 77.5* 76.3* 75.9* 77.2*   .0* 139.0* 117.0* 123.0* 126.0*       Recent Labs   Lab 24  0609 24  0653 24  0701   *  135* 148*  148* 145*  145*   BUN 34*  34* 29*  29* 28*  28*   CREATSERUM 1.21  1.21 1.20  1.20 1.12  1.12   CA 8.1*  8.1* 8.4*  8.4* 8.5*  8.5*   ALB 3.1* 3.2 3.4     138 136  136 138  138   K 3.9  3.9 3.7  3.7 3.7  3.7  3.7     105 103  103 104  104   CO2 28.0  28.0 28.0  28.0 28.0  28.0   ALKPHO 174* 168* 174*   AST 29 23 21   ALT <7* <7* <7*   BILT 0.8 0.9 0.8   TP 5.3* 5.4* 5.7       Estimated Creatinine Clearance: 66.1 mL/min (based on SCr of 1.12 mg/dL).    No results for input(s): \"PTP\", \"INR\" in the last 168 hours.           Imaging: Imaging data reviewed in Epic.    Medications:    insulin aspart  2 Units Subcutaneous TID CC    insulin aspart  1-5 Units Subcutaneous TID CC    gabapentin  100 mg Oral TID    PARoxetine HCl  40 mg Oral QAM    QUEtiapine  50  mg Oral Nightly    rOPINIRole  1 mg Oral Nightly    ceFAZolin  2 g Intravenous Q8H    bumetanide  2 mg Intravenous BID (Diuretic)    clopidogrel  75 mg Oral Daily    metoprolol succinate ER  25 mg Oral Daily Beta Blocker    apixaban  5 mg Oral BID    atorvastatin  80 mg Oral Daily    sacubitril-valsartan  1 tablet Oral BID       Assessment & Plan:     Acute CHF exacerbation  CAD s/p PCI and CABG  Hx of NSVT/declining ICD  Moderate to severe MR, moderate TR  JU on CKD - resolved  CXR reviewed  Saturating well on room air  Recent EF 25%  Cardiology on consult  Continue Bumex 2 mg IV BID  Continue Eliquis 5mg BID, Plavix 75 mg daily  Continue Metoprolol 25 mg daily, Entresto BID  Strict Is and Os, daily weights  Net IO Since Admission: -4,009.19 mL [02/28/24 1615]   Echo reviewed - EF 25%  Renal function at baseline - continue to monitor  Continue present management per cardiology  Telemetry monitoring  Bilateral LE cellulitis  Started on IV Cefazolin  Deescalate as indicated  Discharge on PO abx  Hypokalemia  Replaced  Continue to monitor  Telemetry monitoring  Hx of PE  Continue home meds  Saturating well on room air  HTN  BP well controlled  Continue home meds  Monitor vitals  T2DM  SSI and frequent accuchecks  Iron deficiency anemia  Hx of colon cancer s/p sigmoidectomy in 2023  Monitor H&H, transfuse as indicated  PPI  Outpatient follow up  Advanced care planning  DNAR      Plan of care discussed with patient or family at bedside.      Supplementary Documentation:     Quality:  DVT Prophylaxis: Eliquis  CODE status: DNAR      Estimated date of discharge: TBD  Discharge is dependent on: clinical stability  At this point Mr. Lopez is expected to be discharge to: home    MDM: High

## 2024-02-28 NOTE — PROGRESS NOTES
[facility]    Progress Note    Cristhian Lopez Patient Status:  Inpatient    9/3/1955 MRN [id number]   [facility] [facility] 3W/SW Attending Mandeep Garcia MD   Hosp Day # 5 PCP KARI BARCLAY     Subjective:  Sleepy     Diabetes History:  Patient has type 2 diabetes.  A1c is 10.4% taken in 2024.  Home regimen:  Metformin 1000 mg twice daily  Glimepiride 4 mg daily     Unclear compliance since patient states he did not even know that he had Diabetes till this hospitalization    A comprehensive 10 point review of systems was completed.  Pertinent positives and negatives noted in the the HPI.  Sleepy but able to answer questions    Objective/Physical Exam:  Physical Exam:   Vital Signs:  Blood pressure 112/71, pulse 86, temperature 97.8 °F (36.6 °C), temperature source Axillary, resp. rate 18, height 5' 10\" (1.778 m), weight 232 lb (105.2 kg), SpO2 91%.  General Appearance:  alert, well developed, in no acute distress  Head: Atraumatic  Eyes:  normal conjunctivae, sclera., normal sclera and normal pupils  Throat/Neck: normal sound to voice. Normal hearing, normal speech  Respiratory:  Speaking in full sentences, non-labored. no increased work of breathing, no audible wheezing    Neuro: motor grossly intact, moving all extremities without difficulty  Psychiatric:  oriented to time, self, and place  Extremities: no obvious extremity swelling, no lesions       Labs:  BG reviewed    Assessment/Plan:  Patient Active Problem List   Diagnosis    Left inguinal hernia    Coronary artery disease without angina pectoris, unspecified vessel or lesion type, unspecified whether native or transplanted heart    Acute congestive heart failure (HCC)    Acute congestive heart failure, unspecified heart failure type (HCC)    Syncope and collapse    JU (acute kidney injury) ([facility])    Dizziness    Parasomnia    RLS (restless legs syndrome)    Episodic mood disorder ([facility])    Anemia, unspecified type     Rectal bleeding    Acute on chronic congestive heart failure, unspecified heart failure type (HCC)    Iron deficiency anemia due to chronic blood loss    Scrotal edema    Goals of care, counseling/discussion    Palliative care encounter       Type 2 diabetes  NovoLog 2 units 3 times daily with meals along with low-dose correction factor  Accu-Cheks ACHS  Hypoglycemia protocol     Diabetic diet     Discharge planning:  MTF 1000 mg Breakfast and dinner  Unclear if he has been taking amaryl at home, based on insulin requirements in the hospital, will hold off on amaryl on discharge     Recommend follow-up in the endocrinology clinic.  Once he is seen as an outpatient we can consider option of GLP agonist/SGLT2 inhibitors.  These might help in better management of diabetes along with a low risk of hypoglycemia.     If patient would like to follow-up in the endocrinology clinic, recommend that he call [phone] and speak with the endocrinology nurse to make an appointment with Dr. Oropeza in 1 to 2 weeks after discharge.         Renu Morales MD

## 2024-02-29 LAB
ALBUMIN SERPL-MCNC: 3.5 G/DL (ref 3.2–4.8)
ANION GAP SERPL CALC-SCNC: 6 MMOL/L (ref 0–18)
BASOPHILS # BLD AUTO: 0.03 X10(3) UL (ref 0–0.2)
BASOPHILS NFR BLD AUTO: 0.6 %
BUN BLD-MCNC: 26 MG/DL (ref 9–23)
BUN/CREAT SERPL: 24.3 (ref 10–20)
CALCIUM BLD-MCNC: 8.6 MG/DL (ref 8.7–10.4)
CHLORIDE SERPL-SCNC: 105 MMOL/L (ref 98–112)
CO2 SERPL-SCNC: 28 MMOL/L (ref 21–32)
CREAT BLD-MCNC: 1.07 MG/DL
DEPRECATED RDW RBC AUTO: 74 FL (ref 35.1–46.3)
EGFRCR SERPLBLD CKD-EPI 2021: 76 ML/MIN/1.73M2 (ref 60–?)
EOSINOPHIL # BLD AUTO: 0.18 X10(3) UL (ref 0–0.7)
EOSINOPHIL NFR BLD AUTO: 3.8 %
ERYTHROCYTE [DISTWIDTH] IN BLOOD BY AUTOMATED COUNT: 28.4 % (ref 11–15)
GLUCOSE BLD-MCNC: 144 MG/DL (ref 70–99)
GLUCOSE BLDC GLUCOMTR-MCNC: 134 MG/DL (ref 70–99)
GLUCOSE BLDC GLUCOMTR-MCNC: 142 MG/DL (ref 70–99)
GLUCOSE BLDC GLUCOMTR-MCNC: 153 MG/DL (ref 70–99)
GLUCOSE BLDC GLUCOMTR-MCNC: 154 MG/DL (ref 70–99)
HCT VFR BLD AUTO: 29.5 %
HGB BLD-MCNC: 9 G/DL
IMM GRANULOCYTES # BLD AUTO: 0.02 X10(3) UL (ref 0–1)
IMM GRANULOCYTES NFR BLD: 0.4 %
LYMPHOCYTES # BLD AUTO: 0.87 X10(3) UL (ref 1–4)
LYMPHOCYTES NFR BLD AUTO: 18.4 %
MAGNESIUM SERPL-MCNC: 1.7 MG/DL (ref 1.6–2.6)
MCH RBC QN AUTO: 23.8 PG (ref 26–34)
MCHC RBC AUTO-ENTMCNC: 30.5 G/DL (ref 31–37)
MCV RBC AUTO: 78 FL
MONOCYTES # BLD AUTO: 0.6 X10(3) UL (ref 0.1–1)
MONOCYTES NFR BLD AUTO: 12.7 %
NEUTROPHILS # BLD AUTO: 3.04 X10 (3) UL (ref 1.5–7.7)
NEUTROPHILS # BLD AUTO: 3.04 X10(3) UL (ref 1.5–7.7)
NEUTROPHILS NFR BLD AUTO: 64.1 %
OSMOLALITY SERPL CALC.SUM OF ELEC: 295 MOSM/KG (ref 275–295)
PHOSPHATE SERPL-MCNC: 2.6 MG/DL (ref 2.4–5.1)
PLATELET # BLD AUTO: 132 10(3)UL (ref 150–450)
POTASSIUM SERPL-SCNC: 3.9 MMOL/L (ref 3.5–5.1)
POTASSIUM SERPL-SCNC: 3.9 MMOL/L (ref 3.5–5.1)
RBC # BLD AUTO: 3.78 X10(6)UL
SODIUM SERPL-SCNC: 139 MMOL/L (ref 136–145)
WBC # BLD AUTO: 4.7 X10(3) UL (ref 4–11)

## 2024-02-29 PROCEDURE — 99232 SBSQ HOSP IP/OBS MODERATE 35: CPT | Performed by: INTERNAL MEDICINE

## 2024-02-29 PROCEDURE — 99233 SBSQ HOSP IP/OBS HIGH 50: CPT | Performed by: HOSPITALIST

## 2024-02-29 RX ORDER — CEPHALEXIN 500 MG/1
500 CAPSULE ORAL 4 TIMES DAILY
Status: COMPLETED | OUTPATIENT
Start: 2024-02-29 | End: 2024-03-02

## 2024-02-29 NOTE — PLAN OF CARE
AxOx4, RA, NSR, Cont x2, Self. ACHS. PLAN: oral abx, bumex BID, Dobutamine drip   Problem: Patient Centered Care  Goal: Patient preferences are identified and integrated in the patient's plan of care  Description: Interventions:  - What would you like us to know as we care for you? From home alone  - Provide timely, complete, and accurate information to patient/family  - Incorporate patient and family knowledge, values, beliefs, and cultural backgrounds into the planning and delivery of care  - Encourage patient/family to participate in care and decision-making at the level they choose  - Honor patient and family perspectives and choices  Outcome: Progressing     Problem: CARDIOVASCULAR - ADULT  Goal: Maintains optimal cardiac output and hemodynamic stability  Description: INTERVENTIONS:  - Monitor vital signs, rhythm, and trends  - Monitor for bleeding, hypotension and signs of decreased cardiac output  - Evaluate effectiveness of vasoactive medications to optimize hemodynamic stability  - Monitor arterial and/or venous puncture sites for bleeding and/or hematoma  - Assess quality of pulses, skin color and temperature  - Assess for signs of decreased coronary artery perfusion - ex. Angina  - Evaluate fluid balance, assess for edema, trend weights  Outcome: Progressing  Goal: Absence of cardiac arrhythmias or at baseline  Description: INTERVENTIONS:  - Continuous cardiac monitoring, monitor vital signs, obtain 12 lead EKG if indicated  - Evaluate effectiveness of antiarrhythmic and heart rate control medications as ordered  - Initiate emergency measures for life threatening arrhythmias  - Monitor electrolytes and administer replacement therapy as ordered  Outcome: Progressing     Problem: RESPIRATORY - ADULT  Goal: Achieves optimal ventilation and oxygenation  Description: INTERVENTIONS:  - Assess for changes in respiratory status  - Assess for changes in mentation and behavior  - Position to facilitate oxygenation  and minimize respiratory effort  - Oxygen supplementation based on oxygen saturation or ABGs  - Provide Smoking Cessation handout, if applicable  - Encourage broncho-pulmonary hygiene including cough, deep breathe, Incentive Spirometry  - Assess the need for suctioning and perform as needed  - Assess and instruct to report SOB or any respiratory difficulty  - Respiratory Therapy support as indicated  - Manage/alleviate anxiety  - Monitor for signs/symptoms of CO2 retention  Outcome: Progressing     Problem: PAIN - ADULT  Goal: Verbalizes/displays adequate comfort level or patient's stated pain goal  Description: INTERVENTIONS:  - Encourage pt to monitor pain and request assistance  - Assess pain using appropriate pain scale  - Administer analgesics based on type and severity of pain and evaluate response  - Implement non-pharmacological measures as appropriate and evaluate response  - Consider cultural and social influences on pain and pain management  - Manage/alleviate anxiety  - Utilize distraction and/or relaxation techniques  - Monitor for opioid side effects  - Notify MD/LIP if interventions unsuccessful or patient reports new pain  - Anticipate increased pain with activity and pre-medicate as appropriate  Outcome: Progressing     Problem: SAFETY ADULT - FALL  Goal: Free from fall injury  Description: INTERVENTIONS:  - Assess pt frequently for physical needs  - Identify cognitive and physical deficits and behaviors that affect risk of falls.  - [place] fall precautions as indicated by assessment.  - Educate pt/family on patient safety including physical limitations  - Instruct pt to call for assistance with activity based on assessment  - Modify environment to reduce risk of injury  - Provide assistive devices as appropriate  - Consider OT/PT consult to assist with strengthening/mobility  - Encourage toileting schedule  Outcome: Progressing     Problem: DISCHARGE PLANNING  Goal: Discharge to home or other  facility with appropriate resources  Description: INTERVENTIONS:  - Identify barriers to discharge w/pt and caregiver  - Include patient/family/discharge partner in discharge planning  - Arrange for needed discharge resources and transportation as appropriate  - Identify discharge learning needs (meds, wound care, etc)  - Arrange for interpreters to assist at discharge as needed  - Consider post-discharge preferences of patient/family/discharge partner  - Complete POLST form as appropriate  - Assess patient's ability to be responsible for managing their own health  - Refer to Case Management Department for coordinating discharge planning if the patient needs post-hospital services based on physician/LIP order or complex needs related to functional status, cognitive ability or social support system  Outcome: Progressing     Problem: Patient/Family Goals  Goal: Patient/Family Long Term Goal  Description: Patient's Long Term Goal: Get stronger    Interventions:  - See additional Care Plan goals for specific interventions  Outcome: Progressing  Goal: Patient/Family Short Term Goal  Description: Patient's Short Term Goal: Go home     Interventions:   - See additional Care Plan goals for specific interventions  Outcome: Progressing

## 2024-02-29 NOTE — PROGRESS NOTES
[facility]    Progress Note    Cristhian Lopez Patient Status:  Inpatient    9/3/1955 MRN [id number]   [facility] [facility] 3W/SW Attending Mandeep Garcia MD   Hosp Day # 6 PCP KARI BARCLAY     Subjective:  Alert    Diabetes History:  Patient has type 2 diabetes.  A1c is 10.4% taken in 2024.  Home regimen:  Metformin 1000 mg twice daily  Glimepiride 4 mg daily     Unclear compliance since patient states he did not even know that he had Diabetes till this hospitalization    A comprehensive 10 point review of systems was completed.  Pertinent positives and negatives noted in the the HPI.  Sleepy but able to answer questions    Objective/Physical Exam:  Physical Exam:   Vital Signs:  Blood pressure 101/69, pulse 88, temperature 98.3 °F (36.8 °C), temperature source Axillary, resp. rate 18, height 5' 10\" (1.778 m), weight 234 lb 11.2 oz (106.5 kg), SpO2 95%.  General Appearance:  alert, well developed, in no acute distress  Head: Atraumatic  Eyes:  normal conjunctivae, sclera., normal sclera and normal pupils  Throat/Neck: normal sound to voice. Normal hearing, normal speech  Respiratory:  Speaking in full sentences, non-labored. no increased work of breathing, no audible wheezing    Neuro: motor grossly intact, moving all extremities without difficulty  Psychiatric:  oriented to time, self, and place  Extremities: no obvious extremity swelling, no lesions       Labs:  BG reviewed    Assessment/Plan:  Patient Active Problem List   Diagnosis    Left inguinal hernia    Coronary artery disease without angina pectoris, unspecified vessel or lesion type, unspecified whether native or transplanted heart    Acute congestive heart failure (HCC)    Acute congestive heart failure, unspecified heart failure type (HCC)    Syncope and collapse    JU (acute kidney injury) ([facility])    Dizziness    Parasomnia    RLS (restless legs syndrome)    Episodic mood disorder ([facility])    Anemia, unspecified  type    Rectal bleeding    Acute on chronic congestive heart failure, unspecified heart failure type (HCC)    Iron deficiency anemia due to chronic blood loss    Scrotal edema    Goals of care, counseling/discussion    Palliative care encounter       Type 2 diabetes  NovoLog 2 units 3 times daily with meals along with low-dose correction factor  Accu-Cheks ACHS  Hypoglycemia protocol     Diabetic diet     Discharge planning:  MTF 1000 mg Breakfast and dinner  Unclear if he has been taking amaryl at home, based on insulin requirements in the hospital, will hold off on amaryl on discharge     Recommend follow-up in the endocrinology clinic.  Once he is seen as an outpatient we can consider option of GLP agonist/SGLT2 inhibitors.  These might help in better management of diabetes along with a low risk of hypoglycemia.     If patient would like to follow-up in the endocrinology clinic, recommend that he call [phone] and speak with the endocrinology nurse to make an appointment with Dr. Oropeza in 1 to 2 weeks after discharge.         Renu Morales MD

## 2024-02-29 NOTE — PROGRESS NOTES
[facility]     Hospitalist Progress Note     Cristhiandasha Lopez Patient Status:  Inpatient    9/3/1955 MRN [id number]   [facility] [facility] 3W/SW Attending Mandeep Garcia MD   Hosp Day # 6 PCP KARI BARCLAY     Subjective:     Pt resting in bed, NAD  Feels well, lower extremity swelling improved  No cp, sob, f,c,n,v, abd pain or HA     Objective:    Review of Systems:   ROS completed; pertinent positive and negatives stated in subjective.      Vital signs:  Temp:  [97.8 °F (36.6 °C)-98.3 °F (36.8 °C)] 98.3 °F (36.8 °C)  Pulse:  [88-91] 91  Resp:  [18-19] 18  BP: (101-107)/(59-69) 103/63  SpO2:  [94 %-99 %] 99 %      Physical Exam:    Gen: NAD AO x3  Chest: good air entry CTABL  CVS: normal s1 and s2 RR  Abd: NABS soft NT ND  Neuro: CN 2-12 grossly intact  Ext: 2+ edema in bilateral LE - improving, erythema improved       Diagnostic Data:    Labs:  Recent Labs   Lab 24  0611 24  0609 24  0706 24  0724 24  0654   WBC 5.7 5.7 5.5 4.6 4.7   HGB 8.8* 8.9* 8.8* 8.9* 9.0*   MCV 77.5* 76.3* 75.9* 77.2* 78.0*   .0* 117.0* 123.0* 126.0* 132.0*       Recent Labs   Lab 24  0609 24  0653 24  0701 24  0653   *  135* 148*  148* 145*  145* 144*   BUN 34*  34* 29*  29* 28*  28* 26*   CREATSERUM 1.21  1.21 1.20  1.20 1.12  1.12 1.07   CA 8.1*  8.1* 8.4*  8.4* 8.5*  8.5* 8.6*   ALB 3.1* 3.2 3.4 3.5     138 136  136 138  138 139   K 3.9  3.9 3.7  3.7 3.7  3.7  3.7 3.9  3.9     105 103  103 104  104 105   CO2 28.0  28.0 28.0  28.0 28.0  28.0 28.0   ALKPHO 174* 168* 174*  --    AST 29 23 21  --    ALT <7* <7* <7*  --    BILT 0.8 0.9 0.8  --    TP 5.3* 5.4* 5.7  --        Estimated Creatinine Clearance: 69.2 mL/min (based on SCr of 1.07 mg/dL).    No results for input(s): \"PTP\", \"INR\" in the last 168 hours.           Imaging: Imaging data reviewed in Epic.    Medications:    cephalexin  500 mg Oral 4x  daily    insulin aspart  3 Units Subcutaneous TID CC    insulin aspart  1-5 Units Subcutaneous TID CC    gabapentin  100 mg Oral TID    PARoxetine HCl  40 mg Oral QAM    QUEtiapine  50 mg Oral Nightly    rOPINIRole  1 mg Oral Nightly    bumetanide  2 mg Intravenous BID (Diuretic)    clopidogrel  75 mg Oral Daily    metoprolol succinate ER  25 mg Oral Daily Beta Blocker    apixaban  5 mg Oral BID    atorvastatin  80 mg Oral Daily    sacubitril-valsartan  1 tablet Oral BID       Assessment & Plan:     Acute CHF exacerbation  CAD s/p PCI and CABG  Hx of NSVT/declining ICD  Moderate to severe MR, moderate TR  JU on CKD - resolved  CXR reviewed  Saturating well on room air  Recent EF 25%  Cardiology on consult  Continue Bumex 2 mg IV BID, may get additional dose this PM  Continue Eliquis 5mg BID, Plavix 75 mg daily  Continue Metoprolol 25 mg daily, Entresto BID  Strict Is and Os, daily weights  Net IO Since Admission: -7,834.19 mL [02/29/24 1207]   Echo reviewed - EF 25%  Renal function at baseline - continue to monitor  Continue present management per cardiology  Telemetry monitoring  Bilateral LE cellulitis  Started on IV Cefazolin -> Switch to PO today  Hypokalemia  Replaced  Continue to monitor  Telemetry monitoring  Hx of PE  Continue home meds  Saturating well on room air  HTN  BP well controlled  Continue home meds  Monitor vitals  T2DM  SSI and frequent accuchecks  Iron deficiency anemia  Hx of colon cancer s/p sigmoidectomy in 2023  Monitor H&H, transfuse as indicated  PPI  Outpatient follow up  Advanced care planning  DNAR      Plan of care discussed with patient or family at bedside.      Supplementary Documentation:     Quality:  DVT Prophylaxis: Eliquis  CODE status: DNAR      Estimated date of discharge: TBD  Discharge is dependent on: clinical stability  At this point Mr. Lopez is expected to be discharge to: home    MDM: High

## 2024-02-29 NOTE — PAYOR COMM NOTE
--------------  CONTINUED STAY REVIEW    Payor: [employer]  Subscriber #:  [id number]  Authorization Number: [id number]    Admit date: 2/23/24  Admit time:  4:38 PM    2/28/24   Temp:  [97.7 °F (36.5 °C)-98.3 °F (36.8 °C)] 98.3 °F (36.8 °C)  Pulse:  [86-94] 86  Resp:  [17-18] 18  BP: ()/(59-71) 102/59  SpO2:  [91 %-97 %] 97 %        Physical Exam:    Gen: AO x3  Chest: good air entry CTABL  CVS: normal s1 and s2 RR  Abd: NABS soft NT ND  Neuro: CN 2-12 grossly intact  Ext: 2+ edema in bilateral LE - improving  Lab 02/24/24  0715 02/25/24  0611 02/26/24  0609 02/27/24  0706 02/28/24  0724   WBC 6.3 5.7 5.7 5.5 4.6   HGB 9.2* 8.8* 8.9* 8.8* 8.9*   MCV 77.7* 77.5* 76.3* 75.9* 77.2*   .0* 139.0* 117.0* 123.0* 126.0*      Lab 02/26/24  0609 02/27/24  0653 02/28/24  0701   *  135* 148*  148* 145*  145*   BUN 34*  34* 29*  29* 28*  28*   CREATSERUM 1.21  1.21 1.20  1.20 1.12  1.12   CA 8.1*  8.1* 8.4*  8.4* 8.5*  8.5*   ALB 3.1* 3.2 3.4     138 136  136 138  138   K 3.9  3.9 3.7  3.7 3.7  3.7  3.7     105 103  103 104  104   CO2 28.0  28.0 28.0  28.0 28.0  28.0   ALKPHO 174* 168* 174*   AST 29 23 21   ALT <7* <7* <7*   BILT 0.8 0.9 0.8   TP 5.3* 5.4* 5.7    Assessment & Plan:      Acute CHF exacerbation  CAD s/p PCI and CABG  Hx of NSVT/declining ICD  Moderate to severe MR, moderate TR  JU on CKD - resolved  CXR reviewed  Saturating well on room air  Recent EF 25%  Cardiology on consult  Continue Bumex 2 mg IV BID  Continue Eliquis 5mg BID, Plavix 75 mg daily  Continue Metoprolol 25 mg daily, Entresto BID  Strict Is and Os, daily weights  Net IO Since Admission: -4,009.19 mL [02/28/24 1615]   Echo reviewed - EF 25%  Renal function at baseline - continue to monitor  Continue present management per cardiology  Telemetry monitoring  Bilateral LE cellulitis  Started on IV Cefazolin  Deescalate as indicated  Discharge on PO  abx  Hypokalemia  Replaced  Continue to monitor  Telemetry monitoring  Hx of PE  Continue home meds  Saturating well on room air  HTN  BP well controlled  Continue home meds  Monitor vitals  T2DM  SSI and frequent accuchecks  Iron deficiency anemia  Hx of colon cancer s/p sigmoidectomy in 2023  Monitor H&H, transfuse as indicated  PPI  Outpatient follow up      CARDIOLOGY  Neck: + JVD. No Carotid bruits. Trachea midline.   Extremities: Without clubbing, cyanosis. +2-3 edema that extends from BLE feet to inferior patella, +1 that extends from superior patella to sacrum (slightly improved from yesterday). +3 scrotal edema   Neurologic: Motor and sensory nerves grossly intact.   Psych: Appropriate affect   Skin: Warm and dry. Right thumb cut  w/ dressing, c,d,I    ASSESSMENT:     Acute on Chronic  HFrEF/ Right Sided  - BNP 1707, CXR w/o pulmonary edema   - Limited ECHO 2/26/24 with LVEF 25%, of note patient was on Dobutamine during ECHO   - Start on Dobutamine 2/25, Has been on Bumex 2 mg BID since 2/23.   - Was given a dose of Zaroxolyn 2/26. Diuresising well on Bumex BID  - Appears grossly overloaded but slowly improving   - Toprol XL, Low Dose Entresto  - Plans for SGLT2 o/p as below   - Has declined ICD in the past      CAD, Hx CABG (LIMA-LAD, SVG-Diag, SVG-OM) & STEMI with emergency PCI LAD 2019  Hx PCI LAD 2003 and RCA 2003  - Stable, no ischemic symptoms   - Toprol XL, Plavix, High dose statin therapy, ARNI  - Not on ASA with concurrent use of Eliquis for history of PE      Bilateral LE Cellulitis- IV ABX per primary       HLD- LDL 47, Lipitor 80 mg, Zetia o/p      HTN- Controlled      Electrolyte Disturbance- On Non-Cardiac replacement protocol      Uncontrolled Type II DM  -A1c 10.4%  - Evaluated by endocrinology, will determine SGLT2 candidacy outpatient      JU on CKD III- Renal fx improved with IV diuresis      Heme positive stool on recent admission/ Iron Deficiency Anemia  - Hgb at baseline   - On recent  admission he was treated with IV iron      Hx Colon Ca s/p Sigmoidectomy in 2023     Hx Suspected Layered Thrombus on ECHO 2019- Not noted on inpatient ECHO      Hx Remote PE- Eliquis      PLAN:  - Will discuss with cardiologist if Bumex gtt should be initiated to help progression of diuresis at an increased pace  - Will start Aldactone once off IV Bumex and Dobutamine.        2/29/24    CARDIOLOGY  Lab 02/27/24  0706 02/28/24  0724 02/29/24  0654   RBC 3.73* 3.72* 3.78*   HGB 8.8* 8.9* 9.0*   HCT 28.3* 28.7* 29.5*   WBC 5.5 4.6 4.7   .0* 126.0* 132.0*   Neck: + mild JVD. No Carotid bruits. Trachea midline.   Cardiac: Regular rate and rhythm. S1, S2 auscultated.+2/6 murmur   Lungs: A/p dullness. Chest expansion symmetrical. Regular effort.  Abdomen: Soft, rounded non-tender, +BS. No hepatosplenomegaly or appreciable masses.   Extremities: Without clubbing, cyanosis. +2-3 edema BLE that extends from inferior patella to feet. +1 that extends from superior patella to upper thigh. Sacral edema is no longer present.   Neurologic: Motor and sensory nerves grossly intact.   Psych: Appropriate affect   Skin: Warm and dry. Right thumb cut  w/ dressing, c,d,I    ASSESSMENT:     Acute on Chronic  HFrEF/ Right Sided  - BNP 1707, CXR w/o pulmonary edema   - Limited ECHO 2/26/24 with LVEF 25%, of note patient was on Dobutamine during ECHO   - Start on Dobutamine 2/25, Has been on Bumex 2 mg BID since 2/23.   - Was given a dose of Zaroxolyn 2/26. Diuresising well on Bumex BID however still has a significant way to go   - Appears grossly overloaded but slowly improving   - Toprol XL, Low Dose Entresto  - Plans for SGLT2 o/p as below   - Has declined ICD in the past      CAD, Hx CABG (LIMA-LAD, SVG-Diag, SVG-OM) & STEMI with emergency PCI LAD 2019  Hx PCI LAD 2003 and RCA 2003  - Stable, no ischemic symptoms   - Toprol XL, Plavix, High dose statin therapy, ARNI  - Not on ASA with concurrent use of Eliquis for history of PE       Bilateral LE Cellulitis- IV ABX per primary       HLD- LDL 47, Lipitor 80 mg, Zetia o/p      HTN- Controlled      Electrolyte Disturbance- Cardiac replacement protocol      Uncontrolled Type II DM  -A1c 10.4%  - Evaluated by endocrinology, will determine SGLT2 candidacy outpatient      JU on CKD III- Renal fx stable      Heme positive stool on recent admission/ Iron Deficiency Anemia  - Hgb at baseline   - On recent admission he was treated with IV iron      Hx Colon Ca s/p Sigmoidectomy in 2023     Hx Suspected Layered Thrombus on ECHO 2019- Not noted on inpatient ECHO      Hx Remote PE- Eliquis      PLAN:  - If blood pressure is stable this afternoon may give a third dose of Bumex   - Will start Aldactone once off IV Bumex and Dobutamine.    MEDICATIONS ADMINISTERED IN LAST 1 DAY:  apixaban (Eliquis) tab 5 mg       Date Action Dose Route User    2/28/2024 2118 Given 5 mg Oral Ambreen, Deepti    2/28/2024 0858 Given 5 mg Oral Rachana Cleaning RN          atorvastatin (Lipitor) tab 80 mg       Date Action Dose Route User    2/28/2024 0858 Given 80 mg Oral Rachana Cleaning RN          bumetanide (Bumex) 0.25 MG/ML injection 2 mg       Date Action Dose Route User    2/28/2024 1627 Given 2 mg Intravenous Rachana Cleaning RN    2/28/2024 0857 Given 2 mg Intravenous Rachana Cleaning RN          ceFAZolin (Ancef) 2 g in 20mL IV syringe premix       Date Action Dose Route User    2/29/2024 0529 Given 2 g Intravenous Ambreen, Deepti    2/28/2024 2118 Given 2 g Intravenous Ambreen, Deepti    2/28/2024 1241 Given 2 g Intravenous Rachana Cleaning RN          clopidogrel (Plavix) tab 75 mg       Date Action Dose Route User    2/28/2024 0857 Given 75 mg Oral Rachana Cleaning RN          DOBUTamine in dextrose 5% (Dobutrex) 500 mg/250mL infusion premix       Date Action Dose Route User    2/28/2024 1721 New Bag 5 mcg/kg/min × 106 kg (Dosing Weight) Intravenous Rachana Cleaning RN          gabapentin (Neurontin) cap 100 mg       Date Action Dose Route User     2/28/2024 2118 Given 100 mg Oral Ambreen, Deepti    2/28/2024 1627 Given 100 mg Oral Rachana Cleaning RN    2/28/2024 0857 Given 100 mg Oral Rachana Cleaning RN          insulin aspart (NovoLOG) 100 Units/mL FlexPen 2 Units       Date Action Dose Route User    2/28/2024 1731 Given 2 Units Subcutaneous (Right Upper Arm) Rachana Cleaning RN    2/28/2024 1339 Given 2 Units Subcutaneous (Right Upper Arm) Rachana Cleaning RN    2/28/2024 0901 Given 2 Units Subcutaneous (Right Upper Arm) Rachana Cleaning RN          insulin aspart (NovoLOG) 100 Units/mL FlexPen 1-5 Units       Date Action Dose Route User    2/28/2024 1731 Given 1 Units Subcutaneous (Right Upper Arm) Rachana Cleaning RN    2/28/2024 1338 Given 1 Units Subcutaneous (Right Upper Arm) Rachana Cleaning RN          metoprolol succinate ER (Toprol XL) 24 hr tab 25 mg       Date Action Dose Route User    2/29/2024 0529 Given 25 mg Oral Ambreen, Deepti          PARoxetine (Paxil) tab 40 mg       Date Action Dose Route User    2/28/2024 0857 Given 40 mg Oral Rachana Cleaning RN          potassium chloride (K-Dur) tab 40 mEq       Date Action Dose Route User    2/28/2024 0857 Given 40 mEq Oral Rachana Cleaning RN     rOPINIRole (Requip) tab 1 mg       Date Action Dose Route User    2/28/2024 2118 Given 1 mg Oral Ambreen, Deepti          sacubitril-valsartan (Entresto) 24-26 MG per tab 1 tablet       Date Action Dose Route User    2/28/2024 2118 Given 1 tablet Oral Ambreen, Deepti    2/28/2024 0857 Given 1 tablet Oral Rachana Cleaning RN            Vitals (last day)       Date/Time Temp Pulse Resp BP SpO2 Weight O2 Device O2 Flow Rate (L/min) [facility]    02/29/24 0526 97.8 °F (36.6 °C) -- 19 107/65 94 % -- None (Room air) -- EC    02/29/24 0510 -- -- -- -- -- 234 lb 11.2 oz -- -- DB    02/28/24 2116 98.1 °F (36.7 °C) -- 18 105/69 97 % -- None (Room air) -- EC    02/28/24 1448 98.3 °F (36.8 °C) -- 18 102/59 97 % -- None (Room air) -- TR    02/28/24 0853 97.8 °F (36.6 °C) 86 18 112/71 91 % -- None (Room air) -- TR    02/28/24 2152  98.3 °F (36.8 °C) 89 18 104/62 95 % -- None (Room air) -- CAPO

## 2024-02-29 NOTE — PLAN OF CARE
Patient alert and oriented on room air with no complaints of pain. Dobutamine gtt continued. IV abx continued. Call light in reach and no needs noted at this time. Plan for home once medically cleared.  Problem: Patient Centered Care  Goal: Patient preferences are identified and integrated in the patient's plan of care  Description: Interventions:  - What would you like us to know as we care for you? From home alone  - Provide timely, complete, and accurate information to patient/family  - Incorporate patient and family knowledge, values, beliefs, and cultural backgrounds into the planning and delivery of care  - Encourage patient/family to participate in care and decision-making at the level they choose  - Honor patient and family perspectives and choices  Outcome: Progressing     Problem: CARDIOVASCULAR - ADULT  Goal: Maintains optimal cardiac output and hemodynamic stability  Description: INTERVENTIONS:  - Monitor vital signs, rhythm, and trends  - Monitor for bleeding, hypotension and signs of decreased cardiac output  - Evaluate effectiveness of vasoactive medications to optimize hemodynamic stability  - Monitor arterial and/or venous puncture sites for bleeding and/or hematoma  - Assess quality of pulses, skin color and temperature  - Assess for signs of decreased coronary artery perfusion - ex. Angina  - Evaluate fluid balance, assess for edema, trend weights  Outcome: Progressing  Goal: Absence of cardiac arrhythmias or at baseline  Description: INTERVENTIONS:  - Continuous cardiac monitoring, monitor vital signs, obtain 12 lead EKG if indicated  - Evaluate effectiveness of antiarrhythmic and heart rate control medications as ordered  - Initiate emergency measures for life threatening arrhythmias  - Monitor electrolytes and administer replacement therapy as ordered  Outcome: Progressing     Problem: RESPIRATORY - ADULT  Goal: Achieves optimal ventilation and oxygenation  Description: INTERVENTIONS:  - Assess  for changes in respiratory status  - Assess for changes in mentation and behavior  - Position to facilitate oxygenation and minimize respiratory effort  - Oxygen supplementation based on oxygen saturation or ABGs  - Provide Smoking Cessation handout, if applicable  - Encourage broncho-pulmonary hygiene including cough, deep breathe, Incentive Spirometry  - Assess the need for suctioning and perform as needed  - Assess and instruct to report SOB or any respiratory difficulty  - Respiratory Therapy support as indicated  - Manage/alleviate anxiety  - Monitor for signs/symptoms of CO2 retention  Outcome: Progressing     Problem: PAIN - ADULT  Goal: Verbalizes/displays adequate comfort level or patient's stated pain goal  Description: INTERVENTIONS:  - Encourage pt to monitor pain and request assistance  - Assess pain using appropriate pain scale  - Administer analgesics based on type and severity of pain and evaluate response  - Implement non-pharmacological measures as appropriate and evaluate response  - Consider cultural and social influences on pain and pain management  - Manage/alleviate anxiety  - Utilize distraction and/or relaxation techniques  - Monitor for opioid side effects  - Notify MD/LIP if interventions unsuccessful or patient reports new pain  - Anticipate increased pain with activity and pre-medicate as appropriate  Outcome: Progressing     Problem: SAFETY ADULT - FALL  Goal: Free from fall injury  Description: INTERVENTIONS:  - Assess pt frequently for physical needs  - Identify cognitive and physical deficits and behaviors that affect risk of falls.  - [place] fall precautions as indicated by assessment.  - Educate pt/family on patient safety including physical limitations  - Instruct pt to call for assistance with activity based on assessment  - Modify environment to reduce risk of injury  - Provide assistive devices as appropriate  - Consider OT/PT consult to assist with strengthening/mobility  -  Encourage toileting schedule  Outcome: Progressing     Problem: DISCHARGE PLANNING  Goal: Discharge to home or other facility with appropriate resources  Description: INTERVENTIONS:  - Identify barriers to discharge w/pt and caregiver  - Include patient/family/discharge partner in discharge planning  - Arrange for needed discharge resources and transportation as appropriate  - Identify discharge learning needs (meds, wound care, etc)  - Arrange for interpreters to assist at discharge as needed  - Consider post-discharge preferences of patient/family/discharge partner  - Complete POLST form as appropriate  - Assess patient's ability to be responsible for managing their own health  - Refer to Case Management Department for coordinating discharge planning if the patient needs post-hospital services based on physician/LIP order or complex needs related to functional status, cognitive ability or social support system  Outcome: Progressing     Problem: Patient/Family Goals  Goal: Patient/Family Long Term Goal  Description: Patient's Long Term Goal: Get stronger    Interventions:  - See additional Care Plan goals for specific interventions  Outcome: Progressing  Goal: Patient/Family Short Term Goal  Description: Patient's Short Term Goal: Go home     Interventions:   - See additional Care Plan goals for specific interventions  Outcome: Progressing

## 2024-02-29 NOTE — PROGRESS NOTES
Progress Note  Cristhiandasha Lopez Patient Status:  Inpatient    9/3/1955 MRN [id number]   [facility] [facility] 3W/SW Attending Aretha Hazel MD   Hosp Day # 6 PCP KARI BARCLAY     SUBJECTIVE:    Denies chest pain or dyspnea. Overall feels weak today, hoping to be able to get up to the chair. Does feel like his LE and scrotal edema has significantly improved. No pain.     VITALS:  /65 (BP [facility]: Left arm)   Pulse 86   Temp 97.8 °F (36.6 °C) (Axillary)   Resp 19   Ht 5' 10\" (1.778 m)   Wt 234 lb 11.2 oz (106.5 kg)   SpO2 94%   BMI 33.68 kg/m²     INTAKE/OUTPUT:    Intake/Output Summary (Last 24 hours) at 2024 0741  Last data filed at 2024 0600  Gross per 24 hour   Intake 1435 ml   Output 4000 ml   Net -2565 ml     Last 3 Weights   24 0510 234 lb 11.2 oz (106.5 kg)   24 0500 232 lb (105.2 kg)   24 0505 237 lb (107.5 kg)   24 0522 233 lb 9.6 oz (106 kg)   24 1154 236 lb 12.8 oz (107.4 kg)   24 0314 229 lb 8 oz (104.1 kg)   24 1639 233 lb 9.6 oz (106 kg)   24 0910 192 lb (87.1 kg)   24 0848 230 lb (104.3 kg)   02/10/24 0622 233 lb 9.6 oz (106 kg)   24 0513 234 lb (106.1 kg)   24 0408 232 lb 11.2 oz (105.6 kg)   24 0430 233 lb 6.4 oz (105.9 kg)   24 0405 232 lb (105.2 kg)   24 0400 229 lb (103.9 kg)   24 1753 232 lb 4.8 oz (105.4 kg)   24 1404 195 lb (88.5 kg)     LABS:  Recent Labs   Lab 24  0609 24  0653 24  0701   *  135* 148*  148* 145*  145*   BUN 34*  34* 29*  29* 28*  28*   CREATSERUM 1.21  1.21 1.20  1.20 1.12  1.12   EGFRCR 66  66 66  66 72  72   CA 8.1*  8.1* 8.4*  8.4* 8.5*  8.5*     138 136  136 138  138   K 3.9  3.9 3.7  3.7 3.7  3.7  3.7     105 103  103 104  104   CO2 28.0  28.0 28.0  28.0 28.0  28.0     Recent Labs   Lab 24  0706 24  0724 24  0654   RBC 3.73* 3.72* 3.78*   HGB 8.8* 8.9* 9.0*    HCT 28.3* 28.7* 29.5*   MCV 75.9* 77.2* 78.0*   MCH 23.6* 23.9* 23.8*   MCHC 31.1 31.0 30.5*   RDW 28.6* 28.6* 28.4*   NEPRELIM 3.89 3.02 3.04   WBC 5.5 4.6 4.7   .0* 126.0* 132.0*     No results for input(s): \"TROP\", \"CK\" in the last 168 hours.    DIAGNOSTICS:    TELEMETRY:     ECHO 2/26/2024:  Conclusions:   1. Left ventricle: The cavity size was increased. Wall thickness was normal.      The estimated ejection fraction was 25%, by visual assessment.   2. Right ventricle: The cavity size was increased. Systolic function was      reduced.   3. Left atrium: The atrium was increased in size.   4. Right atrium: The atrium was markedly dilated.   Impressions:  This study is compared with previous dated 02/26/2024: The   current study is a limited echocardiogram. For comparable findings, there is   no significant change from the prior echocardiogram.     ROS: Negative unless noted above     PHYSICAL EXAM:  General: Alert and oriented x 3. No apparent distress.  HEENT: Normocephalic, sclera are nonicteric. Hearing appropriate bilaterally.  Neck: + mild JVD. No Carotid bruits. Trachea midline.   Cardiac: Regular rate and rhythm. S1, S2 auscultated.+2/6 murmur   Lungs: A/p dullness. Chest expansion symmetrical. Regular effort.  Abdomen: Soft, rounded non-tender, +BS. No hepatosplenomegaly or appreciable masses.   Extremities: Without clubbing, cyanosis. +2-3 edema BLE that extends from inferior patella to feet. +1 that extends from superior patella to upper thigh. Sacral edema is no longer present.   Neurologic: Motor and sensory nerves grossly intact.   Psych: Appropriate affect   Skin: Warm and dry. Right thumb cut  w/ dressing, c,d,i    MEDICATIONS:   insulin aspart  3 Units Subcutaneous TID CC    insulin aspart  1-5 Units Subcutaneous TID CC    gabapentin  100 mg Oral TID    PARoxetine HCl  40 mg Oral QAM    QUEtiapine  50 mg Oral Nightly    rOPINIRole  1 mg Oral Nightly    ceFAZolin  2 g Intravenous Q8H     bumetanide  2 mg Intravenous BID (Diuretic)    clopidogrel  75 mg Oral Daily    metoprolol succinate ER  25 mg Oral Daily Beta Blocker    apixaban  5 mg Oral BID    atorvastatin  80 mg Oral Daily    sacubitril-valsartan  1 tablet Oral BID      DOBUTamine 5 mcg/kg/min (02/28/24 1721)     ASSESSMENT:    Acute on Chronic  HFrEF/ Right Sided  - BNP 1707, CXR w/o pulmonary edema   - Limited ECHO 2/26/24 with LVEF 25%, of note patient was on Dobutamine during ECHO   - Start on Dobutamine 2/25, Has been on Bumex 2 mg BID since 2/23. Net neg 7L, No significant weight change since admission   - Was given a dose of Zaroxolyn 2/26. Diuresising well on Bumex BID however still has a significant way to go   - Appears grossly overloaded but slowly improving   - Toprol XL, Low Dose Entresto  - Plans for SGLT2 o/p as below   - Has declined ICD in the past     CAD, Hx CABG (LIMA-LAD, SVG-Diag, SVG-OM) & STEMI with emergency PCI LAD 2019  Hx PCI LAD 2003 and RCA 2003  - Stable, no ischemic symptoms   - Toprol XL, Plavix, High dose statin therapy, ARNI  - Not on ASA with concurrent use of Eliquis for history of PE     Bilateral LE Cellulitis- IV ABX per primary      HLD- LDL 47, Lipitor 80 mg, Zetia o/p     HTN- Controlled     Electrolyte Disturbance- Cardiac replacement protocol     Uncontrolled Type II DM  -A1c 10.4%  - Evaluated by endocrinology, will determine SGLT2 candidacy outpatient     JU on CKD III- Renal fx stable     Heme positive stool on recent admission/ Iron Deficiency Anemia  - Hgb at baseline   - On recent admission he was treated with IV iron     Hx Colon Ca s/p Sigmoidectomy in 2023    Hx Suspected Layered Thrombus on ECHO 2019- Not noted on inpatient ECHO     Hx Remote PE- Eliquis     PLAN:  - If blood pressure is stable this afternoon may give a third dose of Bumex   - Will start Aldactone once off IV Bumex and Dobutamine.  - Will see endocrine again outpatient for possible SGLT2  - Long discussion with patient  regarding outpatient. He is now open to trying the heart failure clinic (referral placed 2/27) he is also willing to discuss ICD placement again in the future     Plan of care discussed with patient and RN.     Sia Ortiz Gilmer, APRN  2/29/2024  8:20 AM  [phone] ([place])  [phone] ([place])      Addendum:  Patient seen and examined  Denies Sob or CP. No palpitaions.  Continuing on Dobutamine drip & IV Bumex  Agree with the above plan  Follow Cr & H/H    Dmoinguez Lu MD  L3

## 2024-03-01 LAB
ALBUMIN SERPL-MCNC: 3.4 G/DL (ref 3.2–4.8)
ANION GAP SERPL CALC-SCNC: 7 MMOL/L (ref 0–18)
ANION GAP SERPL CALC-SCNC: 7 MMOL/L (ref 0–18)
BASOPHILS # BLD AUTO: 0.03 X10(3) UL (ref 0–0.2)
BASOPHILS NFR BLD AUTO: 0.7 %
BUN BLD-MCNC: 27 MG/DL (ref 9–23)
BUN BLD-MCNC: 27 MG/DL (ref 9–23)
BUN/CREAT SERPL: 26.5 (ref 10–20)
BUN/CREAT SERPL: 26.5 (ref 10–20)
CALCIUM BLD-MCNC: 8.4 MG/DL (ref 8.7–10.4)
CALCIUM BLD-MCNC: 8.4 MG/DL (ref 8.7–10.4)
CHLORIDE SERPL-SCNC: 105 MMOL/L (ref 98–112)
CHLORIDE SERPL-SCNC: 105 MMOL/L (ref 98–112)
CO2 SERPL-SCNC: 28 MMOL/L (ref 21–32)
CO2 SERPL-SCNC: 28 MMOL/L (ref 21–32)
CREAT BLD-MCNC: 1.02 MG/DL
CREAT BLD-MCNC: 1.02 MG/DL
DEPRECATED RDW RBC AUTO: 73.3 FL (ref 35.1–46.3)
EGFRCR SERPLBLD CKD-EPI 2021: 81 ML/MIN/1.73M2 (ref 60–?)
EGFRCR SERPLBLD CKD-EPI 2021: 81 ML/MIN/1.73M2 (ref 60–?)
EOSINOPHIL # BLD AUTO: 0.15 X10(3) UL (ref 0–0.7)
EOSINOPHIL NFR BLD AUTO: 3.5 %
ERYTHROCYTE [DISTWIDTH] IN BLOOD BY AUTOMATED COUNT: 28.3 % (ref 11–15)
GLUCOSE BLD-MCNC: 134 MG/DL (ref 70–99)
GLUCOSE BLD-MCNC: 134 MG/DL (ref 70–99)
GLUCOSE BLDC GLUCOMTR-MCNC: 143 MG/DL (ref 70–99)
GLUCOSE BLDC GLUCOMTR-MCNC: 146 MG/DL (ref 70–99)
GLUCOSE BLDC GLUCOMTR-MCNC: 159 MG/DL (ref 70–99)
GLUCOSE BLDC GLUCOMTR-MCNC: 172 MG/DL (ref 70–99)
HCT VFR BLD AUTO: 28.1 %
HGB BLD-MCNC: 8.6 G/DL
IMM GRANULOCYTES # BLD AUTO: 0.01 X10(3) UL (ref 0–1)
IMM GRANULOCYTES NFR BLD: 0.2 %
LYMPHOCYTES # BLD AUTO: 0.78 X10(3) UL (ref 1–4)
LYMPHOCYTES NFR BLD AUTO: 18.2 %
MAGNESIUM SERPL-MCNC: 1.7 MG/DL (ref 1.6–2.6)
MAGNESIUM SERPL-MCNC: 1.7 MG/DL (ref 1.6–2.6)
MCH RBC QN AUTO: 23.8 PG (ref 26–34)
MCHC RBC AUTO-ENTMCNC: 30.6 G/DL (ref 31–37)
MCV RBC AUTO: 77.6 FL
MONOCYTES # BLD AUTO: 0.59 X10(3) UL (ref 0.1–1)
MONOCYTES NFR BLD AUTO: 13.8 %
NEUTROPHILS # BLD AUTO: 2.72 X10 (3) UL (ref 1.5–7.7)
NEUTROPHILS # BLD AUTO: 2.72 X10(3) UL (ref 1.5–7.7)
NEUTROPHILS NFR BLD AUTO: 63.6 %
OSMOLALITY SERPL CALC.SUM OF ELEC: 297 MOSM/KG (ref 275–295)
OSMOLALITY SERPL CALC.SUM OF ELEC: 297 MOSM/KG (ref 275–295)
PHOSPHATE SERPL-MCNC: 2.8 MG/DL (ref 2.4–5.1)
PLATELET # BLD AUTO: 138 10(3)UL (ref 150–450)
POTASSIUM SERPL-SCNC: 3.6 MMOL/L (ref 3.5–5.1)
POTASSIUM SERPL-SCNC: 3.6 MMOL/L (ref 3.5–5.1)
POTASSIUM SERPL-SCNC: 4.4 MMOL/L (ref 3.5–5.1)
RBC # BLD AUTO: 3.62 X10(6)UL
SODIUM SERPL-SCNC: 140 MMOL/L (ref 136–145)
SODIUM SERPL-SCNC: 140 MMOL/L (ref 136–145)
WBC # BLD AUTO: 4.3 X10(3) UL (ref 4–11)

## 2024-03-01 PROCEDURE — 99231 SBSQ HOSP IP/OBS SF/LOW 25: CPT | Performed by: NURSE PRACTITIONER

## 2024-03-01 PROCEDURE — 99232 SBSQ HOSP IP/OBS MODERATE 35: CPT | Performed by: INTERNAL MEDICINE

## 2024-03-01 PROCEDURE — 99233 SBSQ HOSP IP/OBS HIGH 50: CPT | Performed by: HOSPITALIST

## 2024-03-01 RX ORDER — POTASSIUM CHLORIDE 20 MEQ/1
40 TABLET, EXTENDED RELEASE ORAL EVERY 4 HOURS
Status: COMPLETED | OUTPATIENT
Start: 2024-03-01 | End: 2024-03-01

## 2024-03-01 RX ORDER — MAGNESIUM OXIDE 400 MG/1
400 TABLET ORAL ONCE
Status: COMPLETED | OUTPATIENT
Start: 2024-03-01 | End: 2024-03-01

## 2024-03-01 RX ORDER — BUMETANIDE 1 MG/1
2 TABLET ORAL
Status: DISCONTINUED | OUTPATIENT
Start: 2024-03-02 | End: 2024-03-03

## 2024-03-01 NOTE — PLAN OF CARE
AxOx4, RA, NSR, Cont x2, Self. ACHS. PLAN: oral abx,  cards     Problem: Patient Centered Care  Goal: Patient preferences are identified and integrated in the patient's plan of care  Description: Interventions:  - What would you like us to know as we care for you? From home alone  - Provide timely, complete, and accurate information to patient/family  - Incorporate patient and family knowledge, values, beliefs, and cultural backgrounds into the planning and delivery of care  - Encourage patient/family to participate in care and decision-making at the level they choose  - Honor patient and family perspectives and choices  Outcome: Progressing     Problem: CARDIOVASCULAR - ADULT  Goal: Maintains optimal cardiac output and hemodynamic stability  Description: INTERVENTIONS:  - Monitor vital signs, rhythm, and trends  - Monitor for bleeding, hypotension and signs of decreased cardiac output  - Evaluate effectiveness of vasoactive medications to optimize hemodynamic stability  - Monitor arterial and/or venous puncture sites for bleeding and/or hematoma  - Assess quality of pulses, skin color and temperature  - Assess for signs of decreased coronary artery perfusion - ex. Angina  - Evaluate fluid balance, assess for edema, trend weights  Outcome: Progressing  Goal: Absence of cardiac arrhythmias or at baseline  Description: INTERVENTIONS:  - Continuous cardiac monitoring, monitor vital signs, obtain 12 lead EKG if indicated  - Evaluate effectiveness of antiarrhythmic and heart rate control medications as ordered  - Initiate emergency measures for life threatening arrhythmias  - Monitor electrolytes and administer replacement therapy as ordered  Outcome: Progressing     Problem: RESPIRATORY - ADULT  Goal: Achieves optimal ventilation and oxygenation  Description: INTERVENTIONS:  - Assess for changes in respiratory status  - Assess for changes in mentation and behavior  - Position to facilitate oxygenation and minimize  respiratory effort  - Oxygen supplementation based on oxygen saturation or ABGs  - Provide Smoking Cessation handout, if applicable  - Encourage broncho-pulmonary hygiene including cough, deep breathe, Incentive Spirometry  - Assess the need for suctioning and perform as needed  - Assess and instruct to report SOB or any respiratory difficulty  - Respiratory Therapy support as indicated  - Manage/alleviate anxiety  - Monitor for signs/symptoms of CO2 retention  Outcome: Progressing     Problem: PAIN - ADULT  Goal: Verbalizes/displays adequate comfort level or patient's stated pain goal  Description: INTERVENTIONS:  - Encourage pt to monitor pain and request assistance  - Assess pain using appropriate pain scale  - Administer analgesics based on type and severity of pain and evaluate response  - Implement non-pharmacological measures as appropriate and evaluate response  - Consider cultural and social influences on pain and pain management  - Manage/alleviate anxiety  - Utilize distraction and/or relaxation techniques  - Monitor for opioid side effects  - Notify MD/LIP if interventions unsuccessful or patient reports new pain  - Anticipate increased pain with activity and pre-medicate as appropriate  Outcome: Progressing     Problem: SAFETY ADULT - FALL  Goal: Free from fall injury  Description: INTERVENTIONS:  - Assess pt frequently for physical needs  - Identify cognitive and physical deficits and behaviors that affect risk of falls.  - [place] fall precautions as indicated by assessment.  - Educate pt/family on patient safety including physical limitations  - Instruct pt to call for assistance with activity based on assessment  - Modify environment to reduce risk of injury  - Provide assistive devices as appropriate  - Consider OT/PT consult to assist with strengthening/mobility  - Encourage toileting schedule  Outcome: Progressing     Problem: DISCHARGE PLANNING  Goal: Discharge to home or other facility with  appropriate resources  Description: INTERVENTIONS:  - Identify barriers to discharge w/pt and caregiver  - Include patient/family/discharge partner in discharge planning  - Arrange for needed discharge resources and transportation as appropriate  - Identify discharge learning needs (meds, wound care, etc)  - Arrange for interpreters to assist at discharge as needed  - Consider post-discharge preferences of patient/family/discharge partner  - Complete POLST form as appropriate  - Assess patient's ability to be responsible for managing their own health  - Refer to Case Management Department for coordinating discharge planning if the patient needs post-hospital services based on physician/LIP order or complex needs related to functional status, cognitive ability or social support system  Outcome: Progressing     Problem: Patient/Family Goals  Goal: Patient/Family Long Term Goal  Description: Patient's Long Term Goal: Get stronger    Interventions:  - See additional Care Plan goals for specific interventions  Outcome: Progressing  Goal: Patient/Family Short Term Goal  Description: Patient's Short Term Goal: Go home     Interventions:   - See additional Care Plan goals for specific interventions  Outcome: Progressing

## 2024-03-01 NOTE — PLAN OF CARE
Dobutamine drip infusing. No acute changes overnight. Call light within reach.   Problem: Patient Centered Care  Goal: Patient preferences are identified and integrated in the patient's plan of care  Description: Interventions:  - What would you like us to know as we care for you? From home alone  - Provide timely, complete, and accurate information to patient/family  - Incorporate patient and family knowledge, values, beliefs, and cultural backgrounds into the planning and delivery of care  - Encourage patient/family to participate in care and decision-making at the level they choose  - Honor patient and family perspectives and choices  Outcome: Progressing     Problem: CARDIOVASCULAR - ADULT  Goal: Maintains optimal cardiac output and hemodynamic stability  Description: INTERVENTIONS:  - Monitor vital signs, rhythm, and trends  - Monitor for bleeding, hypotension and signs of decreased cardiac output  - Evaluate effectiveness of vasoactive medications to optimize hemodynamic stability  - Monitor arterial and/or venous puncture sites for bleeding and/or hematoma  - Assess quality of pulses, skin color and temperature  - Assess for signs of decreased coronary artery perfusion - ex. Angina  - Evaluate fluid balance, assess for edema, trend weights  Outcome: Progressing  Goal: Absence of cardiac arrhythmias or at baseline  Description: INTERVENTIONS:  - Continuous cardiac monitoring, monitor vital signs, obtain 12 lead EKG if indicated  - Evaluate effectiveness of antiarrhythmic and heart rate control medications as ordered  - Initiate emergency measures for life threatening arrhythmias  - Monitor electrolytes and administer replacement therapy as ordered  Outcome: Progressing     Problem: RESPIRATORY - ADULT  Goal: Achieves optimal ventilation and oxygenation  Description: INTERVENTIONS:  - Assess for changes in respiratory status  - Assess for changes in mentation and behavior  - Position to facilitate oxygenation  and minimize respiratory effort  - Oxygen supplementation based on oxygen saturation or ABGs  - Provide Smoking Cessation handout, if applicable  - Encourage broncho-pulmonary hygiene including cough, deep breathe, Incentive Spirometry  - Assess the need for suctioning and perform as needed  - Assess and instruct to report SOB or any respiratory difficulty  - Respiratory Therapy support as indicated  - Manage/alleviate anxiety  - Monitor for signs/symptoms of CO2 retention  Outcome: Progressing     Problem: PAIN - ADULT  Goal: Verbalizes/displays adequate comfort level or patient's stated pain goal  Description: INTERVENTIONS:  - Encourage pt to monitor pain and request assistance  - Assess pain using appropriate pain scale  - Administer analgesics based on type and severity of pain and evaluate response  - Implement non-pharmacological measures as appropriate and evaluate response  - Consider cultural and social influences on pain and pain management  - Manage/alleviate anxiety  - Utilize distraction and/or relaxation techniques  - Monitor for opioid side effects  - Notify MD/LIP if interventions unsuccessful or patient reports new pain  - Anticipate increased pain with activity and pre-medicate as appropriate  Outcome: Progressing     Problem: SAFETY ADULT - FALL  Goal: Free from fall injury  Description: INTERVENTIONS:  - Assess pt frequently for physical needs  - Identify cognitive and physical deficits and behaviors that affect risk of falls.  - [place] fall precautions as indicated by assessment.  - Educate pt/family on patient safety including physical limitations  - Instruct pt to call for assistance with activity based on assessment  - Modify environment to reduce risk of injury  - Provide assistive devices as appropriate  - Consider OT/PT consult to assist with strengthening/mobility  - Encourage toileting schedule  Outcome: Progressing

## 2024-03-01 NOTE — PROGRESS NOTES
Cardiology Progress Note    Cristhian Lopez Patient Status:  Inpatient    9/3/1955 MRN [id number]   [facility] [facility] 3W/SW Attending Mandeep Garcia MD   Hosp Day # 7 PCP KARI BARCLAY     Patient lying in the bed  Shortness of breath better  No chest pain  No acute distress  Patient continuing on dobutamine infusion and IV Bumex    Past Medical History:   Diagnosis Date    Coronary heart disease     2 stents in place, pstient sees Dr. 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)     right thumb    RLS (restless legs syndrome)      Past Surgical History:   Procedure Laterality Date    OTHER SURGICAL HISTORY      2 stents      Family History   Problem Relation Age of Onset    Cancer Father         sarcoma of back      reports that he quit smoking about 28 years ago. His smoking use included cigarettes. He smoked an average of 1 pack per day. He has never used smokeless tobacco. He reports that he does not drink alcohol and does not use drugs.    Objective:   Temp: 97.9 °F (36.6 °C)  Pulse: 94  Resp: 18  BP: 106/69    Intake/Output:     Intake/Output Summary (Last 24 hours) at 3/1/2024 1617  Last data filed at 3/1/2024 0700  Gross per 24 hour   Intake 440.8 ml   Output 2350 ml   Net -1909.2 ml       Physical Exam:    General: Awake   No acute distress  HEENT: Normocephalic, anicteric sclera, neck supple.  Neck: No JVD, carotids 2+, no bruits.  Cardiac: Regular rate and rhythm. S1, S2 normal. No murmur, pericardial rub, S3.  Lungs: Clear without wheezes, rales, rhonchi or dullness.  Normal excursions and effort.  Abdomen: Soft, non-tender. BS-present.  Extremities: Without clubbing, cyanosis or edema.  Peripheral pulses are 2+.  Neurologic: Non-focal  Skin: Warm and dry.       Assessment;    CAD status post CABG and MI  History of PCI LAD   Stable  Plavix Toprol-XL statins ARNI  Not  on ASA as he is on Eliquis for h/o PE    Acute on chronic CHF HFrEF  Declined ICD  LVEF 25%  Dobutamine 5 mcg/kg/min and Bumex 2 mg IV twice daily  BB and Entresto    HTN-controlled  HLD-Lipitor and Zetia    JU on CKD  Stable    Plan:  Discontinue dobutamine drip from 3/2/24  Change Bumex to p.o.  Discharge planning for 3/3/23    Level of care: L3    Dominguez Lu MD  [facility]      3/1/2024  4:17 PM

## 2024-03-01 NOTE — PALLIATIVE CARE NOTE
[facility]  Palliative Care Progress Note    Cristhian Lopez Patient Status:  Inpatient    9/3/1955 MRN [id number]   [facility] [facility] 3W/SW Attending Aretha Hazel MD   Hosp Day # 7 PCP KARI BARCLAY     The  Cures Act makes medical notes like these available to patients in the interest of transparency. Please be advised this is a medical document. Medical documents are intended to carry relevant information, facts as evident, and the clinical opinion of the practitioner. The medical note is intended as peer to peer communication and may appear blunt or direct. It is written in medical language and may contain abbreviations or verbiage that are unfamiliar.     Subjective     When I entered the room, Johnny was in the bed awake and alert watching TV, he states he did not sleep well last night, he denies pain, denies sob.  I reviewed his medications he had Restoril ordered and reminded him tonight to ask for sleep medication if he needs it.  States he still has a cough but has improved.    Symptom assessment: cough and unable to sleep last night,    Review of pertinent medication requirements in past 24 hours:   No medications    Palliative Care symptom needs assessed:   Pertinent items are noted in HPI/below    Fatigue:  Yes  Dyspnea: with exertion  Current O2 therapy: RA  Cough: + dry cough  Pain Present: denies  Non-verbal signs of pain present: NO  Appetite: good  Constipation: denies  Diarrhea: denies  Nausea/Vomiting: denies  Depression: h/o depression  Anxiety: h/o anxiety    Allergies:  Allergies   Allergen Reactions    Heparin HIVES     Patient not sure       Medications:     Current Facility-Administered Medications:     potassium chloride (K-Dur) tab 40 mEq, 40 mEq, Oral, Q4H    cephalexin (Keflex) cap 500 mg, 500 mg, Oral, 4x daily    insulin aspart (NovoLOG) 100 Units/mL FlexPen 3 Units, 3 Units, Subcutaneous, TID CC    guaiFENesin (Robitussin) 100 MG/5 ML oral  liquid 200 mg, 200 mg, Oral, Q4H PRN    insulin aspart (NovoLOG) 100 Units/mL FlexPen 1-5 Units, 1-5 Units, Subcutaneous, TID CC    DOBUTamine in dextrose 5% (Dobutrex) 500 mg/250mL infusion premix, 5 mcg/kg/min (Dosing Weight), Intravenous, Continuous    gabapentin (Neurontin) cap 100 mg, 100 mg, Oral, TID    PARoxetine (Paxil) tab 40 mg, 40 mg, Oral, QAM    QUEtiapine (SEROquel) tab 50 mg, 50 mg, Oral, Nightly    rOPINIRole (Requip) tab 1 mg, 1 mg, Oral, Nightly    acetaminophen (Tylenol Extra Strength) tab 500 mg, 500 mg, Oral, Q4H PRN    ondansetron (Zofran) 4 MG/2ML injection 4 mg, 4 mg, Intravenous, Q6H PRN    temazepam (Restoril) cap 15 mg, 15 mg, Oral, Nightly PRN    bumetanide (Bumex) 0.25 MG/ML injection 2 mg, 2 mg, Intravenous, BID (Diuretic)    glucose (Dex4) 15 GM/59ML oral liquid 15 g, 15 g, Oral, Q15 Min PRN **OR** glucose (Glutose) 40% oral gel 15 g, 15 g, Oral, Q15 Min PRN **OR** glucose-vitamin C (Dex-4) chewable tab 4 tablet, 4 tablet, Oral, Q15 Min PRN **OR** dextrose 50% injection 50 mL, 50 mL, Intravenous, Q15 Min PRN **OR** glucose (Dex4) 15 GM/59ML oral liquid 30 g, 30 g, Oral, Q15 Min PRN **OR** glucose (Glutose) 40% oral gel 30 g, 30 g, Oral, Q15 Min PRN **OR** glucose-vitamin C (Dex-4) chewable tab 8 tablet, 8 tablet, Oral, Q15 Min PRN    clopidogrel (Plavix) tab 75 mg, 75 mg, Oral, Daily    metoprolol succinate ER (Toprol XL) 24 hr tab 25 mg, 25 mg, Oral, Daily Beta Blocker    apixaban (Eliquis) tab 5 mg, 5 mg, Oral, BID    atorvastatin (Lipitor) tab 80 mg, 80 mg, Oral, Daily    sacubitril-valsartan (Entresto) 24-26 MG per tab 1 tablet, 1 tablet, Oral, BID    Objective     Vital Signs:  Blood pressure 106/69, pulse 94, temperature 97.9 °F (36.6 °C), temperature source Axillary, resp. rate 18, height 5' 10\" (1.778 m), weight 216 lb (98 kg), SpO2 95%.  Body mass index is 30.99 kg/m².  Present Level of pain: denies  Non-verbal signs of pain present: No    Physical Exam:  General: pt is in  the bed awake and alert watching TV, on RA, he is in no apparent distress. On Dobutamine drip  HEENT: No focal deficits.  Cardiac: Regular rate and rhythm, S1, S2 normal, slight murmur, No rub or gallop.  Lungs: Clear to diminished without wheezes, normal excursions and effort. On RA, + dry cough  Abdomen: large obese soft, non-tender, + bowel sounds, no rebound or guarding, + Bms  Extremities: Without clubbing, cyanosis. BLE Edema 1-2+  Neurologic: Alert and oriented X3, moves all extremities, normal affect.  Skin: Warm and dry.    Hematology:  Lab Results   Component Value Date    WBC 4.3 03/01/2024    HGB 8.6 (L) 03/01/2024    HCT 28.1 (L) 03/01/2024    .0 (L) 03/01/2024       Coags:No results found for: \"PT\", \"INR\", \"PTT\"    Chemistry:  Lab Results   Component Value Date    CREATSERUM 1.02 03/01/2024    CREATSERUM 1.02 03/01/2024    BUN 27 (H) 03/01/2024    BUN 27 (H) 03/01/2024     03/01/2024     03/01/2024    K 3.6 03/01/2024    K 3.6 03/01/2024     03/01/2024     03/01/2024    CO2 28.0 03/01/2024    CO2 28.0 03/01/2024     (H) 03/01/2024     (H) 03/01/2024    CA 8.4 (L) 03/01/2024    CA 8.4 (L) 03/01/2024    ALB 3.4 03/01/2024    ALKPHO 174 (H) 02/28/2024    BILT 0.8 02/28/2024    TP 5.7 02/28/2024    AST 21 02/28/2024    ALT <7 (L) 02/28/2024    PSA 9.27 (H) 02/20/2023    DDIMER 0.93 (H) 12/22/2021    MG 1.7 03/01/2024    PHOS 2.8 03/01/2024    TROP <0.045 08/25/2021       Imaging:  No results found.      Summary of Discussion    3/1/24 Johnny states he is agreeable to try to the heart failure clinic upon discharge, he remains unsure in regards to ICD placement-he will continue to think about it, Johnny declines CPC at discharge he feels like he does not need it.   We talked about Johnny's dog, he states the dog is missing him. I encouraged Johnny to take care of his heart failure and DM to prevent rehospitalization so his dog would not have to go through missing him.  Garrett agreed.   I discussed with Garrett I will sign off for now and to call for any needs or questions.     2/27/24 followed up with Garrett today. Garrett denies pain, states he has had a dry cough for about 7 months, which is frustrating. I provided an overall clinical update for Garrett, who expressed concern over how long he may have to live due to his weak heart, had long discussion in regards to heart healthy diet, continued exercise and compliance with medical regimen and plan from the healthcare team. Garrett expressed he has a dog who is 2 yrs old that he enjoys taking for a walk when the weather is warm, I encouraged walking in malls and utilizing on line free walking programs when the weather is cold.   Offered to fill out HCPOA PW with garrett who stated he has filled out HCPOA pw with his , I  requested a copy if Garrett had a chance to ask someone to bring a copy for our records.   We reviewed and filled out POLST form with expressed wishes for DNAR/DNI with selective treatment and continued medical support.   I provided emotional support to Garrett who is coping adequately.      Assessment and Recommendation     Acute on chronic HFrEF-EF 25%-declines ICD and declines heart failure clinic if recommended.    CAD with history of PCI and Cabg     Cardiorenal syndrome    Moderate to severe MR, moderate TR    Bilateral LE cellulitis    HLD    H/o PE 2019    H/o LV thrombus 2021    JU on CKD    HTN    DM    Noncompliant with his diet    Scrotal edema    H/o colon cancer s/p sigmoidectomy 2023    DJD of the lumbar spine    H/o depression  -on home medications Seroquel, Paxil     H/o DJD of the lumbar spine  -gabapentin 100mg 3 times daily  -Tylenol 500mg every 4 hours as needed    RLS  -Requip 1mg nightly    Cough   -recommend trying honey  -added Robitussin 200mg liquid every 4 hours as needed    Difficulty sleeping   -continue with Restoril at night as needed, I encouraged Garrett to take a dose tonight if he needs it.       Goals of care counseling  -see above for details  -Pt is DNAR/DNI with selective treatment  -Agreeable to palliative care following  -Dispo: Return to home, Johnny is declining CPC at this time he changed his mind. Johnny is now agreeable to heart failure clinic upon discharge. SW to help with dc planning.   -declined  at this time.  -Ongoing GOC discussions will be needed over time.   -Provided emotional support to the pt who is coping adequately.  -I encouraged Johnny to take care of his medical issues going forward for his dog who he loves, Johnny liked this thought.      Advance care planning  -see above for details  -Pt's friend Woodrow Le is his HC surrogate [phone]  -POLST form has been scanned into [employer]      Palliative Performance Scale 60%     Discussed today's visit with  Dr Garcia and Jayashree VAIL via secure chat    Palliative Care Follow Up: Palliative care team will sign off at this time as GOC have been established. Feel free to contact our team with any questions or concerns.    Thank you for allowing Palliative Care services to participate in the care of Cristhian Lopez.    A total of 25 minutes were spent on this follow-up, which included all of the following: chart review, direct face to face contact, history taking, physical examination, counseling and coordinating care, ACP and documentation.     Yareli Hooker, [id number]  3/1/2024  10:06AM  Palliative Care Services

## 2024-03-01 NOTE — DIETARY NOTE
BRIEF DIETITIAN NOTE     Pt re-screened for LOS (length of stay). Found to be at no nutrition risk at this time. Good PO intakes, 100% at all meals. Weight relatively stable, though large difference between today's weight and yesterday's. Likely  error. Will await re-weigh to confirm. Though pt is on Bumex. Please consult RD if further nutrition intervention is needed.     Percent Meals Eaten (last 6 days)       Date/Time Percent Meals Eaten (%)    02/25/24 0930 100 %    02/26/24 0843 100 %    02/26/24 1250 100 %    02/26/24 1732 100 %    02/27/24 0855 100 %    02/28/24 1016 100 %    02/28/24 1426 100 %    02/28/24 1849 100 %    02/29/24 1017 100 %    02/29/24 1900 100 %             Patient Weight(s) for the past 336 hrs:   Weight   03/01/24 0544 98 kg (216 lb)   02/29/24 0510 106.5 kg (234 lb 11.2 oz)   02/27/24 0500 105.2 kg (232 lb)   02/26/24 0505 107.5 kg (237 lb)   02/25/24 0522 106 kg (233 lb 9.6 oz)   02/24/24 1154 107.4 kg (236 lb 12.8 oz)   02/24/24 0314 104.1 kg (229 lb 8 oz)   02/23/24 1639 106 kg (233 lb 9.6 oz)   02/23/24 0910 87.1 kg (192 lb)        Wt Readings from Last 6 Encounters:   03/01/24 98 kg (216 lb)   02/13/24 104.3 kg (230 lb)   02/10/24 106 kg (233 lb 9.6 oz)   05/05/23 101.6 kg (224 lb)   02/20/23 99.8 kg (220 lb)   03/12/22 88.5 kg (195 lb)        F/u per protocol or as appropriate.       Jaz Ferrari RD, LDN  Clinical Dietitian  P: [phone]

## 2024-03-01 NOTE — PROGRESS NOTES
[facility]    Progress Note    Cristhian Lopez Patient Status:  Inpatient    9/3/1955 MRN [id number]   [facility] [facility] 3W/SW Attending Mandeep Garcia MD   Hosp Day # 7 PCP KARI BARCLAY     Subjective:  Alert, appetite stable.  BG levels at goal.     Diabetes History:  Patient has type 2 diabetes.  A1c is 10.4% taken in 2024.  Home regimen:  Metformin 1000 mg twice daily  Glimepiride 4 mg daily     Unclear compliance since patient states he did not even know that he had Diabetes till this hospitalization    A comprehensive 10 point review of systems was completed.  Pertinent positives and negatives noted in the the HPI.  Sleepy but able to answer questions    Objective/Physical Exam:  Physical Exam:   Vital Signs:  Blood pressure 115/75, pulse 95, temperature 98.3 °F (36.8 °C), temperature source Axillary, resp. rate 18, height 5' 10\" (1.778 m), weight 216 lb (98 kg), SpO2 96%.  General Appearance:  alert, well developed, in no acute distress  Head: Atraumatic  Eyes:  normal conjunctivae, sclera., normal sclera and normal pupils  Throat/Neck: normal sound to voice. Normal hearing, normal speech  Respiratory:  Speaking in full sentences, non-labored. no increased work of breathing, no audible wheezing    Neuro: motor grossly intact, moving all extremities without difficulty  Psychiatric:  oriented to time, self, and place  Extremities: no obvious extremity swelling, no lesions       Labs:  BG reviewed    Assessment/Plan:  Patient Active Problem List   Diagnosis    Left inguinal hernia    Coronary artery disease without angina pectoris, unspecified vessel or lesion type, unspecified whether native or transplanted heart    Acute congestive heart failure (HCC)    Acute congestive heart failure, unspecified heart failure type (HCC)    Syncope and collapse    JU (acute kidney injury) (HCC)    Dizziness    Parasomnia    RLS (restless legs syndrome)    Episodic mood disorder  (HCC)    Anemia, unspecified type    Rectal bleeding    Acute on chronic congestive heart failure, unspecified heart failure type (HCC)    Iron deficiency anemia due to chronic blood loss    Scrotal edema    Goals of care, counseling/discussion    Palliative care encounter       Type 2 diabetes  NovoLog 2 units 3 times daily with meals along with low-dose correction factor  Accu-Cheks ACHS  Hypoglycemia protocol     Diabetic diet     Discharge planning:  MTF 1000 mg Breakfast and dinner  Unclear if he has been taking amaryl at home, based on insulin requirements in the hospital, will hold off on amaryl on discharge     Recommend follow-up in the endocrinology clinic.  Once he is seen as an outpatient we can consider option of GLP agonist/SGLT2 inhibitors.  These might help in better management of diabetes along with a low risk of hypoglycemia.     If patient would like to follow-up in the endocrinology clinic, recommend that he call [phone] and speak with the endocrinology nurse to make an appointment with Dr. Oropeza in 1 to 2 weeks after discharge.    Endocrine service will sign off given well controlled BG levels.  Please call back with questions.        Liana Saunders MD

## 2024-03-01 NOTE — OCCUPATIONAL THERAPY NOTE
Chart reviewed. Patient received while lying in bed and stated he does not have any skilled OT needs. Reported he has been ambulating in room independently to complete ADLs in bathroom. Stated he had no concerns r/t ability to perform self-cares upon discharge. Plan is for patient to discharge home when medically cleared. OT will discontinue current order. Please re-consult if functional status changes.    Florina Lino OTR/L  [facility]  #[phone]

## 2024-03-02 LAB
ALBUMIN SERPL-MCNC: 3.6 G/DL (ref 3.2–4.8)
ANION GAP SERPL CALC-SCNC: 5 MMOL/L (ref 0–18)
ANION GAP SERPL CALC-SCNC: 5 MMOL/L (ref 0–18)
BASOPHILS # BLD AUTO: 0.05 X10(3) UL (ref 0–0.2)
BASOPHILS NFR BLD AUTO: 1.1 %
BUN BLD-MCNC: 23 MG/DL (ref 9–23)
BUN BLD-MCNC: 23 MG/DL (ref 9–23)
BUN/CREAT SERPL: 22.1 (ref 10–20)
BUN/CREAT SERPL: 22.1 (ref 10–20)
CALCIUM BLD-MCNC: 8.5 MG/DL (ref 8.7–10.4)
CALCIUM BLD-MCNC: 8.5 MG/DL (ref 8.7–10.4)
CHLORIDE SERPL-SCNC: 108 MMOL/L (ref 98–112)
CHLORIDE SERPL-SCNC: 108 MMOL/L (ref 98–112)
CO2 SERPL-SCNC: 27 MMOL/L (ref 21–32)
CO2 SERPL-SCNC: 27 MMOL/L (ref 21–32)
CREAT BLD-MCNC: 1.04 MG/DL
CREAT BLD-MCNC: 1.04 MG/DL
DEPRECATED RDW RBC AUTO: 74.4 FL (ref 35.1–46.3)
EGFRCR SERPLBLD CKD-EPI 2021: 79 ML/MIN/1.73M2 (ref 60–?)
EGFRCR SERPLBLD CKD-EPI 2021: 79 ML/MIN/1.73M2 (ref 60–?)
EOSINOPHIL # BLD AUTO: 0.17 X10(3) UL (ref 0–0.7)
EOSINOPHIL NFR BLD AUTO: 3.6 %
ERYTHROCYTE [DISTWIDTH] IN BLOOD BY AUTOMATED COUNT: 28.6 % (ref 11–15)
GLUCOSE BLD-MCNC: 148 MG/DL (ref 70–99)
GLUCOSE BLD-MCNC: 148 MG/DL (ref 70–99)
GLUCOSE BLDC GLUCOMTR-MCNC: 152 MG/DL (ref 70–99)
GLUCOSE BLDC GLUCOMTR-MCNC: 162 MG/DL (ref 70–99)
GLUCOSE BLDC GLUCOMTR-MCNC: 227 MG/DL (ref 70–99)
GLUCOSE BLDC GLUCOMTR-MCNC: 235 MG/DL (ref 70–99)
HCT VFR BLD AUTO: 29.9 %
HGB BLD-MCNC: 8.9 G/DL
IMM GRANULOCYTES # BLD AUTO: 0.01 X10(3) UL (ref 0–1)
IMM GRANULOCYTES NFR BLD: 0.2 %
IRON SATN MFR SERPL: 9 %
IRON SERPL-MCNC: 36 UG/DL
LYMPHOCYTES # BLD AUTO: 0.86 X10(3) UL (ref 1–4)
LYMPHOCYTES NFR BLD AUTO: 18.3 %
MAGNESIUM SERPL-MCNC: 1.7 MG/DL (ref 1.6–2.6)
MCH RBC QN AUTO: 23.4 PG (ref 26–34)
MCHC RBC AUTO-ENTMCNC: 29.8 G/DL (ref 31–37)
MCV RBC AUTO: 78.5 FL
MONOCYTES # BLD AUTO: 0.67 X10(3) UL (ref 0.1–1)
MONOCYTES NFR BLD AUTO: 14.3 %
NEUTROPHILS # BLD AUTO: 2.94 X10 (3) UL (ref 1.5–7.7)
NEUTROPHILS # BLD AUTO: 2.94 X10(3) UL (ref 1.5–7.7)
NEUTROPHILS NFR BLD AUTO: 62.5 %
OSMOLALITY SERPL CALC.SUM OF ELEC: 296 MOSM/KG (ref 275–295)
OSMOLALITY SERPL CALC.SUM OF ELEC: 296 MOSM/KG (ref 275–295)
PHOSPHATE SERPL-MCNC: 2.6 MG/DL (ref 2.4–5.1)
PLATELET # BLD AUTO: 145 10(3)UL (ref 150–450)
PLATELET MORPHOLOGY: NORMAL
POTASSIUM SERPL-SCNC: 4 MMOL/L (ref 3.5–5.1)
POTASSIUM SERPL-SCNC: 4 MMOL/L (ref 3.5–5.1)
RBC # BLD AUTO: 3.81 X10(6)UL
SODIUM SERPL-SCNC: 140 MMOL/L (ref 136–145)
SODIUM SERPL-SCNC: 140 MMOL/L (ref 136–145)
TIBC SERPL-MCNC: 380 UG/DL (ref 250–425)
TRANSFERRIN SERPL-MCNC: 255 MG/DL (ref 215–365)
WBC # BLD AUTO: 4.7 X10(3) UL (ref 4–11)

## 2024-03-02 PROCEDURE — 99233 SBSQ HOSP IP/OBS HIGH 50: CPT | Performed by: HOSPITALIST

## 2024-03-02 RX ORDER — MAGNESIUM OXIDE 400 MG/1
400 TABLET ORAL ONCE
Status: COMPLETED | OUTPATIENT
Start: 2024-03-02 | End: 2024-03-02

## 2024-03-02 NOTE — SPIRITUAL CARE NOTE
Spiritual Care Visit Note    Patient Name: Cristhian Lopez Date of Spiritual Care Visit: 24   : 9/3/1955 Primary Dx: Acute on chronic congestive heart failure, unspecified heart failure type (HCC)       Referred By: Referral From:     Summary:  received referral for visit from [facility]. Writer attempted to visit but patient was busy with staff.      - Spiritual Care:  remains available as needed for follow up.    Spiritual Care support can be requested via an [facility] consult. For urgent/immediate needs, please contact the On Call  at: [place]: ext [phone]    Chaplain Dasia.

## 2024-03-02 NOTE — PROGRESS NOTES
[facility]     Hospitalist Progress Note     Cristhiandasha Lopez Patient Status:  Inpatient    9/3/1955 MRN [id number]   [facility] [facility] 3W/SW Attending Mandeep Garcia MD   Hosp Day # 8 PCP KARI BARCLAY     Subjective:     Pt resting in bed, NAD  Feels well  No acute events overnight  No complaints currently  Feels a little tired  No cp, sob, f,c,n,v, abd pain or HA     Objective:    Review of Systems:   ROS completed; pertinent positive and negatives stated in subjective.      Vital signs:  Temp:  [98 °F (36.7 °C)-98.6 °F (37 °C)] 98.4 °F (36.9 °C)  Pulse:  [90-97] 90  Resp:  [16-18] 16  BP: (103-112)/(60-75) 105/60  SpO2:  [95 %-98 %] 95 %      Physical Exam:    Gen: NAD AO x3  Chest: good air entry CTABL  CVS: normal s1 and s2 RR  Abd: NABS soft NT ND  Neuro: CN 2-12 grossly intact  Ext: 1-2+ edema in bilateral LE, erythema improved       Diagnostic Data:    Labs:  Recent Labs   Lab 24  0706 24  0724 24  0654 24  0625 24  0553   WBC 5.5 4.6 4.7 4.3 4.7   HGB 8.8* 8.9* 9.0* 8.6* 8.9*   MCV 75.9* 77.2* 78.0* 77.6* 78.5*   .0* 126.0* 132.0* 138.0* 145.0*       Recent Labs   Lab 24  0609 24  0653 24  0701 24  0653 24  0625 24  1648 24  0553   *  135* 148*  148* 145*  145* 144* 134*  134*  --  148*  148*   BUN 34*  34* 29*  29* 28*  28* 26* 27*  27*  --  23  23   CREATSERUM 1.21  1.21 1.20  1.20 1.12  1.12 1.07 1.02  1.02  --  1.04  1.04   CA 8.1*  8.1* 8.4*  8.4* 8.5*  8.5* 8.6* 8.4*  8.4*  --  8.5*  8.5*   ALB 3.1* 3.2 3.4 3.5 3.4  --  3.6     138 136  136 138  138 139 140  140  --  140  140   K 3.9  3.9 3.7  3.7 3.7  3.7  3.7 3.9  3.9 3.6  3.6 4.4 4.0  4.0     105 103  103 104  104 105 105  105  --  108  108   CO2 28.0  28.0 28.0  28.0 28.0  28.0 28.0 28.0  28.0  --  27.0  27.0   ALKPHO 174* 168* 174*  --   --   --   --    AST 29 23 21  --   --    --   --    ALT <7* <7* <7*  --   --   --   --    BILT 0.8 0.9 0.8  --   --   --   --    TP 5.3* 5.4* 5.7  --   --   --   --        Estimated Creatinine Clearance: 71.2 mL/min (based on SCr of 1.04 mg/dL).    No results for input(s): \"PTP\", \"INR\" in the last 168 hours.           Imaging: Imaging data reviewed in Epic.    Medications:    bumetanide  2 mg Oral BID (Diuretic)    cephalexin  500 mg Oral 4x daily    insulin aspart  3 Units Subcutaneous TID CC    insulin aspart  1-5 Units Subcutaneous TID CC    gabapentin  100 mg Oral TID    PARoxetine HCl  40 mg Oral QAM    QUEtiapine  50 mg Oral Nightly    rOPINIRole  1 mg Oral Nightly    clopidogrel  75 mg Oral Daily    metoprolol succinate ER  25 mg Oral Daily Beta Blocker    apixaban  5 mg Oral BID    atorvastatin  80 mg Oral Daily    sacubitril-valsartan  1 tablet Oral BID       Assessment & Plan:     Acute CHF exacerbation  CAD s/p PCI and CABG  Hx of NSVT/declining ICD  Moderate to severe MR, moderate TR  JU on CKD - resolved  CXR reviewed  Saturating well on room air  Recent EF 25%  Cardiology on consult  Continue Bumex 2 mg IV BID -> now switched to PO  Continue Eliquis 5mg BID, Plavix 75 mg daily  Continue Metoprolol 25 mg daily, Entresto BID  Strict Is and Os, daily weights  Net IO Since Admission: -9,702.95 mL [03/02/24 1156]  Echo reviewed - EF 25%  Renal function at baseline - continue to monitor  Continue present management per cardiology  Telemetry monitoring  Bilateral LE cellulitis  Started on IV Cefazolin -> Switched to PO  Hypokalemia  Replaced  Continue to monitor  Telemetry monitoring  Hx of PE  Continue home meds  Saturating well on room air  HTN  BP well controlled  Continue home meds  Monitor vitals  T2DM  SSI and frequent accuchecks  Iron deficiency anemia  Hx of colon cancer s/p sigmoidectomy in 2023  Monitor H&H, transfuse as indicated  PPI  Outpatient follow up  Advanced care planning  DNAR      Plan of care discussed with patient or family  at bedside.      Supplementary Documentation:     Quality:  DVT Prophylaxis: Eliquis  CODE status: DNAR      Estimated date of discharge: TBD  Discharge is dependent on: clinical stability  At this point Mr. Lopez is expected to be discharge to: home    MDM: High

## 2024-03-02 NOTE — PROGRESS NOTES
Progress Note  Cristhiandasha Lopez Patient Status:  Inpatient    9/3/1955 MRN [id number]   [facility] [facility] 3W/SW Attending Mandeep Garcia MD   Hosp Day # 8 PCP KARI BARCLAY     Subjective:  No acute events overnight.  Feels good this morning, denies any CP, SOB, palpitations or dizziness at this time.    Objective:  /60 (BP [facility]: Right arm)   Pulse 90   Temp 98.4 °F (36.9 °C) (Oral)   Resp 16   Ht 5' 10\" (1.778 m)   Wt 211 lb 9.6 oz (96 kg)   SpO2 95%   BMI 30.36 kg/m²     Telemetry: SR, HR 90s, PVCs      Intake/Output:    Intake/Output Summary (Last 24 hours) at 3/2/2024 1034  Last data filed at 3/2/2024 0942  Gross per 24 hour   Intake 840.44 ml   Output 400 ml   Net 440.44 ml       Last 3 Weights   24 0559 211 lb 9.6 oz (96 kg)   24 0544 216 lb (98 kg)   24 0510 234 lb 11.2 oz (106.5 kg)   24 0500 232 lb (105.2 kg)   24 0505 237 lb (107.5 kg)   24 0522 233 lb 9.6 oz (106 kg)   24 1154 236 lb 12.8 oz (107.4 kg)   24 0314 229 lb 8 oz (104.1 kg)   24 1639 233 lb 9.6 oz (106 kg)   24 0910 192 lb (87.1 kg)   24 0848 230 lb (104.3 kg)   02/10/24 0622 233 lb 9.6 oz (106 kg)   24 0513 234 lb (106.1 kg)   24 0408 232 lb 11.2 oz (105.6 kg)   24 0430 233 lb 6.4 oz (105.9 kg)   24 0405 232 lb (105.2 kg)   24 0400 229 lb (103.9 kg)   24 1753 232 lb 4.8 oz (105.4 kg)   24 1404 195 lb (88.5 kg)       Labs:  Recent Labs   Lab 24  0653 24  0625 24  1648 24  0553   * 134*  134*  --  148*  148*   BUN 26* 27*  27*  --  23  23   CREATSERUM 1.07 1.02  1.02  --  1.04  1.04   EGFRCR 76 81  81  --  79  79   CA 8.6* 8.4*  8.4*  --  8.5*  8.5*    140  140  --  140  140   K 3.9  3.9 3.6  3.6 4.4 4.0  4.0    105  105  --  108  108   CO2 28.0 28.0  28.0  --  27.0  27.0     Recent Labs   Lab 24  0654 24  0625 24  0553    RBC 3.78* 3.62* 3.81   HGB 9.0* 8.6* 8.9*   HCT 29.5* 28.1* 29.9*   MCV 78.0* 77.6* 78.5*   MCH 23.8* 23.8* 23.4*   MCHC 30.5* 30.6* 29.8*   RDW 28.4* 28.3* 28.6*   NEPRELIM 3.04 2.72 2.94   WBC 4.7 4.3 4.7   .0* 138.0* 145.0*         No results for input(s): \"TROP\", \"TROPHS\", \"CK\" in the last 168 hours.    Review of Systems   Constitutional: Negative.   Cardiovascular:  Positive for leg swelling.   Respiratory: Negative.     Skin:  Positive for color change and dry skin.        Bruising       Physical Exam:    Gen: alert, oriented x 3, NAD  Heent: pupils equal, reactive. Mucous membranes moist.   Neck: no jvd  Cardiac: regular rate and rhythm, normal S1,S2, soft systolic murmur, no gallop or rub  Lungs: CTA, diminished   Abd: soft, NT/ND +bs  Ext: lower extremities +1 edema  Skin: Warm, dry  Neuro: No focal deficits        Medications:     bumetanide  2 mg Oral BID (Diuretic)    cephalexin  500 mg Oral 4x daily    insulin aspart  3 Units Subcutaneous TID CC    insulin aspart  1-5 Units Subcutaneous TID CC    gabapentin  100 mg Oral TID    PARoxetine HCl  40 mg Oral QAM    QUEtiapine  50 mg Oral Nightly    rOPINIRole  1 mg Oral Nightly    clopidogrel  75 mg Oral Daily    metoprolol succinate ER  25 mg Oral Daily Beta Blocker    apixaban  5 mg Oral BID    atorvastatin  80 mg Oral Daily    sacubitril-valsartan  1 tablet Oral BID       Assessment:  Acute on chronic HFrEF  BNP 1,707  CXR showed pulmonary edema  Echo 2/6/24 LVEF 25%, grade 3 diastolic dysfunction, mod MR, mod TR, PASP 62 mmHg  Off dobutamine since 3/1  Diuresed with IV Bumex, net fluid off 9.7L, now on PO Bumex  On toprol, entresto   Has declined ICD in the past   CAD with hx of CABG (LIMA-LAD, SVG-OM, SVG-Diag), STEMI with emergent PCI LAD 2019  Hx PCI LAD and RCA 2003  On Plavix, statin, BB   Mod MR and TR  Pulmonary HTN, PASP 62 mmHg  Lower extremities cellulitis s/p IV abx  Hx of PE on Eliquis   HTN-controlled   HLP-on statin   DM2, A1C  10.4%-per primary  JU/CKD 3-stable  Chronic anemia, hgb 8.9-stable   Hx colon CA s/p sigmoidectomy 2023    Plan:  Appears compensated cardiac status, continue oral Bumex, kidney function remains stable today.  Monitor I/Os and daily weights.  Monitor electrolytes and kidney function.  Continue current cardiac medications toprol, entresto, Plavix, statin, Eliquis.  Encourage increased activity as tolerated.  Discharge planning in progress hopefully tomorrow.    Enrolled in HF clinic 2/27 will follow up post dc.  Patient would consider discussing ICD placement OP in near future.     Plan of care discussed with patient, RN.    Nano Velascovicky, APRN  3/2/2024  10:34 AM  [phone]    Cardiology Attending:  Note reviewed and Independent exam, assessment and plan developed. Labs and tests reviewed.  Note is my recommendations with assessment and plan as I have made changes and additions within note and merged.    S/no change in symptoms  Physical Exam:  General: Alert and oriented. No apparent distress. No respiratory or constitutional distress.  HEENT: Normocephalic, anicteric sclera, neck supple  Neck: elevated JVD, carotids 2+,  Cardiac: irregular/Regular rate and rhythm. No pathologic murmur.  Lungs: Rales with normal effort.  Normal excursions and effort.  Abdomen: Soft, non-tender. BS-present.  Extremities: Without clubbing, cyanosis.  Edema, Peripheral pulses present.  Neurologic: Alert and oriented, normal affect. Motor ok.  Skin: Warm and dry.   A/P:f/u labs  Agree plan above  Brent Saldaña MD  [facility]  Cardiac Electrophysiology  3/2/2024  3v

## 2024-03-02 NOTE — PROGRESS NOTES
[facility]     Hospitalist Progress Note     Cristhian SHARAN Joelelishajoselyn Patient Status:  Inpatient    9/3/1955 MRN [id number]   [facility] [facility] 3W/SW Attending Mandeep Garcia MD   Hosp Day # 7 PCP KARI BARCLAY     Subjective:     Pt resting in bed, NAD  Feels well  No acute events overnight  B/l le swelling and erythema improved   No cp, sob, f,c,n,v, abd pain or HA     Objective:    Review of Systems:   ROS completed; pertinent positive and negatives stated in subjective.      Vital signs:  Temp:  [97.9 °F (36.6 °C)-98.4 °F (36.9 °C)] 97.9 °F (36.6 °C)  Pulse:  [89-95] 94  Resp:  [18-20] 18  BP: (106-115)/(65-75) 106/69  SpO2:  [95 %-97 %] 95 %      Physical Exam:    Gen: NAD AO x3  Chest: good air entry CTABL  CVS: normal s1 and s2 RR  Abd: NABS soft NT ND  Neuro: CN 2-12 grossly intact  Ext: 1-2+ edema in bilateral LE, erythema improved       Diagnostic Data:    Labs:  Recent Labs   Lab 24  0609 24  0706 24  0724 24  0654 24  0625   WBC 5.7 5.5 4.6 4.7 4.3   HGB 8.9* 8.8* 8.9* 9.0* 8.6*   MCV 76.3* 75.9* 77.2* 78.0* 77.6*   .0* 123.0* 126.0* 132.0* 138.0*       Recent Labs   Lab 24  0609 24  0653 24  0701 24  0653 24  0625 24  1648   *  135* 148*  148* 145*  145* 144* 134*  134*  --    BUN 34*  34* 29*  29* 28*  28* 26* 27*  27*  --    CREATSERUM 1.21  1.21 1.20  1.20 1.12  1.12 1.07 1.02  1.02  --    CA 8.1*  8.1* 8.4*  8.4* 8.5*  8.5* 8.6* 8.4*  8.4*  --    ALB 3.1* 3.2 3.4 3.5 3.4  --      138 136  136 138  138 139 140  140  --    K 3.9  3.9 3.7  3.7 3.7  3.7  3.7 3.9  3.9 3.6  3.6 4.4     105 103  103 104  104 105 105  105  --    CO2 28.0  28.0 28.0  28.0 28.0  28.0 28.0 28.0  28.0  --    ALKPHO 174* 168* 174*  --   --   --    AST 29 23 21  --   --   --    ALT <7* <7* <7*  --   --   --    BILT 0.8 0.9 0.8  --   --   --    TP 5.3* 5.4* 5.7  --   --   --         Estimated Creatinine Clearance: 72.6 mL/min (based on SCr of 1.02 mg/dL).    No results for input(s): \"PTP\", \"INR\" in the last 168 hours.           Imaging: Imaging data reviewed in Epic.    Medications:    [START ON 3/2/2024] bumetanide  2 mg Oral BID (Diuretic)    cephalexin  500 mg Oral 4x daily    insulin aspart  3 Units Subcutaneous TID CC    insulin aspart  1-5 Units Subcutaneous TID CC    gabapentin  100 mg Oral TID    PARoxetine HCl  40 mg Oral QAM    QUEtiapine  50 mg Oral Nightly    rOPINIRole  1 mg Oral Nightly    clopidogrel  75 mg Oral Daily    metoprolol succinate ER  25 mg Oral Daily Beta Blocker    apixaban  5 mg Oral BID    atorvastatin  80 mg Oral Daily    sacubitril-valsartan  1 tablet Oral BID       Assessment & Plan:     Acute CHF exacerbation  CAD s/p PCI and CABG  Hx of NSVT/declining ICD  Moderate to severe MR, moderate TR  JU on CKD - resolved  CXR reviewed  Saturating well on room air  Recent EF 25%  Cardiology on consult  Continue Bumex 2 mg IV BID -> now switched to PO  Continue Eliquis 5mg BID, Plavix 75 mg daily  Continue Metoprolol 25 mg daily, Entresto BID  Strict Is and Os, daily weights  Net IO Since Admission: -9,992.87 mL [03/01/24 1815]   Echo reviewed - EF 25%  Renal function at baseline - continue to monitor  Continue present management per cardiology  Telemetry monitoring  Bilateral LE cellulitis  Started on IV Cefazolin -> Switched to PO  Hypokalemia  Replaced  Continue to monitor  Telemetry monitoring  Hx of PE  Continue home meds  Saturating well on room air  HTN  BP well controlled  Continue home meds  Monitor vitals  T2DM  SSI and frequent accuchecks  Iron deficiency anemia  Hx of colon cancer s/p sigmoidectomy in 2023  Monitor H&H, transfuse as indicated  PPI  Outpatient follow up  Advanced care planning  DNAR      Plan of care discussed with patient or family at bedside.      Supplementary Documentation:     Quality:  DVT Prophylaxis: Eliquis  CODE status:  DNAR      Estimated date of discharge: TBD  Discharge is dependent on: clinical stability  At this point Mr. Lopez is expected to be discharge to: home    MDM: High

## 2024-03-02 NOTE — PLAN OF CARE
No complaints overnight. Iv dobutamine running. Self care in room.  Problem: Patient Centered Care  Goal: Patient preferences are identified and integrated in the patient's plan of care  Description: Interventions:  - What would you like us to know as we care for you? From home alone  - Provide timely, complete, and accurate information to patient/family  - Incorporate patient and family knowledge, values, beliefs, and cultural backgrounds into the planning and delivery of care  - Encourage patient/family to participate in care and decision-making at the level they choose  - Honor patient and family perspectives and choices  Outcome: Progressing     Problem: CARDIOVASCULAR - ADULT  Goal: Maintains optimal cardiac output and hemodynamic stability  Description: INTERVENTIONS:  - Monitor vital signs, rhythm, and trends  - Monitor for bleeding, hypotension and signs of decreased cardiac output  - Evaluate effectiveness of vasoactive medications to optimize hemodynamic stability  - Monitor arterial and/or venous puncture sites for bleeding and/or hematoma  - Assess quality of pulses, skin color and temperature  - Assess for signs of decreased coronary artery perfusion - ex. Angina  - Evaluate fluid balance, assess for edema, trend weights  Outcome: Progressing  Goal: Absence of cardiac arrhythmias or at baseline  Description: INTERVENTIONS:  - Continuous cardiac monitoring, monitor vital signs, obtain 12 lead EKG if indicated  - Evaluate effectiveness of antiarrhythmic and heart rate control medications as ordered  - Initiate emergency measures for life threatening arrhythmias  - Monitor electrolytes and administer replacement therapy as ordered  Outcome: Progressing     Problem: RESPIRATORY - ADULT  Goal: Achieves optimal ventilation and oxygenation  Description: INTERVENTIONS:  - Assess for changes in respiratory status  - Assess for changes in mentation and behavior  - Position to facilitate oxygenation and minimize  respiratory effort  - Oxygen supplementation based on oxygen saturation or ABGs  - Provide Smoking Cessation handout, if applicable  - Encourage broncho-pulmonary hygiene including cough, deep breathe, Incentive Spirometry  - Assess the need for suctioning and perform as needed  - Assess and instruct to report SOB or any respiratory difficulty  - Respiratory Therapy support as indicated  - Manage/alleviate anxiety  - Monitor for signs/symptoms of CO2 retention  Outcome: Progressing     Problem: PAIN - ADULT  Goal: Verbalizes/displays adequate comfort level or patient's stated pain goal  Description: INTERVENTIONS:  - Encourage pt to monitor pain and request assistance  - Assess pain using appropriate pain scale  - Administer analgesics based on type and severity of pain and evaluate response  - Implement non-pharmacological measures as appropriate and evaluate response  - Consider cultural and social influences on pain and pain management  - Manage/alleviate anxiety  - Utilize distraction and/or relaxation techniques  - Monitor for opioid side effects  - Notify MD/LIP if interventions unsuccessful or patient reports new pain  - Anticipate increased pain with activity and pre-medicate as appropriate  Outcome: Progressing     Problem: SAFETY ADULT - FALL  Goal: Free from fall injury  Description: INTERVENTIONS:  - Assess pt frequently for physical needs  - Identify cognitive and physical deficits and behaviors that affect risk of falls.  - [place] fall precautions as indicated by assessment.  - Educate pt/family on patient safety including physical limitations  - Instruct pt to call for assistance with activity based on assessment  - Modify environment to reduce risk of injury  - Provide assistive devices as appropriate  - Consider OT/PT consult to assist with strengthening/mobility  - Encourage toileting schedule  Outcome: Progressing     Problem: DISCHARGE PLANNING  Goal: Discharge to home or other facility with  appropriate resources  Description: INTERVENTIONS:  - Identify barriers to discharge w/pt and caregiver  - Include patient/family/discharge partner in discharge planning  - Arrange for needed discharge resources and transportation as appropriate  - Identify discharge learning needs (meds, wound care, etc)  - Arrange for interpreters to assist at discharge as needed  - Consider post-discharge preferences of patient/family/discharge partner  - Complete POLST form as appropriate  - Assess patient's ability to be responsible for managing their own health  - Refer to Case Management Department for coordinating discharge planning if the patient needs post-hospital services based on physician/LIP order or complex needs related to functional status, cognitive ability or social support system  Outcome: Progressing     Problem: Patient/Family Goals  Goal: Patient/Family Long Term Goal  Description: Patient's Long Term Goal: Get stronger    Interventions:  - See additional Care Plan goals for specific interventions  Outcome: Progressing  Goal: Patient/Family Short Term Goal  Description: Patient's Short Term Goal: Go home     Interventions:   - See additional Care Plan goals for specific interventions  Outcome: Progressing

## 2024-03-03 VITALS
DIASTOLIC BLOOD PRESSURE: 77 MMHG | WEIGHT: 212.63 LBS | SYSTOLIC BLOOD PRESSURE: 100 MMHG | HEART RATE: 86 BPM | OXYGEN SATURATION: 91 % | BODY MASS INDEX: 30.44 KG/M2 | TEMPERATURE: 98 F | RESPIRATION RATE: 20 BRPM | HEIGHT: 70 IN

## 2024-03-03 LAB
ALBUMIN SERPL-MCNC: 3.7 G/DL (ref 3.2–4.8)
ANION GAP SERPL CALC-SCNC: 6 MMOL/L (ref 0–18)
ANION GAP SERPL CALC-SCNC: 6 MMOL/L (ref 0–18)
BASOPHILS # BLD AUTO: 0.04 X10(3) UL (ref 0–0.2)
BASOPHILS NFR BLD AUTO: 0.8 %
BUN BLD-MCNC: 32 MG/DL (ref 9–23)
BUN BLD-MCNC: 32 MG/DL (ref 9–23)
BUN/CREAT SERPL: 25.8 (ref 10–20)
BUN/CREAT SERPL: 25.8 (ref 10–20)
CALCIUM BLD-MCNC: 8.6 MG/DL (ref 8.7–10.4)
CALCIUM BLD-MCNC: 8.6 MG/DL (ref 8.7–10.4)
CHLORIDE SERPL-SCNC: 107 MMOL/L (ref 98–112)
CHLORIDE SERPL-SCNC: 107 MMOL/L (ref 98–112)
CO2 SERPL-SCNC: 26 MMOL/L (ref 21–32)
CO2 SERPL-SCNC: 26 MMOL/L (ref 21–32)
CREAT BLD-MCNC: 1.24 MG/DL
CREAT BLD-MCNC: 1.24 MG/DL
DEPRECATED RDW RBC AUTO: 75.3 FL (ref 35.1–46.3)
EGFRCR SERPLBLD CKD-EPI 2021: 64 ML/MIN/1.73M2 (ref 60–?)
EGFRCR SERPLBLD CKD-EPI 2021: 64 ML/MIN/1.73M2 (ref 60–?)
EOSINOPHIL # BLD AUTO: 0.19 X10(3) UL (ref 0–0.7)
EOSINOPHIL NFR BLD AUTO: 3.7 %
ERYTHROCYTE [DISTWIDTH] IN BLOOD BY AUTOMATED COUNT: 28.7 % (ref 11–15)
GLUCOSE BLD-MCNC: 150 MG/DL (ref 70–99)
GLUCOSE BLD-MCNC: 150 MG/DL (ref 70–99)
GLUCOSE BLDC GLUCOMTR-MCNC: 149 MG/DL (ref 70–99)
HCT VFR BLD AUTO: 32.1 %
HGB BLD-MCNC: 9.8 G/DL
IMM GRANULOCYTES # BLD AUTO: 0.01 X10(3) UL (ref 0–1)
IMM GRANULOCYTES NFR BLD: 0.2 %
LYMPHOCYTES # BLD AUTO: 1.1 X10(3) UL (ref 1–4)
LYMPHOCYTES NFR BLD AUTO: 21.2 %
MAGNESIUM SERPL-MCNC: 1.8 MG/DL (ref 1.6–2.6)
MCH RBC QN AUTO: 24 PG (ref 26–34)
MCHC RBC AUTO-ENTMCNC: 30.5 G/DL (ref 31–37)
MCV RBC AUTO: 78.7 FL
MONOCYTES # BLD AUTO: 0.82 X10(3) UL (ref 0.1–1)
MONOCYTES NFR BLD AUTO: 15.8 %
NEUTROPHILS # BLD AUTO: 3.04 X10 (3) UL (ref 1.5–7.7)
NEUTROPHILS # BLD AUTO: 3.04 X10(3) UL (ref 1.5–7.7)
NEUTROPHILS NFR BLD AUTO: 58.3 %
OSMOLALITY SERPL CALC.SUM OF ELEC: 298 MOSM/KG (ref 275–295)
OSMOLALITY SERPL CALC.SUM OF ELEC: 298 MOSM/KG (ref 275–295)
PHOSPHATE SERPL-MCNC: 2.9 MG/DL (ref 2.4–5.1)
PLATELET # BLD AUTO: 149 10(3)UL (ref 150–450)
POTASSIUM SERPL-SCNC: 5 MMOL/L (ref 3.5–5.1)
POTASSIUM SERPL-SCNC: 5 MMOL/L (ref 3.5–5.1)
RBC # BLD AUTO: 4.08 X10(6)UL
SODIUM SERPL-SCNC: 139 MMOL/L (ref 136–145)
SODIUM SERPL-SCNC: 139 MMOL/L (ref 136–145)
WBC # BLD AUTO: 5.2 X10(3) UL (ref 4–11)

## 2024-03-03 PROCEDURE — 99239 HOSP IP/OBS DSCHRG MGMT >30: CPT | Performed by: HOSPITALIST

## 2024-03-03 RX ORDER — BUMETANIDE 1 MG/1
2 TABLET ORAL DAILY
Status: DISCONTINUED | OUTPATIENT
Start: 2024-03-04 | End: 2024-03-03

## 2024-03-03 RX ORDER — MAGNESIUM OXIDE 400 MG/1
400 TABLET ORAL ONCE
Status: COMPLETED | OUTPATIENT
Start: 2024-03-03 | End: 2024-03-03

## 2024-03-03 NOTE — DISCHARGE SUMMARY
[facility]  part of [facility]    Discharge Summary    Cristhian Lopez Patient Status:  Inpatient    9/3/1955 MRN [id number]   [facility] [facility] 3W/SW Attending Mandeep Garcia MD   Hosp Day # 9 PCP KARI BARCLAY     Date of Admission: 2024 Disposition: Home or Self Care     Date of Discharge: 3/3/24    Admitting Diagnosis: Scrotal edema [N50.89]  Acute on chronic congestive heart failure, unspecified heart failure type (HCC) [I50.9]    Hospital Discharge Diagnoses: HFrEF exacerbation, B/l LE cellulitis, JU on CKD    Lace+ Score: 80  59-90 High Risk  29-58 Medium Risk  0-28   Low Risk.    TCM Follow-Up Recommendation:  LACE > 58: High Risk of readmission after discharge from the hospital.    Problem List:   Patient Active Problem List   Diagnosis    Left inguinal hernia    Coronary artery disease without angina pectoris, unspecified vessel or lesion type, unspecified whether native or transplanted heart    Acute congestive heart failure (HCC)    Acute congestive heart failure, unspecified heart failure type (HCC)    Syncope and collapse    JU (acute kidney injury) (HCC)    Dizziness    Parasomnia    RLS (restless legs syndrome)    Episodic mood disorder (HCC)    Anemia, unspecified type    Rectal bleeding    Acute on chronic congestive heart failure, unspecified heart failure type (HCC)    Iron deficiency anemia due to chronic blood loss    Scrotal edema    Goals of care, counseling/discussion    Palliative care encounter       Reason for Admission: HFrEF    Physical Exam:   Vitals:    24 0848   BP: 100/77   Pulse: 86   Resp: 20   Temp: 97.8 °F (36.6 °C)     Gen: NAD AO x3  Chest: good air entry CTABL  CVS: normal s1 and s2 RR  Abd: NABS soft NT ND  Neuro: CN 2-12 grossly intact  Ext: 1-2+ edema in bilateral LE, erythema improved     History of Present Illness:   Per Dr. Rockwell  The patient is a 68-year-old  male with underlying ischemic cardiomyopathy  who came in today to the emergency department for evaluation of increased dyspnea on exertion and leg edema. CBC showed white blood cell count of 7.4, hemoglobin of 9.5, MCV 78.6. The hemoglobin is stable. Chemistry showed GFR of 42 which is at baseline, BUN and creatinine of 52 and 1.77, glucose 187. B-natriuretic peptide 1700. Chest x-ray showed no acute findings. EKG showed sinus rhythm. Started on IV Lasix, also started on IV Ancef for lower extremity and scrotal cellulitis.     Hospital Course:   Acute CHF exacerbation  CAD s/p PCI and CABG  Hx of NSVT/declining ICD  Moderate to severe MR, moderate TR  JU on CKD - resolved  CXR reviewed  Saturating well on room air  Recent EF 25%  Cardiology on consult  Continue Bumex 2 mg IV BID -> now switched to PO  Continue Eliquis 5mg BID, Plavix 75 mg daily  Continue Metoprolol 25 mg daily, Entresto BID  Strict Is and Os, daily weights  Net IO Since Admission: -10,802.95 mL [03/04/24 0942]   Echo reviewed - EF 25%  Renal function at baseline    Bilateral LE cellulitis  Started on IV Cefazolin -> completed PO course  Hypokalemia  Replaced    Hx of PE  Continue home meds    HTN  BP well controlled  Continue home meds    T2DM  Cont home meds  Iron deficiency anemia  Hx of colon cancer s/p sigmoidectomy in 2023    Advanced care planning  DNAR/select    Consultations: cardiology, endocrinology    Procedures: none     Complications: none     Discharge Condition: Stable    Discharge Medications:      Discharge Medications        CONTINUE taking these medications        Instructions Prescription details   allopurinol 300 MG Tabs  Commonly known as: Zyloprim      Take 1 tablet (300 mg total) by mouth daily.   Refills: 0     apixaban 5 MG Tabs  Commonly known as: Eliquis      Take 1 tablet (5 mg total) by mouth 2 (two) times daily.   Refills: 0     atorvastatin 80 MG Tabs  Commonly known as: Lipitor      Take 1 tablet (80 mg total) by mouth daily.   Refills: 0     bumetanide 2 MG  Tabs  Commonly known as: Bumex      Take 1 tablet (2 mg total) by mouth daily.   Refills: 0     clopidogrel 75 MG Tabs  Commonly known as: Plavix      Take 1 tablet (75 mg total) by mouth daily.   Refills: 0     cyanocobalamin 100 MCG Tabs      Take 1 tablet (100 mcg total) by mouth daily.   Quantity: 90 tablet  Refills: 3     Entresto 24-26 MG Tabs  Generic drug: sacubitril-valsartan      Take 1 tablet by mouth 2 (two) times daily. Take 1/2 tab of entresto   Refills: 0     gabapentin 100 MG Caps  Commonly known as: Neurontin      Take 1 capsule (100 mg total) by mouth 3 (three) times daily.   Quantity: 90 capsule  Refills: 1     glimepiride 4 MG Tabs  Commonly known as: Amaryl      Take 1 tablet (4 mg total) by mouth every morning before breakfast.   Refills: 0     metFORMIN HCl 1000 MG Tabs  Commonly known as: GLUCOPHAGE      Take 1 tablet (1,000 mg total) by mouth in the morning and 1 tablet (1,000 mg total) before bedtime.   Refills: 0     metoprolol succinate ER 25 MG Tb24  Commonly known as: Toprol XL      Take 0.5 tablets (12.5 mg total) by mouth Daily Beta Blocker.   Quantity: 30 tablet  Refills: 0     pantoprazole 40 MG Tbec  Commonly known as: Protonix      Take 1 tablet (40 mg total) by mouth every morning before breakfast.   Quantity: 30 tablet  Refills: 0     PARoxetine HCl 40 MG Tabs  Commonly known as: PAXIL      Take 1 tablet (40 mg total) by mouth every morning.   Refills: 0     QUEtiapine 50 MG Tabs  Commonly known as: SEROquel      Take 1 tablet (50 mg total) by mouth nightly.   Quantity: 30 tablet  Refills: 1     rOPINIRole 1 MG Tabs  Commonly known as: Requip      Take 1 tablet (1 mg total) by mouth nightly.   Refills: 0     Zetia 10 MG Tabs  Generic drug: ezetimibe       Refills: 0            STOP taking these medications      cephalexin 500 MG Caps  Commonly known as: Keflex                 Greater than 35 minutes spent, >50% spent counseling re: treatment plan and workup     Mandeep ANGELO  MD Jose  3/3/2024

## 2024-03-03 NOTE — PLAN OF CARE
Patient alert and oriented x 4. Vitals stable on room air. Patient denies pain. Patient had dobutamine drip discontinued this morning. Plan to continue to monitor and discharge once medically cleared. Call light within reach.    Problem: Patient Centered Care  Goal: Patient preferences are identified and integrated in the patient's plan of care  Description: Interventions:  - What would you like us to know as we care for you? From home alone  - Provide timely, complete, and accurate information to patient/family  - Incorporate patient and family knowledge, values, beliefs, and cultural backgrounds into the planning and delivery of care  - Encourage patient/family to participate in care and decision-making at the level they choose  - Honor patient and family perspectives and choices  Outcome: Progressing     Problem: CARDIOVASCULAR - ADULT  Goal: Maintains optimal cardiac output and hemodynamic stability  Description: INTERVENTIONS:  - Monitor vital signs, rhythm, and trends  - Monitor for bleeding, hypotension and signs of decreased cardiac output  - Evaluate effectiveness of vasoactive medications to optimize hemodynamic stability  - Monitor arterial and/or venous puncture sites for bleeding and/or hematoma  - Assess quality of pulses, skin color and temperature  - Assess for signs of decreased coronary artery perfusion - ex. Angina  - Evaluate fluid balance, assess for edema, trend weights  Outcome: Progressing  Goal: Absence of cardiac arrhythmias or at baseline  Description: INTERVENTIONS:  - Continuous cardiac monitoring, monitor vital signs, obtain 12 lead EKG if indicated  - Evaluate effectiveness of antiarrhythmic and heart rate control medications as ordered  - Initiate emergency measures for life threatening arrhythmias  - Monitor electrolytes and administer replacement therapy as ordered  Outcome: Progressing     Problem: RESPIRATORY - ADULT  Goal: Achieves optimal ventilation and  oxygenation  Description: INTERVENTIONS:  - Assess for changes in respiratory status  - Assess for changes in mentation and behavior  - Position to facilitate oxygenation and minimize respiratory effort  - Oxygen supplementation based on oxygen saturation or ABGs  - Provide Smoking Cessation handout, if applicable  - Encourage broncho-pulmonary hygiene including cough, deep breathe, Incentive Spirometry  - Assess the need for suctioning and perform as needed  - Assess and instruct to report SOB or any respiratory difficulty  - Respiratory Therapy support as indicated  - Manage/alleviate anxiety  - Monitor for signs/symptoms of CO2 retention  Outcome: Progressing     Problem: PAIN - ADULT  Goal: Verbalizes/displays adequate comfort level or patient's stated pain goal  Description: INTERVENTIONS:  - Encourage pt to monitor pain and request assistance  - Assess pain using appropriate pain scale  - Administer analgesics based on type and severity of pain and evaluate response  - Implement non-pharmacological measures as appropriate and evaluate response  - Consider cultural and social influences on pain and pain management  - Manage/alleviate anxiety  - Utilize distraction and/or relaxation techniques  - Monitor for opioid side effects  - Notify MD/LIP if interventions unsuccessful or patient reports new pain  - Anticipate increased pain with activity and pre-medicate as appropriate  Outcome: Progressing     Problem: SAFETY ADULT - FALL  Goal: Free from fall injury  Description: INTERVENTIONS:  - Assess pt frequently for physical needs  - Identify cognitive and physical deficits and behaviors that affect risk of falls.  - [place] fall precautions as indicated by assessment.  - Educate pt/family on patient safety including physical limitations  - Instruct pt to call for assistance with activity based on assessment  - Modify environment to reduce risk of injury  - Provide assistive devices as appropriate  - Consider  OT/PT consult to assist with strengthening/mobility  - Encourage toileting schedule  Outcome: Progressing     Problem: DISCHARGE PLANNING  Goal: Discharge to home or other facility with appropriate resources  Description: INTERVENTIONS:  - Identify barriers to discharge w/pt and caregiver  - Include patient/family/discharge partner in discharge planning  - Arrange for needed discharge resources and transportation as appropriate  - Identify discharge learning needs (meds, wound care, etc)  - Arrange for interpreters to assist at discharge as needed  - Consider post-discharge preferences of patient/family/discharge partner  - Complete POLST form as appropriate  - Assess patient's ability to be responsible for managing their own health  - Refer to Case Management Department for coordinating discharge planning if the patient needs post-hospital services based on physician/LIP order or complex needs related to functional status, cognitive ability or social support system  Outcome: Progressing     Problem: Patient/Family Goals  Goal: Patient/Family Long Term Goal  Description: Patient's Long Term Goal: Get stronger    Interventions:  - See additional Care Plan goals for specific interventions  Outcome: Progressing  Goal: Patient/Family Short Term Goal  Description: Patient's Short Term Goal: Go home     Interventions:   - See additional Care Plan goals for specific interventions  Outcome: Progressing

## 2024-03-03 NOTE — PROGRESS NOTES
Progress Note  Cristhiandasha Lopez Patient Status:  Inpatient    9/3/1955 MRN [id number]   [facility] [facility] 3W/SW Attending Mandeep Garcia MD   Hosp Day # 9 PCP KARI BARCLAY     Subjective:  No acute events overnight.  Feels good this morning, denies any cardiac symptoms at this time.    Objective:  /77 (BP [facility]: Right arm)   Pulse 86   Temp 97.8 °F (36.6 °C) (Axillary)   Resp 20   Ht 5' 10\" (1.778 m)   Wt 212 lb 9.6 oz (96.4 kg)   SpO2 91%   BMI 30.50 kg/m²     Telemetry: SR, HR 80s      Intake/Output:    Intake/Output Summary (Last 24 hours) at 3/3/2024 1114  Last data filed at 3/3/2024 0848  Gross per 24 hour   Intake 300 ml   Output 1400 ml   Net -1100 ml       Last 3 Weights   24 0508 212 lb 9.6 oz (96.4 kg)   24 0559 211 lb 9.6 oz (96 kg)   24 0544 216 lb (98 kg)   24 0510 234 lb 11.2 oz (106.5 kg)   24 0500 232 lb (105.2 kg)   24 0505 237 lb (107.5 kg)   24 0522 233 lb 9.6 oz (106 kg)   24 1154 236 lb 12.8 oz (107.4 kg)   24 0314 229 lb 8 oz (104.1 kg)   24 1639 233 lb 9.6 oz (106 kg)   24 0910 192 lb (87.1 kg)   24 0848 230 lb (104.3 kg)   02/10/24 0622 233 lb 9.6 oz (106 kg)   24 0513 234 lb (106.1 kg)   24 0408 232 lb 11.2 oz (105.6 kg)   24 0430 233 lb 6.4 oz (105.9 kg)   24 0405 232 lb (105.2 kg)   24 0400 229 lb (103.9 kg)   24 1753 232 lb 4.8 oz (105.4 kg)   24 1404 195 lb (88.5 kg)       Labs:  Recent Labs   Lab 24  0625 24  1648 24  0553 24  0656   *  134*  --  148*  148* 150*  150*   BUN 27*  27*  --  23  23 32*  32*   CREATSERUM 1.02  1.02  --  1.04  1.04 1.24  1.24   EGFRCR 81  81  --  79  79 64  64   CA 8.4*  8.4*  --  8.5*  8.5* 8.6*  8.6*     140  --  140  140 139  139   K 3.6  3.6 4.4 4.0  4.0 5.0  5.0     105  --  108  108 107  107   CO2 28.0  28.0  --  27.0  27.0 26.0  26.0      Recent Labs   Lab 03/01/24  0625 03/02/24  0553 03/03/24  0656   RBC 3.62* 3.81 4.08   HGB 8.6* 8.9* 9.8*   HCT 28.1* 29.9* 32.1*   MCV 77.6* 78.5* 78.7*   MCH 23.8* 23.4* 24.0*   MCHC 30.6* 29.8* 30.5*   RDW 28.3* 28.6* 28.7*   NEPRELIM 2.72 2.94 3.04   WBC 4.3 4.7 5.2   .0* 145.0* 149.0*         No results for input(s): \"TROP\", \"TROPHS\", \"CK\" in the last 168 hours.    Review of Systems   Constitutional: Negative.   Cardiovascular:  Positive for leg swelling.   Respiratory: Negative.     Skin:  Positive for dry skin.       Physical Exam:    Gen: alert, oriented x 3, NAD  Heent: pupils equal, reactive. Mucous membranes moist.   Neck: no jvd  Cardiac: regular rate and rhythm, normal S1,S2, soft systolic murmur, no gallop or rub  Lungs: CTA, diminished at the basis   Abd: soft, NT/ND +bs  Ext: trace of lower extremities edema  Skin: Warm, dry  Neuro: No focal deficits        Medications:     bumetanide  2 mg Oral BID (Diuretic)    insulin aspart  3 Units Subcutaneous TID CC    insulin aspart  1-5 Units Subcutaneous TID CC    gabapentin  100 mg Oral TID    PARoxetine HCl  40 mg Oral QAM    QUEtiapine  50 mg Oral Nightly    rOPINIRole  1 mg Oral Nightly    clopidogrel  75 mg Oral Daily    metoprolol succinate ER  25 mg Oral Daily Beta Blocker    apixaban  5 mg Oral BID    atorvastatin  80 mg Oral Daily    sacubitril-valsartan  1 tablet Oral BID     Assessment:  Acute on chronic HFrEF  BNP 1,707  CXR showed pulmonary edema  Echo 2/6/24 LVEF 25%, grade 3 diastolic dysfunction, mod MR, mod TR, PASP 62 mmHg  Off dobutamine since 3/1  Diuresed with IV Bumex, net fluid off 10.8 L, now on PO Bumex  On toprol, entresto   Has declined ICD in the past   CAD with hx of CABG (LIMA-LAD, SVG-OM, SVG-Diag), STEMI with emergent PCI LAD 2019  Hx PCI LAD and RCA 2003  On Plavix, statin, BB   Mod MR and TR  Pulmonary HTN, PASP 62 mmHg  Lower extremities cellulitis s/p IV abx  Hx of PE on Eliquis   HTN-controlled   HLP-on statin    DM2, A1C 10.4%-per primary  JU/CKD 3-stable  Chronic anemia, hgb 8.9-stable   Hx colon CA s/p sigmoidectomy 2023    Plan:  Appears compensated cardiac status.  Volume status on a dry side, will decrease Bumex to daily and reevaluate OP.  Continue entresto, toprol, Plavix, Eliquis, statin.  Plan to dc today, ok with cardiology will follow up with Dr Almazan in 1 week.   Enrolled in HF clinic, encouraged to follow up OP.   Will discuss ICD insertion during OP visit with Dr Almazan.      Plan of care discussed with patient, RN.    Nano Velascovicky, APRN  3/3/2024  11:14 AM  [phone]       Cardiology Attending:  Note reviewed and Independent exam, assessment and plan developed. Labs and tests reviewed.  Note is my recommendations with assessment and plan as I have made changes and additions within note and merged.    S/no change in symptoms  Physical Exam:  General: Alert and oriented. No apparent distress. No respiratory or constitutional distress.  HEENT: Normocephalic, anicteric sclera, neck supple  Neck: elevated JVD, carotids 2+,  Cardiac: irregular/Regular rate and rhythm. No pathologic murmur.  Lungs: Rales with normal effort.  Normal excursions and effort.  Abdomen: Soft, non-tender. BS-present.  Extremities: Without clubbing, cyanosis.  Edema, Peripheral pulses present.  Neurologic: Alert and oriented, normal affect. Motor ok.  Skin: Warm and dry.   A/P:f/u labs  Agree plan above  Brent Saldaña MD  [facility]  Cardiac Electrophysiology  3/3/2024  3v

## 2024-03-03 NOTE — PLAN OF CARE
Patient cleared for discharge. Discharge instructions discussed with patient at the bedside. All questions addressed and answered. Tele and IV removed. Patient wheeled out by this RN.     Problem: Patient Centered Care  Goal: Patient preferences are identified and integrated in the patient's plan of care  Description: Interventions:  - What would you like us to know as we care for you? From home alone  - Provide timely, complete, and accurate information to patient/family  - Incorporate patient and family knowledge, values, beliefs, and cultural backgrounds into the planning and delivery of care  - Encourage patient/family to participate in care and decision-making at the level they choose  - Honor patient and family perspectives and choices  Outcome: Completed     Problem: CARDIOVASCULAR - ADULT  Goal: Maintains optimal cardiac output and hemodynamic stability  Description: INTERVENTIONS:  - Monitor vital signs, rhythm, and trends  - Monitor for bleeding, hypotension and signs of decreased cardiac output  - Evaluate effectiveness of vasoactive medications to optimize hemodynamic stability  - Monitor arterial and/or venous puncture sites for bleeding and/or hematoma  - Assess quality of pulses, skin color and temperature  - Assess for signs of decreased coronary artery perfusion - ex. Angina  - Evaluate fluid balance, assess for edema, trend weights  Outcome: Completed  Goal: Absence of cardiac arrhythmias or at baseline  Description: INTERVENTIONS:  - Continuous cardiac monitoring, monitor vital signs, obtain 12 lead EKG if indicated  - Evaluate effectiveness of antiarrhythmic and heart rate control medications as ordered  - Initiate emergency measures for life threatening arrhythmias  - Monitor electrolytes and administer replacement therapy as ordered  Outcome: Completed     Problem: RESPIRATORY - ADULT  Goal: Achieves optimal ventilation and oxygenation  Description: INTERVENTIONS:  - Assess for changes in  respiratory status  - Assess for changes in mentation and behavior  - Position to facilitate oxygenation and minimize respiratory effort  - Oxygen supplementation based on oxygen saturation or ABGs  - Provide Smoking Cessation handout, if applicable  - Encourage broncho-pulmonary hygiene including cough, deep breathe, Incentive Spirometry  - Assess the need for suctioning and perform as needed  - Assess and instruct to report SOB or any respiratory difficulty  - Respiratory Therapy support as indicated  - Manage/alleviate anxiety  - Monitor for signs/symptoms of CO2 retention  Outcome: Completed     Problem: PAIN - ADULT  Goal: Verbalizes/displays adequate comfort level or patient's stated pain goal  Description: INTERVENTIONS:  - Encourage pt to monitor pain and request assistance  - Assess pain using appropriate pain scale  - Administer analgesics based on type and severity of pain and evaluate response  - Implement non-pharmacological measures as appropriate and evaluate response  - Consider cultural and social influences on pain and pain management  - Manage/alleviate anxiety  - Utilize distraction and/or relaxation techniques  - Monitor for opioid side effects  - Notify MD/LIP if interventions unsuccessful or patient reports new pain  - Anticipate increased pain with activity and pre-medicate as appropriate  Outcome: Completed     Problem: SAFETY ADULT - FALL  Goal: Free from fall injury  Description: INTERVENTIONS:  - Assess pt frequently for physical needs  - Identify cognitive and physical deficits and behaviors that affect risk of falls.  - [place] fall precautions as indicated by assessment.  - Educate pt/family on patient safety including physical limitations  - Instruct pt to call for assistance with activity based on assessment  - Modify environment to reduce risk of injury  - Provide assistive devices as appropriate  - Consider OT/PT consult to assist with strengthening/mobility  - Encourage  toileting schedule  Outcome: Completed     Problem: DISCHARGE PLANNING  Goal: Discharge to home or other facility with appropriate resources  Description: INTERVENTIONS:  - Identify barriers to discharge w/pt and caregiver  - Include patient/family/discharge partner in discharge planning  - Arrange for needed discharge resources and transportation as appropriate  - Identify discharge learning needs (meds, wound care, etc)  - Arrange for interpreters to assist at discharge as needed  - Consider post-discharge preferences of patient/family/discharge partner  - Complete POLST form as appropriate  - Assess patient's ability to be responsible for managing their own health  - Refer to Case Management Department for coordinating discharge planning if the patient needs post-hospital services based on physician/LIP order or complex needs related to functional status, cognitive ability or social support system  Outcome: Completed     Problem: Patient/Family Goals  Goal: Patient/Family Long Term Goal  Description: Patient's Long Term Goal: Get stronger    Interventions:  - See additional Care Plan goals for specific interventions  Outcome: Completed  Goal: Patient/Family Short Term Goal  Description: Patient's Short Term Goal: Go home     Interventions:   - See additional Care Plan goals for specific interventions  Outcome: Completed

## 2024-03-03 NOTE — CM/SW NOTE
03/03/24 1200   Discharge disposition   Expected discharge disposition Home or Self   Patient Declines Recommended Services Yes  (palliative)   Discharge transportation Private car     Liana Healy MBA BSN RN CRRN   RN Case Manager  [phone]

## 2024-03-03 NOTE — PLAN OF CARE
Pt alert and oriented x4. Denies chest pain. Did reports some dyspnea with exertion. Dry cough noted. On RA. Satting in the mid-90s. Vitals WNL. Plan for home upon discharge pending medical clearance.     Problem: Patient Centered Care  Goal: Patient preferences are identified and integrated in the patient's plan of care  Description: Interventions:  - What would you like us to know as we care for you? From home alone  - Provide timely, complete, and accurate information to patient/family  - Incorporate patient and family knowledge, values, beliefs, and cultural backgrounds into the planning and delivery of care  - Encourage patient/family to participate in care and decision-making at the level they choose  - Honor patient and family perspectives and choices  Outcome: Progressing     Problem: CARDIOVASCULAR - ADULT  Goal: Maintains optimal cardiac output and hemodynamic stability  Description: INTERVENTIONS:  - Monitor vital signs, rhythm, and trends  - Monitor for bleeding, hypotension and signs of decreased cardiac output  - Evaluate effectiveness of vasoactive medications to optimize hemodynamic stability  - Monitor arterial and/or venous puncture sites for bleeding and/or hematoma  - Assess quality of pulses, skin color and temperature  - Assess for signs of decreased coronary artery perfusion - ex. Angina  - Evaluate fluid balance, assess for edema, trend weights  Outcome: Progressing  Goal: Absence of cardiac arrhythmias or at baseline  Description: INTERVENTIONS:  - Continuous cardiac monitoring, monitor vital signs, obtain 12 lead EKG if indicated  - Evaluate effectiveness of antiarrhythmic and heart rate control medications as ordered  - Initiate emergency measures for life threatening arrhythmias  - Monitor electrolytes and administer replacement therapy as ordered  Outcome: Progressing     Problem: RESPIRATORY - ADULT  Goal: Achieves optimal ventilation and oxygenation  Description: INTERVENTIONS:  -  Assess for changes in respiratory status  - Assess for changes in mentation and behavior  - Position to facilitate oxygenation and minimize respiratory effort  - Oxygen supplementation based on oxygen saturation or ABGs  - Provide Smoking Cessation handout, if applicable  - Encourage broncho-pulmonary hygiene including cough, deep breathe, Incentive Spirometry  - Assess the need for suctioning and perform as needed  - Assess and instruct to report SOB or any respiratory difficulty  - Respiratory Therapy support as indicated  - Manage/alleviate anxiety  - Monitor for signs/symptoms of CO2 retention  Outcome: Progressing     Problem: PAIN - ADULT  Goal: Verbalizes/displays adequate comfort level or patient's stated pain goal  Description: INTERVENTIONS:  - Encourage pt to monitor pain and request assistance  - Assess pain using appropriate pain scale  - Administer analgesics based on type and severity of pain and evaluate response  - Implement non-pharmacological measures as appropriate and evaluate response  - Consider cultural and social influences on pain and pain management  - Manage/alleviate anxiety  - Utilize distraction and/or relaxation techniques  - Monitor for opioid side effects  - Notify MD/LIP if interventions unsuccessful or patient reports new pain  - Anticipate increased pain with activity and pre-medicate as appropriate  Outcome: Progressing     Problem: SAFETY ADULT - FALL  Goal: Free from fall injury  Description: INTERVENTIONS:  - Assess pt frequently for physical needs  - Identify cognitive and physical deficits and behaviors that affect risk of falls.  - [place] fall precautions as indicated by assessment.  - Educate pt/family on patient safety including physical limitations  - Instruct pt to call for assistance with activity based on assessment  - Modify environment to reduce risk of injury  - Provide assistive devices as appropriate  - Consider OT/PT consult to assist with  strengthening/mobility  - Encourage toileting schedule  Outcome: Progressing     Problem: DISCHARGE PLANNING  Goal: Discharge to home or other facility with appropriate resources  Description: INTERVENTIONS:  - Identify barriers to discharge w/pt and caregiver  - Include patient/family/discharge partner in discharge planning  - Arrange for needed discharge resources and transportation as appropriate  - Identify discharge learning needs (meds, wound care, etc)  - Arrange for interpreters to assist at discharge as needed  - Consider post-discharge preferences of patient/family/discharge partner  - Complete POLST form as appropriate  - Assess patient's ability to be responsible for managing their own health  - Refer to Case Management Department for coordinating discharge planning if the patient needs post-hospital services based on physician/LIP order or complex needs related to functional status, cognitive ability or social support system  Outcome: Progressing

## 2024-03-06 NOTE — PAYOR COMM NOTE
--------------  CONTINUED STAY REVIEW    Payor: [employer] MEDICARE ADV PPO  Subscriber #:  [id number]  Authorization Number: [id number]    Admit date: 2/23/24  Admit time:  4:38 PM    2/29/24  CARDIOLOGY  Overall feels weak today, hoping to be able to get up to the chair. Does feel like his LE and scrotal edema has significantly improved. No pain.      /65 (BP [facility]: Left arm)   Pulse 86   Temp 97.8 °F (36.6 °C) (Axillary)   Resp 19   Ht 5' 10\" (1.778 m)   Wt 234 lb 11.2 oz (106.5 kg)   SpO2 94%   BMI 33.68 kg/m²      Lab 02/27/24  0706 02/28/24  0724 02/29/24  0654   RBC 3.73* 3.72* 3.78*   HGB 8.8* 8.9* 9.0*   HCT 28.3* 28.7* 29.5*   MCV 75.9* 77.2* 78.0*   MCH 23.6* 23.9* 23.8*   MCHC 31.1 31.0 30.5*   RDW 28.6* 28.6* 28.4*   NEPRELIM 3.89 3.02 3.04   WBC 5.5 4.6 4.7   .0* 126.0* 132.0*    MEDICATIONS:   insulin aspart  3 Units Subcutaneous TID CC    insulin aspart  1-5 Units Subcutaneous TID CC    gabapentin  100 mg Oral TID    PARoxetine HCl  40 mg Oral QAM    QUEtiapine  50 mg Oral Nightly    rOPINIRole  1 mg Oral Nightly    ceFAZolin  2 g Intravenous Q8H    bumetanide  2 mg Intravenous BID (Diuretic)    clopidogrel  75 mg Oral Daily    metoprolol succinate ER  25 mg Oral Daily Beta Blocker    apixaban  5 mg Oral BID    atorvastatin  80 mg Oral Daily    sacubitril-valsartan  1 tablet Oral BID       DOBUTamine 5 mcg/kg/min (02/28/24 1721)      ASSESSMENT:     Acute on Chronic  HFrEF/ Right Sided  - BNP 1707, CXR w/o pulmonary edema   - Limited ECHO 2/26/24 with LVEF 25%, of note patient was on Dobutamine during ECHO   - Start on Dobutamine 2/25, Has been on Bumex 2 mg BID since 2/23. Net neg 7L, No significant weight change since admission   - Was given a dose of Zaroxolyn 2/26. Diuresising well on Bumex BID however still has a significant way to go   - Appears grossly overloaded but slowly improving   - Toprol XL, Low Dose Entresto  - Plans for SGLT2 o/p as below   - Has declined ICD in the  past      CAD, Hx CABG (LIMA-LAD, SVG-Diag, SVG-OM) & STEMI with emergency PCI LAD 2019  Hx PCI LAD 2003 and RCA 2003  - Stable, no ischemic symptoms   - Toprol XL, Plavix, High dose statin therapy, ARNI  - Not on ASA with concurrent use of Eliquis for history of PE      Bilateral LE Cellulitis- IV ABX per primary       HLD- LDL 47, Lipitor 80 mg, Zetia o/p      HTN- Controlled      Electrolyte Disturbance- Cardiac replacement protocol      Uncontrolled Type II DM  -A1c 10.4%  - Evaluated by endocrinology, will determine SGLT2 candidacy outpatient      JU on CKD III- Renal fx stable      Heme positive stool on recent admission/ Iron Deficiency Anemia  - Hgb at baseline   - On recent admission he was treated with IV iron      Hx Colon Ca s/p Sigmoidectomy in 2023     Hx Suspected Layered Thrombus on ECHO 2019- Not noted on inpatient ECHO      Hx Remote PE- Eliquis      PLAN:  - If blood pressure is stable this afternoon may give a third dose of Bumex   - Will start Aldactone once off IV Bumex and Dobutamine.  - Will see endocrine again outpatient for possible SGLT2      3/1/24  Temp:  [97.9 °F (36.6 °C)-98.4 °F (36.9 °C)] 97.9 °F (36.6 °C)  Pulse:  [89-95] 94  Resp:  [18-20] 18  BP: (106-115)/(65-75) 106/69  SpO2:  [95 %-97 %] 95 %      Physical Exam:    Gen: AO x3  Chest: good air entry CTABL  CVS: normal s1 and s2 RR  Abd: NABS soft NT ND  Neuro: CN 2-12 grossly intact  Ext: 1-2+ edema in bilateral LE, erythema improved      Lab 02/26/24  0609 02/27/24  0706 02/28/24  0724 02/29/24  0654 03/01/24  0625   WBC 5.7 5.5 4.6 4.7 4.3   HGB 8.9* 8.8* 8.9* 9.0* 8.6*   MCV 76.3* 75.9* 77.2* 78.0* 77.6*   .0* 123.0* 126.0* 132.0* 138.0*      Lab 02/26/24  0609 02/27/24  0653 02/28/24  0701 02/29/24  0653 03/01/24  0625 03/01/24  1648   *  135* 148*  148* 145*  145* 144* 134*  134*  --    BUN 34*  34* 29*  29* 28*  28* 26* 27*  27*  --    CREATSERUM 1.21  1.21 1.20  1.20 1.12  1.12 1.07 1.02  1.02   --    CA 8.1*  8.1* 8.4*  8.4* 8.5*  8.5* 8.6* 8.4*  8.4*  --    ALB 3.1* 3.2 3.4 3.5 3.4  --      138 136  136 138  138 139 140  140  --    K 3.9  3.9 3.7  3.7 3.7  3.7  3.7 3.9  3.9 3.6  3.6 4.4     105 103  103 104  104 105 105  105  --    CO2 28.0  28.0 28.0  28.0 28.0  28.0 28.0 28.0  28.0  --    Medications:    [START ON 3/2/2024] bumetanide  2 mg Oral BID (Diuretic)    cephalexin  500 mg Oral 4x daily    insulin aspart  3 Units Subcutaneous TID CC    insulin aspart  1-5 Units Subcutaneous TID CC    gabapentin  100 mg Oral TID    PARoxetine HCl  40 mg Oral QAM    QUEtiapine  50 mg Oral Nightly    rOPINIRole  1 mg Oral Nightly    clopidogrel  75 mg Oral Daily    metoprolol succinate ER  25 mg Oral Daily Beta Blocker    apixaban  5 mg Oral BID    atorvastatin  80 mg Oral Daily    sacubitril-valsartan  1 tablet Oral BID       Assessment & Plan:      Acute CHF exacerbation  CAD s/p PCI and CABG  Hx of NSVT/declining ICD  Moderate to severe MR, moderate TR  JU on CKD - resolved  CXR reviewed  Saturating well on room air  Recent EF 25%  Cardiology on consult  Continue Bumex 2 mg IV BID -> now switched to PO  Continue Eliquis 5mg BID, Plavix 75 mg daily  Continue Metoprolol 25 mg daily, Entresto BID  Strict Is and Os, daily weights  Net IO Since Admission: -9,992.87 mL [03/01/24 1815]   Echo reviewed - EF 25%  Renal function at baseline - continue to monitor  Continue present management per cardiology  Telemetry monitoring  Bilateral LE cellulitis  Started on IV Cefazolin -> Switched to PO  Hypokalemia  Replaced  Continue to monitor  Telemetry monitoring  Hx of PE  Continue home meds  Saturating well on room air  HTN  BP well controlled  Continue home meds  Monitor vitals  T2DM  SSI and frequent accuchecks  Iron deficiency anemia  Hx of colon cancer s/p sigmoidectomy in 2023  Monitor H&H, transfuse as indicated  PPI  Outpatient follow up        3/2/24  Ext: 1-2+ edema in bilateral  LE, erythema improved   Lab 02/27/24  0706 02/28/24  0724 02/29/24  0654 03/01/24  0625 03/02/24  0553   WBC 5.5 4.6 4.7 4.3 4.7   HGB 8.8* 8.9* 9.0* 8.6* 8.9*   MCV 75.9* 77.2* 78.0* 77.6* 78.5*   .0* 126.0* 132.0* 138.0* 145.0*      Lab 02/26/24  0609 02/27/24  0653 02/28/24  0701 02/29/24  0653 03/01/24  0625 03/01/24  1648 03/02/24  0553   *  135* 148*  148* 145*  145* 144* 134*  134*  --  148*  148*   BUN 34*  34* 29*  29* 28*  28* 26* 27*  27*  --  23  23   CREATSERUM 1.21  1.21 1.20  1.20 1.12  1.12 1.07 1.02  1.02  --  1.04  1.04   CA 8.1*  8.1* 8.4*  8.4* 8.5*  8.5* 8.6* 8.4*  8.4*  --  8.5*  8.5*   ALB 3.1* 3.2 3.4 3.5 3.4  --  3.6     138 136  136 138  138 139 140  140  --  140  140   K 3.9  3.9 3.7  3.7 3.7  3.7  3.7 3.9  3.9 3.6  3.6 4.4 4.0  4.0     105 103  103 104  104 105 105  105  --  108  108   CO2 28.0  28.0 28.0  28.0 28.0  28.0 28.0 28.0  28.0  --  27.0  27.0   Medications:    bumetanide  2 mg Oral BID (Diuretic)    cephalexin  500 mg Oral 4x daily    insulin aspart  3 Units Subcutaneous TID CC    insulin aspart  1-5 Units Subcutaneous TID CC    gabapentin  100 mg Oral TID    PARoxetine HCl  40 mg Oral QAM    QUEtiapine  50 mg Oral Nightly    rOPINIRole  1 mg Oral Nightly    clopidogrel  75 mg Oral Daily    metoprolol succinate ER  25 mg Oral Daily Beta Blocker    apixaban  5 mg Oral BID    atorvastatin  80 mg Oral Daily    sacubitril-valsartan  1 tablet Oral BID      Assessment & Plan:      Acute CHF exacerbation  CAD s/p PCI and CABG  Hx of NSVT/declining ICD  Moderate to severe MR, moderate TR  JU on CKD - resolved  CXR reviewed  Saturating well on room air  Recent EF 25%  Cardiology on consult  Continue Bumex 2 mg IV BID -> now switched to PO  Continue Eliquis 5mg BID, Plavix 75 mg daily  Continue Metoprolol 25 mg daily, Entresto BID  Strict Is and Os, daily weights  Net IO Since Admission: -9,702.95 mL [03/02/24  1156]  Echo reviewed - EF 25%  Renal function at baseline - continue to monitor  Continue present management per cardiology  Telemetry monitoring  Bilateral LE cellulitis  Started on IV Cefazolin -> Switched to PO  Hypokalemia  Replaced  Continue to monitor  Telemetry monitoring  Hx of PE  Continue home meds  Saturating well on room air  HTN  BP well controlled  Continue home meds  Monitor vitals  T2DM  SSI and frequent accuchecks  Iron deficiency anemia  Hx of colon cancer s/p sigmoidectomy in 2023  Monitor H&H, transfuse as indicated  PPI  Outpatient follow up        CARDIOLOGY  Assessment:  Acute on chronic HFrEF  BNP 1,707  CXR showed pulmonary edema  Echo 2/6/24 LVEF 25%, grade 3 diastolic dysfunction, mod MR, mod TR, PASP 62 mmHg  Off dobutamine since 3/1  Diuresed with IV Bumex, net fluid off 9.7L, now on PO Bumex  On toprol, entresto   Has declined ICD in the past   CAD with hx of CABG (LIMA-LAD, SVG-OM, SVG-Diag), STEMI with emergent PCI LAD 2019  Hx PCI LAD and RCA 2003  On Plavix, statin, BB   Mod MR and TR  Pulmonary HTN, PASP 62 mmHg  Lower extremities cellulitis s/p IV abx  Hx of PE on Eliquis   HTN-controlled   HLP-on statin   DM2, A1C 10.4%-per primary  JU/CKD 3-stable  Chronic anemia, hgb 8.9-stable   Hx colon CA s/p sigmoidectomy 2023     Plan:  Appears compensated cardiac status, continue oral Bumex, kidney function remains stable today.  Monitor I/Os and daily weights.  Monitor electrolytes and kidney function.  Continue current cardiac medications toprol, entresto, Plavix, statin, Eliquis.  Encourage increased activity as tolerated.  Discharge planning in progress hopefully tomorrow.    Enrolled in HF clinic 2/27 will follow up post dc.      DATE OF DISCHARGE: 3/3/24

## 2024-03-14 ENCOUNTER — PATIENT OUTREACH (OUTPATIENT)
Dept: CASE MANAGEMENT | Age: 69
End: 2024-03-14

## 2024-03-14 NOTE — PROGRESS NOTES
VM received; pt requesting assistance w/getting medicine (discharged  03/03)    LVM advised if he is looking for some medicine he will need to contact his PCP; if still need assistance w/scheduling to call [phone]  Closing encounter

## 2024-03-19 ENCOUNTER — APPOINTMENT (OUTPATIENT)
Dept: FAMILY MEDICINE | Age: 69
End: 2024-03-19

## 2024-03-19 DIAGNOSIS — I50.9 ACUTE ON CHRONIC CONGESTIVE HEART FAILURE, UNSPECIFIED HEART FAILURE TYPE (HCC): Primary | ICD-10-CM

## 2024-03-22 RX ORDER — ALLOPURINOL 300 MG/1
300 TABLET ORAL DAILY
Qty: 90 TABLET | Refills: 0 | Status: SHIPPED | OUTPATIENT
Start: 2024-03-22

## 2024-03-22 RX ORDER — QUETIAPINE FUMARATE 50 MG/1
50 TABLET, FILM COATED ORAL DAILY
Qty: 90 TABLET | Refills: 0 | Status: SHIPPED | OUTPATIENT
Start: 2024-03-22

## 2024-03-22 RX ORDER — BUMETANIDE 2 MG/1
2 TABLET ORAL DAILY
Qty: 90 TABLET | Refills: 0 | Status: SHIPPED | OUTPATIENT
Start: 2024-03-22

## 2024-03-22 RX ORDER — PAROXETINE HYDROCHLORIDE 20 MG/1
20 TABLET, FILM COATED ORAL EVERY MORNING
Qty: 90 TABLET | Refills: 0 | Status: SHIPPED | OUTPATIENT
Start: 2024-03-22

## 2024-03-22 RX ORDER — ROPINIROLE 1 MG/1
1 TABLET, FILM COATED ORAL DAILY
Qty: 90 TABLET | Refills: 0 | Status: SHIPPED | OUTPATIENT
Start: 2024-03-22

## 2024-04-30 PROBLEM — I95.9 HYPOTENSION: Status: ACTIVE | Noted: 2024-01-01

## 2024-04-30 PROBLEM — K66.8 PNEUMOPERITONEUM: Status: ACTIVE | Noted: 2024-01-01

## 2024-04-30 PROBLEM — I51.3 RV (RIGHT VENTRICULAR) MURAL THROMBUS: Status: ACTIVE | Noted: 2024-01-01

## 2024-04-30 PROBLEM — I95.9 HYPOTENSION, UNSPECIFIED HYPOTENSION TYPE: Status: ACTIVE | Noted: 2024-01-01

## 2024-04-30 PROBLEM — I26.99 BILATERAL PULMONARY EMBOLISM (HCC): Status: ACTIVE | Noted: 2024-01-01

## 2024-04-30 NOTE — ED QUICK NOTES
Orders for admission, patient is aware of plan and ready to go upstairs. Any questions, please call ED RN Yakelin at extension [phone].     Patient Covid vaccination status: Unvaccinated     COVID Test Ordered in ED: None    COVID Suspicion at Admission: N/A    Running Infusions:    norepinephrine 12 mcg/min (04/30/24 1146)        Mental Status/LOC at time of transport: AOx4    Other pertinent information:   CIWA score: N/A   NIH score:  N/A

## 2024-04-30 NOTE — ED QUICK NOTES
Tried to call CCU RN, no answer. Will attempt again in 5 mins before putting in transport request

## 2024-04-30 NOTE — PROGRESS NOTES
[facility]  part of [facility]      Sepsis Reassessment Note    BP 92/68   Pulse 104   Temp 97.2 °F (36.2 °C) (Temporal)   Resp 24   Ht 5' 9\" (1.753 m)   Wt 208 lb 8.9 oz (94.6 kg)   SpO2 95%   BMI 30.80 kg/m²      I completed the sepsis reassessment at 2 pm     Cardiac:  Regularity: Regular  Rate: Tachycardic  Heart Sounds: S1,S2    Lungs:   Right: Clear  Left: Clear    Peripheral Pulses:  Radial: Right 1+ or Left 1+      Capillary Refill:  <3 Secs    Skin:  Temp/Moisture: Clammy   Color: Normal      Travis Mejia MD  4/30/2024  3:05 PM

## 2024-04-30 NOTE — H&P
[facility]    PATIENT'S NAME: CARLY ROTHMAN   ATTENDING PHYSICIAN: Misa Anne MD   PATIENT ACCOUNT#:   [id number]    LOCATION:  [address]  MEDICAL RECORD #:   [id number]       YOB: 1955  ADMISSION DATE:       04/30/2024    HISTORY AND PHYSICAL EXAMINATION    (Addendum added 04/30/2024)    CHIEF COMPLAINT:  Possible perforated bowel, right ventricular thrombus and pulmonary embolism, possible sepsis.    HISTORY OF PRESENT ILLNESS:  The patient is 68-year-old  male who was brought into the emergency department after he fell at home.  The patient is an extremely poor historian.  Upon arrival, he was noted to be a bit drowsy but able to answer questions.  CBC showed white blood cell count of 3.7, hemoglobin 16.5; noted to have a left shift on differential.  Chemistry showed sodium 130, chloride 95, potassium 3.6, BUN and creatinine 41 and 1.86.  GFR is 39, which is below his baseline.  ALT and AST 45 and 47, alkaline phosphatase 128, total bilirubin 4.9, and total protein 5.0.  The patient had lactic acid of 4.8.  Chest x-ray showed cardiomegaly with prominent pulmonary vascularity consistent with pulmonary edema.  Noted to have systolic blood pressure of 80.  CTA scan of the chest and CT angiogram showed severe biventricular dilation, small nonocclusive PE in the right lower lobar artery, and small occlusive subsegmental pulmonary embolism at the apical left upper lobe.  There is a 1.9 cm thrombus at the right ventricular apex, large volume ascites, mild pneumoperitoneum.  CT scan of the abdomen was ordered, still pending.  Started empirically on IV vancomycin and Zosyn and was given IV fluid sepsis bolus plus heparin, and he will be admitted to the hospital for further management.  Blood cultures and reflex lactic acid still pending.    PAST MEDICAL HISTORY:  Reviewing the records, the patient had history of coronary artery disease status post LAD and RCA PCI  stents, ischemic cardiomyopathy, ejection fraction 25%, with combined systolic and diastolic heart failure, with moderate to severe pulmonary artery hypertension, declined ICD; essential hypertension; hyperlipidemia; pulmonary embolism, supposed to be anticoagulated with Eliquis; chronic kidney disease stage 3; anemia of chronic kidney disease; iron deficiency; history of gastrointestinal blood loss, treated conservatively; generalized osteoarthritis; degenerative joint disease of lumbar spine; restless leg syndrome; diabetes mellitus type 2; and gout.    PAST SURGICAL HISTORY:  Sigmoidectomy for adenocarcinoma, left vitrectomy, and left inguinal hernia repair.    ALLERGIES:  Heparin but the patient is not sure about the reaction.    FAMILY HISTORY:  Mother had diabetes mellitus type 2.  Father had heart disease.    SOCIAL HISTORY:  Ex-tobacco user.  No current tobacco, alcohol, or drug use.  Lives by himself.  Noncompliant with low-salt diet.  Noncompliant with medications.    REVIEW OF SYSTEMS:  The patient is an extremely poor historian.  He said he fell off his couch, he could not get up today.  When I asked him how long he has been short of breath or weak, he could not answer the question.  The patient does not take any medications at home since he was discharged from the hospital around 6 weeks ago.      PHYSICAL EXAMINATION:    GENERAL:  Alert, oriented to time, place, and person, fatigued but no acute distress.  VITAL SIGNS:  Temperature 97.2, pulse 104, respiratory rate 24, blood pressure upon arrival 63/51, pulse ox 95% on room air.  HEENT:  Atraumatic.  Oropharynx clear.  Dry mucous membranes.    NECK:  Supple.  No lymphadenopathy.  Positive jugular venous distention.  LUNGS:  Diminished breathing sounds both lung bases.  HEART:  Regular rate and rhythm.  S1 and S2 auscultated.   ABDOMEN:  Soft.  Moderate distention.  Fluid ascites.  Tenderness to palpation.  Hypoactive bowel sounds.  EXTREMITIES:  +2 to  3 edema both legs.  No clubbing or cyanosis.  NEUROLOGIC:  No acute findings.  Motor and sensory intact.    ASSESSMENT AND PLAN:    1.   Acute on chronic heart failure with pulmonary edema with medical noncompliance.  2.   Hypotension, possible sepsis with evidence of pneumoperitoneum.  Rule out perforated bowel.  3.   Acute on chronic kidney injury.  4.   Diabetes mellitus type 2.  5.   Elevated lactic acid with possible septic shock.  6.   Pulmonary embolism and right ventricular thrombus.  Noncompliant with anticoagulation.    The patient will be admitted to intensive care unit.  IV Levophed.  IV antibiotics.  Monitor his hemodynamic status closely.  Record indicates allergy to heparin.  Will defer to Intensive Care consult regarding anticoagulation.  Also, General Surgery consult was notified.  Will follow up on CT scan of the abdomen and pelvis.  Overall poor prognosis.  Further recommendations to follow.    ADDENDUM (Job [id number]):    ASSESSMENT AND PLAN:      Considering heparin allergy, the patient will be started on IV argatroban.  Also, obtain cardiology consult considering his severe heart failure.    Dictated By Luke Rockwell MD  d: 04/30/2024 13:25:55  t: 04/30/2024 13:27:16  Job [id number]  FB/

## 2024-04-30 NOTE — PROCEDURES
Procedure performed ;  Central line placement /right internal jugular    Indication ; septic shock with multiple organ system failure and patient on pressors  Informed consent obtained from patient    Under sterile condition , right IJ area was cleaned and sterilized with ChloraPrep and draped in a sterile fashion  Utilizing real-time ultrasound guidance dark blood aspirated from the right IJ from the first attempt  Guidewire advanced without any difficulties  Triple-lumen central line advanced without any difficulties  Good flow from all ports  Line was secured  Patient tolerated procedure well  Chest x-ray was ordered to confirm placement

## 2024-04-30 NOTE — ED PROVIDER NOTES
Patient Seen in: [facility]    History     Chief Complaint   Patient presents with    Back Pain       HPI    68 year old male with a history of CAD, hypertension, colon cancer status post sigmoidectomy who presents to the emergency department after a wellness check was done by his friend and the patient was noted to be on the floor where he had been for at least a day, the patient is a very poor historian limiting history.  He is not taking any of his medications.  He reports generalized abdominal pain.    History reviewed.   Past Medical History:    Coronary heart disease    2 stents in place, pstient sees Dr. Westbrook    Essential hypertension    Heart attack (HCC)     maker w/ 5 stents    Hyperlipidemia    Melanoma in situ of left upper extremity (HCC)    right thumb    RLS (restless legs syndrome)       History reviewed.   Past Surgical History:   Procedure Laterality Date    Other surgical history      2 stents          Medications :  (Not in a hospital admission)       Family History   Problem Relation Age of Onset    Cancer Father         sarcoma of back       Smoking Status:   Social History     Socioeconomic History    Marital status: Single    Number of children: 0   Occupational History    Occupation: [employer]   Tobacco Use    Smoking status: Former     Current packs/day: 0.00     Types: Cigarettes     Quit date: 1995     Years since quittin.6    Smokeless tobacco: Never   Vaping Use    Vaping status: Never Used   Substance and Sexual Activity    Alcohol use: No     Alcohol/week: 0.0 standard drinks of alcohol    Drug use: Never       Constitutional and vital signs reviewed.      Social History and Family History elements reviewed from today, pertinent positives to the presenting problem noted.    Physical Exam     ED Triage Vitals   BP 24 1048 (!) 63/51   Pulse 24 1046 92   Resp 24 1046 18   Temp 24 1046 97.2 °F (36.2 °C)   Temp src  04/30/24 1046 Temporal   SpO2 04/30/24 1046 95 %   O2 Device 04/30/24 1046 None (Room air)       All measures to prevent infection transmission during my interaction with the patient were taken. The patient was already wearing a droplet mask on my arrival to the room. Personal protective equipment was worn throughout the duration of the exam.  Handwashing was performed prior to and after the exam.  Stethoscope and any equipment used during my examination was cleaned with super sani-cloth germicidal wipes following the exam.     Physical Exam    General: NAD  Head: Normocephalic and atraumatic.  Mouth/Throat/Ears/Nose: Oropharynx is clear     Neck: Normal range of motion. Supple.   Cardiovascular: Normal rate, regular rhythm, normal heart sounds.  Respiratory/Chest: Clear and equal bilaterally. Exhibits no tenderness.  Gastrointestinal: distended, gen ttp    Musculoskeletal:bilateral LE edema . Cool hand and feet   Neurological: Alert and oriented to self and place.  No focal deficits.   Skin: Skin is warm and dry. No pallor.         ED Course        Labs Reviewed   COMP METABOLIC PANEL (14) - Abnormal; Notable for the following components:       Result Value    Glucose 180 (*)     Sodium 130 (*)     Chloride 95 (*)     BUN 41 (*)     Creatinine 1.86 (*)     BUN/CREA Ratio 22.0 (*)     Calcium, Total 8.0 (*)     eGFR-Cr 39 (*)     AST 47 (*)     Alkaline Phosphatase 128 (*)     Bilirubin, Total 4.9 (*)     Total Protein 5.0 (*)     Albumin 2.8 (*)     Globulin  2.2 (*)     All other components within normal limits   URINALYSIS WITH CULTURE REFLEX - Abnormal; Notable for the following components:    Urobilinogen Urine 2 (*)     All other components within normal limits   PROTHROMBIN TIME (PT) - Abnormal; Notable for the following components:    PT 20.5 (*)     INR 1.65 (*)     All other components within normal limits   LACTIC ACID, PLASMA - Abnormal; Notable for the following components:    Lactic Acid 4.8 (*)      All other components within normal limits   RBC MORPHOLOGY SCAN - Abnormal; Notable for the following components:    RBC Morphology See morphology below (*)     All other components within normal limits   CK CREATINE KINASE (NOT CREATININE) - Abnormal; Notable for the following components:     (*)     All other components within normal limits   CBC W/ DIFFERENTIAL - Abnormal; Notable for the following components:    WBC 3.7 (*)     RBC 6.44 (*)     MCV 73.0 (*)     MCH 25.6 (*)     RDW-SD 57.5 (*)     RDW 23.3 (*)     .0 (*)     Immature Platelet Fraction 14.9 (*)     Lymphocyte Absolute 0.47 (*)     All other components within normal limits   PTT, ACTIVATED - Normal   CBC WITH DIFFERENTIAL WITH PLATELET    Narrative:     The following orders were created for panel order CBC With Differential With Platelet.                  Procedure                               Abnormality         Status                                     ---------                               -----------         ------                                     CBC W/ DIFFERENTIAL[[id number]]          Abnormal            Final result                                                 Please view results for these tests on the individual orders.   MD BLOOD SMEAR CONSULT   LACTIC ACID REFLEX POST POSTIVE   HEPARIN INDUCED PLATELET   PTT, ACTIVATED   CBC, PLATELET; NO DIFFERENTIAL   HEPATIC FUNCTION PANEL (7)   PTT, ACTIVATED   RAINBOW DRAW LAVENDER   RAINBOW DRAW LIGHT GREEN   RAINBOW DRAW GOLD   RAINBOW DRAW BLUE   BLOOD CULTURE   BLOOD CULTURE     EKG    Rate, intervals and axes as noted on EKG Report.  Rate: 93  Rhythm: Sinus Rhythm  Reading: Sinus rhythm at rate 93, left axis deviation, QTc 564, no STEMI.           As Interpreted by me    Imaging Results Available and Reviewed while in ED: CT ABDOMEN+PELVIS(CONTRAST ONLY)(CPT=74177)    Result Date: 4/30/2024  CONCLUSION:   Small volume of pneumoperitoneum.  The pneumoperitoneum appears to  be most prominent within the upper abdomen and contiguous with the loop of proximal transverse colon.  Therefore a small perforation in the proximal transverse colon is felt to be the origin of the pneumoperitoneum.  Other  etiology such as a perforated duodenal or gastric ulcer are also within the differential but are felt to be less likely.  Large volume of abdominal and pelvic ascites.  No obstruction.  Right ventricular thrombus again seen and unchanged.  Cholelithiasis without CT evidence of acute cholecystitis.  Multiple other incidental findings as described in the body of the report which are unchanged.    Dictated by (CST): Prem Pina MD on 4/30/2024 at 2:50 PM     Finalized by (CST): Prem Pina MD on 4/30/2024 at 2:56 PM          XR CHEST AP PORTABLE  (CPT=71045)    Result Date: 4/30/2024  CONCLUSION:  1. Mild cardiomegaly and mildly prominent pulmonary vascularity but no hilary pulmonary edema.  Suboptimal inspiration.  Moderate elevation of the right hemidiaphragm unchanged.  Patchy right basal airspace disease may suggest right basal pneumonia and or  atelectasis.  Correlate clinically and follow-up studies are advised.    Dictated by (CST): Vern Day MD on 4/30/2024 at 12:59 PM     Finalized by (CST): Vern Day MD on 4/30/2024 at 1:03 PM          CTA CHEST+CTA ABDOMEN DISSECT SET (CPT=71275/46079)    Result Date: 4/30/2024  CONCLUSION:   Severe biventricular dilation.  1.9 x 1.7 centimeter thrombus at the RV apex  Small nonocclusive PE right lower lobar artery.  Small occlusive subsegmental PE at the apical left upper lobe  Small volume right effusion  Large volume ascites  Mild pneumoperitoneum.  This could be from bowel perforation or could be iatrogenic such as from any recent paracentesis  No acute aortic syndrome  1.8 x 1.5 centimeter low-density nodule or cyst along the inferior margin of the pancreatic uncinate.  This could be a pseudo cyst or cystic pancreatic lesion  Discussed  with Dr. Anne     Dictated by (CST): Bradley Rick MD on 4/30/2024 at 12:14 PM     Finalized by (CST): Bradley Rick MD on 4/30/2024 at 12:37 PM         ED Medications Administered:   Medications   norepinephrine (Levophed) 4 mg/250mL infusion premix (20 mcg/min Intravenous Rate/Dose Change 4/30/24 1456)   vancomycin (Vancocin) 1.75 g in sodium chloride 0.9% 500mL IVPB premix (1,750 mg Intravenous New Bag 4/30/24 1440)   sodium chloride 0.9 % IV bolus 1,000 mL (1,000 mL Intravenous New Bag 4/30/24 1430)   argatroban 250 mg in sodium chloride 0.9% 250 mL infusion (2 mcg/kg/min × 68 kg Intravenous New Bag 4/30/24 1442)   vasopressin (Vasostrict) 20 Units in sodium chloride 0.9% 100 mL infusion for septic shock (has no administration in time range)   sodium chloride 0.9 % IV bolus 2,040 mL (1,000 mL Intravenous New Bag 4/30/24 1113)   iopamidol 76% (ISOVUE-370) injection for power injector (85 mL Intravenous Given 4/30/24 1202)   piperacillin-tazobactam (Zosyn) 4.5 g in dextrose 5% 100 mL IVPB-ADDV (4.5 g Intravenous New Bag 4/30/24 1302)   iopamidol 76% (ISOVUE-370) injection for power injector (80 mL Intravenous Given 4/30/24 1409)         MDM     Vitals:    04/30/24 1140 04/30/24 1143 04/30/24 1215 04/30/24 1400   BP: (!) 68/56 (!) 83/64 92/68    Pulse: 90 88 104    Resp: 18 20 24    Temp:       TempSrc:       SpO2: 96% 96% 95%    Weight:    94.6 kg   Height:         *I personally reviewed and interpreted all ED vitals.    Pulse Ox: 95%, Room air, Normal     Monitor Interpretation:   normal sinus rhythm as interpreted by me.  The cardiac monitor was ordered given hypotension.      Medical Decision Making      Differential Diagnosis/ Diagnostic Considerations: Sepsis, cardiogenic shock, pulmonary embolus    Complicating Factors: The patient already has CHF to contribute to the complexity of this ED evaluation.    I reviewed prior chart records including discharge summary from February 23, 2020 for admission.   Patient is here with hypotension, bedside ultrasound demonstrates quite dilated ventricles as well as a possible thrombus at the right ventricle for which CT angiograms of the chest and abdomen were ordered which does indeed demonstrate a right ventricular thrombus at the apex, no dissection, agree with report, however there does appear to be free air in the abdomen without obvious source of this free air.  I discussed with Dr. Best who recommended more complete views of the CT abdomen pelvis which were ordered and pending at time of admission.  The CT angiograms of the chest also demonstrated bilateral pulmonary emboli.  Patient apparently has a heparin allergy documented as hives, argatroban ordered. Given the hypotension, Levophed was added in addition to sepsis bolus 30 mL/kg.  Sepsis perfusion reexamination performed at 2:30 PM.    On reevaluation, vasopressin was added.  Also discussed with [facility] cardiology given LV thrombus in addition to Dr. Mejia regarding the pulmonary emboli.  Dr. Best without plans for surgical intervention today.     Admitted in critical condition.  Patient is comfortable with the plan.     I spent 125 minutes of critical care time caring for this patient. This does not include time spent on separately reported billable procedures.       Disposition and Plan     Clinical Impression:  1. Hypotension, unspecified hypotension type    2. Pneumoperitoneum    3. Bilateral pulmonary embolism (HCC)    4. RV (right ventricular) mural thrombus        Disposition:  Admit    Follow-up:  No follow-up provider specified.    Medications Prescribed:  Current Discharge Medication List          Hospital Problems       Present on Admission  Date Reviewed: 2/20/2023            ICD-10-CM Noted POA    Hypotension I95.9 4/30/2024 Unknown

## 2024-04-30 NOTE — CONSULTS
Cardiology Consult Note    Cristhian Lopez Patient Status:  Emergency    9/3/1955 MRN [id number]   [facility] [facility] EMERGENCY DEPARTMENT Attending Misa Anne MD   Hosp Day # 0 PCP KARI LESTER.  Cristhian Lopez is a 68 year old male with a history of colectomy, ischemic cardiomyopathy with EF 25%, CAD/CABG/PCI, chronic anticoagulation, hyperlipidemia, diabetes who presents to the hospital after being found down at home.  Patient states he had back and abdominal pain for over a month otherwise does not complain of any new issues today.  He was noted to be hypotensive in the emergency room with systolics in the 60s and was started on Levophed and given IV fluid boluses.  Lab work pertinent for creatinine 1.86 up from 1.24 last month, lactate 4.8, , albumin 2.8, INR 1.65, WBC 3.7, hemoglobin 16.5.  Chest x-ray showed mild cardiomegaly with prominent pulmonary vascularity.  CT chest and abdomen dissection protocol shows severe biventricular dilation, thrombus in the RV apex is suggested, small nonocclusive pulmonary embolism in the right lower lobar artery, large volume ascites, mild pneumoperitoneum.    Patient has noted persistent hypotension despite pressors and IV fluids and will be admitted to the ICU for further management.  He is going to have dedicated CT abdomen pelvis to rule out acute abdominal etiology.      Prior cardiac workup  Echo 2024  1. Left ventricle: The cavity size was increased. Wall thickness was normal.      The estimated ejection fraction was 25%, by visual assessment.   2. Right ventricle: The cavity size was increased. Systolic function was      reduced.   3. Left atrium: The atrium was increased in size.   4. Right atrium: The atrium was markedly dilated.         --------------------------------------------------------------------------------------------------------------------------------  ROS 10 systems reviewed, pertinent findings  above.  ROS    History:  Past Medical History:    Coronary heart disease    2 stents in place, pstient sees Dr. Westbrook    Essential hypertension    Heart attack (HCC)     maker w/ 5 stents    Hyperlipidemia    Melanoma in situ of left upper extremity (HCC)    right thumb    RLS (restless legs syndrome)     Past Surgical History:   Procedure Laterality Date    Other surgical history      2 stents      Family History   Problem Relation Age of Onset    Cancer Father         sarcoma of back      reports that he quit smoking about 28 years ago. His smoking use included cigarettes. He has never used smokeless tobacco. He reports that he does not drink alcohol and does not use drugs.    Objective:   Temp: 97.2 °F (36.2 °C)  Pulse: 104  Resp: 24  BP: 92/68    Intake/Output:   No intake or output data in the 24 hours ending 04/30/24 1339    Physical Exam:     General: Alert, oriented x 2, no acute distress  HEENT: Normocephalic, anicteric sclera, neck supple.  Neck: No JVD, carotids 2+, no bruits.  Cardiac: Regular rate and rhythm. S1, S2 normal. No murmur, pericardial rub, S3.  Lungs: Clear without wheezes, rales, rhonchi or dullness.  Normal excursions and effort.  Abdomen: Soft, non-tender. BS-present.  Extremities: Without clubbing, cyanosis.  Peripheral pulses are 2+.  +2 lower extremity edema  Neurologic: Non-focal  Skin: Warm and dry.       Assessment:    Hypotension, shock  Small pulm embolism  Small pneumoperitoneum  Ascites  Possible RV apical thrombus  JU  Lactic acidosis  CAD/CABG/PCI  Ischemic cardiomyopathy EF 25%  Pulm hypertension  Hyperlipidemia  Diabetes  Question of HIT      Plan:  68-year-old male with above history presenting after being found down at home.  Patient is being admitted to ICU for management of shock requiring pressors.  Patient does have significant volume overload with 2+ lower extremity bilateral edema, at risk for decompensated heart failure with aggressive fluid resuscitation.   Will recommend holding off on further IV fluids and prioritize pressors.  Recommend echocardiogram  Agree with Levophed, not requiring inotropes at this time.  Consider albumin infusion as well.  Discussed with ED physician and pharmacy regarding possibility of HIT, can offer alternatives such as Arixtra, bivalirudin or argatroban.    Thank you for allowing me to take part in the care of Cristhian SHARAN Jessica. Please call with any questions of concerns.    Level of care: C5    Dr. Amarilys Williamson,   April 30, 2024  1:39 PM

## 2024-04-30 NOTE — CONSULTS
[facility]  part of [facility]    Report of Consultation    Cristhian Lopez Patient Status:  Emergency    9/3/1955 MRN [id number]   [facility] [facility] EMERGENCY DEPARTMENT Attending Misa Anne MD   Hosp Day # 0 PCP KARI BARCLAY     Date of Admission:  2024  Date of Consult: 2024    Reason for Consultation:   Consults  PE   CHF   Shock state  Possible perforated viscus    History provided by:patient  HPI:     Chief Complaint   Patient presents with    Back Pain     HPI    68-year-old gentleman with very complex medical problem with severe biventricular heart failure LVEF 25% with severe right heart failure and history of CAD with a prior CABG and stenting and AICD and history of CKD and severe pulmonary hypertension and a prior history of VTE supposed to be on Eliquis and he is not compliant.    Patient lives at home by himself with extremely poor functional status and almost bedbound and wheelchair-bound and today he fell to the floor and he was unable to get up with significant weakness and fatigue with very poor oral intake for the last 2 to 3 days , and patient not taking his medication for the last few days  He called 911 brought patient to ER  He was found hypotensive in ER and lactic acid of 4.8 and also patient had distended abdomen with no significant pain denied any vomiting or diarrhea and chest CT showed small PE with possible pneumoperitoneum and perforated viscus and also showed right ventricular thrombus  Patient denied any chest pain  Currently comfortable on room air but he has dyspnea  Denied high fever or chills  Generalized fatigue and weakness but denied complete syncope  Lower extremity edema          History     Past Medical History:    Coronary heart disease    2 stents in place, pstient sees Dr. Westbrook    Essential hypertension    Heart attack (HCC)     maker w/ 5 stents    Hyperlipidemia    Melanoma in situ of left upper extremity  (HCC)    right thumb    RLS (restless legs syndrome)     Past Surgical History:   Procedure Laterality Date    Other surgical history      2 stents      Family History   Problem Relation Age of Onset    Cancer Father         sarcoma of back     Social History:  Social History     Socioeconomic History    Marital status: Single    Number of children: 0   Occupational History    Occupation: diasable    Tobacco Use    Smoking status: Former     Current packs/day: 0.00     Types: Cigarettes     Quit date: 1995     Years since quittin.6    Smokeless tobacco: Never   Vaping Use    Vaping status: Never Used   Substance and Sexual Activity    Alcohol use: No     Alcohol/week: 0.0 standard drinks of alcohol    Drug use: Never     Social Determinants of Health     Food Insecurity: No Food Insecurity (2024)    Food Insecurity     Food Insecurity: Never true   Transportation Needs: No Transportation Needs (2024)    Transportation Needs     Lack of Transportation: No   Housing Stability: Low Risk  (2024)    Housing Stability     Housing Instability: No     Allergies/Medications:   Allergies:   Allergies   Allergen Reactions    Heparin HIVES     Patient not sure     (Not in a hospital admission)      Review of Systems:     Constitutional:  Positive for fatigue. Negative for fever.   HENT:  Negative for congestion.    Respiratory:  Positive for shortness of breath. Negative for cough and wheezing.    Cardiovascular:  Positive for leg swelling. Negative for palpitations.   Gastrointestinal:  Positive for abdominal pain and abdominal distention. Negative for vomiting and anal bleeding.   Genitourinary:  Negative for frequency.   Musculoskeletal:  Positive for back pain.   Skin:  Positive for pallor.   Neurological:  Positive for dizziness and seizures.   Psychiatric/Behavioral:  Positive for decreased concentration. Negative for agitation.        Physical Exam:   Vital Signs:   height is 5' 9\" (1.753  m) and weight is 208 lb 8.9 oz (94.6 kg). His temporal temperature is 97.2 °F (36.2 °C). His blood pressure is 92/68 and his pulse is 104. His respiration is 24 and oxygen saturation is 95%.   Physical Exam  Constitutional:       General: He is in acute distress.      Appearance: He is ill-appearing.   HENT:      Head: Atraumatic.      Nose: Nose normal.      Mouth/Throat:      Mouth: Mucous membranes are dry.   Eyes:      General: No scleral icterus.  Cardiovascular:      Rate and Rhythm: Normal rate.      Heart sounds: Murmur heard.      Gallop present.   Pulmonary:      Effort: No respiratory distress.      Breath sounds: No stridor. No wheezing, rhonchi or rales.   Abdominal:      General: Bowel sounds are normal. There is distension.      Tenderness: There is abdominal tenderness. There is no guarding or rebound.   Musculoskeletal:      Cervical back: Neck supple. No rigidity.      Right lower leg: Edema present.      Left lower leg: Edema present.   Neurological:      General: No focal deficit present.      Comments: Awake and alert and oriented x 3         Results:     Lab Results   Component Value Date    WBC 3.7 (L) 04/30/2024    HGB 16.5 04/30/2024    HCT 47.0 04/30/2024    .0 (L) 04/30/2024    CREATSERUM 1.86 (H) 04/30/2024    BUN 41 (H) 04/30/2024     (L) 04/30/2024    K 3.6 04/30/2024    CL 95 (L) 04/30/2024    CO2 22.0 04/30/2024     (H) 04/30/2024    CA 8.0 (L) 04/30/2024    ALB 2.8 (L) 04/30/2024    ALKPHO 128 (H) 04/30/2024    BILT 4.9 (H) 04/30/2024    TP 5.0 (L) 04/30/2024    AST 47 (H) 04/30/2024    ALT 45 04/30/2024    PTT 30.7 04/30/2024    INR 1.65 (H) 04/30/2024    TSH 0.397 12/25/2021    PSA 9.27 (H) 02/20/2023    DDIMER 0.93 (H) 12/22/2021    MG 1.8 03/03/2024    PHOS 2.9 03/03/2024    TROP <0.045 08/25/2021    TROPHS 117 (HH) 02/08/2024     (H) 04/30/2024    B12 389 12/25/2021    ETOH <3 05/29/2023     XR CHEST AP PORTABLE  (CPT=71045)    Result Date:  4/30/2024  CONCLUSION:  1. Mild cardiomegaly and mildly prominent pulmonary vascularity but no hilary pulmonary edema.  Suboptimal inspiration.  Moderate elevation of the right hemidiaphragm unchanged.  Patchy right basal airspace disease may suggest right basal pneumonia and or  atelectasis.  Correlate clinically and follow-up studies are advised.    Dictated by (CST): Vern Day MD on 4/30/2024 at 12:59 PM     Finalized by (CST): Vern Day MD on 4/30/2024 at 1:03 PM          CTA CHEST+CTA ABDOMEN DISSECT SET (CPT=71275/81517)    Result Date: 4/30/2024  CONCLUSION:   Severe biventricular dilation.  1.9 x 1.7 centimeter thrombus at the RV apex  Small nonocclusive PE right lower lobar artery.  Small occlusive subsegmental PE at the apical left upper lobe  Small volume right effusion  Large volume ascites  Mild pneumoperitoneum.  This could be from bowel perforation or could be iatrogenic such as from any recent paracentesis  No acute aortic syndrome  1.8 x 1.5 centimeter low-density nodule or cyst along the inferior margin of the pancreatic uncinate.  This could be a pseudo cyst or cystic pancreatic lesion  Discussed with Dr. Anne     Dictated by (CST): Bradley Rick MD on 4/30/2024 at 12:14 PM     Finalized by (CST): Bradley Rick MD on 4/30/2024 at 12:37 PM         EKG 12 Lead    Result Date: 4/30/2024  Sinus rhythm with occasional Premature ventricular complexes and Fusion complexes Possible Left atrial enlargement Left axis deviation LVH with QRS widening ( Clinton product ) Inferior infarct , age undetermined Anteroseptal infarct (cited on or before 29-MAY-2023) Prolonged QT interval or tu fusion, consider myocardial disease, electrolyte imbalance, or drug effects Abnormal ECG When compared with ECG of 23-FEB-2024 10:50, Fusion complexes are now Present Premature ventricular complexes are now Present QT has lengthened Confirmed by WEN HOLLOWAY ([id number]) on 4/30/2024 1:29:01 PM     Impression:      1-shock state which is likely cardiogenic and septic  2-possible perforated bowel /pneumoperitoneum on CT  3-severe end-stage biventricular heart failure LVEF 25 % and right heart failure       H/o CAD s/p CABG and stenting  and ICD   4-acute PE with a prior history of VTE  5-right ventricular thrombus  6-acute on chronic kidney injury  7-history of colon cancer with history of hemicolectomy in the past  8-very poor functional status and recently completely wheelchair and bedbound  9-medical noncompliance  10-DNAR/select    Plan :  Sepsis reassessment protocol  IV fluid and broad-spectrum antibiotics and pressors  Evaluated by general surgery and he is not a surgical candidate  Anticoagulation with argatroban since patient allergic to heparin  O2 therapy and NIV if needed  Prognosis is guarded and condition is critical  Patient is DNAR/select  D/w pt and staff   D/w consultants  > 35 min cct             Travis Mejia MD  4/30/2024

## 2024-04-30 NOTE — PROGRESS NOTES
Right jugular venous catheter with tip in right atrium approximately 5.5 cm below the RA SVC junction.     CVC at 22cm, retracted to 17cm based on above finding  Dressing changed  Recheck cxr for placement     Suni May NP  P[facility]U

## 2024-04-30 NOTE — PROGRESS NOTES
[facility] Pharmacy Dosing Service  Argatroban Initial Dosing    Cristhian Lopez is a 68 year old patient to be started on argatroban for treatment of PE/DVT .  Pharmacy to manage per protocol for a goal PTT range of 46 to 93 (1.5-3 times the control PTT).     Argatrogan Dosing Criteria    HIPA:  negative    Albumin:   Recent Labs   Lab 04/30/24  1111 04/30/24  1521   ALB 2.8* 2.3*        Ascites:  present    Total Bilirubin:   Recent Labs   Lab 04/30/24  1111 04/30/24  1521   BILT 4.9* 4.3*        Encephalopathy:  negative    INR:   Recent Labs   Lab 04/30/24  1111   INR 1.65*        Child Freeman Score: 11    Heart Failure: History of heart failure    Patient [facility]: ICU bed    Estimated Creatinine Clearance: 38.5 mL/min (A) (based on SCr of 1.86 mg/dL (H)).    Multi-organ failure: yes    Plan:    1. Initiate argatroban at 0.25 mcg/kg/min based on risk factors of: heart failure, multi-organ failure, Child Freeman Score 10 or greater, and ICU location.    2. Will obtain a PTT 2 hours after the initiation of therapy and adjust based on the result for a goal PTT of 46-93.    3. We will continue to follow and appreciate the opportunity to assist in the care of this patient.    Thank you,    Dheeraj Wright, PharmD  Clinical Pharmacist  4/30/2024 6:03 PM

## 2024-04-30 NOTE — PROGRESS NOTES
[facility]  part of [facility]    Report of Consultation    Cristhian Lopez Patient Status:  Emergency    9/3/1955 MRN [id number]   [facility] [facility] EMERGENCY DEPARTMENT Attending Misa Anne MD   Hosp Day # 0 PCP KARI BARCLAY     Date of Admission:  2024  Date of Consult:  2024    Reason for Consultation:  Abd pain    History of Present Illness:  Cristhian Lopez is a a(n) 68 year old male. Many medical issues listed below    States colon resection  - unknown reason  cannot find report  Was recently in hospital for hrt failure  hx  of stents   On apixaban  clopidogrel   others - listed   Not eating well but no n or v      History:  Past Medical History:    Coronary heart disease    2 stents in place, pstient sees Dr. Westbrook    Essential hypertension    Heart attack (HCC)     maker w/ 5 stents    Hyperlipidemia    Melanoma in situ of left upper extremity (HCC)    right thumb    RLS (restless legs syndrome)     Past Surgical History:   Procedure Laterality Date    Other surgical history      2 stents      Family History   Problem Relation Age of Onset    Cancer Father         sarcoma of back      reports that he quit smoking about 28 years ago. His smoking use included cigarettes. He has never used smokeless tobacco. He reports that he does not drink alcohol and does not use drugs.    Allergies:  Allergies   Allergen Reactions    Heparin HIVES     Patient not sure       Medications:    Current Facility-Administered Medications:     norepinephrine (Levophed) 4 mg/250mL infusion premix, 0.5-30 mcg/min, Intravenous, Continuous    sodium chloride 0.9 % IV bolus 2,040 mL, 30 mL/kg, Intravenous, Once    vancomycin (Vancocin) 1.75 g in sodium chloride 0.9% 500mL IVPB premix, 25 mg/kg, Intravenous, Once    sodium chloride 0.9 % IV bolus 1,000 mL, 1,000 mL, Intravenous, Once    argatroban 250 mg in sodium chloride 0.9% 250 mL infusion, 2 mcg/kg/min, Intravenous,  Continuous  (Not in a hospital admission)      Review of Systems:  A ten point review of systems was negative except as noted.    Physical Exam:  Blood pressure 92/68, pulse 104, temperature 97.2 °F (36.2 °C), temperature source Temporal, resp. rate 24, height 5' 9\" (1.753 m), weight 150 lb (68 kg), SpO2 95%.    General: Alert, orientated x3.  Cooperative.  No apparent distress.  HEENT: Exam is unremarkable. .    Lungs: per attending  Cardiac: per attending  Abdomen:  Soft, distended  non tender     Skin: Normal texture and turgor.  Neurologic: per attending   Laboratory Data:  Lab Results   Component Value Date    WBC 3.7 (L) 04/30/2024    HGB 16.5 04/30/2024    HCT 47.0 04/30/2024    .0 (L) 04/30/2024    CREATSERUM 1.86 (H) 04/30/2024    BUN 41 (H) 04/30/2024     (L) 04/30/2024    K 3.6 04/30/2024    CL 95 (L) 04/30/2024    CO2 22.0 04/30/2024     (H) 04/30/2024    CA 8.0 (L) 04/30/2024    ALB 2.8 (L) 04/30/2024    ALKPHO 128 (H) 04/30/2024    BILT 4.9 (H) 04/30/2024    TP 5.0 (L) 04/30/2024    AST 47 (H) 04/30/2024    ALT 45 04/30/2024    PTT 30.7 04/30/2024    INR 1.65 (H) 04/30/2024    TSH 0.397 12/25/2021    PSA 9.27 (H) 02/20/2023    DDIMER 0.93 (H) 12/22/2021    MG 1.8 03/03/2024    PHOS 2.9 03/03/2024    TROP <0.045 08/25/2021     (H) 04/30/2024    B12 389 12/25/2021    ETOH <3 05/29/2023       Imaging:  XR CHEST AP PORTABLE  (CPT=71045)    Result Date: 4/30/2024  PROCEDURE: XR CHEST AP PORTABLE  (CPT=71045) TIME: 1230 hours.   COMPARISON: [facility], CTA CHEST+CTA ABDOMEN DISSECT SET (CPT=71275/00548), 4/30/2024, 11:51 AM.  [facility], XR CHEST AP PORTABLE (CPT=71045), 2/23/2024, 11:15 AM.  INDICATIONS: Back pain post fall.  TECHNIQUE:   Single view.   FINDINGS:  CARDIAC/VASC: Mild cardiomegaly and minimally prominent pulmonary vascularity but no hilary pulmonary edema.  Status post sternotomy and coronary bypass.  Again noted is a fracture of the  1st are no suture. MEDIAST/SELMA:   No visible mass or adenopathy. LUNGS/PLEURA: Suboptimal inspiration.  Moderate elevation of the right hemidiaphragm.  Patchy right basal airspace disease may suggest right basilar pneumonia and or atelectasis.  Correlate clinically and follow-up studies are advised. BONES: No fracture or visible bony lesion. OTHER: Negative.          CONCLUSION:  1. Mild cardiomegaly and mildly prominent pulmonary vascularity but no hilary pulmonary edema.  Suboptimal inspiration.  Moderate elevation of the right hemidiaphragm unchanged.  Patchy right basal airspace disease may suggest right basal pneumonia and or  atelectasis.  Correlate clinically and follow-up studies are advised.    Dictated by (CST): Vern Day MD on 4/30/2024 at 12:59 PM     Finalized by (CST): Vern Day MD on 4/30/2024 at 1:03 PM          CTA CHEST+CTA ABDOMEN DISSECT SET (CPT=71275/52598)    Result Date: 4/30/2024  PROCEDURE: CT CHEST ABDOMEN DISSECT SET (CPT=71275/50214)  COMPARISON: [facility], CT CHEST PE AORTA (IV ONLY) (CPT=71260), 12/23/2021, 1:05 AM.  INDICATIONS: Back pain, hypotensive , very poor historian  TECHNIQUE:   CT images of the chest and abdomen were obtained with intravenous contrast material.  Automated exposure control for dose reduction was used. Adjustment of the mA and/or kV was done based on the patient's size. Use of iterative reconstruction technique for dose reduction was used.  Dose information is transmitted to the ACR (American College of Radiology) NRDR (National Radiology Data Registry) which includes the Dose Index Registry.  FINDINGS: Normal size aorta.  No acute aortic syndrome.  Moderately calcified abdominal aorta.  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  Mesenteric arteries are widely patent.  Severe biventricular and moderate biatrial dilation.  1.9 x 1.7 centimeter thrombus at the RV apex new from prior   Nonocclusive PE RLL lobar artery image 60. Occlusive subsegmental PE at the left apex image 31.  Possible lingular segmental PE image 65  Prior CABG with severely calcified native coronary arteries.  Stable 1.3 centimeter anterior mediastinal nodule with peripheral calcifications likely benign.  No pericardial effusion  Small volume right basilar effusion  Subsegmental atelectasis right base.  Minimal emphysema  Abdomen and pelvis:  Normal liver contour.  Enlarged caudate lobe.  Punctate gallstones  Normal spleen  1.8 x 1.5 centimeter low-density nodule or cyst along the inferior margin of the pancreatic uncinate  Normal adrenals and kidneys  Unobstructed bowel.  Large volume ascites.  Mild pneumoperitoneum.  Laparotomy scar  1 centimeter sclerotic lesion posterior aspect of L5, nonspecific.          CONCLUSION:   Severe biventricular dilation.  1.9 x 1.7 centimeter thrombus at the RV apex  Small nonocclusive PE right lower lobar artery.  Small occlusive subsegmental PE at the apical left upper lobe  Small volume right effusion  Large volume ascites  Mild pneumoperitoneum.  This could be from bowel perforation or could be iatrogenic such as from any recent paracentesis  No acute aortic syndrome  1.8 x 1.5 centimeter low-density nodule or cyst along the inferior margin of the pancreatic uncinate.  This could be a pseudo cyst or cystic pancreatic lesion  Discussed with Dr. Anne     Dictated by (CST): Bradley Rick MD on 4/30/2024 at 12:14 PM     Finalized by (CST): Bradley Rick MD on 4/30/2024 at 12:37 PM            Impression and Plan:  Patient Active Problem List   Diagnosis    Left inguinal hernia    Coronary artery disease without angina pectoris, unspecified vessel or lesion type, unspecified whether native or transplanted heart    Acute congestive heart failure (HCC)    Acute congestive heart failure, unspecified heart failure type (HCC)    Syncope and collapse    JU (acute kidney injury) (HCC)     Dizziness    Parasomnia    RLS (restless legs syndrome)    Episodic mood disorder (HCC)    Anemia, unspecified type    Rectal bleeding    Acute on chronic congestive heart failure, unspecified heart failure type (HCC)    Iron deficiency anemia due to chronic blood loss    Scrotal edema    Goals of care, counseling/discussion    Palliative care encounter    Hypotension   PE   small amt free air  increase PT 20.5/ INR 1.65  PTT 30.7  CT  small free air  etiology?  Hypotensive    etiology?  Anti-coagulated    obs     for now    NOTE   second dedicated CT of abd  done  ? Small perforation transverse  Pt unstable now   needs resuscitation then consider surgical intervention if worsening          ZHEN ALVAREZ MD  4/30/2024  1:34 PM

## 2024-05-01 NOTE — PLAN OF CARE
Pt's BP remains very labile- maxed on 3 pressors. Argatroban gtt infusing-pharmacy made aware about pt's weight-ok to continue the same dose for now.  Increased o2 requirements noted-Dr Mejia aware, HFNC at 12lpm, refused BIPAP all night despite encouragement and counseling, went into respiratory distress at 0630 AM-seen by Dr Holt at bedside, pt agreed to be intubated. OG placed per MD, stat CXR, result pending. Urine output of 350ml overnight. Updated Dr Mejia, Dr Greene and NP Luis Armando/Cardiologist about pt's condition. Attempted to call pt's friend, Woodrow but no answer. Endorsed given to Danny VAIL.    Problem: CARDIOVASCULAR - ADULT  Goal: Maintains optimal cardiac output and hemodynamic stability  Description: INTERVENTIONS:  - Monitor vital signs, rhythm, and trends  - Monitor for bleeding, hypotension and signs of decreased cardiac output  - Evaluate effectiveness of vasoactive medications to optimize hemodynamic stability  - Monitor arterial and/or venous puncture sites for bleeding and/or hematoma  - Assess quality of pulses, skin color and temperature  - Assess for signs of decreased coronary artery perfusion - ex. Angina  - Evaluate fluid balance, assess for edema, trend weights  Outcome: Not Progressing  Goal: Absence of cardiac arrhythmias or at baseline  Description: INTERVENTIONS:  - Continuous cardiac monitoring, monitor vital signs, obtain 12 lead EKG if indicated  - Evaluate effectiveness of antiarrhythmic and heart rate control medications as ordered  - Initiate emergency measures for life threatening arrhythmias  - Monitor electrolytes and administer replacement therapy as ordered  Outcome: Not Progressing     Problem: MUSCULOSKELETAL - ADULT  Goal: Return mobility to safest level of function  Description: INTERVENTIONS:  - Assess patient stability and activity tolerance for standing, transferring and ambulating w/ or w/o assistive devices  - Assist with transfers and ambulation using safe  patient handling equipment as needed  - Ensure adequate protection for wounds/incisions during mobilization  - Obtain PT/OT consults as needed  - Advance activity as appropriate  - Communicate ordered activity level and limitations with patient/family  Outcome: Progressing  Goal: Maintain proper alignment of affected body part  Description: INTERVENTIONS:  - Support and protect limb and body alignment per provider's orders  - Instruct and reinforce with patient and family use of appropriate assistive device and precautions (e.g. spinal or hip dislocation precautions)  Outcome: Progressing     Problem: Patient Centered Care  Goal: Patient preferences are identified and integrated in the patient's plan of care  Description: Interventions:  - What would you like us to know as we care for you?   - Provide timely, complete, and accurate information to patient/family  - Incorporate patient and family knowledge, values, beliefs, and cultural backgrounds into the planning and delivery of care  - Encourage patient/family to participate in care and decision-making at the level they choose  - Honor patient and family perspectives and choices  Outcome: Progressing     Problem: Diabetes/Glucose Control  Goal: Glucose maintained within prescribed range  Description: INTERVENTIONS:  - Monitor Blood Glucose as ordered  - Assess for signs and symptoms of hyperglycemia and hypoglycemia  - Administer ordered medications to maintain glucose within target range  - Assess barriers to adequate nutritional intake and initiate nutrition consult as needed  - Instruct patient on self management of diabetes  Outcome: Not Progressing     Problem: Patient/Family Goals  Goal: Patient/Family Long Term Goal  Description: Patient's Long Term Goal:     Interventions:  -   - See additional Care Plan goals for specific interventions  Outcome: Not Progressing  Goal: Patient/Family Short Term Goal  Description: Patient's Short Term Goal:      Interventions:   -   - See additional Care Plan goals for specific interventions  Outcome: Progressing

## 2024-05-01 NOTE — PAYOR COMM NOTE
--------------  ADMISSION REVIEW     Payor: [employer]  Subscriber #:  [id number]  Authorization Number: [id number]    Admit date: 4/30/24  Admit time:  4:04 PM       History   HPI  68 year old male with a history of CAD, hypertension, colon cancer status post sigmoidectomy who presents to the emergency department after a wellness check was done by his friend and the patient was noted to be on the floor where he had been for at least a day, the patient is a very poor historian limiting history.  He is not taking any of his medications.  He reports generalized abdominal pain.     ED Triage Vitals   BP 04/30/24 1048 (!) 63/51   Pulse 04/30/24 1046 92   Resp 04/30/24 1046 18   Temp 04/30/24 1046 97.2 °F (36.2 °C)   Temp src 04/30/24 1046 Temporal   SpO2 04/30/24 1046 95 %   O2 Device 04/30/24 1046 None (Room air)   Physical Exam  Head: Normocephalic and atraumatic.  Mouth/Throat/Ears/Nose: Oropharynx is clear     Neck: Normal range of motion. Supple.   Cardiovascular: Normal rate, regular rhythm, normal heart sounds.  Respiratory/Chest: Clear and equal bilaterally. Exhibits no tenderness.  Gastrointestinal: distended, gen ttp    Musculoskeletal:bilateral LE edema . Cool hand and feet   Neurological: Alert and oriented to self and place.  No focal deficits.   Skin: Skin is warm and dry. No pallor.     Labs Reviewed   COMP METABOLIC PANEL (14) - Abnormal; Notable for the following components:       Result Value    Glucose 180 (*)     Sodium 130 (*)     Chloride 95 (*)     BUN 41 (*)     Creatinine 1.86 (*)     BUN/CREA Ratio 22.0 (*)     Calcium, Total 8.0 (*)     eGFR-Cr 39 (*)     AST 47 (*)     Alkaline Phosphatase 128 (*)     Bilirubin, Total 4.9 (*)     Total Protein 5.0 (*)     Albumin 2.8 (*)     Globulin  2.2 (*)     All other components within normal limits   URINALYSIS WITH CULTURE REFLEX - Abnormal; Notable for the following components:    Urobilinogen Urine 2 (*)     All other components within  normal limits   PROTHROMBIN TIME (PT) - Abnormal; Notable for the following components:    PT 20.5 (*)     INR 1.65 (*)     All other components within normal limits   LACTIC ACID, PLASMA - Abnormal; Notable for the following components:    Lactic Acid 4.8 (*)     All other components within normal limits   RBC MORPHOLOGY SCAN - Abnormal; Notable for the following components:    RBC Morphology See morphology below (*)     All other components within normal limits   CK CREATINE KINASE (NOT CREATININE) - Abnormal; Notable for the following components:     (*)     All other components within normal limits   CBC W/ DIFFERENTIAL - Abnormal; Notable for the following components:    WBC 3.7 (*)     RBC 6.44 (*)     MCV 73.0 (*)     MCH 25.6 (*)     RDW-SD 57.5 (*)     RDW 23.3 (*)     .0 (*)     Immature Platelet Fraction 14.9 (*)     Lymphocyte Absolute 0.47 (*)    EKG    Rate, intervals and axes as noted on EKG Report.  Rate: 93  Rhythm: Sinus Rhythm  Reading: Sinus rhythm at rate 93, left axis deviation, QTc 564, no STEMI.    Imaging Results Available and Reviewed while in ED: CT ABDOMEN+PELVIS(CONTRAST ONLY)(CPT=74177)  Result Date: 4/30/2024  CONCLUSION:   Small volume of pneumoperitoneum.  The pneumoperitoneum appears to be most prominent within the upper abdomen and contiguous with the loop of proximal transverse colon.  Therefore a small perforation in the proximal transverse colon is felt to be the origin of the pneumoperitoneum.  Other  etiology such as a perforated duodenal or gastric ulcer are also within the differential but are felt to be less likely.  Large volume of abdominal and pelvic ascites.  No obstruction.  Right ventricular thrombus again seen and unchanged.  Cholelithiasis without CT evidence of acute cholecystitis.  Multiple other incidental findings as described in the body of the report which are unchanged.    Dictated by (CST): Prem Pina MD on 4/30/2024 at 2:50 PM     Finalized  by (CST): Prem Pina MD on 4/30/2024 at 2:56 PM          XR CHEST AP PORTABLE  (CPT=71045)  Result Date: 4/30/2024  CONCLUSION:  1. Mild cardiomegaly and mildly prominent pulmonary vascularity but no hilary pulmonary edema.  Suboptimal inspiration.  Moderate elevation of the right hemidiaphragm unchanged.  Patchy right basal airspace disease may suggest right basal pneumonia and or  atelectasis.  Correlate clinically and follow-up studies are advised.    Dictated by (CST): Vern Day MD on 4/30/2024 at 12:59 PM     Finalized by (CST): Vern Day MD on 4/30/2024 at 1:03 PM          CTA CHEST+CTA ABDOMEN DISSECT SET (CPT=71275/68757)  Result Date: 4/30/2024  CONCLUSION:   Severe biventricular dilation.  1.9 x 1.7 centimeter thrombus at the RV apex  Small nonocclusive PE right lower lobar artery.  Small occlusive subsegmental PE at the apical left upper lobe  Small volume right effusion  Large volume ascites  Mild pneumoperitoneum.  This could be from bowel perforation or could be iatrogenic such as from any recent paracentesis  No acute aortic syndrome  1.8 x 1.5 centimeter low-density nodule or cyst along the inferior margin of the pancreatic uncinate.  This could be a pseudo cyst or cystic pancreatic lesion  Discussed with Dr. Anne     Dictated by (CST): Bradley Rick MD on 4/30/2024 at 12:14 PM     Finalized by (CST): Bradley Rick MD on 4/30/2024 at 12:37 PM           ED Medications Administered:   Medications   norepinephrine (Levophed) 4 mg/250mL infusion premix (20 mcg/min Intravenous Rate/Dose Change 4/30/24 1456)   vancomycin (Vancocin) 1.75 g in sodium chloride 0.9% 500mL IVPB premix (1,750 mg Intravenous New Bag 4/30/24 1440)   sodium chloride 0.9 % IV bolus 1,000 mL (1,000 mL Intravenous New Bag 4/30/24 1430)   argatroban 250 mg in sodium chloride 0.9% 250 mL infusion (2 mcg/kg/min × 68 kg Intravenous New Bag 4/30/24 1442)   vasopressin (Vasostrict) 20 Units in sodium chloride 0.9% 100 mL  infusion for septic shock (has no administration in time range)   sodium chloride 0.9 % IV bolus 2,040 mL (1,000 mL Intravenous New Bag 4/30/24 1113)   iopamidol 76% (ISOVUE-370) injection for power injector (85 mL Intravenous Given 4/30/24 1202)   piperacillin-tazobactam (Zosyn) 4.5 g in dextrose 5% 100 mL IVPB-ADDV (4.5 g Intravenous New Bag 4/30/24 1302)   iopamidol 76% (ISOVUE-370) injection for power injector (80 mL Intravenous Given 4/30/24 1409)       Vitals:    04/30/24 1140 04/30/24 1143 04/30/24 1215 04/30/24 1400   BP: (!) 68/56 (!) 83/64 92/68    Pulse: 90 88 104    Resp: 18 20 24    SpO2: 96% 96% 95%    Weight:    94.6 kg     Disposition and Plan   Clinical Impression:  1. Hypotension, unspecified hypotension type    2. Pneumoperitoneum    3. Bilateral pulmonary embolism (HCC)    4. RV (right ventricular) mural thrombus      Disposition:  Admit    HISTORY AND PHYSICAL EXAMINATION  CHIEF COMPLAINT:  Possible perforated bowel, right ventricular thrombus and pulmonary embolism, possible sepsis.     HISTORY OF PRESENT ILLNESS:  The patient is 68-year-old  male who was brought into the emergency department after he fell at home.  The patient is an extremely poor historian.  Upon arrival, he was noted to be a bit drowsy but able to answer questions.  CBC showed white blood cell count of 3.7, hemoglobin 16.5; noted to have a left shift on differential.  Chemistry showed sodium 130, chloride 95, potassium 3.6, BUN and creatinine 41 and 1.86.  GFR is 39, which is below his baseline.  ALT and AST 45 and 47, alkaline phosphatase 128, total bilirubin 4.9, and total protein 5.0.  The patient had lactic acid of 4.8.  Chest x-ray showed cardiomegaly with prominent pulmonary vascularity consistent with pulmonary edema.  Noted to have systolic blood pressure of 80.  CTA scan of the chest and CT angiogram showed severe biventricular dilation, small nonocclusive PE in the right lower lobar artery, and small  occlusive subsegmental pulmonary embolism at the apical left upper lobe.  There is a 1.9 cm thrombus at the right ventricular apex, large volume ascites, mild pneumoperitoneum.  CT scan of the abdomen was ordered, still pending.  Started empirically on IV vancomycin and Zosyn and was given IV fluid sepsis bolus plus heparin, and he will be admitted to the hospital for further management.  Blood cultures and reflex lactic acid still pending.     PAST MEDICAL HISTORY:  Reviewing the records, the patient had history of coronary artery disease status post LAD and RCA PCI stents, ischemic cardiomyopathy, ejection fraction 25%, with combined systolic and diastolic heart failure, with moderate to severe pulmonary artery hypertension, declined ICD; essential hypertension; hyperlipidemia; pulmonary embolism, supposed to be anticoagulated with Eliquis; chronic kidney disease stage 3; anemia of chronic kidney disease; iron deficiency; history of gastrointestinal blood loss, treated conservatively; generalized osteoarthritis; degenerative joint disease of lumbar spine; restless leg syndrome; diabetes mellitus type 2; and gout.     PAST SURGICAL HISTORY:  Sigmoidectomy for adenocarcinoma, left vitrectomy, and left inguinal hernia repair.  REVIEW OF SYSTEMS:  The patient is an extremely poor historian.  He said he fell off his couch, he could not get up today.  When I asked him how long he has been short of breath or weak, he could not answer the question.  The patient does not take any medications at home since he was discharged from the hospital around 6 weeks ago.       PHYSICAL EXAMINATION:    GENERAL:  Alert, oriented to time, place, and person, fatigued but no acute distress.  VITAL SIGNS:  Temperature 97.2, pulse 104, respiratory rate 24, blood pressure upon arrival 63/51, pulse ox 95% on room air.  HEENT:  Atraumatic.  Oropharynx clear.  Dry mucous membranes.    NECK:  Supple.  No lymphadenopathy.  Positive jugular venous  distention.  LUNGS:  Diminished breathing sounds both lung bases.  HEART:  Regular rate and rhythm.  S1 and S2 auscultated.   ABDOMEN:  Soft.  Moderate distention.  Fluid ascites.  Tenderness to palpation.  Hypoactive bowel sounds.  EXTREMITIES:  +2 to 3 edema both legs.  No clubbing or cyanosis.  NEUROLOGIC:  No acute findings.  Motor and sensory intact.     ASSESSMENT AND PLAN:    1.       Acute on chronic heart failure with pulmonary edema with medical noncompliance.  2.       Hypotension, possible sepsis with evidence of pneumoperitoneum.  Rule out perforated bowel.  3.       Acute on chronic kidney injury.  4.       Diabetes mellitus type 2.  5.       Elevated lactic acid with possible septic shock.  6.       Pulmonary embolism and right ventricular thrombus.  Noncompliant with anticoagulation.     The patient will be admitted to intensive care unit.  IV Levophed.  IV antibiotics.  Monitor his hemodynamic status closely.  Record indicates allergy to heparin.  Will defer to Intensive Care consult regarding anticoagulation.  Also, General Surgery consult was notified.  Will follow up on CT scan of the abdomen and pelvis.  Overall poor prognosis.  Further recommendations to follow.     ADDENDUM      ASSESSMENT AND PLAN:    Considering heparin allergy, the patient will be started on IV argatroban.  Also, obtain cardiology consult considering his severe heart failure.        5/1/24  CARDIOLOGY  Interval Note:  Pt with progressive shock - escalation of pressors  Pt changed code status from DNR to full code overnight - he developed AMS and ultimately got intubated.  Developed large right sided pneumothorax  General: intubated, sedated  HEENT: Normocephalic, anicteric sclera, neck supple.  Neck: No JVD, carotids 2+, no bruits.  Cardiac: Regular rate and rhythm. S1, S2 normal. No murmur, pericardial rub, S3.  Lungs: Clear without wheezes, rales, rhonchi or dullness.  Normal excursions and effort.  Abdomen: Soft,  non-tender. BS-present.  Extremities: Without clubbing, cyanosis or edema.  Peripheral pulses are 2+.  Neurologic: Non-focal  Skin: Warm and dry.         Assessment:    Septic shock, bowel perforation  VDRF  Large right PTX, tension?  Small pulm embolism  Small pneumoperitoneum  Ascites  Possible RV apical thrombus  JU  Lactic acidosis  CAD/CABG/PCI  Ischemic cardiomyopathy EF 25%  Pulm hypertension  Hyperlipidemia  Diabetes  Question of HIT history      Plan:  - Pt with progressive respiratory failure, shock requiring escalation of pressors and intubation this morning  - large PTX on CXR overnight, Pulm/CritCare placing chest tube this morning - possibly contributing to shock if tension ptx  - Continue pressor support  - Consider addition of inotrope if poor UOP and concern for cardiogenic shock      PULMONOLOGY  Assessment & Plan:       1-profound shock state which is likely cardiogenic and septic  Now on 3 pressors   Severe biventricular heart failure  Also sepsis with perforated bowel  IV fluid with D5.9  Broad-spectrum antibiotics  Supportive care      2 -perforated bowel /pneumoperitoneum on CT  Prior history of hemicolectomy for colon cancer  CT with microperforation in the transverse colon with large ascites  Poor surgical candidate  Surgery following  Conservative management for now     3- right side pneumothorax   CXR today 5/1/24 with a large right-sided pneumothorax after intubation  Emergent right-sided chest tube was placed     4-acute respiratory failure with hypoxia  Patient intubated and now on mechanical ventilation  Vent support and management     5-severe end-stage biventricular heart failure LVEF 25 % and right heart failure       H/o CAD s/p CABG and stenting  and ICD       Now with RV thrombus       MEDICATIONS ADMINISTERED IN LAST 1 DAY:  argatroban 250 mg in sodium chloride 0.9% 250 mL infusion       Date Action Dose Route User    4/30/2024 1816 Hi-Risk Restarted 0.25 mcg/kg/min × 68 kg  Intravenous Danny Win RN    4/30/2024 1442 New Bag 2 mcg/kg/min × 68 kg Intravenous Yakelin Augustin RN          chlorhexidine gluconate (Peridex) 0.12 % oral solution 15 mL       Date Action Dose Route User    5/1/2024 0803 Given 15 mL Mouth/Throat Danny Win RN          dextrose 5%-sodium chloride 0.9% infusion       Date Action Dose Route User    4/30/2024 1852 New Bag (none) Intravenous Danny Win RN          etomidate (Amidate) 2 mg/mL injection 10 mg       Date Action Dose Route User    5/1/2024 0635 Given 10 mg Intravenous Lola Shultz RN          insulin regular human (Novolin R, Humulin R) 100 UNIT/ML injection 1-5 Units       Date Action Dose Route User    5/1/2024 0600 Given 2 Units Subcutaneous (Left Lower Abdomen) Lola Shultz RN    5/1/2024 0030 Given 1 Units Subcutaneous (Left Lower Abdomen) Lola Shultz RN     morphINE PF 2 MG/ML injection 1 mg       Date Action Dose Route User    5/1/2024 0828 Given 1 mg Intravenous Danny Win RN    5/1/2024 0818 Given 1 mg Intravenous Danny Win RN    4/30/2024 2155 Given 1 mg Intravenous Lola Shultz RN          norepinephrine (Levophed) 32 mg in dextrose 5% 250 mL infusion       Date Action Dose Route User    5/1/2024 1341 New Bag 30 mcg/min Intravenous Danny Win RN    4/30/2024 2000 New Bag 30 mcg/min Intravenous Lola Shultz RN          norepinephrine (Levophed) 4 mg/250mL infusion premix       Date Action Dose Route User    4/30/2024 1826 New Bag 30 mcg/min Intravenous Danny Win RN    4/30/2024 1635 Rate/Dose Change 30 mcg/min Intravenous Danny Win RN     pantoprazole (Protonix) 40 mg in sodium chloride 0.9% PF 10 mL IV push       Date Action Dose Route User    5/1/2024 0803 Given 40 mg Intravenous Danny Win RN    4/30/2024 1730 Given 40 mg Intravenous Danny Win, RN          phenylephrine (Zia-Synephrine) 250 mg in sodium chloride 0.9% 250 mL infusion       Date Action Dose Route User    5/1/2024 0645 Rate/Dose  Change 320 mcg/min Intravenous Lola Shultz RN    5/1/2024 0126 New Bag 50 mcg/min Intravenous Lola Shultz RN          piperacillin-tazobactam (Zosyn) 3.375 g in dextrose 5% 100 mL IVPB-ADDV       Date Action Dose Route User    5/1/2024 1025 New Bag 3.375 g Intravenous Danny Win RN    5/1/2024 0224 New Bag 3.375 g Intravenous Lola Shultz RN    4/30/2024 1800 New Bag 3.375 g Intravenous Danny Win RN          propofol (Diprivan) 10 mg/mL infusion premix       Date Action Dose Route User    5/1/2024 0659 New Bag 15 mcg/kg/min × 89.9 kg Intravenous Lola Shultz RN          sodium chloride 0.9 % IV bolus 1,000 mL       Date Action Dose Route User    4/30/2024 1430 New Bag 1,000 mL Intravenous Yakelin Augustin RN          vancomycin (Vancocin) 1.75 g in sodium chloride 0.9% 500mL IVPB premix       Date Action Dose Route User    4/30/2024 1440 New Bag 1,750 mg Intravenous Yakelin Augustin RN          vasopressin (Vasostrict) 20 Units in sodium chloride 0.9% 100 mL infusion for septic shock       Date Action Dose Route User    5/1/2024 0601 New Bag 0.03 Units/min Intravenous Lola Shultz RN    4/30/2024 2105 New Bag 0.03 Units/min Intravenous Lola Shultz RN            Vitals (last day)       Date/Time Temp Pulse Resp BP SpO2 Weight O2 Device O2 Flow Rate (L/min) [facility]    05/01/24 0800 97.7 °F (36.5 °C) 113 21 92/78 -- -- Ventilator -- RO    05/01/24 0640 -- 60 26 124/106 76 % -- -- -- ZB    05/01/24 0600 -- 112 45 111/84 95 % -- High flow nasal cannula 12 L/min     05/01/24 0500 -- 118 36 114/86 99 % -- High flow nasal cannula 12 L/min B    05/01/24 0400 97.3 °F (36.3 °C) 111 21 116/83 -- 198 lb 1.6 oz High flow nasal cannula 12 L/min B    05/01/24 0000 97.3 °F (36.3 °C) 108 32 106/75 94 % -- High flow nasal cannula 4 L/min B    04/30/24 2000 97.8 °F (36.6 °C) 113 27 111/90 95 % -- Nasal cannula 4 L/min B    04/30/24 1900 -- 110 29 133/90 93 % -- -- -- RO    04/30/24 1800 -- 107 23 69/42 86 % -- -- -- RO     04/30/24 1700 97.6 °F (36.4 °C) 101 23 66/48 90 % -- None (Room air) -- RO    04/30/24 1143 -- 88 20 83/64 96 % -- None (Room air) --     04/30/24 1140 -- 90 18 68/56 96 % -- None (Room air) --     04/30/24 1127 -- 90 18 65/52 96 % -- None (Room air) --     04/30/24 1110 -- 93 18 98/71 93 % -- None (Room air) --     04/30/24 1048 -- 90 18 63/51 -- -- -- --     04/30/24 1046 97.2 °F (36.2 °C) 92 18 -- 95 % 150 lb None (Room air) --

## 2024-05-01 NOTE — PROGRESS NOTES
Pt remains on the following vent settings:   05/01/24 1619   Vent Information   Vent Mode PC/AC   Settings   FiO2 (%) (S)  70 %   Resp Rate (Set) 14   Waveform Decelerating ramp   PEEP/CPAP (cm H2O) 5 cm H20   Press Control > PEEP CWP 15   Insp Time (sec) (S)  0.65 sec   PIP Set (cm H2O) 20 cm H2O     PEEP turned off during chest tube insertion per Dr Mejia, returned to PEEP 5 once placed. Pt maintaining appropriate SpO2, ABG results improved. I-time titrated to maintain appropriate Vts. ETT remains secured at 25cm at the lip. RT to continue to monitor.

## 2024-05-01 NOTE — PLAN OF CARE
Problem: CARDIOVASCULAR - ADULT  Goal: Maintains optimal cardiac output and hemodynamic stability  Description: INTERVENTIONS:  - Monitor vital signs, rhythm, and trends  - Monitor for bleeding, hypotension and signs of decreased cardiac output  - Evaluate effectiveness of vasoactive medications to optimize hemodynamic stability  - Monitor arterial and/or venous puncture sites for bleeding and/or hematoma  - Assess quality of pulses, skin color and temperature  - Assess for signs of decreased coronary artery perfusion - ex. Angina  - Evaluate fluid balance, assess for edema, trend weights  Outcome: Progressing  Goal: Absence of cardiac arrhythmias or at baseline  Description: INTERVENTIONS:  - Continuous cardiac monitoring, monitor vital signs, obtain 12 lead EKG if indicated  - Evaluate effectiveness of antiarrhythmic and heart rate control medications as ordered  - Initiate emergency measures for life threatening arrhythmias  - Monitor electrolytes and administer replacement therapy as ordered  Outcome: Progressing     Problem: MUSCULOSKELETAL - ADULT  Goal: Return mobility to safest level of function  Description: INTERVENTIONS:  - Assess patient stability and activity tolerance for standing, transferring and ambulating w/ or w/o assistive devices  - Assist with transfers and ambulation using safe patient handling equipment as needed  - Ensure adequate protection for wounds/incisions during mobilization  - Obtain PT/OT consults as needed  - Advance activity as appropriate  - Communicate ordered activity level and limitations with patient/family  Outcome: Not Progressing  Goal: Maintain proper alignment of affected body part  Description: INTERVENTIONS:  - Support and protect limb and body alignment per provider's orders  - Instruct and reinforce with patient and family use of appropriate assistive device and precautions (e.g. spinal or hip dislocation precautions)  Outcome: Not Progressing

## 2024-05-01 NOTE — PROGRESS NOTES
[facility]  part of [facility]    Progress Note    Cristhian Lopez Patient Status:  Inpatient    9/3/1955 MRN [id number]   [facility] [facility] 2W/SW Attending Aretha Hazel MD   Hosp Day # 1 PCP KARI BARCLAY         Subjective:     Unable to perform ROS    Patient intubated and now on mechanical ventilation  He is on 3 pressors  Sedated  Unresponsive  Objective:   Blood pressure (!) 124/106, pulse 60, temperature 97.3 °F (36.3 °C), temperature source Temporal, resp. rate 26, height 5' 9\" (1.753 m), weight 198 lb 1.6 oz (89.9 kg), SpO2 (!) 76%.  Physical Exam  Vitals and nursing note reviewed.   Constitutional:       General: He is in acute distress.      Appearance: He is ill-appearing.   HENT:      Head: Atraumatic.      Nose: Nose normal.      Mouth/Throat:      Mouth: Mucous membranes are dry.   Eyes:      General: No scleral icterus.  Cardiovascular:      Rate and Rhythm: Tachycardia present.      Heart sounds: Murmur heard.      No gallop.   Pulmonary:      Effort: Respiratory distress present.      Breath sounds: No wheezing or rhonchi.      Comments: Good air exchange to both lungs after placement of a chest tube  Mild basilar rales  No wheezes or rhonchi  Abdominal:      General: There is distension.      Tenderness: There is abdominal tenderness. There is no guarding or rebound.      Comments: Abdomen is distended  Negative bowel sounds   Musculoskeletal:      Cervical back: No rigidity.      Right lower leg: Edema present.      Left lower leg: Edema present.   Skin:     General: Skin is dry.   Neurological:      Comments: Now intubated and sedated on mechanical ventilation  Patient on 3 pressors         Results:   Lab Results   Component Value Date    WBC 18.4 (H) 2024    HGB 16.6 2024    HCT 48.8 2024    .0 2024    CREATSERUM 2.10 (H) 2024    BUN 45 (H) 2024     (L) 2024    K 3.6 2024    CL 96 (L) 2024     CO2 16.0 (L) 05/01/2024     (H) 05/01/2024    CA 7.9 (L) 05/01/2024    ALB 2.8 (L) 05/01/2024    ALKPHO 111 05/01/2024    BILT 5.4 (H) 05/01/2024    TP 5.0 (L) 05/01/2024    AST 45 (H) 05/01/2024    ALT 42 05/01/2024    PTT 70.4 (H) 05/01/2024    INR 6.10 (HH) 04/30/2024    TSH 0.397 12/25/2021    PSA 9.27 (H) 02/20/2023    DDIMER 0.93 (H) 12/22/2021    MG 1.8 03/03/2024    PHOS 2.9 03/03/2024    TROP <0.045 08/25/2021    TROPHS 117 (HH) 02/08/2024     (H) 04/30/2024    B12 389 12/25/2021    ETOH <3 05/29/2023       XR CHEST AP PORTABLE  (CPT=71045)    Result Date: 5/1/2024  CONCLUSION:   Moderate right apicolateral pneumothorax.  Progressive opacities in the right perihilar region and right lower lobe, which are favored to reflect atelectasis.  Prominence of the pulmonary markings, which may reflect mild interstitial edema or be secondary to low lung volumes.  Posterior left 8th rib fracture.   The finding of a right pneumothorax was discussed with Audelia VAIL by Hamilton QUIGLEY at 7:26 a.m. on 05/01/2024.   Dictated by (CST): Dayday Dave MD on 5/01/2024 at 7:47 AM     Finalized by (CST): Dayday Dave MD on 5/01/2024 at 8:01 AM          XR CHEST AP PORTABLE  (CPT=71045)    Result Date: 5/1/2024  CONCLUSION:  1. Increasing large right-sided pneumothorax.  2. Postthoracotomy chest with cardiomegaly and nonspecific interstitial opacities.  3. Additional support tubes and lines as above.    Results of this examination were discussed with the patient's critical care nurse, Daryn Win, by Dr. Villasenor at 0726 on 05/01/2024.   Dictated by (CST): Gaurav Villasenor MD on 5/01/2024 at 7:23 AM     Finalized by (CST): Gaurav Villasenor MD on 5/01/2024 at 7:27 AM          XR CHEST AP PORTABLE  (CPT=71045)    Result Date: 4/30/2024  CONCLUSION:  Right internal jugular central venous catheter tip projects over the expected superior cavoatrial junction.   Unchanged right greater than left basilar opacity.   Dictated by (CST):  Ratna Larson MD on 4/30/2024 at 7:45 PM     Finalized by (CST): Ratna Larson MD on 4/30/2024 at 7:48 PM          XR CHEST AP PORTABLE  (CPT=71045)    Result Date: 4/30/2024  CONCLUSION:  1. No pneumothorax. 2. Right jugular venous catheter with tip in right atrium approximately 5.5 cm below the RA SVC junction. 3. Elevation of right hemidiaphragm with right basilar atelectasis. 4. Pneumoperitoneum unchanged from recent CT.    Dictated by (CST): Edvin Strauss MD on 4/30/2024 at 6:21 PM     Finalized by (CST): Edvin Strauss MD on 4/30/2024 at 6:25 PM          CT ABDOMEN+PELVIS(CONTRAST ONLY)(CPT=74177)    Result Date: 4/30/2024  CONCLUSION:   Small volume of pneumoperitoneum.  The pneumoperitoneum appears to be most prominent within the upper abdomen and contiguous with the loop of proximal transverse colon.  Therefore a small perforation in the proximal transverse colon is felt to be the origin of the pneumoperitoneum.  Other  etiology such as a perforated duodenal or gastric ulcer are also within the differential but are felt to be less likely.  Large volume of abdominal and pelvic ascites.  No obstruction.  Right ventricular thrombus again seen and unchanged.  Cholelithiasis without CT evidence of acute cholecystitis.  Multiple other incidental findings as described in the body of the report which are unchanged.    Dictated by (CST): Prem Pina MD on 4/30/2024 at 2:50 PM     Finalized by (CST): Prem Pina MD on 4/30/2024 at 2:56 PM          XR CHEST AP PORTABLE  (CPT=71045)    Result Date: 4/30/2024  CONCLUSION:  1. Mild cardiomegaly and mildly prominent pulmonary vascularity but no hilary pulmonary edema.  Suboptimal inspiration.  Moderate elevation of the right hemidiaphragm unchanged.  Patchy right basal airspace disease may suggest right basal pneumonia and or  atelectasis.  Correlate clinically and follow-up studies are advised.    Dictated by (CST): Vern Day MD on 4/30/2024 at 12:59 PM      Finalized by (CST): Vern Day MD on 4/30/2024 at 1:03 PM          CTA CHEST+CTA ABDOMEN DISSECT SET (CPT=71275/19013)    Result Date: 4/30/2024  CONCLUSION:   Severe biventricular dilation.  1.9 x 1.7 centimeter thrombus at the RV apex  Small nonocclusive PE right lower lobar artery.  Small occlusive subsegmental PE at the apical left upper lobe  Small volume right effusion  Large volume ascites  Mild pneumoperitoneum.  This could be from bowel perforation or could be iatrogenic such as from any recent paracentesis  No acute aortic syndrome  1.8 x 1.5 centimeter low-density nodule or cyst along the inferior margin of the pancreatic uncinate.  This could be a pseudo cyst or cystic pancreatic lesion  Discussed with Dr. Anne     Dictated by (CST): Bradley Rick MD on 4/30/2024 at 12:14 PM     Finalized by (CST): Bradley Rick MD on 4/30/2024 at 12:37 PM         EKG 12 Lead    Result Date: 4/30/2024  Sinus rhythm with occasional Premature ventricular complexes and Fusion complexes Possible Left atrial enlargement Left axis deviation LVH with QRS widening ( Ragland product ) Inferior infarct , age undetermined Anteroseptal infarct (cited on or before 29-MAY-2023) Prolonged QT interval or tu fusion, consider myocardial disease, electrolyte imbalance, or drug effects Abnormal ECG When compared with ECG of 23-FEB-2024 10:50, Fusion complexes are now Present Premature ventricular complexes are now Present QT has lengthened Confirmed by WEN HOLLOWAY ([id number]) on 4/30/2024 1:29:01 PM     Assessment & Plan:       1-profound shock state which is likely cardiogenic and septic  Now on 3 pressors   Severe biventricular heart failure  Also sepsis with perforated bowel  IV fluid with D5.9  Broad-spectrum antibiotics  Supportive care      2 -perforated bowel /pneumoperitoneum on CT  Prior history of hemicolectomy for colon cancer  CT with microperforation in the transverse colon with large ascites  Poor surgical  candidate  Surgery following  Conservative management for now    3- right side pneumothorax   CXR today 5/1/24 with a large right-sided pneumothorax after intubation  Emergent right-sided chest tube was placed    4-acute respiratory failure with hypoxia  Patient intubated and now on mechanical ventilation  Vent support and management    5-severe end-stage biventricular heart failure LVEF 25 % and right heart failure       H/o CAD s/p CABG and stenting  and ICD       Now with RV thrombus     Cardiology following       6-acute PE with a prior history of VTE   Also pt with right ventricular thrombus    Patient with allergy to heparin and now on argatroban    7-acute on chronic kidney injury  IV fluid and supportive care   Consult renal     8-very poor functional status and recently completely wheelchair and bedbound    9-h/o medical noncompliance    10-CODE STATUS ; patient was DNAR/select need changes status earlier and now full code  Patient unfortunately with no other family members     D/w  staff   D/w consultants  Prognosis is guarded   > 35 min cct today                   Travis Mejia MD  5/1/2024

## 2024-05-01 NOTE — RESPIRATORY THERAPY NOTE
Arterial catheter inserted with ultrasound guidance. Guidewire advanced with no resistance. Good blood return and good waveform on monitor.

## 2024-05-01 NOTE — PROGRESS NOTES
[facility]  part of [facility]    Progress Note    Cristhian Lopez Patient Status:  Inpatient    9/3/1955 MRN [id number]   [facility] [facility] 2W/SW Attending Aretha Hazel MD   Hosp Day # 1 PCP KARI BARCLAY     Subjective:  Intubated   3 pressors     Objective/Physical Exam:  General: Alert, orientated x3.  Cooperative.  No apparent distress.  Vital Signs:  Blood pressure 112/85, pulse 114, temperature 96.6 °F (35.9 °C), temperature source Temporal, resp. rate 19, height 5' 9\" (1.753 m), weight 198 lb 1.6 oz (89.9 kg), SpO2 100%.  Lungs: per attendings  Cardiac: per attendings  Abdomen:  Soft, distended       Skin: Normal texture and turgor.  Neurologic:intubated  Labs:  Lab Results   Component Value Date    WBC 18.4 (H) 2024    HGB 16.6 2024    HCT 48.8 2024    .0 2024    CREATSERUM 2.10 (H) 2024    BUN 45 (H) 2024     (L) 2024    K 3.6 2024    CL 96 (L) 2024    CO2 16.0 (L) 2024     (H) 2024    CA 7.9 (L) 2024    ALB 2.8 (L) 2024    ALKPHO 111 2024    BILT 5.4 (H) 2024    TP 5.0 (L) 2024    AST 45 (H) 2024    ALT 42 2024    PTT 70.4 (H) 2024    INR 6.10 (HH) 2024    TSH 0.397 2021    PSA 9.27 (H) 2023    DDIMER 0.93 (H) 2021    MG 1.8 2024    PHOS 6.1 (H) 2024    TROP <0.045 2021     (H) 2024    B12 389 2021    ETOH <3 2023       Imaging:  XR CHEST AP PORTABLE  (CPT=71045)    Result Date: 2024  PROCEDURE: XR CHEST AP PORTABLE  (CPT=71045) TIME: 0929 hours.   COMPARISON: [facility], XR CHEST AP PORTABLE (CPT=71045), 2024, 6:54 AM.  INDICATIONS: Chest Tube insertion for right-sided pneumothorax.  TECHNIQUE:   Single view.   FINDINGS:  CARDIAC/VASC: Mild cardiomegaly and normal pulmonary vascularity.  Status post sternotomy and coronary bypass.  Fracture of  the 1st Josias suture. MEDIAST/SELMA:   No visible mass or adenopathy. LUNGS/PLEURA: Right lower pigtail chest tube placed with significant re-expansion of the right lung with a small right apical pneumothorax measuring 1.7 cm in height, small right upper lobe lateral pneumothorax measuring 5.3 mm.  Moderate elevation of the right hemidiaphragm.  Bibasilar atelectasis suggested.  Can not exclude right perihilar pneumonia this may suggest more likely atelectasis from recent pneumothorax.  ET tube in the trachea at the level of the clavicles.  NG tube terminates in the cardia of the stomach.  Right internal jugular central line tip ends in the cavoatrial junction.  BONES: No fracture or visible bony lesion. OTHER: Negative.          CONCLUSION:  1. Right lower lobe pigtail chest tube placed with significant re-expansion of the right lung with a small right apical right upper lobe lateral pneumothorax as described above.  Moderate elevation right hemidiaphragm.  Bibasilar atelectasis.  I cannot exclude right perihilar pneumonia.  Follow-up studies are advised.  ET tube in the trachea at the level of the clavicles     Dictated by (CST): Vern Day MD on 5/01/2024 at 10:23 AM     Finalized by (CST): Vern Day MD on 5/01/2024 at 10:26 AM          XR ABDOMEN (1 VIEW) (CPT=74018)    Result Date: 5/1/2024  PROCEDURE: XR ABDOMEN (1 VIEW) (CPT=74018)  COMPARISON: [facility], CT ABDOMEN + PELVIS (CONTRAST ONLY) (CPT=74177), 4/30/2024, 2:01 PM.  [facility], XR CHEST AP PORTABLE (CPT=71045), 4/30/2024, 5:53 PM.  [facility], XR CHEST AP PORTABLE (CPT=71045),  4/30/2024, 7:05 PM.  [facility], XR CHEST AP PORTABLE (CPT=71045), 5/01/2024, 5:45 AM.  [facility], XR CHEST AP PORTABLE (CPT=71045), 5/01/2024, 6:54 AM.  [facility], XR CHEST AP PORTABLE (CPT=71045), 5/01/2024, 9:29 AM.  INDICATIONS: pneumoperitoneum  TECHNIQUE:   Single  view.   FINDINGS:  BOWEL GAS PATTERN: Moderate gaseous distention of the stomach and transverse colon.  NG tube terminates in the cardia of the stomach and may need to be advanced further into the more distal stomach to decompress it.  Correlate clinically. SOFT TISSUES: Normal.  No masses or organomegaly.  CALCIFICATIONS: None significant. BONES: Normal.  No significant arthritic changes.  OTHER: No well-defined large free intraperitoneal air collection is seen.  Small amount of contrast within the decompressed urinary bladder with a Louise catheter suggested.         CONCLUSION:  1. Moderate gaseous distention of the stomach and transverse colon.  NG tube terminates in the cardia of the stomach and may need to be advanced further into the more distal stomach to decompress.  No large well-defined free intraperitoneal air collection is seen.  No definite bowel obstruction.    Dictated by (CST): Vern Day MD on 5/01/2024 at 10:16 AM     Finalized by (CST): Vern Day MD on 5/01/2024 at 10:20 AM          XR CHEST AP PORTABLE  (CPT=71045)    Result Date: 5/1/2024  PROCEDURE: XR CHEST AP PORTABLE  (CPT=71045) TIME: 5:45.   COMPARISON: [facility], XR CHEST AP PORTABLE (CPT=71045), 5/01/2024, 6:54 AM.  [facility], CT ABDOMEN + PELVIS (CONTRAST ONLY) (CPT=74177), 4/30/2024, 2:01 PM.  [facility], XR CHEST AP PORTABLE (CPT=71045),  4/30/2024, 7:05 PM.  [facility], XR CHEST AP PORTABLE (CPT=71045), 4/30/2024, 5:53 PM.  [facility], XR CHEST AP PORTABLE (CPT=71045), 4/30/2024, 12:30 PM.  INDICATIONS: Follow-up shortness of breath.  TECHNIQUE:   Single view.   FINDINGS:  CARDIAC/VASC: The cardiomediastinal silhouette is enlarged and unchanged in size.  There postoperative changes from prior CABG.  There is unchanged discontinuity of the superior sternotomy wire.  There is atherosclerotic calcification of the thoracic aorta. MEDIAST/SELMA:    The mediastinum and hilum are unchanged. LUNGS/PLEURA: Moderate right apicolateral pneumothorax.  The pneumothorax measures approximately 3 cm in thickness apically.  There are progressive opacities in the right perihilar region and right lower lobe.  There is prominence of the interstitial markings.  There are low lung volumes.  There is elevation of the right hemidiaphragm.  There is unchanged minimal left basilar opacity.  No left pneumothorax. BONES: Multilevel degenerative changes of the thoracic spine.  Bilateral shoulder degenerative change.  There is a left posterior 8th rib fracture OTHER: The right IJ central venous catheter is in unchanged position.         CONCLUSION:   Moderate right apicolateral pneumothorax.  Progressive opacities in the right perihilar region and right lower lobe, which are favored to reflect atelectasis.  Prominence of the pulmonary markings, which may reflect mild interstitial edema or be secondary to low lung volumes.  Posterior left 8th rib fracture.   The finding of a right pneumothorax was discussed with Audelia VAIL by Hamilton QUIGLEY at 7:26 a.m. on 05/01/2024.   Dictated by (CST): Dayday Dave MD on 5/01/2024 at 7:47 AM     Finalized by (CST): Dayday Dave MD on 5/01/2024 at 8:01 AM          XR CHEST AP PORTABLE  (CPT=71045)    Result Date: 5/1/2024  PROCEDURE: XR CHEST AP PORTABLE  (CPT=71045) TIME: 0700.   COMPARISON: [facility], XR CHEST AP PORTABLE (CPT=71045), 4/30/2024, 5:53 PM.  [facility], XR CHEST AP PORTABLE (CPT=71045), 4/30/2024, 7:05 PM.  [facility], XR CHEST AP PORTABLE (CPT=71045), 5/01/2024, 5:45 AM.  INDICATIONS: ETT and OG placement.  TECHNIQUE:   Single view.   FINDINGS:  POSITION: The patient is semi-erect and moderately rotated to the right. DEVICES: There is a right-sided central venous catheter with tip projecting near the cavoatrial junction.  An endotracheal tube is seen with tip terminating approximately  4.6 cm above the briseyda.  A nasogastric tube extends caudally beyond the field of view. CARDIAC/VASC: The cardiomediastinal silhouette is accentuated by AP technique, but likely stably enlarged. There is mild tortuosity of the thoracic aorta with peripheral atherosclerotic vascular calcification. The pulmonary vascularity is within normal limits. MEDIAST/SELMA: Surgical clips are present. LUNGS/PLEURA: A large right-sided pneumothorax is evident, perhaps intervally increased from 0549. A background of interstitial opacities is seen throughout the lungs. No airspace consolidation, pleural effusion, or BONES: Median sternotomy wires and sternal fixation plates are demonstrated. Mild degenerative changes of the thoracic spine are apparent. There are degenerative changes of the shoulders bilaterally. OTHER: Persistent pneumoperitoneum may be present. Leads overlie the chest and obscure underlying detail.           CONCLUSION:  1. Increasing large right-sided pneumothorax.  2. Postthoracotomy chest with cardiomegaly and nonspecific interstitial opacities.  3. Additional support tubes and lines as above.    Results of this examination were discussed with the patient's critical care nurse, Daryn Win, by Dr. Villasenor at 0726 on 05/01/2024.   Dictated by (CST): Gaurav Villasenor MD on 5/01/2024 at 7:23 AM     Finalized by (CST): Gaurav Villasenor MD on 5/01/2024 at 7:27 AM          XR CHEST AP PORTABLE  (CPT=71045)    Result Date: 4/30/2024  PROCEDURE: XR CHEST AP PORTABLE  (CPT=71045) TIME: 7:12 p.m..   COMPARISON: [facility], XR CHEST AP PORTABLE (CPT=71045), 4/30/2024, 5:53 PM.  [facility], XR CHEST AP PORTABLE (CPT=71045), 4/30/2024, 12:30 PM.  [facility], XR CHEST AP PORTABLE (CPT=71045), 2/23/2024, 11:15 AM.  INDICATIONS: CVC line adjustment.  TECHNIQUE:   Single view.   FINDINGS:  CARDIAC/VASC: Post coronary bypass .   Cardiomegaly.  Pulmonary vascularity normal. Atherosclerotic  calcification aorta.  MEDIAST/SELMA:   No visible mass or adenopathy. LUNGS/PLEURA: Low lung volumes.  No pneumothorax.  Elevation of right hemidiaphragm is unchanged.  Bibasilar opacities again noted.  BONES: Post sternotomy. OTHER: Pneumoperitoneum again noted, as seen on recent CT examination.  The tip of the right internal jugular central venous catheter projects over the expected location of the superior cavoatrial junction.         CONCLUSION:  Right internal jugular central venous catheter tip projects over the expected superior cavoatrial junction.   Unchanged right greater than left basilar opacity.   Dictated by (CST): Ratna Larson MD on 4/30/2024 at 7:45 PM     Finalized by (CST): Ratna Larson MD on 4/30/2024 at 7:48 PM          XR CHEST AP PORTABLE  (CPT=71045)    Result Date: 4/30/2024  PROCEDURE: XR CHEST AP PORTABLE  (CPT=71045) TIME: 1800  COMPARISON: [facility], CTA CHEST+CTA ABDOMEN DISSECT SET (CPT=71275/35057), 4/30/2024, 11:51 AM.  [facility], XR CHEST AP PORTABLE (CPT=71045), 4/30/2024, 12:30 PM.  INDICATIONS: Follow-up abnormal chest x-ray.  Very central line placement.  TECHNIQUE:   Single view.   FINDINGS:  CARDIAC/VASC: Post coronary bypass .   Cardiomegaly.  Pulmonary vascularity normal. Atherosclerotic calcification aorta.  MEDIAST/SELMA:   No visible mass or adenopathy. LUNGS/PLEURA: No pneumothorax.  Elevation of right hemidiaphragm with right basilar atelectasis. BONES: Post sternotomy. OTHER: Right jugular venous catheter with tip in the right atrium approximately 5.5 cm below the RA SVC junction.  Pneumoperitoneum is unchanged from recent CT         CONCLUSION:  1. No pneumothorax. 2. Right jugular venous catheter with tip in right atrium approximately 5.5 cm below the RA SVC junction. 3. Elevation of right hemidiaphragm with right basilar atelectasis. 4. Pneumoperitoneum unchanged from recent CT.    Dictated by (CST): Edvin Strauss MD on  4/30/2024 at 6:21 PM     Finalized by (CST): Edvin Strauss MD on 4/30/2024 at 6:25 PM          CT ABDOMEN+PELVIS(CONTRAST ONLY)(CPT=74177)    Result Date: 4/30/2024  PROCEDURE: CT ABDOMEN + PELVIS (CONTRAST ONLY) (CPT=74177)  COMPARISON: [facility], CTA CHEST+CTA ABDOMEN DISSECT SET (CPT=71275/22671), 4/30/2024, 11:51 AM.  INDICATIONS: please get full abdomen and pelvis after discuss with Prabhjot and Estephania  TECHNIQUE: CT images of the abdomen and pelvis were obtained with non-ionic intravenous contrast material.  Automated exposure control for dose reduction was used. Adjustment of the mA and/or kV was done based on the patient's size. Use of iterative reconstruction technique for dose reduction was used.  Dose information is transmitted to the ACR (American College of Radiology) NRDR (National Radiology Data Registry) which includes the Dose Index Registry.  FINDINGS:  LIVER: Atrophic with a nodular contour. GALLBLADDER: There are multiple gallstones. No gallbladder wall thickening or pericholecystic fluid. BILIARY: No intra-or extrahepatic biliary ductal dilation. SPLEEN: Unremarkable PANCREAS: The pancreas enhances symmetrically. No ductal dilation. ADRENALS: Unremarkable KIDNEYS: The kidneys enhance symmetrically. There is no hydronephrosis.  1.6 cm cystic lesion within the uncinate process is once again seen and unchanged.  AORTA/VASCULAR:   There is atherosclerotic calcification of the abdominal aorta extending into bilateral iliac arteries. RETROPERITONEUM: There are scattered subcentimeter nonspecific retroperitoneal lymph nodes which are unchanged. BOWEL:  Small hiatal hernia with wall thickening at the gastroesophageal junction.  Small volume of pneumoperitoneum is once again seen, most prominent within the anterior aspect of the upper abdomen.  Centered along a loop of transverse colon seen best on series 2, image 76 and series 5, image 18. The small bowel loops are decompressed. The  appendix is normal. There are no inflammatory changes within the right lower quadrant.  Remainder of the bowel is otherwise unremarkable without dilation or wall thickening.   MESENTERY: Large volume of abdominal ascites.  There is no mass or loculated collection.  Diffuse subcutaneous edema within the bilateral upper and lower quadrants. PELVIS: Large volume of pelvic ascites.  No pelvic lymphadenopathy. BONES:   There is degenerative disease of the thoracic and lumbar spine. Degenerative changes are seen within the sacroiliac joints and pubic symphysis. Degenerative changes are seen in the bilateral hips.  Sclerotic lesion is seen within the L5 vertebral body which is indeterminate. LUNG BASES: Thrombus within the right ventricle again seen and unchanged.  Otherwise dictated in separate study.         CONCLUSION:   Small volume of pneumoperitoneum.  The pneumoperitoneum appears to be most prominent within the upper abdomen and contiguous with the loop of proximal transverse colon.  Therefore a small perforation in the proximal transverse colon is felt to be the origin of the pneumoperitoneum.  Other  etiology such as a perforated duodenal or gastric ulcer are also within the differential but are felt to be less likely.  Large volume of abdominal and pelvic ascites.  No obstruction.  Right ventricular thrombus again seen and unchanged.  Cholelithiasis without CT evidence of acute cholecystitis.  Multiple other incidental findings as described in the body of the report which are unchanged.    Dictated by (CST): Prem Pina MD on 4/30/2024 at 2:50 PM     Finalized by (CST): Prem Pina MD on 4/30/2024 at 2:56 PM          XR CHEST AP PORTABLE  (CPT=71045)    Result Date: 4/30/2024  PROCEDURE: XR CHEST AP PORTABLE  (CPT=71045) TIME: 1230 hours.   COMPARISON: [facility], CTA CHEST+CTA ABDOMEN DISSECT SET (CPT=71275/12723), 4/30/2024, 11:51 AM.  [facility], XR CHEST AP PORTABLE  (CPT=71045), 2/23/2024, 11:15 AM.  INDICATIONS: Back pain post fall.  TECHNIQUE:   Single view.   FINDINGS:  CARDIAC/VASC: Mild cardiomegaly and minimally prominent pulmonary vascularity but no hilary pulmonary edema.  Status post sternotomy and coronary bypass.  Again noted is a fracture of the 1st are no suture. MEDIAST/SELMA:   No visible mass or adenopathy. LUNGS/PLEURA: Suboptimal inspiration.  Moderate elevation of the right hemidiaphragm.  Patchy right basal airspace disease may suggest right basilar pneumonia and or atelectasis.  Correlate clinically and follow-up studies are advised. BONES: No fracture or visible bony lesion. OTHER: Negative.          CONCLUSION:  1. Mild cardiomegaly and mildly prominent pulmonary vascularity but no hilary pulmonary edema.  Suboptimal inspiration.  Moderate elevation of the right hemidiaphragm unchanged.  Patchy right basal airspace disease may suggest right basal pneumonia and or  atelectasis.  Correlate clinically and follow-up studies are advised.    Dictated by (CST): Vern Day MD on 4/30/2024 at 12:59 PM     Finalized by (CST): Vern Day MD on 4/30/2024 at 1:03 PM          CTA CHEST+CTA ABDOMEN DISSECT SET (CPT=71275/10238)    Result Date: 4/30/2024  PROCEDURE: CT CHEST ABDOMEN DISSECT SET (CPT=71275/92559)  COMPARISON: [facility], CT CHEST PE AORTA (IV ONLY) (CPT=71260), 12/23/2021, 1:05 AM.  INDICATIONS: Back pain, hypotensive , very poor historian  TECHNIQUE:   CT images of the chest and abdomen were obtained with intravenous contrast material.  Automated exposure control for dose reduction was used. Adjustment of the mA and/or kV was done based on the patient's size. Use of iterative reconstruction technique for dose reduction was used.  Dose information is transmitted to the ACR (American College of Radiology) NRDR (National Radiology Data Registry) which includes the Dose Index Registry.  FINDINGS: Normal size aorta.  No acute aortic  syndrome.  Moderately calcified abdominal aorta.  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  Mesenteric arteries are widely patent.  Severe biventricular and moderate biatrial dilation.  1.9 x 1.7 centimeter thrombus at the RV apex new from prior  Nonocclusive PE RLL lobar artery image 60. Occlusive subsegmental PE at the left apex image 31.  Possible lingular segmental PE image 65  Prior CABG with severely calcified native coronary arteries.  Stable 1.3 centimeter anterior mediastinal nodule with peripheral calcifications likely benign.  No pericardial effusion  Small volume right basilar effusion  Subsegmental atelectasis right base.  Minimal emphysema  Abdomen and pelvis:  Normal liver contour.  Enlarged caudate lobe.  Punctate gallstones  Normal spleen  1.8 x 1.5 centimeter low-density nodule or cyst along the inferior margin of the pancreatic uncinate  Normal adrenals and kidneys  Unobstructed bowel.  Large volume ascites.  Mild pneumoperitoneum.  Laparotomy scar  1 centimeter sclerotic lesion posterior aspect of L5, nonspecific.          CONCLUSION:   Severe biventricular dilation.  1.9 x 1.7 centimeter thrombus at the RV apex  Small nonocclusive PE right lower lobar artery.  Small occlusive subsegmental PE at the apical left upper lobe  Small volume right effusion  Large volume ascites  Mild pneumoperitoneum.  This could be from bowel perforation or could be iatrogenic such as from any recent paracentesis  No acute aortic syndrome  1.8 x 1.5 centimeter low-density nodule or cyst along the inferior margin of the pancreatic uncinate.  This could be a pseudo cyst or cystic pancreatic lesion  Discussed with Dr. Anne     Dictated by (CST): Bradley Rick MD on 4/30/2024 at 12:14 PM     Finalized by (CST): Bradley Rick MD on 4/30/2024 at 12:37 PM            Assessment/Plan:  Patient Active Problem List   Diagnosis    Left inguinal hernia    Coronary artery disease  without angina pectoris, unspecified vessel or lesion type, unspecified whether native or transplanted heart    Acute congestive heart failure (HCC)    Acute congestive heart failure, unspecified heart failure type (HCC)    Syncope and collapse    JU (acute kidney injury) (HCC)    Dizziness    Parasomnia    RLS (restless legs syndrome)    Episodic mood disorder (HCC)    Anemia, unspecified type    Rectal bleeding    Acute on chronic congestive heart failure, unspecified heart failure type ([facility])    Iron deficiency anemia due to chronic blood loss    Scrotal edema    Goals of care, counseling/discussion    Palliative care encounter    Hypotension    Hypotension, unspecified hypotension type    Pneumoperitoneum    Bilateral pulmonary embolism (HCC)    RV (right ventricular) mural thrombus   Lactic 6.4 creat 2.1 LFT's ok now   TB 4,3  DB  3.1  PT 58  wbc 6.5 hb 14.9 plat   KUB  no free air   Septic and cardiac shock  Respiratory failure   Difficult to tell if bowel leak but KUB nl  PE  Not candidate for any surgical procedure       ZHEN ALVAREZ MD  5/1/2024  1:50 PM

## 2024-05-01 NOTE — PLAN OF CARE
Problem: CARDIOVASCULAR - ADULT  Goal: Maintains optimal cardiac output and hemodynamic stability  Description: INTERVENTIONS:  - Monitor vital signs, rhythm, and trends  - Monitor for bleeding, hypotension and signs of decreased cardiac output  - Evaluate effectiveness of vasoactive medications to optimize hemodynamic stability  - Monitor arterial and/or venous puncture sites for bleeding and/or hematoma  - Assess quality of pulses, skin color and temperature  - Assess for signs of decreased coronary artery perfusion - ex. Angina  - Evaluate fluid balance, assess for edema, trend weights  Outcome: Progressing  Goal: Absence of cardiac arrhythmias or at baseline  Description: INTERVENTIONS:  - Continuous cardiac monitoring, monitor vital signs, obtain 12 lead EKG if indicated  - Evaluate effectiveness of antiarrhythmic and heart rate control medications as ordered  - Initiate emergency measures for life threatening arrhythmias  - Monitor electrolytes and administer replacement therapy as ordered  Outcome: Progressing     Problem: MUSCULOSKELETAL - ADULT  Goal: Return mobility to safest level of function  Description: INTERVENTIONS:  - Assess patient stability and activity tolerance for standing, transferring and ambulating w/ or w/o assistive devices  - Assist with transfers and ambulation using safe patient handling equipment as needed  - Ensure adequate protection for wounds/incisions during mobilization  - Obtain PT/OT consults as needed  - Advance activity as appropriate  - Communicate ordered activity level and limitations with patient/family  Outcome: Progressing  Goal: Maintain proper alignment of affected body part  Description: INTERVENTIONS:  - Support and protect limb and body alignment per provider's orders  - Instruct and reinforce with patient and family use of appropriate assistive device and precautions (e.g. spinal or hip dislocation precautions)  Outcome: Progressing     Problem: Diabetes/Glucose  Control  Goal: Glucose maintained within prescribed range  Description: INTERVENTIONS:  - Monitor Blood Glucose as ordered  - Assess for signs and symptoms of hyperglycemia and hypoglycemia  - Administer ordered medications to maintain glucose within target range  - Assess barriers to adequate nutritional intake and initiate nutrition consult as needed  - Instruct patient on self management of diabetes  Outcome: Progressing     Problem: Safety Risk - Non-Violent Restraints  Goal: Patient will remain free from self-harm  Description: INTERVENTIONS:  - Apply the least restrictive restraint to prevent harm  - Notify patient and family of reasons restraints applied  - Assess for any contributing factors to confusion (electrolyte disturbances, delirium, medications)  - Discontinue any unnecessary medical devices as soon as possible  - Assess the patient's physical comfort, circulation, skin condition, hydration, nutrition and elimination needs   - Reorient and redirection as needed  - Assess for the need to continue restraints  Outcome: Progressing

## 2024-05-01 NOTE — PROGRESS NOTES
[facility] Pharmacy Dosing Service      Initial Pharmacokinetic Consult for Vancomycin Dosing     Cristhian Lopez is a 68 year old male who is being initiated on vancomycin therapy for sepsis.  Pharmacy has been asked to dose vancomycin by Dr. Miller.  The initial treatment and monitoring approach will be non-AUC strategy.        Weight and Temperature:    Wt Readings from Last 1 Encounters:   24 89.9 kg (198 lb 1.6 oz)        Temp Readings from Last 1 Encounters:   24 96.6 °F (35.9 °C) (Temporal)      Labs:   Recent Labs   Lab 24  1111 24  0511   CREATSERUM 1.86* 2.10*      Estimated Creatinine Clearance: 34.1 mL/min (A) (based on SCr of 2.1 mg/dL (H)).     Recent Labs   Lab 24  1111 24  1521 24  0511   WBC 3.7* 6.5 18.4*          The Pharmacokinetic Target is:    Trough/random 10-15 mg/L    Renal Dosing Considerations:    JU/ARF     Assessment/Plan:   Initial/Loading dose: Has received 1750 mg IV (25 mg/kg, capped at 2250 mg) x 1 loading dose.      Maintenance dose: Pharmacy will dose vancomycin per levels    Monitorin) Plan for vancomycin trough to be obtained after the loading dose    2) Pharmacy will order SCr as clinically indicated to assess renal function.    3) Pharmacy will monitor for toxicity and efficacy, adjust vancomycin dose and/or frequency, and order vancomycin levels as appropriate per the Pharmacy and Therapeutics Committee approved protocol until discontinuation of the medication.       We appreciate the opportunity to assist in the care of this patient.     Dayday Preciado, PharmD  2024  12:58 PM  [facility] Pharmacy Extension: [phone]

## 2024-05-01 NOTE — PROGRESS NOTES
[facility] Pharmacy Dosing Service  Argatroban Subsequent Dosing       Cristhian Lopez is a 68 year old patient on argatroban for treatment of PE/DVT .    Goal PTT is 46-93     PTT   Date Value Ref Range Status   04/30/2024 68.0 (H) 23.3 - 35.6 seconds Final     Comment:     Elevations of the aPTT in patients not receiving  anticoagulant therapy (Heparin, etc.), may be  seen in Factor deficiency, vitamin K deficiency,  factor inhibitors, liver disease, etc.  Clinical correlation is recommended.       INR   Date Value Ref Range Status   04/30/2024 6.10 (HH) 0.80 - 1.20 Final     Comment:     Therapeutic INR range for patients on warfarin:  2.0-3.0 for most medical conditions and surgical prophylaxis   2.5-3.5 for mechanical heart valves and recurrent thromboembolism    Direct thrombin inhibitors (e.g. argatroban, bivalirudin), factor Xa inhibitors (e.g. apixaban, rivaroxaban), and conditions such as coagulation factor deficiency, vitamin K deficiency, and liver disease will prolong the prothrombin time/INR.    Unfractionated heparin and low molecular weight heparin do not affect the prothrombin time/INR up to a concentration of 1 IU/mL and 1.5 IU/mL, respectively.           Current dose is 0.25mcg/kg/min    Based on PTT will continue current dose     Will recheck PTT  in 2 hours  and adjust based on result for goal PTT of 46-93.     We will continue to follow and appreciate the opportunity to assist in the care of this patient.    Thank you,    Cintia Tavares, PharmD  4/30/2024 10:49 PM

## 2024-05-01 NOTE — PROGRESS NOTES
Pt's BP continues to decline,started now on 3rd pressor which is Zia gtt, currently maxed on Levo and Vaso. 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 Offering BIPAP since the beginning of the shift but pt continues to refuse and stated that he's claustrophobic. Also stated that he doesn't have any family and his friend Woodrow can decide when he can't decide for himself. Tunde ROBERT made aware. Dr Holt-nocturnalist notified.

## 2024-05-01 NOTE — PROGRESS NOTES
Cardiology Progress Note    Cristhian Lopez Patient Status:  Inpatient    9/3/1955 MRN [id number]   [facility] [facility] 2W/SW Attending Aretha Hazel MD   Hosp Day # 1 PCP KARI BARCLAY     Interval Note:  Pt with progressive shock - escalation of pressors  Pt changed code status from DNR to full code overnight - he developed AMS and ultimately got intubated.  Developed large right sided pneumothorax    --------------------------------------------------------------------------------------------------------------------------------  ROS 12 systems reviewed, pertinent findings above.  ROS    History:  Past Medical History:    Coronary heart disease    2 stents in place, pstient sees Dr. Westbrook    Essential hypertension    Heart attack (HCC)     maker w/ 5 stents    Hyperlipidemia    Melanoma in situ of left upper extremity (HCC)    right thumb    RLS (restless legs syndrome)     Past Surgical History:   Procedure Laterality Date    Other surgical history      2 stents      Family History   Problem Relation Age of Onset    Cancer Father         sarcoma of back      reports that he quit smoking about 28 years ago. His smoking use included cigarettes. He has never used smokeless tobacco. He reports that he does not drink alcohol and does not use drugs.    Objective:   Temp: 97.3 °F (36.3 °C)  Pulse: 60  Resp: 26  BP: 124/106  AO: 105/76  FiO2 (%): 100 %    Intake/Output:     Intake/Output Summary (Last 24 hours) at 2024 0838  Last data filed at 2024 0645  Gross per 24 hour   Intake 1354.1 ml   Output 350 ml   Net 1004.1 ml       Physical Exam:    General: intubated, sedated  HEENT: Normocephalic, anicteric sclera, neck supple.  Neck: No JVD, carotids 2+, no bruits.  Cardiac: Regular rate and rhythm. S1, S2 normal. No murmur, pericardial rub, S3.  Lungs: Clear without wheezes, rales, rhonchi or dullness.  Normal excursions and effort.  Abdomen: Soft, non-tender. BS-present.  Extremities: Without  clubbing, cyanosis or edema.  Peripheral pulses are 2+.  Neurologic: Non-focal  Skin: Warm and dry.       Assessment:    Septic shock, bowel perforation  VDRF  Large right PTX, tension?  Small pulm embolism  Small pneumoperitoneum  Ascites  Possible RV apical thrombus  JU  Lactic acidosis  CAD/CABG/PCI  Ischemic cardiomyopathy EF 25%  Pulm hypertension  Hyperlipidemia  Diabetes  Question of HIT history        Plan:  - Pt with progressive respiratory failure, shock requiring escalation of pressors and intubation this morning  - large PTX on CXR overnight, Pulm/CritCare placing chest tube this morning - possibly contributing to shock if tension ptx  - Continue pressor support  - Consider addition of inotrope if poor UOP and concern for cardiogenic shock  - poor prognosis    Thank you for allowing me to take part in the care of Cristhian Lopez. Please call with any questions of concerns.      Level of care: L4    Amarilys Williamson DO  [facility]   Interventional Cardiac and Vascular Services      Amarilys Williamson DO  May 01, 2024  8:38 AM

## 2024-05-01 NOTE — CONSULTS
[facility]    PATIENT'S NAME: CARLY ROTHMAN   ATTENDING PHYSICIAN: Aretha Hazel MD   CONSULTING PHYSICIAN: Josh Omalley MD   PATIENT ACCOUNT#:   [id number]    LOCATION:  [address] [place] [address]  MEDICAL RECORD #:   [id number]       YOB: 1955  ADMISSION DATE:       04/30/2024      CONSULT DATE:  05/01/2024    REPORT OF CONSULTATION      HISTORY OF PRESENT ILLNESS:  The patient is a 62-year-old male with a history of coronary artery disease, hypertension, ischemic cardiomyopathy who came to the emergency room on April 30, 2024, after falling.  The history is obtained through the chart as he is now intubated and sedated.  When the patient arrived, though, he was hypotensive with systolics in the mid 60s.  He was given fluids and is now on 2 pressors.  He had CT scans of the chest and abdomen with IV contrast on the date of admission.  This revealed evidence for small pulmonary embolism.  On abdominal exam, there was evidence for large amount of ascites.  There was a small amount of pneumoperitoneum.  There were findings that were consistent with perforation of the transferse colon.  The patient was initially DNR but early this morning the patient developed increasing respiratory distress and reversed his code status and said he wanted to be a full code.  As result, he is now intubated, on pressors.  His urine output is declining and his creatinine is increasing, and renal consultation has now been called.  Of note is that he was recently hospitalized here and had followup laboratory studies done on March 3, 2024.  At that time, he had a creatinine 1.24 with an estimated GFR of 64 mL/minute.  When the patient arrived in the emergency room yesterday, his creatinine was 1.86, and today it is now 2.10.    PAST MEDICAL HISTORY:  Significant for coronary artery disease with ischemic cardiomyopathy.  Last echocardiogram in February 2024 showed a left ventricular ejection fraction of only 25%.  He  has had a history of hypertension, adult-onset diabetes mellitus, and may have had a sigmoidectomy for colon cancer in the past.    MEDICATIONS:  His current medications include etomidate, Humulin R, Protonix, Zosyn, IV vancomycin.  He was on Entresto and Bumex along with metformin at home.      ALLERGIES:  List as having an allergy to heparin.    SOCIAL HISTORY:  He apparently lives alone and has no immediate family in the area.  He is a former cigarette smoker.    FAMILY HISTORY:  Unobtainable.    REVIEW OF SYSTEMS:  Also unobtainable as the patient is sedated and intubated.      PHYSICAL EXAMINATION:    GENERAL:  The patient is intubated and sedated.  He is on 2 pressors.  VITAL SIGNS:  His blood pressure is 118/67 with a pulse of 114, respiratory rate 18, temperature 96.6.  NECK:  Supple without JVD.  LUNGS:  Grossly clear anteriorly.  HEART:  Regular rate and rhythm without gallops or murmurs.  ABDOMEN:  Soft but distended.  Ascitic fluid was present.  No obvious masses or organomegaly.  EXTREMITIES:  2+ lower extremity edema bilaterally.     LABORATORY DATA:  BUN and creatinine of 45 and 2.10 respectively, with a CO2 of 16, with an anion gap of 16.  Potassium 3.6, sodium 128, glucose 217.  Albumin 2.8.  Total bilirubin 5.4.  PT/INR 6.1.  White count 18.4, hemoglobin 16.6, platelets 182,000.  Urinalysis at time of admission was unremarkable with no protein or hematuria.      CT scans as stated above.  Kidneys appeared to be grossly normal.  The patient had central line placed and subsequently had a pneumothorax.  Chest tube was now been placed.    IMPRESSION:  The patient is a 68-year-old male now with:    1.   Acute renal failure secondary to septic shock.  He was hypotensive at time of presentation.  Also had 2 CT scans with IV contrast and contrast-induced nephropathy may be playing a role.  He was also on Entresto and diuretics at time of admission.  2.   Septic shock.  3.   Possible perforated transverse  colon.  4.   History of severe ischemic cardiomyopathy.  5.   Possible underlying cirrhosis.  6.   Right pneumothorax, now status post chest tube placement.  7.   Hypertension.    PLAN:  We will continue IV hydration but switch him to a bicarbonate drip.  Check a random urine sodium and creatinine.  Will follow with intake and output, daily weights, and serial chemistries.  The patient's overall prognosis, though, is very guarded as he has multiple organ failure.   Unless he should clinically improve, he would make a poor dialysis candidate.  I have discussed the above with Nursing and Dr. Mejia.  They are attempting to contact friends who may be power-of- to discuss goals of care.     Dictated By Josh Omalley MD  d: 05/01/2024 12:49:20  t: 05/01/2024 13:01:32  Job [id number]  MKK/    cc: Aretha Hazel MD

## 2024-05-01 NOTE — PROGRESS NOTES
05/01/24 0640   Vent Information   Vent Mode PC/AC   Settings   FiO2 (%) 100 %   Resp Rate (Set) 14   PEEP/CPAP (cm H2O) 5 cm H20   Press Control > PEEP CWP 20   Insp Time (sec) 1 sec     Pt intubated 7.5mm  ETT secured at 24cm at the lip. B/l bs heard, good color change on CO2 detector. Awaiting CXR

## 2024-05-01 NOTE — PROCEDURES
On call Wilfredournist 05/01/24    Called by RN patient in acute respiratory distress, now obtunded.  As per RN patient prior had been DO NOT INTUBATE however started to become more short of breath, was refusing BiPAP and agreed to be intubated instead.    Patient given etomidate, 8.0 ET tube used, intubated under direct visualization 24 cm at the lip bilateral breath sounds appreciated CO2 detector with color change.  14/500/100/5    Chest x-ray pending

## 2024-05-01 NOTE — PROCEDURES
Procedure performed ; right-sided chest tube placement    Indication ; large right-sided pneumothorax    Right-sided chest wall was cleaned and sterilized with ChloraPrep  Chest was draped  under sterile fashion .  At the anterior mid clavicle right-sided chest area at the fourth intercostal space just above the edge of the fourth rib 1% lidocaine was irrigated.  Utilizing wane kit chest tube , air was aspirated easily and then guidewire was advanced without any difficulties , under the guidewire 14 Danish pigtail chest tube was placed without any difficulties , connected to Pleur-evac with good air bubble with no bleeding.  Pigtail chest tube was sutured and secured in proper dressing was placed  Chest tube connected to chest wall suction -20  Patient tolerated procedure well with no immediate complications  Chest x-ray was ordered to confirm placement

## 2024-05-01 NOTE — CONSULTS
[facility]  part of [facility] ID CONSULT NOTE    Cristhian Lopez Patient Status:  Inpatient    9/3/1955 MRN [id number]   [facility] [facility] 2W/SW Attending Aretha Hazel MD   Hosp Day # 1 PCP KARI BARCLAY       Reason for Consultation:  Sepsis    ASSESSMENT:    Antibiotics: IV vancomycin, zosyn    # Acute sepsis with shock on vasopressors from possible intraabdominal source  # Pneumoperitoneum with concern for bowel perforation and large volume ascites   - h/o colon cancer with previous hemicolectomy  # Acute respiratory failure s/p intubation  with large R sided PTX  # JU on CKD with metabolic acidosis  # Acute leukocytosis with bandemia - r/o bacteremia  # RV thrombus and acute PE with h/o VTE  # Severe biventricular heart failure EF 25% s/p AICD; CAD s/p CABG      PLAN:    -  IV vancomycin, zosyn  -  f/up cx  -  prognosis guarded  -  Follow fever curve, wbc  -  Reviewed labs, micro, imaging reports, available old records  -  d/w staff    History of Present Illness:  Cristhian Lopez is a a(n) 68 year old male. Patient is a 68-year-old male with a history of biventricular heart failure EF 25%, CAD status post CABG with an AICD, CKD, severe pulmonary hypertension, VTE who lives at home but mostly bedbound and wheelchair-bound who had a fall and unable to get up with associated weakness and fatigue.  Poor oral intake.  Brought to the hospital initially afebrile with WBC of 3.7, up to 18.4 with an elevated lactic acid up to 6.4, JU, INR of 6.8.  CTA chest, abdomen, pelvis with right ventricular apex thrombus, small nonocclusive PE, large volume ascites.  CT A/P showed pneumoperitoneum with concern for transverse colon origin.  Started on IV vancomycin and Zosyn.  Blood cultures pending.  Requiring vasopressors.  Increasing ox requirement.    History:  Past Medical History:    Coronary heart disease    2 stents in place, pstient sees Dr. Westbrook    Essential  hypertension    Heart attack (HCC)     maker w/ 5 stents    Hyperlipidemia    Melanoma in situ of left upper extremity (HCC)    right thumb    RLS (restless legs syndrome)     Past Surgical History:   Procedure Laterality Date    Other surgical history      2 stents      Family History   Problem Relation Age of Onset    Cancer Father         sarcoma of back      reports that he quit smoking about 28 years ago. His smoking use included cigarettes. He has never used smokeless tobacco. He reports that he does not drink alcohol and does not use drugs.    Allergies:  Allergies   Allergen Reactions    Heparin HIVES     Patient not sure       Medications:    Current Facility-Administered Medications:     phenylephrine (Zia-Synephrine) 250 mg in sodium chloride 0.9% 250 mL infusion,  mcg/min, Intravenous, Continuous    etomidate (Amidate) 2 mg/mL injection, , ,     propofol (Diprivan) 10 MG/ML injection, , ,     propofol (Diprivan) 10 mg/mL infusion premix, 5-50 mcg/kg/min, Intravenous, Continuous    fentaNYL (Sublimaze) 50 mcg/mL injection 25 mcg, 25 mcg, Intravenous, Q30 Min PRN **OR** fentaNYL (Sublimaze) 50 mcg/mL injection 50 mcg, 50 mcg, Intravenous, Q30 Min PRN    acetaminophen (Tylenol) tab 650 mg, 650 mg, Oral, Q4H PRN **OR** acetaminophen (Tylenol) 160 MG/5ML oral liquid 650 mg, 650 mg, Oral, Q4H PRN **OR** acetaminophen (Tylenol) rectal suppository 650 mg, 650 mg, Rectal, Q4H PRN **OR** acetaminophen (Ofirmev) 10 mg/mL infusion premix 1,000 mg, 1,000 mg, Intravenous, Q6H PRN    polyethylene glycol (PEG 3350) (Miralax) 17 g oral packet 17 g, 17 g, Oral, Daily PRN    senna (Senokot) 8.8 MG/5ML oral syrup 17.6 mg, 10 mL, Oral, Nightly PRN    bisacodyl (Dulcolax) 10 MG rectal suppository 10 mg, 10 mg, Rectal, Daily PRN    fleet enema (Fleet) 7-19 GM/118ML rectal enema 133 mL, 1 enema, Rectal, Once PRN    chlorhexidine gluconate (Peridex) 0.12 % oral solution 15 mL, 15 mL, Mouth/Throat, BID@0800,2000     argatroban 250 mg in sodium chloride 0.9% 250 mL infusion, 2 mcg/kg/min, Intravenous, Continuous    vasopressin (Vasostrict) 20 Units in sodium chloride 0.9% 100 mL infusion for septic shock, 0.03 Units/min, Intravenous, Continuous    piperacillin-tazobactam (Zosyn) 3.375 g in dextrose 5% 100 mL IVPB-ADDV, 3.375 g, Intravenous, Q8H    pantoprazole (Protonix) 40 mg in sodium chloride 0.9% PF 10 mL IV push, 40 mg, Intravenous, Daily    glucose (Dex4) 15 GM/59ML oral liquid 15 g, 15 g, Oral, Q15 Min PRN **OR** glucose (Glutose) 40% oral gel 15 g, 15 g, Oral, Q15 Min PRN **OR** glucose-vitamin C (Dex-4) chewable tab 4 tablet, 4 tablet, Oral, Q15 Min PRN **OR** dextrose 50% injection 50 mL, 50 mL, Intravenous, Q15 Min PRN **OR** glucose (Dex4) 15 GM/59ML oral liquid 30 g, 30 g, Oral, Q15 Min PRN **OR** glucose (Glutose) 40% oral gel 30 g, 30 g, Oral, Q15 Min PRN **OR** glucose-vitamin C (Dex-4) chewable tab 8 tablet, 8 tablet, Oral, Q15 Min PRN    insulin regular human (Novolin R, Humulin R) 100 UNIT/ML injection 1-5 Units, 1-5 Units, Subcutaneous, 4 times per day    dextrose 5%-sodium chloride 0.9% infusion, , Intravenous, Continuous    norepinephrine (Levophed) 32 mg in dextrose 5% 250 mL infusion, 0.5-30 mcg/min, Intravenous, Continuous    morphINE PF 2 MG/ML injection 1 mg, 1 mg, Intravenous, Q4H PRN    Review of Systems:  Unable to obtain 2/2 clinical condition    Physical Exam:  Vital signs: Blood pressure (!) 124/106, pulse 60, temperature 97.3 °F (36.3 °C), temperature source Temporal, resp. rate 26, height 175.3 cm (5' 9\"), weight 198 lb 1.6 oz (89.9 kg), SpO2 (!) 76%.    General: in bed, sedated  HEENT: eyes closed, ETT in place  Neck: No lymphadenopathy.  Supple.  Cardiovascular: No chest wall tenderness  Respiratory: Symmetric expansion  Abdomen: Soft, distended, +ttp  Musculoskeletal: +LE edema  Integument: No lesions. No erythema.  R chest tube  RIJ CVC  R radial madhav Aviles    Laboratory Data: Reviewed in  EMR    Microbiology: Reviewed in EMR    Radiology: Reviewed    Thank you for allowing us to participate in the care of this patient. Please do not hesitate to call if you have any questions.     We will continue to follow with you and will make further recommendations based on his progress.    Michael Miller MD   [facility]  [phone]  5/1/2024

## 2024-05-01 NOTE — PROGRESS NOTES
[facility]  part of [place] Hospitalist Progress Note     Cristhian Lopez Patient Status:  Inpatient    9/3/1955 MRN [id number]   [facility] [facility] 2W/SW Attending Aretha Hazel MD   Hosp Day # 1 PCP KARI BARCLAY     Subjective:     Patient laying in bed, sedated, intubated. On 3 pressors.       Objective:    Review of Systems:   Unable to assess.       Vital signs:  Temp:  [96.6 °F (35.9 °C)-97.8 °F (36.6 °C)] 96.6 °F (35.9 °C)  Pulse:  [] 114  Resp:  [16-45] 19  BP: ()/() 112/85  SpO2:  [76 %-100 %] 100 %  AO: ()/(52-76) 102/69  FiO2 (%):  [80 %-100 %] 80 %      Physical Exam:    Gen: intubated, sedated.  Chest: mechanical breath sounds  CVS: normal s1 and s2 RR  Abd: NABS soft NT ND  Neuro: unable to assess  Ext: no edema in bilateral LE      Diagnostic Data:    Labs:  Recent Labs   Lab 24  1111 24  1521 24  1717 24  1853 24  0511   WBC 3.7* 6.5  --   --  18.4*   HGB 16.5 14.9  --   --  16.6   MCV 73.0* 72.6*  --   --  72.2*   .0* 136.0*  --   --  182.0   BAND  --   --   --   --  4   INR 1.65*  --  6.79* 6.10*  --        Recent Labs   Lab 24  1111 24  1521 24  2041 24  0511   *  --   --  217*   BUN 41*  --   --  45*   CREATSERUM 1.86*  --   --  2.10*   CA 8.0*  --   --  7.9*   ALB 2.8* 2.3* 2.7* 2.8*   *  --   --  128*   K 3.6  --   --  3.6   CL 95*  --   --  96*   CO2 22.0  --   --  16.0*   ALKPHO 128* 99 116 111   AST 47* 34 43* 45*   ALT 45 35 35 42   BILT 4.9* 4.3* 5.1* 5.4*   TP 5.0* 4.1* 5.0* 5.0*       Estimated Creatinine Clearance: 34.1 mL/min (A) (based on SCr of 2.1 mg/dL (H)).    Recent Labs   Lab 24  1111 24  1717 24  1853   PTP 20.5* 63.1* 58.0*   INR 1.65* 6.79* 6.10*              Imaging: Imaging data reviewed in Epic.    Medications:    etomidate        propofol        chlorhexidine gluconate  15 mL Mouth/Throat BID@0800,2000    insulin  regular human  1-7 Units Subcutaneous 4 times per day    Vancomycin IV  1 each Intravenous See Admin Instructions (RX holding)    piperacillin-tazobactam  3.375 g Intravenous Q8H    pantoprazole  40 mg Intravenous Daily       Assessment & Plan:     Profound shock - cardiogenic + septic  Acute hypoxic respiratory failure  Biventricular heart failure  Perforated bowel  Hx of CAD s/p CABG and stenting and ICD  RV thrombus  CT reviewed  ICU on consult  On 3 pressors  Continue IVF, IV abx  Full vent support  Cardiology on consult  Continue pressor support  Consider addition of inotrope if poor UOP and concern for cardiogenic shock  ID on consult  Continue vanc and zosyn  Follow up on cultures  Poor surgical candidate  Appreicate Gsx recs  Poor prognosis  R sided PTX  Chest tube in place  Appreciate Pulm recs  Acute PE with hx of VTE  RV thrombus  Allergy to heparin  Currently on Argatroban  JU on CKD  Continue IVF  Monitor labs  Nephrology on consult  Strict Is and Os, daily weights        Plan of care discussed with patient or family at bedside.      Supplementary Documentation:     Quality:  DVT Prophylaxis: argatroban  CODE status: Full      Estimated date of discharge: TBD  Discharge is dependent on: clinical stability  At this point Mr. Lopez is expected to be discharge to: home            **Certification      PHYSICIAN Certification of Need for Inpatient Hospitalization - Initial Certification    Patient will require inpatient services that will reasonably be expected to span two midnight's based on the clinical documentation in H+P.   Based on patients current state of illness, I anticipate that, after discharge, patient will require TBD.      Aretha Hazel MD  Hospitalist    MDM: High, I personally reviewed the available laboratories, imaging. I discussed the case with RN. I ordered laboratories, studies including AM labs.  Medical decision making high, risk is high.       The 21st Century Cures Act makes  medical notes like these available to patients in the interest of transparency. Please be advised this is a medical document. Medical documents are intended to carry relevant information, facts as evident, and the clinical opinion of the practitioner. The medical note is intended as peer to peer communication and may appear blunt or direct. It is written in medical language and may contain abbreviations or verbiage that are unfamiliar.

## 2024-05-02 NOTE — PROGRESS NOTES
Received PT intubated with a (8) ETT secured at (25) on vent with the following settings; BS heard bilaterally, SXN provided as needed. Titrated FiO2 to 50%. Pt tolerated well. RT to continue to monitor.        05/02/24 0502   Vent Information   Vent Mode PC/AC   Settings   FiO2 (%) 50 %   Resp Rate (Set) 14   Waveform Decelerating ramp   PEEP/CPAP (cm H2O) 5 cm H20   Press Control > PEEP CWP 15   Insp Time (sec) 0.65 sec   PIP Set (cm H2O) 20 cm H2O   Trigger Sensitivity Flow (L/min) 1 L/min   Humidification Heater   H2O Bag Level 1/4 Full   Readings   Total RR 18   Minute Ventilation (L/min) 9.6 L/min   Expiratory Tidal Volume 510 mL   PIP Observed (cm H2O) 20 cm H2O   MAP (cm H2O) 8   PEEP Low (cm H2O) 2 cm H2O   Plateau Pressure (cm H2O) 15 cm H2O   Static Compliance (L/cm H2O) 49   Dynamic Compliance (L/cm H2O) 33 L/cm H2O

## 2024-05-02 NOTE — PROGRESS NOTES
[facility]  Nephrology Daily Progress Note    Cristhian Lopez  [id number]  68 year old      HPI:   Cristhian Lopez is a 68 year old male.  Intubated and sedated.  On 3 pressors.  40% FiO2.        ROS:     Unable    PHYSICAL EXAM:   Temp:  [96.6 °F (35.9 °C)-97.4 °F (36.3 °C)] 96.7 °F (35.9 °C)  Pulse:  [114-125] 117  Resp:  [17-29] 18  BP: ()/(61-88) 108/88  SpO2:  [91 %-100 %] 100 %  AO: ()/(56-72) 101/61  FiO2 (%):  [40 %-80 %] 40 %  Patient Weight for the past 72 hrs:   Weight   04/30/24 1046 150 lb (68 kg)   04/30/24 1400 208 lb 8.9 oz (94.6 kg)   04/30/24 2100 197 lb 14.4 oz (89.8 kg)   05/01/24 0400 198 lb 1.6 oz (89.9 kg)   05/02/24 0548 204 lb 9.6 oz (92.8 kg)       Constitutional: appears well hydrated alert and responsive no acute distress noted  Neck/Thyroid: neck is supple without adenopathy  Lymphatic: no abnormal cervical, supraclavicular or axillary adenopathy is noted  Respiratory: normal to inspection lungs are clear to auscultation bilaterally normal respiratory effort  Cardiovascular: regular rate and rhythm no murmurs, gallups, or rubs  Abdomen: soft non-tender non-distended no organomegaly noted no masses  Musculoskeletal: full ROM all extremities good strength  no deformities  Extremities: 1 + edema.   Neurological: exam appropriate for age reflexes and motor skills appropriate for age    Labs:  Lab Results   Component Value Date    WBC 20.8 05/02/2024    WBC 20.8 05/02/2024    HGB 14.8 05/02/2024    HGB 14.8 05/02/2024    HCT 42.7 05/02/2024    HCT 42.7 05/02/2024    .0 05/02/2024    .0 05/02/2024    CREATSERUM 2.27 05/02/2024    BUN 52 05/02/2024     05/02/2024    K 3.3 05/02/2024    CL 99 05/02/2024    CO2 23.0 05/02/2024     05/02/2024    CA 7.3 05/02/2024    ALB 2.2 05/02/2024    ALKPHO 92 05/02/2024    BILT 5.5 05/02/2024    TP 4.4 05/02/2024    AST 36 05/02/2024    ALT 36 05/02/2024    .8 05/02/2024    INR 7.07 05/02/2024     MG 1.7 05/02/2024    PHOS 5.6 05/02/2024     Recent Labs   Lab 04/30/24  1521 05/01/24  0511 05/02/24 0512   WBC 6.5 18.4* 20.8*  20.8*   HGB 14.9 16.6 14.8  14.8   HCT 43.4 48.8 42.7  42.7   .0* 182.0 157.0  157.0     Recent Labs   Lab 04/30/24  1111 04/30/24  1521 04/30/24 2041 05/01/24 0511 05/02/24 0512   *  --   --  217* 242*   BUN 41*  --   --  45* 52*   CREATSERUM 1.86*  --   --  2.10* 2.27*   CA 8.0*  --   --  7.9* 7.3*   ALB 2.8*   < > 2.7* 2.8* 2.2*   *  --   --  128* 130*   K 3.6  --   --  3.6 3.3*   CL 95*  --   --  96* 99   CO2 22.0  --   --  16.0* 23.0   ALKPHO 128*   < > 116 111 92   AST 47*   < > 43* 45* 36*   ALT 45   < > 35 42 36   BILT 4.9*   < > 5.1* 5.4* 5.5*   TP 5.0*   < > 5.0* 5.0* 4.4*   PHOS  --   --   --  6.1* 5.6*    < > = values in this interval not displayed.       Imaging  XR CHEST AP PORTABLE  (CPT=71045)    Result Date: 5/2/2024  CONCLUSION:  1.  Small right apical pneumothorax occupying 5-10% of hemithorax volume is present.  No tracheal or mediastinal shift.  Stable position of right chest tube. 2.  Cardiomegaly.  Post CABG. 3.  Elevation/eventration of the right hemidiaphragm.  Linear bibasilar pulmonary opacities lung most likely representing atelectasis however correlate for pneumonia. 4.  Interstitial prominence suggesting mild edema. 5.  Gross stable/satisfactory position of lines and tubes.  Dictated by (CST): Kings Troy MD on 5/02/2024 at 8:47 AM     Finalized by (CST): Kings Troy MD on 5/02/2024 at 8:50 AM          XR CHEST AP PORTABLE  (CPT=71045)    Result Date: 5/1/2024  CONCLUSION:  1. Right lower lobe pigtail chest tube placed with significant re-expansion of the right lung with a small right apical right upper lobe lateral pneumothorax as described above.  Moderate elevation right hemidiaphragm.  Bibasilar atelectasis.  I cannot exclude right perihilar pneumonia.  Follow-up studies are advised.  ET tube in the trachea at the level of  the clavicles     Dictated by (CST): Vern Day MD on 5/01/2024 at 10:23 AM     Finalized by (CST): Vern Day MD on 5/01/2024 at 10:26 AM          XR ABDOMEN (1 VIEW) (CPT=74018)    Result Date: 5/1/2024  CONCLUSION:  1. Moderate gaseous distention of the stomach and transverse colon.  NG tube terminates in the cardia of the stomach and may need to be advanced further into the more distal stomach to decompress.  No large well-defined free intraperitoneal air collection is seen.  No definite bowel obstruction.    Dictated by (CST): Vern Day MD on 5/01/2024 at 10:16 AM     Finalized by (CST): Vern Day MD on 5/01/2024 at 10:20 AM          XR CHEST AP PORTABLE  (CPT=71045)    Result Date: 5/1/2024  CONCLUSION:   Moderate right apicolateral pneumothorax.  Progressive opacities in the right perihilar region and right lower lobe, which are favored to reflect atelectasis.  Prominence of the pulmonary markings, which may reflect mild interstitial edema or be secondary to low lung volumes.  Posterior left 8th rib fracture.   The finding of a right pneumothorax was discussed with Audelia VAIL by Hamilton QUIGLEY at 7:26 a.m. on 05/01/2024.   Dictated by (CST): Dayday Dave MD on 5/01/2024 at 7:47 AM     Finalized by (CST): Dayday Dave MD on 5/01/2024 at 8:01 AM          XR CHEST AP PORTABLE  (CPT=71045)    Result Date: 5/1/2024  CONCLUSION:  1. Increasing large right-sided pneumothorax.  2. Postthoracotomy chest with cardiomegaly and nonspecific interstitial opacities.  3. Additional support tubes and lines as above.    Results of this examination were discussed with the patient's critical care nurse, Daryn Win, by Dr. Villasenor at 0726 on 05/01/2024.   Dictated by (CST): Gaurav Villasenor MD on 5/01/2024 at 7:23 AM     Finalized by (CST): Gaurav Villasenor MD on 5/01/2024 at 7:27 AM          XR CHEST AP PORTABLE  (CPT=71045)    Result Date: 4/30/2024  CONCLUSION:  Right internal jugular central venous catheter tip  projects over the expected superior cavoatrial junction.   Unchanged right greater than left basilar opacity.   Dictated by (CST): Ratna Larson MD on 4/30/2024 at 7:45 PM     Finalized by (CST): Ratna Larson MD on 4/30/2024 at 7:48 PM          XR CHEST AP PORTABLE  (CPT=71045)    Result Date: 4/30/2024  CONCLUSION:  1. No pneumothorax. 2. Right jugular venous catheter with tip in right atrium approximately 5.5 cm below the RA SVC junction. 3. Elevation of right hemidiaphragm with right basilar atelectasis. 4. Pneumoperitoneum unchanged from recent CT.    Dictated by (CST): Edvin Strauss MD on 4/30/2024 at 6:21 PM     Finalized by (CST): Edvin Strauss MD on 4/30/2024 at 6:25 PM          CT ABDOMEN+PELVIS(CONTRAST ONLY)(CPT=74177)    Result Date: 4/30/2024  CONCLUSION:   Small volume of pneumoperitoneum.  The pneumoperitoneum appears to be most prominent within the upper abdomen and contiguous with the loop of proximal transverse colon.  Therefore a small perforation in the proximal transverse colon is felt to be the origin of the pneumoperitoneum.  Other  etiology such as a perforated duodenal or gastric ulcer are also within the differential but are felt to be less likely.  Large volume of abdominal and pelvic ascites.  No obstruction.  Right ventricular thrombus again seen and unchanged.  Cholelithiasis without CT evidence of acute cholecystitis.  Multiple other incidental findings as described in the body of the report which are unchanged.    Dictated by (CST): Prem Pina MD on 4/30/2024 at 2:50 PM     Finalized by (CST): Prem Pina MD on 4/30/2024 at 2:56 PM          XR CHEST AP PORTABLE  (CPT=71045)    Result Date: 4/30/2024  CONCLUSION:  1. Mild cardiomegaly and mildly prominent pulmonary vascularity but no hilary pulmonary edema.  Suboptimal inspiration.  Moderate elevation of the right hemidiaphragm unchanged.  Patchy right basal airspace disease may suggest right basal pneumonia and or   atelectasis.  Correlate clinically and follow-up studies are advised.    Dictated by (CST): Vern Day MD on 4/30/2024 at 12:59 PM     Finalized by (CST): Vern Day MD on 4/30/2024 at 1:03 PM          CTA CHEST+CTA ABDOMEN DISSECT SET (CPT=71275/20619)    Result Date: 4/30/2024  CONCLUSION:   Severe biventricular dilation.  1.9 x 1.7 centimeter thrombus at the RV apex  Small nonocclusive PE right lower lobar artery.  Small occlusive subsegmental PE at the apical left upper lobe  Small volume right effusion  Large volume ascites  Mild pneumoperitoneum.  This could be from bowel perforation or could be iatrogenic such as from any recent paracentesis  No acute aortic syndrome  1.8 x 1.5 centimeter low-density nodule or cyst along the inferior margin of the pancreatic uncinate.  This could be a pseudo cyst or cystic pancreatic lesion  Discussed with Dr. Anne     Dictated by (CST): Bradley Rick MD on 4/30/2024 at 12:14 PM     Finalized by (CST): Bradley Rick MD on 4/30/2024 at 12:37 PM             Medications:    Current Facility-Administered Medications:     argatroban 50 mg/50mL infusion premix, 0.09 mcg/kg/min, Intravenous, Continuous    phenylephrine (Zia-Synephrine) 250 mg in sodium chloride 0.9% 250 mL infusion,  mcg/min, Intravenous, Continuous    propofol (Diprivan) 10 mg/mL infusion premix, 5-50 mcg/kg/min, Intravenous, Continuous    fentaNYL (Sublimaze) 50 mcg/mL injection 25 mcg, 25 mcg, Intravenous, Q30 Min PRN **OR** fentaNYL (Sublimaze) 50 mcg/mL injection 50 mcg, 50 mcg, Intravenous, Q30 Min PRN    acetaminophen (Tylenol) tab 650 mg, 650 mg, Oral, Q4H PRN **OR** acetaminophen (Tylenol) 160 MG/5ML oral liquid 650 mg, 650 mg, Oral, Q4H PRN **OR** acetaminophen (Tylenol) rectal suppository 650 mg, 650 mg, Rectal, Q4H PRN **OR** acetaminophen (Ofirmev) 10 mg/mL infusion premix 1,000 mg, 1,000 mg, Intravenous, Q6H PRN    polyethylene glycol (PEG 3350) (Miralax) 17 g oral packet 17 g, 17  g, Oral, Daily PRN    senna (Senokot) 8.8 MG/5ML oral syrup 17.6 mg, 10 mL, Oral, Nightly PRN    bisacodyl (Dulcolax) 10 MG rectal suppository 10 mg, 10 mg, Rectal, Daily PRN    fleet enema (Fleet) 7-19 GM/118ML rectal enema 133 mL, 1 enema, Rectal, Once PRN    chlorhexidine gluconate (Peridex) 0.12 % oral solution 15 mL, 15 mL, Mouth/Throat, BID@0800,2000    insulin regular human (Novolin R, Humulin R) 100 UNIT/ML injection 1-7 Units, 1-7 Units, Subcutaneous, 4 times per day    sodium bicarbonate 150 mEq in dextrose 5% 1,000 mL infusion, 150 mEq, Intravenous, Continuous    Vancomycin: PHARMACY DOSING, 1 each, Intravenous, See Admin Instructions (RX holding)    vasopressin (Vasostrict) 20 Units in sodium chloride 0.9% 100 mL infusion for septic shock, 0.03 Units/min, Intravenous, Continuous    piperacillin-tazobactam (Zosyn) 3.375 g in dextrose 5% 100 mL IVPB-ADDV, 3.375 g, Intravenous, Q8H    pantoprazole (Protonix) 40 mg in sodium chloride 0.9% PF 10 mL IV push, 40 mg, Intravenous, Daily    glucose (Dex4) 15 GM/59ML oral liquid 15 g, 15 g, Oral, Q15 Min PRN **OR** glucose (Glutose) 40% oral gel 15 g, 15 g, Oral, Q15 Min PRN **OR** glucose-vitamin C (Dex-4) chewable tab 4 tablet, 4 tablet, Oral, Q15 Min PRN **OR** dextrose 50% injection 50 mL, 50 mL, Intravenous, Q15 Min PRN **OR** glucose (Dex4) 15 GM/59ML oral liquid 30 g, 30 g, Oral, Q15 Min PRN **OR** glucose (Glutose) 40% oral gel 30 g, 30 g, Oral, Q15 Min PRN **OR** glucose-vitamin C (Dex-4) chewable tab 8 tablet, 8 tablet, Oral, Q15 Min PRN    norepinephrine (Levophed) 32 mg in dextrose 5% 250 mL infusion, 0.5-30 mcg/min, Intravenous, Continuous    morphINE PF 2 MG/ML injection 1 mg, 1 mg, Intravenous, Q4H PRN    Allergies:  Allergies   Allergen Reactions    Heparin HIVES     Patient not sure       Input/Output:    Intake/Output Summary (Last 24 hours) at 5/2/2024 1152  Last data filed at 5/2/2024 0800  Gross per 24 hour   Intake 2953.67 ml   Output 705 ml    Net 2248.67 ml          ASSESSMENT/PLAN:   Assessment   Patient Active Problem List   Diagnosis    Left inguinal hernia    Coronary artery disease without angina pectoris, unspecified vessel or lesion type, unspecified whether native or transplanted heart    Acute congestive heart failure (HCC)    Acute congestive heart failure, unspecified heart failure type ([facility])    Syncope and collapse    JU (acute kidney injury) ([facility])    Dizziness    Parasomnia    RLS (restless legs syndrome)    Episodic mood disorder ([facility])    Anemia, unspecified type    Rectal bleeding    Acute on chronic congestive heart failure, unspecified heart failure type ([facility])    Iron deficiency anemia due to chronic blood loss    Scrotal edema    Goals of care, counseling/discussion    Palliative care encounter    Hypotension    Hypotension, unspecified hypotension type    Pneumoperitoneum    Bilateral pulmonary embolism ([facility])    RV (right ventricular) mural thrombus        ml.  Creatinine up to 2.27.  Replace K.  CO2 better 23.  Continue bicarb drip.  Prognosis guarded.  Discussed with RN.             5/2/2024  Josh Omalley MD

## 2024-05-02 NOTE — PLAN OF CARE
Patient intubated and sedated. Grimaces to pain and at times can squeeze hands. Pressors titrated as able. Propofol running. Increased UOP via corey. CT in place with small intermittent air leak. Xray of chest done this AM, results pending. Bilateral wrist restraints in place. All safety measures in place. Care endorsed.       Problem: Safety Risk - Non-Violent Restraints  Goal: Patient will remain free from self-harm  Description: INTERVENTIONS:  - Apply the least restrictive restraint to prevent harm  - Notify patient and family of reasons restraints applied  - Discontinue any unnecessary medical devices as soon as possible  - Assess the patient's physical comfort, circulation, skin condition, hydration, nutrition and elimination needs   - Reorient and redirection as needed  - Assess for the need to continue restraints  5/2/2024 0141 by Isabel Bender RN  Outcome: Not Progressing    Problem: CARDIOVASCULAR - ADULT  Goal: Maintains optimal cardiac output and hemodynamic stability  Description: INTERVENTIONS:  - Monitor vital signs, rhythm, and trends  - Monitor for bleeding, hypotension and signs of decreased cardiac output  - Evaluate effectiveness of vasoactive medications to optimize hemodynamic stability  - Monitor arterial and/or venous puncture sites for bleeding and/or hematoma  - Assess quality of pulses, skin color and temperature  - Assess for signs of decreased coronary artery perfusion - ex. Angina  - Evaluate fluid balance, assess for edema, trend weights  Outcome: Progressing  Goal: Absence of cardiac arrhythmias or at baseline  Description: INTERVENTIONS:  - Continuous cardiac monitoring, monitor vital signs, obtain 12 lead EKG if indicated  - Evaluate effectiveness of antiarrhythmic and heart rate control medications as ordered  - Initiate emergency measures for life threatening arrhythmias  - Monitor electrolytes and administer replacement therapy as ordered  Outcome: Progressing     Problem:  Diabetes/Glucose Control  Goal: Glucose maintained within prescribed range  Description: INTERVENTIONS:  - Monitor Blood Glucose as ordered  - Assess for signs and symptoms of hyperglycemia and hypoglycemia  - Administer ordered medications to maintain glucose within target range  - Assess barriers to adequate nutritional intake and initiate nutrition consult as needed  - Instruct patient on self management of diabetes  Outcome: Progressing

## 2024-05-02 NOTE — PROGRESS NOTES
Cardiology Progress Note    Cristhian Lopez Patient Status:  Inpatient    9/3/1955 MRN [id number]   [facility] [facility] 2W/SW Attending Aretha Hazel MD   Hosp Day # 2 PCP KARI BARCLAY     No events overnight remains intubated sedated on 3 pressors.      Telemetry tachycardia 120 bpm.    --------------------------------------------------------------------------------------------------------------------------------          Objective:   Temp: 96.9 °F (36.1 °C)  Pulse: 120  Resp: 20  BP: 93/75  AO: 96/62  FiO2 (%): 40 %    Intake/Output:     Intake/Output Summary (Last 24 hours) at 2024 1022  Last data filed at 2024 0800  Gross per 24 hour   Intake 2953.67 ml   Output 705 ml   Net 2248.67 ml       Physical Exam:    General: intubated, sedated  HEENT: Normocephalic, anicteric sclera, neck supple.  Neck: No JVD, carotids 2+, no bruits.  Cardiac: Regular rate and rhythm. S1, S2 normal. No murmur, pericardial rub, S3.  Lungs: Clear without wheezes, rales, rhonchi or dullness.  Normal excursions and effort.  Abdomen: Soft, non-tender. BS-present.  Extremities: Without clubbing, cyanosis, +1 pitting bilateral edema.  Peripheral pulses are 2+.  Neurologic: Limited exam does not follow commands intubated and sedated  Skin: Warm and dry.       Assessment:    Septic shock, bowel perforation  VDRF  Large right PTX, tension?  Small pulm embolism  Small pneumoperitoneum  Ascites  JU  Lactic acidosis  CAD/CABG/PCI  Ischemic cardiomyopathy EF 25%  Pulm hypertension  Hyperlipidemia  Diabetes  Question of HIT history        Plan:  - Pt with progressive respiratory failure, shock requiring escalation of pressors currently on Levophed at 28 mcg, vasopressin 0.3 and norepinephrine at 275 mcg  - large PTX on CXR, Pulm/CritCare placed chest tube this morning - possibly contributing to shock if tension ptx  - Continue pressor support if able to wean off would start with norepinephrine  - Appreciate ICU care and  general surgery input.  Repeat KUB done today pending evaluation.  Abdomen distended but not tense on exam today.  - Repeat INR today last check 6.1 on 4/30  - poor prognosis  - On argatroban due to allergy to heparin for small pulmonary embolism  - Repeat echocardiogram today appears that it was ordered on admission has not been done last echocardiogram in the chart from February      Thank you for allowing me to take part in the care of Cristhian Lopez. Please call with any questions of concerns.      Critical care time 35 minutes  Lupillo Gee MD.

## 2024-05-02 NOTE — PLAN OF CARE
Problem: RESPIRATORY - ADULT  Goal: Achieves optimal ventilation and oxygenation  Description: INTERVENTIONS:  - Assess for changes in respiratory status  - Assess for changes in mentation and behavior  - Position to facilitate oxygenation and minimize respiratory effort  - Oxygen supplementation based on oxygen saturation or ABGs  - Provide Smoking Cessation handout, if applicable  - Encourage broncho-pulmonary hygiene including cough, deep breathe, Incentive Spirometry  - Assess the need for suctioning and perform as needed  - Assess and instruct to report SOB or any respiratory difficulty  - Respiratory Therapy support as indicated  - Manage/alleviate anxiety  - Monitor for signs/symptoms of CO2 retention  Outcome: Progressing   Patient received intubated and on ventilator PC/AC f14/PIP 20/PEEP+5/40%.  Patient is not a candidate for SBT due to hemodynamic instability. Bilateral breath sounds auscultated. Suction provided when indicated. No acute events during the daytime. RT will continue to monitor.

## 2024-05-02 NOTE — PROGRESS NOTES
[facility]  part of [facility]     Hospitalist Progress Note     Cristhian Lopez Patient Status:  Inpatient    9/3/1955 MRN [id number]   [facility] [facility] 2W/SW Attending Aretha Hazel MD   Hosp Day # 2 PCP KARI BARCLAY     Subjective:     Patient laying in bed, sedated, intubated.   Remains on pressors at this time.   Friend at the bedside.     Objective:    Review of Systems:   Unable to assess.       Vital signs:  Temp:  [96.6 °F (35.9 °C)-98.1 °F (36.7 °C)] 98.1 °F (36.7 °C)  Pulse:  [117-125] 121  Resp:  [17-34] 34  BP: ()/(61-88) 101/78  SpO2:  [91 %-100 %] 95 %  AO: ()/(56-80) 126/80  FiO2 (%):  [40 %-80 %] 40 %      Physical Exam:    Gen: intubated, sedated.  Chest: mechanical breath sounds  CVS: normal s1 and s2 RR  Abd: NABS soft NT ND  Neuro: unable to assess  Ext: no edema in bilateral LE      Diagnostic Data:    Labs:  Recent Labs   Lab 24  1111 24  1521 24  1717 24  1853 24  0524  0512 24  1018   WBC 3.7* 6.5  --   --  18.4* 20.8*  20.8*  --    HGB 16.5 14.9  --   --  16.6 14.8  14.8  --    MCV 73.0* 72.6*  --   --  72.2* 72.1*  72.1*  --    .0* 136.0*  --   --  182.0 157.0  157.0  --    BAND  --   --   --   --  4  --   --    INR 1.65*  --  6.79* 6.10*  --   --  7.07*       Recent Labs   Lab 24  1111 24  1521 24  2041 24  0511 24  0512   *  --   --  217* 242*   BUN 41*  --   --  45* 52*   CREATSERUM 1.86*  --   --  2.10* 2.27*   CA 8.0*  --   --  7.9* 7.3*   ALB 2.8*   < > 2.7* 2.8* 2.2*   *  --   --  128* 130*   K 3.6  --   --  3.6 3.3*   CL 95*  --   --  96* 99   CO2 22.0  --   --  16.0* 23.0   ALKPHO 128*   < > 116 111 92   AST 47*   < > 43* 45* 36*   ALT 45   < > 35 42 36   BILT 4.9*   < > 5.1* 5.4* 5.5*   TP 5.0*   < > 5.0* 5.0* 4.4*    < > = values in this interval not displayed.       Estimated Creatinine Clearance: 31.6 mL/min (A) (based on SCr of  2.27 mg/dL (H)).    Recent Labs   Lab 04/30/24  1717 04/30/24  1853 05/02/24  1018   PTP 63.1* 58.0* 65.2*   INR 6.79* 6.10* 7.07*              Imaging: Imaging data reviewed in Epic.    Medications:    potassium chloride  20 mEq Intravenous Once    insulin degludec  10 Units Subcutaneous Daily    chlorhexidine gluconate  15 mL Mouth/Throat BID@0800,2000    insulin regular human  1-7 Units Subcutaneous 4 times per day    Vancomycin IV  1 each Intravenous See Admin Instructions (RX holding)    piperacillin-tazobactam  3.375 g Intravenous Q8H    pantoprazole  40 mg Intravenous Daily       Assessment & Plan:     Profound shock - cardiogenic + septic  Acute hypoxic respiratory failure  Biventricular heart failure  Perforated bowel  Hx of CAD s/p CABG and stenting and ICD  RV thrombus  CT reviewed  ICU on consult  On 3 pressors  Continue IVF, IV abx  Full vent support  Cardiology on consult  Continue pressor support  Consider addition of inotrope if poor UOP and concern for cardiogenic shock  Repeat echo pending  ID on consult  Continue vanc and zosyn  Follow up on cultures  Gsx on consult  Repeat CT a/p  Poor surgical candidate  Poor prognosis  R sided PTX  Chest tube in place  Appreciate Pulm recs  Acute PE with hx of VTE  RV thrombus  Allergy to heparin  Currently on Argatroban  Uncontrolled hyperglycemia  Endo on consult  Medium SSI  Degludec 10 U daily  JU on CKD  Continue IVF  Monitor labs  Nephrology on consult  Strict Is and Os, daily weights        Plan of care discussed with patient or family at bedside.      Supplementary Documentation:     Quality:  DVT Prophylaxis: argatroban  CODE status: Full      Estimated date of discharge: TBD  Discharge is dependent on: clinical stability  At this point Mr. Lopez is expected to be discharge to: home            **Certification      PHYSICIAN Certification of Need for Inpatient Hospitalization - Initial Certification    Patient will require inpatient services that  will reasonably be expected to span two midnight's based on the clinical documentation in H+P.   Based on patients current state of illness, I anticipate that, after discharge, patient will require TBD.      Aretha Hazel MD  Hospitalist    MDM: High, I personally reviewed the available laboratories, imaging. I discussed the case with RN. I ordered laboratories, studies including AM labs.  Medical decision making high, risk is high.       The 21st Century Cures Act makes medical notes like these available to patients in the interest of transparency. Please be advised this is a medical document. Medical documents are intended to carry relevant information, facts as evident, and the clinical opinion of the practitioner. The medical note is intended as peer to peer communication and may appear blunt or direct. It is written in medical language and may contain abbreviations or verbiage that are unfamiliar.

## 2024-05-02 NOTE — PLAN OF CARE
Problem: CARDIOVASCULAR - ADULT  Goal: Maintains optimal cardiac output and hemodynamic stability  Description: INTERVENTIONS:  - Monitor vital signs, rhythm, and trends  - Monitor for bleeding, hypotension and signs of decreased cardiac output  - Evaluate effectiveness of vasoactive medications to optimize hemodynamic stability  - Monitor arterial and/or venous puncture sites for bleeding and/or hematoma  - Assess quality of pulses, skin color and temperature  - Assess for signs of decreased coronary artery perfusion - ex. Angina  - Evaluate fluid balance, assess for edema, trend weights  Outcome: Not Progressing  Goal: Absence of cardiac arrhythmias or at baseline  Description: INTERVENTIONS:  - Continuous cardiac monitoring, monitor vital signs, obtain 12 lead EKG if indicated  - Evaluate effectiveness of antiarrhythmic and heart rate control medications as ordered  - Initiate emergency measures for life threatening arrhythmias  - Monitor electrolytes and administer replacement therapy as ordered  Outcome: Not Progressing     Problem: Diabetes/Glucose Control  Goal: Glucose maintained within prescribed range  Description: INTERVENTIONS:  - Monitor Blood Glucose as ordered  - Assess for signs and symptoms of hyperglycemia and hypoglycemia  - Administer ordered medications to maintain glucose within target range  - Assess barriers to adequate nutritional intake and initiate nutrition consult as needed  - Instruct patient on self management of diabetes  Outcome: Progressing     Problem: Safety Risk - Non-Violent Restraints  Goal: Patient will remain free from self-harm  Description: INTERVENTIONS:  - Apply the least restrictive restraint to prevent harm  - Notify patient and family of reasons restraints applied  - Assess for any contributing factors to confusion (electrolyte disturbances, delirium, medications)  - Discontinue any unnecessary medical devices as soon as possible  - Assess the patient's physical  comfort, circulation, skin condition, hydration, nutrition and elimination needs   - Reorient and redirection as needed  - Assess for the need to continue restraints  Outcome: Not Progressing

## 2024-05-02 NOTE — PROGRESS NOTES
[facility]  part of [facility]    Progress Note    Cristhian Lopez Patient Status:  Inpatient    9/3/1955 MRN [id number]   [facility] [facility] 2W/SW Attending Aretha Hazel MD   Hosp Day # 2 PCP KARI BARCLAY       Subjective:     Unable to perform ROS    Patient was seen and examined  Remains on 3 pressors  Intubated on mechanical ventilation  NG tube with mild bleeding  Withdrawal to pain  Unstable to perform SBT  On 40% FiO2  Objective:   Blood pressure 101/78, pulse (!) 121, temperature 98.1 °F (36.7 °C), temperature source Temporal, resp. rate (!) 34, height 5' 9\" (1.753 m), weight 204 lb 9.6 oz (92.8 kg), SpO2 95%.  Physical Exam  Constitutional:       General: He is in acute distress.      Appearance: He is ill-appearing.   HENT:      Head: Atraumatic.      Nose: Nose normal.      Mouth/Throat:      Mouth: Mucous membranes are dry.   Eyes:      General: No scleral icterus.  Cardiovascular:      Rate and Rhythm: Regular rhythm. Tachycardia present.      Heart sounds:      No gallop.   Pulmonary:      Effort: No respiratory distress.      Breath sounds: No stridor. Rales present. No wheezing or rhonchi.   Abdominal:      General: There is distension.      Tenderness: There is no abdominal tenderness. There is no guarding or rebound.      Comments: Negative bowel sounds   Musculoskeletal:      Right lower leg: Edema present.      Left lower leg: Edema present.   Lymphadenopathy:      Cervical: No cervical adenopathy.   Neurological:      Comments: Lethargic when off sedation  Withdrawal to pain         Results:   Lab Results   Component Value Date    WBC 20.8 (H) 2024    WBC 20.8 (H) 2024    HGB 14.8 2024    HGB 14.8 2024    HCT 42.7 2024    HCT 42.7 2024    .0 2024    .0 2024    CREATSERUM 2.27 (H) 2024    BUN 52 (H) 2024     (L) 2024    K 3.3 (L) 2024    CL 99 2024    CO2 23.0  05/02/2024     (H) 05/02/2024    CA 7.3 (L) 05/02/2024    ALB 2.2 (L) 05/02/2024    ALKPHO 92 05/02/2024    BILT 5.5 (H) 05/02/2024    TP 4.4 (L) 05/02/2024    AST 36 (H) 05/02/2024    ALT 36 05/02/2024    PTT 92.4 (H) 05/02/2024    INR 7.07 (HH) 05/02/2024    TSH 0.397 12/25/2021    PSA 9.27 (H) 02/20/2023    DDIMER 0.93 (H) 12/22/2021    MG 1.7 05/02/2024    PHOS 5.6 (H) 05/02/2024    TROP <0.045 08/25/2021    TROPHS 117 (HH) 02/08/2024     (H) 04/30/2024    B12 389 12/25/2021    ETOH <3 05/29/2023       XR ABDOMEN (1 VIEW) (CPT=74018)    Result Date: 5/2/2024  CONCLUSION:   No significant change gaseous distension of the transverse colon.  Mild improvement in the gaseous distension of the stomach with a well-positioned enteric tube.  Otherwise, there is a paucity of abdominal bowel gas which is nonspecific.  Delayed nephrograms, which may be secondary to renal failure/acute tubular necrosis.  Lesser incidental findings described above.    Dictated by (CST): Dayday Dave MD on 5/02/2024 at 1:48 PM     Finalized by (CST): Dayday Dave MD on 5/02/2024 at 1:54 PM          XR CHEST AP PORTABLE  (CPT=71045)    Result Date: 5/2/2024  CONCLUSION:  1.  Small right apical pneumothorax occupying 5-10% of hemithorax volume is present.  No tracheal or mediastinal shift.  Stable position of right chest tube. 2.  Cardiomegaly.  Post CABG. 3.  Elevation/eventration of the right hemidiaphragm.  Linear bibasilar pulmonary opacities lung most likely representing atelectasis however correlate for pneumonia. 4.  Interstitial prominence suggesting mild edema. 5.  Gross stable/satisfactory position of lines and tubes.  Dictated by (CST): Kings Troy MD on 5/02/2024 at 8:47 AM     Finalized by (CST): Kings Troy MD on 5/02/2024 at 8:50 AM          XR CHEST AP PORTABLE  (CPT=71045)    Result Date: 5/1/2024  CONCLUSION:  1. Right lower lobe pigtail chest tube placed with significant re-expansion of the right lung with  a small right apical right upper lobe lateral pneumothorax as described above.  Moderate elevation right hemidiaphragm.  Bibasilar atelectasis.  I cannot exclude right perihilar pneumonia.  Follow-up studies are advised.  ET tube in the trachea at the level of the clavicles     Dictated by (CST): Vern Day MD on 5/01/2024 at 10:23 AM     Finalized by (CST): Vern Day MD on 5/01/2024 at 10:26 AM          XR ABDOMEN (1 VIEW) (CPT=74018)    Result Date: 5/1/2024  CONCLUSION:  1. Moderate gaseous distention of the stomach and transverse colon.  NG tube terminates in the cardia of the stomach and may need to be advanced further into the more distal stomach to decompress.  No large well-defined free intraperitoneal air collection is seen.  No definite bowel obstruction.    Dictated by (CST): Vern Day MD on 5/01/2024 at 10:16 AM     Finalized by (CST): Vern Day MD on 5/01/2024 at 10:20 AM          XR CHEST AP PORTABLE  (CPT=71045)    Result Date: 5/1/2024  CONCLUSION:   Moderate right apicolateral pneumothorax.  Progressive opacities in the right perihilar region and right lower lobe, which are favored to reflect atelectasis.  Prominence of the pulmonary markings, which may reflect mild interstitial edema or be secondary to low lung volumes.  Posterior left 8th rib fracture.   The finding of a right pneumothorax was discussed with Audelia VAIL by Hamilton QUIGLEY at 7:26 a.m. on 05/01/2024.   Dictated by (CST): Dayday Dave MD on 5/01/2024 at 7:47 AM     Finalized by (CST): Dayday Dave MD on 5/01/2024 at 8:01 AM          XR CHEST AP PORTABLE  (CPT=71045)    Result Date: 5/1/2024  CONCLUSION:  1. Increasing large right-sided pneumothorax.  2. Postthoracotomy chest with cardiomegaly and nonspecific interstitial opacities.  3. Additional support tubes and lines as above.    Results of this examination were discussed with the patient's critical care nurse, Daryn Win, by Dr. Villasenor at 0726 on 05/01/2024.    Dictated by (CST): Gaurav Villasenor MD on 5/01/2024 at 7:23 AM     Finalized by (CST): Gaurav Villasenor MD on 5/01/2024 at 7:27 AM          XR CHEST AP PORTABLE  (CPT=71045)    Result Date: 4/30/2024  CONCLUSION:  Right internal jugular central venous catheter tip projects over the expected superior cavoatrial junction.   Unchanged right greater than left basilar opacity.   Dictated by (CST): Ratna Larson MD on 4/30/2024 at 7:45 PM     Finalized by (CST): Ratna Larson MD on 4/30/2024 at 7:48 PM          XR CHEST AP PORTABLE  (CPT=71045)    Result Date: 4/30/2024  CONCLUSION:  1. No pneumothorax. 2. Right jugular venous catheter with tip in right atrium approximately 5.5 cm below the RA SVC junction. 3. Elevation of right hemidiaphragm with right basilar atelectasis. 4. Pneumoperitoneum unchanged from recent CT.    Dictated by (CST): Edvin Strauss MD on 4/30/2024 at 6:21 PM     Finalized by (CST): Edvin Strauss MD on 4/30/2024 at 6:25 PM               Assessment & Plan:       1-profound shock state which is likely cardiogenic and septic  Now on 3 pressors   Severe biventricular heart failure  Also sepsis with perforated bowel  IV fluid with D5.9  Broad-spectrum antibiotics  Supportive care        2 -perforated bowel /pneumoperitoneum on CT  Prior history of hemicolectomy for colon cancer  CT with microperforation in the transverse colon with large ascites  Poor surgical candidate  Surgery following  Conservative management for now     3- right side pneumothorax   CXR today 5/1/24 with a large right-sided pneumothorax after intubation  Emergent right-sided chest tube was placed     4-acute respiratory failure with hypoxia  Patient intubated and now on mechanical ventilation  Vent support and management     5-severe end-stage biventricular heart failure LVEF 25 % and right heart failure       H/o CAD s/p CABG and stenting  and ICD       Now with RV thrombus      Cardiology following         6-acute PE with a  prior history of VTE   Also pt with right ventricular thrombus     Patient with allergy to heparin and now on argatroban     7-acute on chronic kidney injury  IV fluid and supportive care   Per renal      8-very poor functional status and recently completely wheelchair and bedbound     9-h/o medical noncompliance     10-CODE STATUS ; patient was DNAR/select need changes status earlier and now full code  Patient unfortunately with no other family members  Palliative care following     D/w  staff   D/w consultants  Prognosis is guarded    35 min cct today                Travis Mejia MD  5/2/2024

## 2024-05-02 NOTE — CONSULTS
[facility]  part of [facility]  Palliative Care Initial Consult Note    Cristhian Lopez Patient Status:  Inpatient    9/3/1955 MRN [id number]   [facility] [facility] 2W/SW Attending Aretha Hazel MD   Hosp Day # 2 PCP KARI BARCLAY     Date of Consult: 2024  Patient seen at: [facility] Inpatient    The  Cures Act makes medical notes like these available to patients in the interest of transparency. Please be advised this is a medical document. Medical documents are intended to carry relevant information, facts as evident, and the clinical opinion of the practitioner. The medical note is intended as peer to peer communication and may appear blunt or direct. It is written in medical language and may contain abbreviations or verbiage that are unfamiliar.     Reason for Consultation: Consult ordered by:: Suni PALAFOX  for evaluation of Palliative Care needs and goals of care discussion.     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 Work up in our hospital revealed acute on chronic CHF with pulmonary edema, hypotension, sepsis, acute on CKD, PE and right ventricular thrombus.  Hospitalization complicated by respiratory failure requiring intubation after Johnny rescinded his DNAR/DNI on May 1st. History was obtained from Epic and pts friends Woodrow and Harsh who I spoke with over the phone.      Of note pt was noncompliant with his medical care recommendations.   Pt is know to me from hospitalization back in February of this year, at  the time he had declined CPC.     This is the 3rd hospitalization in the past 3 months.    Today is day #2 of hospitalization.     When I entered the room, the patient was sedted he is on MV to ET-tube, on 3 pressors, appears acutely ill. There are no visitors present at the bedside.     Palliative Care symptom needs assessed:     Unable to obtain ROS due to Johnny is acutely ill and sedated    Palliative Care Social History:   Marital Status: Single  Children: denies  Living Situation Prior to Admit: lives alone  Is Patient Confused: No  Occupational History: Former  for [employer]     Substance History:  Smoking history: h/o smoking he states he quit in 1995  Hx of Substance Use/Abuse: No     Spiritual Assessment:   No Druze Given-No   No  requested    Past Medical History/Past Surgical History: obtained from [facility]  Past Medical History:    Coronary heart disease    2 stents in place, pstient sees Dr. Westbrook    Essential hypertension    Heart attack (HCC)     maker w/ 5 stents    Hyperlipidemia    Melanoma in situ of left upper extremity (HCC)    right thumb    RLS (restless legs syndrome)     Past Surgical History:   Procedure Laterality Date    Other surgical history      2 stents        Nutritional Status:  BMI:30 Weight: 204lbs  Weight changes: + wt loss since Feb 2024 as per epic, wt loss of approx 33lbs  Current Appetite: poor  Dysphagia: TANIA    Family History: obtained from [facility]  Family History   Problem Relation Age of Onset    Cancer Father         sarcoma of back       Allergies:  Allergies   Allergen Reactions    Heparin HIVES     Patient not sure       Medications:     Current Facility-Administered Medications:     argatroban 50 mg/50mL infusion premix, 0.09 mcg/kg/min, Intravenous, Continuous    phenylephrine (Zia-Synephrine) 250 mg in sodium chloride 0.9% 250 mL infusion,  mcg/min, Intravenous, Continuous    propofol (Diprivan) 10 mg/mL infusion premix, 5-50  mcg/kg/min, Intravenous, Continuous    fentaNYL (Sublimaze) 50 mcg/mL injection 25 mcg, 25 mcg, Intravenous, Q30 Min PRN **OR** fentaNYL (Sublimaze) 50 mcg/mL injection 50 mcg, 50 mcg, Intravenous, Q30 Min PRN    acetaminophen (Tylenol) tab 650 mg, 650 mg, Oral, Q4H PRN **OR** acetaminophen (Tylenol) 160 MG/5ML oral liquid 650 mg, 650 mg, Oral, Q4H PRN **OR** acetaminophen (Tylenol) rectal suppository 650 mg, 650 mg, Rectal, Q4H PRN **OR** acetaminophen (Ofirmev) 10 mg/mL infusion premix 1,000 mg, 1,000 mg, Intravenous, Q6H PRN    polyethylene glycol (PEG 3350) (Miralax) 17 g oral packet 17 g, 17 g, Oral, Daily PRN    senna (Senokot) 8.8 MG/5ML oral syrup 17.6 mg, 10 mL, Oral, Nightly PRN    bisacodyl (Dulcolax) 10 MG rectal suppository 10 mg, 10 mg, Rectal, Daily PRN    fleet enema (Fleet) 7-19 GM/118ML rectal enema 133 mL, 1 enema, Rectal, Once PRN    chlorhexidine gluconate (Peridex) 0.12 % oral solution 15 mL, 15 mL, Mouth/Throat, BID@0800,2000    insulin regular human (Novolin R, Humulin R) 100 UNIT/ML injection 1-7 Units, 1-7 Units, Subcutaneous, 4 times per day    sodium bicarbonate 150 mEq in dextrose 5% 1,000 mL infusion, 150 mEq, Intravenous, Continuous    Vancomycin: PHARMACY DOSING, 1 each, Intravenous, See Admin Instructions (RX holding)    vasopressin (Vasostrict) 20 Units in sodium chloride 0.9% 100 mL infusion for septic shock, 0.03 Units/min, Intravenous, Continuous    piperacillin-tazobactam (Zosyn) 3.375 g in dextrose 5% 100 mL IVPB-ADDV, 3.375 g, Intravenous, Q8H    pantoprazole (Protonix) 40 mg in sodium chloride 0.9% PF 10 mL IV push, 40 mg, Intravenous, Daily    glucose (Dex4) 15 GM/59ML oral liquid 15 g, 15 g, Oral, Q15 Min PRN **OR** glucose (Glutose) 40% oral gel 15 g, 15 g, Oral, Q15 Min PRN **OR** glucose-vitamin C (Dex-4) chewable tab 4 tablet, 4 tablet, Oral, Q15 Min PRN **OR** dextrose 50% injection 50 mL, 50 mL, Intravenous, Q15 Min PRN **OR** glucose (Dex4) 15 GM/59ML oral liquid 30  g, 30 g, Oral, Q15 Min PRN **OR** glucose (Glutose) 40% oral gel 30 g, 30 g, Oral, Q15 Min PRN **OR** glucose-vitamin C (Dex-4) chewable tab 8 tablet, 8 tablet, Oral, Q15 Min PRN    norepinephrine (Levophed) 32 mg in dextrose 5% 250 mL infusion, 0.5-30 mcg/min, Intravenous, Continuous    morphINE PF 2 MG/ML injection 1 mg, 1 mg, Intravenous, Q4H PRN    Functional Status History:  Falls: Yes  ADLs: Johnny was living at home alone, he has 2 friends that keep an eye on him and a  who comes when asked to clean house, as per pts friend Woodrow and Harsh, Johnny has been declining over the last couple of months, he has had poor appetite and declined in physical functioning, Harsh stated he was by the house on Monday to check in on Johnny and the house was a mess, Johnny did not look well to him at that time, but Johnny refused to come to the hospital. Johnny contacted his friend Rosita who comes and cleans house for Johnny, Rosita found Johnny on the floor when she stopped by to return laundry. Johnny has been non compliant with his medical care in the past.     Palliative Performance Scale:   Prior to admission: (pt/family reported) 50 %  Observed during hospitalization: 10 %  % Ambulation Activity Level Self-Care Intake Consciousness   100 Full  Normal  No Disease Full Normal Full   90 Full  Normal  Some Disease Full Normal Full   80 Full  Normal w/effort  Some Disease Full Normal or reduced Full   70 Reduced  Can't Perform Job  Some Disease Full Normal or reduced Full   60 Reduced  Can't Perform Hobby   Significant Disease Occ Assist Normal or reduced Full or confused   50 Mainly sit/lie Can't do any work  Extensive Disease Partial Assist Normal or reduced Full or confused   40 Mainly in bed Can't do any work  Extensive Disease Mainly Assist Normal or reduced Full or confused   30 Bed Bound Can't do any work  Extensive Disease Max Assist  Total Care Reduced  Drowsy/confused   20 Bed Bound Can't do any work  Extensive Disease Max  Assist  Total Care Minimal  Drowsy/confused   10 Bed Bound Can't do any work  Extensive Disease Max Assist  Total Care Mouth Care  Drowsy/confused   0 Death        Objective      Vital Signs:  Blood pressure 93/75, pulse 120, temperature 96.9 °F (36.1 °C), temperature source Temporal, resp. rate 20, height 5' 9\" (1.753 m), weight 204 lb 9.6 oz (92.8 kg), SpO2 100%.  Body mass index is 30.21 kg/m².  Present Level of pain: TANIA  Non-verbal signs of pain present: No    General: Johnny is in the bed he is sedated on MV to ET-tube, on 3 pressors for his hypotension.  HEENT: Et-tube, dry oral MM  Cardiac: ST regular rate and rhythm, S1, S2 normal, + murmur.  Lungs: diminished without wheezes. On MV , + CT with air leak noted.   Abdomen: Softly distended, hypoactive bowel sounds  Extremities: + generalized pitting   Neurologic: sedated.  Skin: Warm and dry.    Hematology:  Lab Results   Component Value Date    WBC 20.8 (H) 05/02/2024    WBC 20.8 (H) 05/02/2024    HGB 14.8 05/02/2024    HGB 14.8 05/02/2024    HCT 42.7 05/02/2024    HCT 42.7 05/02/2024    .0 05/02/2024    .0 05/02/2024       Coags:  Lab Results   Component Value Date    INR 6.10 (HH) 04/30/2024    .6 (H) 05/02/2024       Chemistry:  Lab Results   Component Value Date    CREATSERUM 2.27 (H) 05/02/2024    BUN 52 (H) 05/02/2024     (L) 05/02/2024    K 3.3 (L) 05/02/2024    CL 99 05/02/2024    CO2 23.0 05/02/2024     (H) 05/02/2024    CA 7.3 (L) 05/02/2024    ALB 2.2 (L) 05/02/2024    ALKPHO 92 05/02/2024    BILT 5.5 (H) 05/02/2024    TP 4.4 (L) 05/02/2024    AST 36 (H) 05/02/2024    ALT 36 05/02/2024    PSA 9.27 (H) 02/20/2023    DDIMER 0.93 (H) 12/22/2021    MG 1.7 05/02/2024    PHOS 5.6 (H) 05/02/2024    TROP <0.045 08/25/2021       Imaging:  XR CHEST AP PORTABLE  (CPT=71045)    Result Date: 5/2/2024  CONCLUSION:  1.  Small right apical pneumothorax occupying 5-10% of hemithorax volume is present.  No tracheal or mediastinal  shift.  Stable position of right chest tube. 2.  Cardiomegaly.  Post CABG. 3.  Elevation/eventration of the right hemidiaphragm.  Linear bibasilar pulmonary opacities lung most likely representing atelectasis however correlate for pneumonia. 4.  Interstitial prominence suggesting mild edema. 5.  Gross stable/satisfactory position of lines and tubes.  Dictated by (CST): Kings Troy MD on 5/02/2024 at 8:47 AM     Finalized by (CST): Kings Troy MD on 5/02/2024 at 8:50 AM          XR CHEST AP PORTABLE  (CPT=71045)    Result Date: 5/1/2024  CONCLUSION:  1. Right lower lobe pigtail chest tube placed with significant re-expansion of the right lung with a small right apical right upper lobe lateral pneumothorax as described above.  Moderate elevation right hemidiaphragm.  Bibasilar atelectasis.  I cannot exclude right perihilar pneumonia.  Follow-up studies are advised.  ET tube in the trachea at the level of the clavicles     Dictated by (CST): Vern Day MD on 5/01/2024 at 10:23 AM     Finalized by (CST): Vern Day MD on 5/01/2024 at 10:26 AM          XR ABDOMEN (1 VIEW) (CPT=74018)    Result Date: 5/1/2024  CONCLUSION:  1. Moderate gaseous distention of the stomach and transverse colon.  NG tube terminates in the cardia of the stomach and may need to be advanced further into the more distal stomach to decompress.  No large well-defined free intraperitoneal air collection is seen.  No definite bowel obstruction.    Dictated by (CST): Vern Day MD on 5/01/2024 at 10:16 AM     Finalized by (CST): Vern Day MD on 5/01/2024 at 10:20 AM          XR CHEST AP PORTABLE  (CPT=71045)    Result Date: 5/1/2024  CONCLUSION:   Moderate right apicolateral pneumothorax.  Progressive opacities in the right perihilar region and right lower lobe, which are favored to reflect atelectasis.  Prominence of the pulmonary markings, which may reflect mild interstitial edema or be secondary to low lung volumes.  Posterior left  8th rib fracture.   The finding of a right pneumothorax was discussed with Audelia RN by Hamilton QUIGLEY at 7:26 a.m. on 05/01/2024.   Dictated by (CST): Dayday Dave MD on 5/01/2024 at 7:47 AM     Finalized by (CST): Dayday Dave MD on 5/01/2024 at 8:01 AM          XR CHEST AP PORTABLE  (CPT=71045)    Result Date: 5/1/2024  CONCLUSION:  1. Increasing large right-sided pneumothorax.  2. Postthoracotomy chest with cardiomegaly and nonspecific interstitial opacities.  3. Additional support tubes and lines as above.    Results of this examination were discussed with the patient's critical care nurse, Daryn Win, by Dr. Villasenor at 0726 on 05/01/2024.   Dictated by (CST): Gaurav Villasenor MD on 5/01/2024 at 7:23 AM     Finalized by (CST): Gaurav Villasenor MD on 5/01/2024 at 7:27 AM          XR CHEST AP PORTABLE  (CPT=71045)    Result Date: 4/30/2024  CONCLUSION:  Right internal jugular central venous catheter tip projects over the expected superior cavoatrial junction.   Unchanged right greater than left basilar opacity.   Dictated by (CST): Ratna Larson MD on 4/30/2024 at 7:45 PM     Finalized by (CST): Ratna Larson MD on 4/30/2024 at 7:48 PM          XR CHEST AP PORTABLE  (CPT=71045)    Result Date: 4/30/2024  CONCLUSION:  1. No pneumothorax. 2. Right jugular venous catheter with tip in right atrium approximately 5.5 cm below the RA SVC junction. 3. Elevation of right hemidiaphragm with right basilar atelectasis. 4. Pneumoperitoneum unchanged from recent CT.    Dictated by (CST): Edvin Strauss MD on 4/30/2024 at 6:21 PM     Finalized by (CST): Edvin Strauss MD on 4/30/2024 at 6:25 PM          CT ABDOMEN+PELVIS(CONTRAST ONLY)(CPT=74177)    Result Date: 4/30/2024  CONCLUSION:   Small volume of pneumoperitoneum.  The pneumoperitoneum appears to be most prominent within the upper abdomen and contiguous with the loop of proximal transverse colon.  Therefore a small perforation in the proximal transverse colon is felt to  be the origin of the pneumoperitoneum.  Other  etiology such as a perforated duodenal or gastric ulcer are also within the differential but are felt to be less likely.  Large volume of abdominal and pelvic ascites.  No obstruction.  Right ventricular thrombus again seen and unchanged.  Cholelithiasis without CT evidence of acute cholecystitis.  Multiple other incidental findings as described in the body of the report which are unchanged.    Dictated by (CST): Prem Pina MD on 4/30/2024 at 2:50 PM     Finalized by (CST): Prem Pina MD on 4/30/2024 at 2:56 PM          XR CHEST AP PORTABLE  (CPT=71045)    Result Date: 4/30/2024  CONCLUSION:  1. Mild cardiomegaly and mildly prominent pulmonary vascularity but no hilary pulmonary edema.  Suboptimal inspiration.  Moderate elevation of the right hemidiaphragm unchanged.  Patchy right basal airspace disease may suggest right basal pneumonia and or  atelectasis.  Correlate clinically and follow-up studies are advised.    Dictated by (CST): Vern Day MD on 4/30/2024 at 12:59 PM     Finalized by (CST): Vern Day MD on 4/30/2024 at 1:03 PM          CTA CHEST+CTA ABDOMEN DISSECT SET (CPT=71275/50947)    Result Date: 4/30/2024  CONCLUSION:   Severe biventricular dilation.  1.9 x 1.7 centimeter thrombus at the RV apex  Small nonocclusive PE right lower lobar artery.  Small occlusive subsegmental PE at the apical left upper lobe  Small volume right effusion  Large volume ascites  Mild pneumoperitoneum.  This could be from bowel perforation or could be iatrogenic such as from any recent paracentesis  No acute aortic syndrome  1.8 x 1.5 centimeter low-density nodule or cyst along the inferior margin of the pancreatic uncinate.  This could be a pseudo cyst or cystic pancreatic lesion  Discussed with Dr. Anne     Dictated by (CST): Bradley Rick MD on 4/30/2024 at 12:14 PM     Finalized by (CST): Bradley Rick MD on 4/30/2024 at 12:37 PM             Summary of  Discussion      I discussed reason for palliative care consultation with Johnny' friends Woodrow and Harsh over the phone. Johnny is known to me from admission back in February of this year.       Prognostic awareness/understanding:   I provided an overall clinical update for Johnny's friends Woodrow and Harsh over the phone, discussed poor prognosis.   We discussed the disease trajectory of cariogenic/septic shock, respiratory failure requiring intubation with MV, hypotension requiring 3 pressors, acute kidney injury, possible perforated bowel/pneumoperitoneum on CT, severe ICM-EF 25%, right ventricular thrombus with associated symptoms and decline.   Woodrow and Harsh are aware of Johnny's noncompliance with medical recommendations in the past.   Discussed with Woodrow and Harsh I had consulted and seen Johnny when he was hospitalized back in Feb of this year. At that time, he did not want aggressive measures to sustain his life. He filled out POLST form with wishes of DNAR/DNI with selective treatment. When Johnny went into respiratory failure yesterday he rescinded his DNAR/DNI.   In the past Johnny had stated that his  has copy of HCPOA with designation of his friend Woodrow Sloan as HCPOA. Woodrow stated he does not have a copy but will contact Johnny's  to see is he can obtain a copy, Woodrow is agreeable to be HC surrogate in the event no HCPOA pw can be obtained. However he is going to [place] next [place], Harsh stated he would act as HC surrogate if needed. Johnny has no family, he has a brother who he has not spoken to for many years and has been estranged from.   I provided emotional support to Woodrow and Harsh who are coping adequately.     Assessment and Recommendations            RV (right ventricular) thrombus    Acute on chronic HFrEF-EF 25%-declined ICD in the past    Profound shock cardiogenic and septic    Cute hypoxic respiratory failure intubated on May 1st    Biventricular heart failure    Right pneumothorax CT insertion May  1st    JU on CKD      CAD with history of PCI and Cabg     Cardiorenal syndrome    Moderate to severe MR, moderate TR    Bilateral LE cellulitis    HLD    H/o PE 2019    H/o LV thrombus 2021    JU on CKD    HTN    DM    Noncompliant with his diet    Scrotal edema    H/o colon cancer s/p sigmoidectomy 2023    DJD of the lumbar spine    Goals of care counseling  -see above for details  -Pt is Full Code  -Johnny's friends are agreeable to palliative care following  -Dispo: TBD. SW to help with dc planning.   -no  requested  -overall poor prognosis, ongoing GOC will be needed over the course of hospitalization.   -Provided emotional support to Johnny's friends who are coping adequately    Advance care planning  see above for details  -Pt's friend Woodrow Le is his HC surrogate [phone], Woodrow will contact Johnny's  to see if HCPOA pw are on file and to obtain a copy. If Johnny has not filled out HCPOA pw in the past, His friend Woodrow is willing to serve as HC surrogate for Johnny. Johnny told me in the past that Woodrow was his HCPOA. Johnny's friend Harsh Castillo is also willing to serve as HC surrogate if needed his number is [phone]    Palliative Performance Scale 10%    Discussed today's visit with Dr Hazel, Suni May, Sammie RN and Aby RN    Palliative Care Follow Up: Palliative care team will continue to follow. Feel free to contact our team with any questions or concerns.    Thank you for allowing Palliative Care services to participate in the care of Cristhian Lopez.    A total of 55 minutes were spent on this consult, which included all of the following: chart review, direct face to face contact, history taking, physical examination, counseling and coordinating care, and documentation.     Yareli Hooker, APRN [id number]  5/2/2024  9:52 AM  Palliative Care Services

## 2024-05-02 NOTE — CM/SW NOTE
05/02/24 1500   CM/SW Referral Data   Referral Source    Reason for Referral Discharge planning   Informant Clinical Staff Member;EMR   Medical Hx   Does patient have an established PCP? Yes   Significant Past Medical/Mental Health Hx cardiac hx   Patient Info   Patient lives with Alone   Patient Status Prior to Admission   Independent with ADLs and Mobility No  (wc bound)   Discharge Needs   Anticipated D/C needs To be determined     Palliative remains intubated.  POA wishes to wait to see how Johnny progresses over the next few days.Johnny remains intubated at this time.    CM/SW will follow and assist with any dc  planning needs.    / to remain available for support and/or discharge planning.    Liana Healy MBA BSN RN CRRN   RN Case Manager  [phone]

## 2024-05-02 NOTE — PROGRESS NOTES
[facility]  part of [facility]    Progress Note    Cristhian Lopez Patient Status:  Inpatient    9/3/1955 MRN [id number]   [facility] [facility] 2W/SW Attending Aretha Hazel MD   Hosp Day # 3 PCP KARI BARCLAY     Subjective:  Intubated    Diabetes History:  HgA1c 10.4%      Home Regimen:  Metformin 1000mg PO BID  Glimepiride 4mg PO daily   -->unclear if he was taking these medications, per H&P he was not taking any meds at home     He was seen by endocrine service in 3/2024.  At that time maintained on Novolog 2 units subcutaneous TID with meals.  Recommended discharge home with Metformin twice daily.     Review of systems not possible due to the patient's medical condition      Objective/Physical Exam:  Physical Exam:   Vital Signs:  Blood pressure 97/70, pulse (!) 127, temperature 97.9 °F (36.6 °C), temperature source Temporal, resp. rate 24, height 5' 9\" (1.753 m), weight 204 lb 9.6 oz (92.8 kg), SpO2 100%.      General Appearance: Intubated and sedated  Head: Atraumatic  Eyes:  normal conjunctivae, sclera., normal sclera and normal pupils  Respiratory: Mechanically ventilated  Extremities: no obvious extremity swelling      Labs:  Pertinent labs reviewed    Assessment/Plan:  Patient Active Problem List   Diagnosis    Left inguinal hernia    Coronary artery disease without angina pectoris, unspecified vessel or lesion type, unspecified whether native or transplanted heart    Acute congestive heart failure (HCC)    Acute congestive heart failure, unspecified heart failure type (HCC)    Syncope and collapse    JU (acute kidney injury) (HCC)    Dizziness    Parasomnia    RLS (restless legs syndrome)    Episodic mood disorder ([facility])    Anemia, unspecified type    Rectal bleeding    Acute on chronic congestive heart failure, unspecified heart failure type (HCC)    Iron deficiency anemia due to chronic blood loss    Scrotal edema    Goals of care, counseling/discussion    Palliative  care encounter    Hypotension    Hypotension, unspecified hypotension type    Pneumoperitoneum    Bilateral pulmonary embolism (HCC)    RV (right ventricular) mural thrombus     Uncontrolled Hyperglycemia   - Secondary to acute illness  - Tresiba 14 units subcutaneous daily   -Novolin R 4 units along with CF based on every 6 hours Accu-Cheks  - hypoglycemic protocol    Discussed with RN: If patient has started on TPN/tube feeds please notify the endocrinology service since that might affect his insulin requirements.    We will follow.    Renu Morales MD

## 2024-05-02 NOTE — CONSULTS
[facility]  part of [facility]    Report of Consultation    Cristhian Lopez Patient Status:  Inpatient    9/3/1955 MRN [id number]   [facility] [facility] 2W/SW Attending Aretha Hazel MD   Hosp Day # 2 PCP KARI BARCLAY     Date of Admission:  2024  Date of Consult:  2024     Reason for Consultation:  Hyperglycemia    History of Present Illness:  Cristhian Lopez is a a(n) 68 year old male.     67 y/o M with uncontrolled DM2 presents for inpatient glycemic management    HgA1c 10.4%     Home Regimen:  Metformin 1000mg PO BID  Glimepiride 4mg PO daily   -->unclear if he was taking these medications, per H&P he was not taking any meds at home    He was seen by endocrine service in 3/2024.  At that time maintained on Novolog 2 units subcutaneous TID with meals.  Recommended discharge home with Metformin twice daily.     History:  Past Medical History:    Coronary heart disease    2 stents in place, pstient sees Dr. Westbrook    Essential hypertension    Heart attack (HCC)     maker w/ 5 stents    Hyperlipidemia    Melanoma in situ of left upper extremity (HCC)    right thumb    RLS (restless legs syndrome)     Past Surgical History:   Procedure Laterality Date    Other surgical history      2 stents      Family History   Problem Relation Age of Onset    Cancer Father         sarcoma of back      reports that he quit smoking about 28 years ago. His smoking use included cigarettes. He has never used smokeless tobacco. He reports that he does not drink alcohol and does not use drugs.    Allergies:  Allergies   Allergen Reactions    Heparin HIVES     Patient not sure       Medications:    Current Facility-Administered Medications:     argatroban 50 mg/50mL infusion premix, 0.09 mcg/kg/min, Intravenous, Continuous    potassium chloride 20 mEq/100mL IVPB premix 20 mEq, 20 mEq, Intravenous, Once    magnesium sulfate in dextrose 5% 1 g/100mL infusion premix 1 g, 1 g, Intravenous,  Once    insulin degludec 100 units/mL flextouch 10 Units, 10 Units, Subcutaneous, Daily    phenylephrine (Zia-Synephrine) 250 mg in sodium chloride 0.9% 250 mL infusion,  mcg/min, Intravenous, Continuous    propofol (Diprivan) 10 mg/mL infusion premix, 5-50 mcg/kg/min, Intravenous, Continuous    fentaNYL (Sublimaze) 50 mcg/mL injection 25 mcg, 25 mcg, Intravenous, Q30 Min PRN **OR** fentaNYL (Sublimaze) 50 mcg/mL injection 50 mcg, 50 mcg, Intravenous, Q30 Min PRN    acetaminophen (Tylenol) tab 650 mg, 650 mg, Oral, Q4H PRN **OR** acetaminophen (Tylenol) 160 MG/5ML oral liquid 650 mg, 650 mg, Oral, Q4H PRN **OR** acetaminophen (Tylenol) rectal suppository 650 mg, 650 mg, Rectal, Q4H PRN **OR** acetaminophen (Ofirmev) 10 mg/mL infusion premix 1,000 mg, 1,000 mg, Intravenous, Q6H PRN    polyethylene glycol (PEG 3350) (Miralax) 17 g oral packet 17 g, 17 g, Oral, Daily PRN    senna (Senokot) 8.8 MG/5ML oral syrup 17.6 mg, 10 mL, Oral, Nightly PRN    bisacodyl (Dulcolax) 10 MG rectal suppository 10 mg, 10 mg, Rectal, Daily PRN    fleet enema (Fleet) 7-19 GM/118ML rectal enema 133 mL, 1 enema, Rectal, Once PRN    chlorhexidine gluconate (Peridex) 0.12 % oral solution 15 mL, 15 mL, Mouth/Throat, BID@0800,2000    insulin regular human (Novolin R, Humulin R) 100 UNIT/ML injection 1-7 Units, 1-7 Units, Subcutaneous, 4 times per day    sodium bicarbonate 150 mEq in dextrose 5% 1,000 mL infusion, 150 mEq, Intravenous, Continuous    Vancomycin: PHARMACY DOSING, 1 each, Intravenous, See Admin Instructions (RX holding)    vasopressin (Vasostrict) 20 Units in sodium chloride 0.9% 100 mL infusion for septic shock, 0.03 Units/min, Intravenous, Continuous    piperacillin-tazobactam (Zosyn) 3.375 g in dextrose 5% 100 mL IVPB-ADDV, 3.375 g, Intravenous, Q8H    pantoprazole (Protonix) 40 mg in sodium chloride 0.9% PF 10 mL IV push, 40 mg, Intravenous, Daily    glucose (Dex4) 15 GM/59ML oral liquid 15 g, 15 g, Oral, Q15 Min PRN  **OR** glucose (Glutose) 40% oral gel 15 g, 15 g, Oral, Q15 Min PRN **OR** glucose-vitamin C (Dex-4) chewable tab 4 tablet, 4 tablet, Oral, Q15 Min PRN **OR** dextrose 50% injection 50 mL, 50 mL, Intravenous, Q15 Min PRN **OR** glucose (Dex4) 15 GM/59ML oral liquid 30 g, 30 g, Oral, Q15 Min PRN **OR** glucose (Glutose) 40% oral gel 30 g, 30 g, Oral, Q15 Min PRN **OR** glucose-vitamin C (Dex-4) chewable tab 8 tablet, 8 tablet, Oral, Q15 Min PRN    norepinephrine (Levophed) 32 mg in dextrose 5% 250 mL infusion, 0.5-30 mcg/min, Intravenous, Continuous    morphINE PF 2 MG/ML injection 1 mg, 1 mg, Intravenous, Q4H PRN    Unable to perform ROS     Physical Exam:  Blood pressure 101/78, pulse (!) 121, temperature 98.1 °F (36.7 °C), temperature source Temporal, resp. rate (!) 34, height 5' 9\" (1.753 m), weight 204 lb 9.6 oz (92.8 kg), SpO2 95%.    General: Intubated, sedated   HEENT: Exam is unremarkable.  Neck: Supple.  Lungs: Mechanical BS   Cardiac: Regular rate   Abdomen:  Bowel sounds present  Extremities:  No lower extremity edema noted.  Skin: Normal texture and turgor.    Impression and Plan:  Patient Active Problem List   Diagnosis    Left inguinal hernia    Coronary artery disease without angina pectoris, unspecified vessel or lesion type, unspecified whether native or transplanted heart    Acute congestive heart failure (HCC)    Acute congestive heart failure, unspecified heart failure type (HCC)    Syncope and collapse    JU (acute kidney injury) ([facility])    Dizziness    Parasomnia    RLS (restless legs syndrome)    Episodic mood disorder (HCC)    Anemia, unspecified type    Rectal bleeding    Acute on chronic congestive heart failure, unspecified heart failure type (HCC)    Iron deficiency anemia due to chronic blood loss    Scrotal edema    Goals of care, counseling/discussion    Palliative care encounter    Hypotension    Hypotension, unspecified hypotension type    Pneumoperitoneum    Bilateral pulmonary  embolism (HCC)    RV (right ventricular) mural thrombus       Uncontrolled Hyperglycemia   - Secondary to acute illness  - Continue Medium dose CF  - Start Degludec 10 units subcutaneous daily   - Currently NPO   - hypoglycemic protocol    Will follow     Liana Saunders MD  5/2/2024  12:21 PM

## 2024-05-02 NOTE — PROGRESS NOTES
[facility]  part of [facility]    Progress Note    Cristhian Lopez Patient Status:  Inpatient    9/3/1955 MRN [id number]   [facility] [facility] 2W/SW Attending Aretha Hazel MD   Hosp Day # 2 PCP KARI BARCLAY     Subjective:  Intubated    hypotensive       Objective/Physical Exam:  General: Alert, orientated x3.  Cooperative.  No apparent distress.  Vital Signs:  Blood pressure 93/75, pulse 120, temperature 96.9 °F (36.1 °C), temperature source Temporal, resp. rate 20, height 5' 9\" (1.753 m), weight 204 lb 9.6 oz (92.8 kg), SpO2 100%.  Abdomen:  Soft, less distended   NG ?      Labs:  Lab Results   Component Value Date    WBC 20.8 (H) 2024    WBC 20.8 (H) 2024    HGB 14.8 2024    HGB 14.8 2024    HCT 42.7 2024    HCT 42.7 2024    .0 2024    .0 2024    CREATSERUM 2.27 (H) 2024    BUN 52 (H) 2024     (L) 2024    K 3.3 (L) 2024    CL 99 2024    CO2 23.0 2024     (H) 2024    CA 7.3 (L) 2024    ALB 2.2 (L) 2024    ALKPHO 92 2024    BILT 5.5 (H) 2024    TP 4.4 (L) 2024    AST 36 (H) 2024    ALT 36 2024    .6 (H) 2024    INR 6.10 (HH) 2024    TSH 0.397 2021    PSA 9.27 (H) 2023    DDIMER 0.93 (H) 2021    MG 1.7 2024    PHOS 5.6 (H) 2024    TROP <0.045 2021     (H) 2024    B12 389 2021    ETOH <3 2023       Imaging:  XR CHEST AP PORTABLE  (CPT=71045)    Result Date: 2024  PROCEDURE: XR CHEST AP PORTABLE  (CPT=71045) TIME: 0929 hours.   COMPARISON: [facility], XR CHEST AP PORTABLE (CPT=71045), 2024, 6:54 AM.  INDICATIONS: Chest Tube insertion for right-sided pneumothorax.  TECHNIQUE:   Single view.   FINDINGS:  CARDIAC/VASC: Mild cardiomegaly and normal pulmonary vascularity.  Status post sternotomy and coronary bypass.  Fracture of  the 1st Josias suture. MEDIAST/SELMA:   No visible mass or adenopathy. LUNGS/PLEURA: Right lower pigtail chest tube placed with significant re-expansion of the right lung with a small right apical pneumothorax measuring 1.7 cm in height, small right upper lobe lateral pneumothorax measuring 5.3 mm.  Moderate elevation of the right hemidiaphragm.  Bibasilar atelectasis suggested.  Can not exclude right perihilar pneumonia this may suggest more likely atelectasis from recent pneumothorax.  ET tube in the trachea at the level of the clavicles.  NG tube terminates in the cardia of the stomach.  Right internal jugular central line tip ends in the cavoatrial junction.  BONES: No fracture or visible bony lesion. OTHER: Negative.          CONCLUSION:  1. Right lower lobe pigtail chest tube placed with significant re-expansion of the right lung with a small right apical right upper lobe lateral pneumothorax as described above.  Moderate elevation right hemidiaphragm.  Bibasilar atelectasis.  I cannot exclude right perihilar pneumonia.  Follow-up studies are advised.  ET tube in the trachea at the level of the clavicles     Dictated by (CST): Vern Day MD on 5/01/2024 at 10:23 AM     Finalized by (CST): Vern Day MD on 5/01/2024 at 10:26 AM          XR ABDOMEN (1 VIEW) (CPT=74018)    Result Date: 5/1/2024  PROCEDURE: XR ABDOMEN (1 VIEW) (CPT=74018)  COMPARISON: [facility], CT ABDOMEN + PELVIS (CONTRAST ONLY) (CPT=74177), 4/30/2024, 2:01 PM.  [facility], XR CHEST AP PORTABLE (CPT=71045), 4/30/2024, 5:53 PM.  [facility], XR CHEST AP PORTABLE (CPT=71045),  4/30/2024, 7:05 PM.  [facility], XR CHEST AP PORTABLE (CPT=71045), 5/01/2024, 5:45 AM.  [facility], XR CHEST AP PORTABLE (CPT=71045), 5/01/2024, 6:54 AM.  [facility], XR CHEST AP PORTABLE (CPT=71045), 5/01/2024, 9:29 AM.  INDICATIONS: pneumoperitoneum  TECHNIQUE:   Single  view.   FINDINGS:  BOWEL GAS PATTERN: Moderate gaseous distention of the stomach and transverse colon.  NG tube terminates in the cardia of the stomach and may need to be advanced further into the more distal stomach to decompress it.  Correlate clinically. SOFT TISSUES: Normal.  No masses or organomegaly.  CALCIFICATIONS: None significant. BONES: Normal.  No significant arthritic changes.  OTHER: No well-defined large free intraperitoneal air collection is seen.  Small amount of contrast within the decompressed urinary bladder with a Louise catheter suggested.         CONCLUSION:  1. Moderate gaseous distention of the stomach and transverse colon.  NG tube terminates in the cardia of the stomach and may need to be advanced further into the more distal stomach to decompress.  No large well-defined free intraperitoneal air collection is seen.  No definite bowel obstruction.    Dictated by (CST): Vern Day MD on 5/01/2024 at 10:16 AM     Finalized by (CST): Vern Day MD on 5/01/2024 at 10:20 AM          XR CHEST AP PORTABLE  (CPT=71045)    Result Date: 5/1/2024  PROCEDURE: XR CHEST AP PORTABLE  (CPT=71045) TIME: 5:45.   COMPARISON: [facility], XR CHEST AP PORTABLE (CPT=71045), 5/01/2024, 6:54 AM.  [facility], CT ABDOMEN + PELVIS (CONTRAST ONLY) (CPT=74177), 4/30/2024, 2:01 PM.  [facility], XR CHEST AP PORTABLE (CPT=71045),  4/30/2024, 7:05 PM.  [facility], XR CHEST AP PORTABLE (CPT=71045), 4/30/2024, 5:53 PM.  [facility], XR CHEST AP PORTABLE (CPT=71045), 4/30/2024, 12:30 PM.  INDICATIONS: Follow-up shortness of breath.  TECHNIQUE:   Single view.   FINDINGS:  CARDIAC/VASC: The cardiomediastinal silhouette is enlarged and unchanged in size.  There postoperative changes from prior CABG.  There is unchanged discontinuity of the superior sternotomy wire.  There is atherosclerotic calcification of the thoracic aorta. MEDIAST/SELMA:    The mediastinum and hilum are unchanged. LUNGS/PLEURA: Moderate right apicolateral pneumothorax.  The pneumothorax measures approximately 3 cm in thickness apically.  There are progressive opacities in the right perihilar region and right lower lobe.  There is prominence of the interstitial markings.  There are low lung volumes.  There is elevation of the right hemidiaphragm.  There is unchanged minimal left basilar opacity.  No left pneumothorax. BONES: Multilevel degenerative changes of the thoracic spine.  Bilateral shoulder degenerative change.  There is a left posterior 8th rib fracture OTHER: The right IJ central venous catheter is in unchanged position.         CONCLUSION:   Moderate right apicolateral pneumothorax.  Progressive opacities in the right perihilar region and right lower lobe, which are favored to reflect atelectasis.  Prominence of the pulmonary markings, which may reflect mild interstitial edema or be secondary to low lung volumes.  Posterior left 8th rib fracture.   The finding of a right pneumothorax was discussed with Audelia VAIL by Hamilton QUIGLEY at 7:26 a.m. on 05/01/2024.   Dictated by (CST): Dayday Dave MD on 5/01/2024 at 7:47 AM     Finalized by (CST): Dayday Dave MD on 5/01/2024 at 8:01 AM          XR CHEST AP PORTABLE  (CPT=71045)    Result Date: 5/1/2024  PROCEDURE: XR CHEST AP PORTABLE  (CPT=71045) TIME: 0700.   COMPARISON: [facility], XR CHEST AP PORTABLE (CPT=71045), 4/30/2024, 5:53 PM.  [facility], XR CHEST AP PORTABLE (CPT=71045), 4/30/2024, 7:05 PM.  [facility], XR CHEST AP PORTABLE (CPT=71045), 5/01/2024, 5:45 AM.  INDICATIONS: ETT and OG placement.  TECHNIQUE:   Single view.   FINDINGS:  POSITION: The patient is semi-erect and moderately rotated to the right. DEVICES: There is a right-sided central venous catheter with tip projecting near the cavoatrial junction.  An endotracheal tube is seen with tip terminating approximately  4.6 cm above the briseyda.  A nasogastric tube extends caudally beyond the field of view. CARDIAC/VASC: The cardiomediastinal silhouette is accentuated by AP technique, but likely stably enlarged. There is mild tortuosity of the thoracic aorta with peripheral atherosclerotic vascular calcification. The pulmonary vascularity is within normal limits. MEDIAST/SELMA: Surgical clips are present. LUNGS/PLEURA: A large right-sided pneumothorax is evident, perhaps intervally increased from 0549. A background of interstitial opacities is seen throughout the lungs. No airspace consolidation, pleural effusion, or BONES: Median sternotomy wires and sternal fixation plates are demonstrated. Mild degenerative changes of the thoracic spine are apparent. There are degenerative changes of the shoulders bilaterally. OTHER: Persistent pneumoperitoneum may be present. Leads overlie the chest and obscure underlying detail.           CONCLUSION:  1. Increasing large right-sided pneumothorax.  2. Postthoracotomy chest with cardiomegaly and nonspecific interstitial opacities.  3. Additional support tubes and lines as above.    Results of this examination were discussed with the patient's critical care nurse, Daryn Win, by Dr. Villasenor at 0726 on 05/01/2024.   Dictated by (CST): Gaurav Villasenor MD on 5/01/2024 at 7:23 AM     Finalized by (CST): Gaurav Villasenor MD on 5/01/2024 at 7:27 AM          XR CHEST AP PORTABLE  (CPT=71045)    Result Date: 4/30/2024  PROCEDURE: XR CHEST AP PORTABLE  (CPT=71045) TIME: 7:12 p.m..   COMPARISON: [facility], XR CHEST AP PORTABLE (CPT=71045), 4/30/2024, 5:53 PM.  [facility], XR CHEST AP PORTABLE (CPT=71045), 4/30/2024, 12:30 PM.  [facility], XR CHEST AP PORTABLE (CPT=71045), 2/23/2024, 11:15 AM.  INDICATIONS: CVC line adjustment.  TECHNIQUE:   Single view.   FINDINGS:  CARDIAC/VASC: Post coronary bypass .   Cardiomegaly.  Pulmonary vascularity normal. Atherosclerotic  calcification aorta.  MEDIAST/SELMA:   No visible mass or adenopathy. LUNGS/PLEURA: Low lung volumes.  No pneumothorax.  Elevation of right hemidiaphragm is unchanged.  Bibasilar opacities again noted.  BONES: Post sternotomy. OTHER: Pneumoperitoneum again noted, as seen on recent CT examination.  The tip of the right internal jugular central venous catheter projects over the expected location of the superior cavoatrial junction.         CONCLUSION:  Right internal jugular central venous catheter tip projects over the expected superior cavoatrial junction.   Unchanged right greater than left basilar opacity.   Dictated by (CST): Ratna Larson MD on 4/30/2024 at 7:45 PM     Finalized by (CST): Ratna Larson MD on 4/30/2024 at 7:48 PM          XR CHEST AP PORTABLE  (CPT=71045)    Result Date: 4/30/2024  PROCEDURE: XR CHEST AP PORTABLE  (CPT=71045) TIME: 1800  COMPARISON: [facility], CTA CHEST+CTA ABDOMEN DISSECT SET (CPT=71275/46045), 4/30/2024, 11:51 AM.  [facility], XR CHEST AP PORTABLE (CPT=71045), 4/30/2024, 12:30 PM.  INDICATIONS: Follow-up abnormal chest x-ray.  Very central line placement.  TECHNIQUE:   Single view.   FINDINGS:  CARDIAC/VASC: Post coronary bypass .   Cardiomegaly.  Pulmonary vascularity normal. Atherosclerotic calcification aorta.  MEDIAST/SELMA:   No visible mass or adenopathy. LUNGS/PLEURA: No pneumothorax.  Elevation of right hemidiaphragm with right basilar atelectasis. BONES: Post sternotomy. OTHER: Right jugular venous catheter with tip in the right atrium approximately 5.5 cm below the RA SVC junction.  Pneumoperitoneum is unchanged from recent CT         CONCLUSION:  1. No pneumothorax. 2. Right jugular venous catheter with tip in right atrium approximately 5.5 cm below the RA SVC junction. 3. Elevation of right hemidiaphragm with right basilar atelectasis. 4. Pneumoperitoneum unchanged from recent CT.    Dictated by (CST): Edvin Strauss MD on  4/30/2024 at 6:21 PM     Finalized by (CST): Edvin Strauss MD on 4/30/2024 at 6:25 PM          CT ABDOMEN+PELVIS(CONTRAST ONLY)(CPT=74177)    Result Date: 4/30/2024  PROCEDURE: CT ABDOMEN + PELVIS (CONTRAST ONLY) (CPT=74177)  COMPARISON: [facility], CTA CHEST+CTA ABDOMEN DISSECT SET (CPT=71275/21385), 4/30/2024, 11:51 AM.  INDICATIONS: please get full abdomen and pelvis after discuss with Prabhjot and Estephania  TECHNIQUE: CT images of the abdomen and pelvis were obtained with non-ionic intravenous contrast material.  Automated exposure control for dose reduction was used. Adjustment of the mA and/or kV was done based on the patient's size. Use of iterative reconstruction technique for dose reduction was used.  Dose information is transmitted to the ACR (American College of Radiology) NRDR (National Radiology Data Registry) which includes the Dose Index Registry.  FINDINGS:  LIVER: Atrophic with a nodular contour. GALLBLADDER: There are multiple gallstones. No gallbladder wall thickening or pericholecystic fluid. BILIARY: No intra-or extrahepatic biliary ductal dilation. SPLEEN: Unremarkable PANCREAS: The pancreas enhances symmetrically. No ductal dilation. ADRENALS: Unremarkable KIDNEYS: The kidneys enhance symmetrically. There is no hydronephrosis.  1.6 cm cystic lesion within the uncinate process is once again seen and unchanged.  AORTA/VASCULAR:   There is atherosclerotic calcification of the abdominal aorta extending into bilateral iliac arteries. RETROPERITONEUM: There are scattered subcentimeter nonspecific retroperitoneal lymph nodes which are unchanged. BOWEL:  Small hiatal hernia with wall thickening at the gastroesophageal junction.  Small volume of pneumoperitoneum is once again seen, most prominent within the anterior aspect of the upper abdomen.  Centered along a loop of transverse colon seen best on series 2, image 76 and series 5, image 18. The small bowel loops are decompressed. The  appendix is normal. There are no inflammatory changes within the right lower quadrant.  Remainder of the bowel is otherwise unremarkable without dilation or wall thickening.   MESENTERY: Large volume of abdominal ascites.  There is no mass or loculated collection.  Diffuse subcutaneous edema within the bilateral upper and lower quadrants. PELVIS: Large volume of pelvic ascites.  No pelvic lymphadenopathy. BONES:   There is degenerative disease of the thoracic and lumbar spine. Degenerative changes are seen within the sacroiliac joints and pubic symphysis. Degenerative changes are seen in the bilateral hips.  Sclerotic lesion is seen within the L5 vertebral body which is indeterminate. LUNG BASES: Thrombus within the right ventricle again seen and unchanged.  Otherwise dictated in separate study.         CONCLUSION:   Small volume of pneumoperitoneum.  The pneumoperitoneum appears to be most prominent within the upper abdomen and contiguous with the loop of proximal transverse colon.  Therefore a small perforation in the proximal transverse colon is felt to be the origin of the pneumoperitoneum.  Other  etiology such as a perforated duodenal or gastric ulcer are also within the differential but are felt to be less likely.  Large volume of abdominal and pelvic ascites.  No obstruction.  Right ventricular thrombus again seen and unchanged.  Cholelithiasis without CT evidence of acute cholecystitis.  Multiple other incidental findings as described in the body of the report which are unchanged.    Dictated by (CST): Prem Pina MD on 4/30/2024 at 2:50 PM     Finalized by (CST): Prem Pina MD on 4/30/2024 at 2:56 PM          XR CHEST AP PORTABLE  (CPT=71045)    Result Date: 4/30/2024  PROCEDURE: XR CHEST AP PORTABLE  (CPT=71045) TIME: 1230 hours.   COMPARISON: [facility], CTA CHEST+CTA ABDOMEN DISSECT SET (CPT=71275/63322), 4/30/2024, 11:51 AM.  [facility], XR CHEST AP PORTABLE  (CPT=71045), 2/23/2024, 11:15 AM.  INDICATIONS: Back pain post fall.  TECHNIQUE:   Single view.   FINDINGS:  CARDIAC/VASC: Mild cardiomegaly and minimally prominent pulmonary vascularity but no hilary pulmonary edema.  Status post sternotomy and coronary bypass.  Again noted is a fracture of the 1st are no suture. MEDIAST/SELMA:   No visible mass or adenopathy. LUNGS/PLEURA: Suboptimal inspiration.  Moderate elevation of the right hemidiaphragm.  Patchy right basal airspace disease may suggest right basilar pneumonia and or atelectasis.  Correlate clinically and follow-up studies are advised. BONES: No fracture or visible bony lesion. OTHER: Negative.          CONCLUSION:  1. Mild cardiomegaly and mildly prominent pulmonary vascularity but no hilary pulmonary edema.  Suboptimal inspiration.  Moderate elevation of the right hemidiaphragm unchanged.  Patchy right basal airspace disease may suggest right basal pneumonia and or  atelectasis.  Correlate clinically and follow-up studies are advised.    Dictated by (CST): Vern Day MD on 4/30/2024 at 12:59 PM     Finalized by (CST): Vern Day MD on 4/30/2024 at 1:03 PM          CTA CHEST+CTA ABDOMEN DISSECT SET (CPT=71275/65475)    Result Date: 4/30/2024  PROCEDURE: CT CHEST ABDOMEN DISSECT SET (CPT=71275/01643)  COMPARISON: [facility], CT CHEST PE AORTA (IV ONLY) (CPT=71260), 12/23/2021, 1:05 AM.  INDICATIONS: Back pain, hypotensive , very poor historian  TECHNIQUE:   CT images of the chest and abdomen were obtained with intravenous contrast material.  Automated exposure control for dose reduction was used. Adjustment of the mA and/or kV was done based on the patient's size. Use of iterative reconstruction technique for dose reduction was used.  Dose information is transmitted to the ACR (American College of Radiology) NRDR (National Radiology Data Registry) which includes the Dose Index Registry.  FINDINGS: Normal size aorta.  No acute aortic  syndrome.  Moderately calcified abdominal aorta.  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  Mesenteric arteries are widely patent.  Severe biventricular and moderate biatrial dilation.  1.9 x 1.7 centimeter thrombus at the RV apex new from prior  Nonocclusive PE RLL lobar artery image 60. Occlusive subsegmental PE at the left apex image 31.  Possible lingular segmental PE image 65  Prior CABG with severely calcified native coronary arteries.  Stable 1.3 centimeter anterior mediastinal nodule with peripheral calcifications likely benign.  No pericardial effusion  Small volume right basilar effusion  Subsegmental atelectasis right base.  Minimal emphysema  Abdomen and pelvis:  Normal liver contour.  Enlarged caudate lobe.  Punctate gallstones  Normal spleen  1.8 x 1.5 centimeter low-density nodule or cyst along the inferior margin of the pancreatic uncinate  Normal adrenals and kidneys  Unobstructed bowel.  Large volume ascites.  Mild pneumoperitoneum.  Laparotomy scar  1 centimeter sclerotic lesion posterior aspect of L5, nonspecific.          CONCLUSION:   Severe biventricular dilation.  1.9 x 1.7 centimeter thrombus at the RV apex  Small nonocclusive PE right lower lobar artery.  Small occlusive subsegmental PE at the apical left upper lobe  Small volume right effusion  Large volume ascites  Mild pneumoperitoneum.  This could be from bowel perforation or could be iatrogenic such as from any recent paracentesis  No acute aortic syndrome  1.8 x 1.5 centimeter low-density nodule or cyst along the inferior margin of the pancreatic uncinate.  This could be a pseudo cyst or cystic pancreatic lesion  Discussed with Dr. Anne     Dictated by (CST): Bradley Rick MD on 4/30/2024 at 12:14 PM     Finalized by (CST): Bradley Rick MD on 4/30/2024 at 12:37 PM            Assessment/Plan:  Patient Active Problem List   Diagnosis    Left inguinal hernia    Coronary artery disease  without angina pectoris, unspecified vessel or lesion type, unspecified whether native or transplanted heart    Acute congestive heart failure (HCC)    Acute congestive heart failure, unspecified heart failure type (HCC)    Syncope and collapse    JU (acute kidney injury) (HCC)    Dizziness    Parasomnia    RLS (restless legs syndrome)    Episodic mood disorder (HCC)    Anemia, unspecified type    Rectal bleeding    Acute on chronic congestive heart failure, unspecified heart failure type (HCC)    Iron deficiency anemia due to chronic blood loss    Scrotal edema    Goals of care, counseling/discussion    Palliative care encounter    Hypotension    Hypotension, unspecified hypotension type    Pneumoperitoneum    Bilateral pulmonary embolism (HCC)    RV (right ventricular) mural thrombus   About same    Doubt perforation  If stable advise repeat CT abd  but just follow with KUB's for no  - no evidence of free air on film yesterday    ZHEN ALVAREZ MD  5/2/2024  8:48 AM

## 2024-05-03 NOTE — PAYOR COMM NOTE
--------------  CONTINUED STAY REVIEW    Payor: [employer] MEDICARE ADV PPO  Subscriber #:  [id number]  Authorization Number: [id number]    Admit date: 4/30/24  Admit time:  4:04 PM    5/2/24   Remains on pressors at this time.     Temp:  [96.6 °F (35.9 °C)-98.1 °F (36.7 °C)] 98.1 °F (36.7 °C)  Pulse:  [117-125] 121  Resp:  [17-34] 34  BP: ()/(61-88) 101/78  SpO2:  [91 %-100 %] 95 %  AO: ()/(56-80) 126/80  FiO2 (%):  [40 %-80 %] 40 %      Physical Exam:    Gen: intubated, sedated.  Chest: mechanical breath sounds  CVS: normal s1 and s2 RR  Abd: NABS soft NT ND  Neuro: unable to assess  Ext: no edema in bilateral LE    Lab 04/30/24  1111 04/30/24  1521 05/01/24  0511 05/02/24  0512 05/02/24  1018   WBC 3.7* 6.5 18.4* 20.8*  20.8*  --    HGB 16.5 14.9 16.6 14.8  14.8  --    MCV 73.0* 72.6* 72.2* 72.1*  72.1*  --    .0* 136.0* 182.0 157.0  157.0  --      Lab 04/30/24  1111 04/30/24  2041 05/01/24  0511 05/02/24  0512   *  --  217* 242*   BUN 41*  --  45* 52*   CREATSERUM 1.86*  --  2.10* 2.27*   CA 8.0*  --  7.9* 7.3*   ALB 2.8* 2.7* 2.8* 2.2*   *  --  128* 130*   K 3.6  --  3.6 3.3*   CL 95*  --  96* 99   CO2 22.0  --  16.0* 23.0   ALKPHO 128* 116 111 92   AST 47* 43* 45* 36*   ALT 45 35 42 36   BILT 4.9* 5.1* 5.4* 5.5*   TP 5.0* 5.0* 5.0* 4.4*     Lab 04/30/24  1717 04/30/24  1853 05/02/24  1018   PTP 63.1* 58.0* 65.2*   INR 6.79* 6.10* 7.07*    Medications:    potassium chloride  20 mEq Intravenous Once    insulin degludec  10 Units Subcutaneous Daily    chlorhexidine gluconate  15 mL Mouth/Throat BID@0800,2000    insulin regular human  1-7 Units Subcutaneous 4 times per day    Vancomycin IV  1 each Intravenous See Admin Instructions (RX holding)    piperacillin-tazobactam  3.375 g Intravenous Q8H    pantoprazole  40 mg Intravenous Daily       Assessment & Plan:      Profound shock - cardiogenic + septic  Acute hypoxic respiratory failure  Biventricular heart failure  Perforated  bowel  Hx of CAD s/p CABG and stenting and ICD  RV thrombus  CT reviewed  ICU on consult  On 3 pressors  Continue IVF, IV abx  Full vent support  Cardiology on consult  Continue pressor support  Consider addition of inotrope if poor UOP and concern for cardiogenic shock  Repeat echo pending  ID on consult  Continue vanc and zosyn  Follow up on cultures  Gsx on consult  Repeat CT a/p  Poor surgical candidate  Poor prognosis  R sided PTX  Chest tube in place  Appreciate Pulm recs  Acute PE with hx of VTE  RV thrombus  Allergy to heparin  Currently on Argatroban  Uncontrolled hyperglycemia  Endo on consult  Medium SSI  Degludec 10 U daily  JU on CKD  Continue IVF  Monitor labs  Nephrology on consult  Strict Is and Os, daily weights           5/3/24  Patient laying in bed, sedated, intubated.   Remains on pressors at this time.   Lab 04/30/24  1111 04/30/24  1521 05/01/24  0511 05/02/24  0512 05/02/24  1018 05/03/24  0533   WBC 3.7* 6.5 18.4* 20.8*  20.8*  --  19.9*  19.9*   HGB 16.5 14.9 16.6 14.8  14.8  --  13.2  13.2   MCV 73.0* 72.6* 72.2* 72.1*  72.1*  --  71.6*  71.6*   .0* 136.0* 182.0 157.0  157.0  --  110.0*  110.0*     Lab 05/01/24  0511 05/02/24  0512 05/03/24  0539   * 242* 220*   BUN 45* 52* 51*   CREATSERUM 2.10* 2.27* 2.04*   CA 7.9* 7.3* 7.2*   ALB 2.8* 2.2* 2.1*   * 130* 132*   K 3.6 3.3* 3.2*   CL 96* 99 96*   CO2 16.0* 23.0 28.0   ALKPHO 111 92 123*   AST 45* 36* 34   ALT 42 36 28   BILT 5.4* 5.5* 5.8*   TP 5.0* 4.4* 4.2*      Lab 04/30/24  1717 04/30/24  1853 05/02/24  1018   PTP 63.1* 58.0* 65.2*   INR 6.79* 6.10* 7.07*    Assessment & Plan:      Profound shock - cardiogenic + septic  Acute hypoxic respiratory failure  Biventricular heart failure  Perforated bowel  Hx of CAD s/p CABG and stenting and ICD  RV thrombus  CT reviewed  ICU on consult  On 3 pressors  Continue IVF, IV abx  Full vent support  Cardiology on consult  Continue pressor support - on levophed,  vasopressin and norepinephrine  Consider addition of inotrope if poor UOP and concern for cardiogenic shock  Echo reviewed - EF 20-25%  ID on consult  Continue zosyn, stop IV Vancomycin  Follow up on cultures  Gsx on consult  Repeat CT a/p  Poor surgical candidate  Poor prognosis  R sided PTX  Chest tube in place  Appreciate Pulm recs  Acute PE with hx of VTE  RV thrombus  Allergy to heparin  Currently on Argatroban  Uncontrolled hyperglycemia  Endo on consult  Medium SSI  Tresiba 14U daily  JU on CKD  Continue IVF  Monitor labs  Nephrology on consult  Strict Is and Os, daily weights  Stop bicarb drip  Start TPN     MEDICATIONS ADMINISTERED IN LAST 1 DAY:  argatroban 50 mg/50mL infusion premix       Date Action Dose Route User    5/3/2024 0613 New Bag 0.09 mcg/kg/min × 92.8 kg Intravenous Demarco Musa RN          chlorhexidine gluconate (Peridex) 0.12 % oral solution 15 mL       Date Action Dose Route User    5/3/2024 0833 Given 15 mL Mouth/Throat Fidelia Portillo RN    5/2/2024 2056 Given 15 mL Mouth/Throat Demarco Musa RN     insulin degludec 100 units/mL flextouch 14 Units       Date Action Dose Route User    5/3/2024 0859 Given 14 Units Subcutaneous (Right Upper Arm) Fidelia Portillo RN          insulin regular human (Novolin R, Humulin R) 100 UNIT/ML injection 1-7 Units       Date Action Dose Route User    5/3/2024 1302 Given 2 Units Subcutaneous (Left Upper Arm) Fidelia Portillo RN    5/3/2024 0600 Given 4 Units Subcutaneous (Left Lower Abdomen) Demarco Musa RN    5/3/2024 0000 Given 4 Units Subcutaneous (Left Lower Abdomen) Demarco Musa RN    5/2/2024 1800 Given 4 Units Subcutaneous (Left Lower Abdomen) Sammie Key RN          insulin regular human (Novolin R, Humulin R) 100 UNIT/ML injection 4 Units       Date Action Dose Route User    5/3/2024 1303 Given 4 Units Subcutaneous (Left Upper Arm) Fidelia Portillo RN       norepinephrine (Levophed) 32 mg in dextrose 5% 250 mL  infusion       Date Action Dose Route User    5/3/2024 1417 New Bag 13 mcg/min Intravenous Fidelia Portillo RN    5/3/2024 1129 Rate/Dose Change 13 mcg/min Intravenous Fidelia Portillo RN    5/3/2024 1020 Rate/Dose Change 15 mcg/min Intravenous Fidelia Portillo RN    5/3/2024 0518 New Bag 16 mcg/min Intravenous Demarco Musa RN    5/2/2024 2326 Rate/Dose Change 22 mcg/min Intravenous Demarco Musa RN    5/2/2024 2225 Rate/Dose Change 24 mcg/min Intravenous Demraco Musa RN    5/2/2024 2210 Rate/Dose Change 26 mcg/min Intravenous Demarco Musa, YARED          pantoprazole (Protonix) 40 mg in sodium chloride 0.9% PF 10 mL IV push       Date Action Dose Route User    5/3/2024 0832 Given 40 mg Intravenous Fidelia Portillo RN          phenylephrine (Zia-Synephrine) 250 mg in sodium chloride 0.9% 250 mL infusion       Date Action Dose Route User    5/3/2024 1417 New Bag 100 mcg/min Intravenous Fidelia Portillo RN    5/3/2024 1129 Rate/Dose Change 100 mcg/min Intravenous Fidelia Portillo RN    5/3/2024 1020 Rate/Dose Change 125 mcg/min Intravenous Fidelia Portillo RN    5/2/2024 2138 Rate/Dose Change 175 mcg/min Intravenous Demarco Musa RN    5/2/2024 2059 Rate/Dose Change 200 mcg/min Intravenous Demarco Musa RN          piperacillin-tazobactam (Zosyn) 3.375 g in dextrose 5% 100 mL IVPB-ADDV       Date Action Dose Route User    5/3/2024 0910 New Bag 3.375 g Intravenous Fidelia Portillo RN    5/3/2024 0159 New Bag 3.375 g Intravenous eDmarco Musa RN    5/2/2024 1800 New Bag 3.375 g Intravenous Sammie Key RN          potassium chloride 40 mEq/100mL IVPB premix (central line) 40 mEq       Date Action Dose Route User    5/3/2024 1051 New Bag 40 mEq Intravenous Fidelia Portillo RN          propofol (Diprivan) 10 mg/mL infusion premix       Date Action Dose Route User    5/3/2024 1038 New Bag 10 mcg/kg/min × 89.9 kg Intravenous Fidelia Portillo, RN    5/3/2024 0211 New Bag 15 mcg/kg/min  × 89.9 kg Intravenous Demarco Musa RN    5/2/2024 2137 Rate/Dose Change 15 mcg/kg/min × 89.9 kg Intravenous Demarco Musa RN    5/2/2024 2053 Rate/Dose Change 20 mcg/kg/min × 89.9 kg Intravenous Demarco Musa RN    5/2/2024 1845 New Bag 25 mcg/kg/min × 89.9 kg Intravenous Sammie Key RN          sodium bicarbonate 150 mEq in dextrose 5% 1,000 mL infusion       Date Action Dose Route User    5/2/2024 2217 New Bag 150 mEq Intravenous Demarco Musa RN          sodium chloride 0.9% infusion       Date Action Dose Route User    5/3/2024 1052 Rate/Dose Change (none) Intravenous Fidelia Portillo RN    5/3/2024 1051 New Bag (none) Intravenous Fidelia Portillo RN          vasopressin (Vasostrict) 20 Units in sodium chloride 0.9% 100 mL infusion for septic shock       Date Action Dose Route User    5/3/2024 1255 New Bag 0.03 Units/min Intravenous Fidelia Portillo RN    5/3/2024 0046 New Bag 0.03 Units/min Intravenous Demarco Musa RN    5/2/2024 1558 New Bag 0.03 Units/min Intravenous Sammie Key RN            Vitals (last day)       Date/Time Temp Pulse Resp BP SpO2 Weight O2 Device O2 Flow Rate (L/min) [facility]    05/03/24 1500 -- 123 18 95/73 97 % -- Ventilator -- SZ    05/03/24 1200 98.1 °F (36.7 °C) 116 18 91/67 99 % -- Ventilator -- LG    05/03/24 0800 97.7 °F (36.5 °C) 127 21 91/63 98 % -- Ventilator -- LG    05/02/24 1600 98.1 °F (36.7 °C) 117 20 98/70 100 % -- Ventilator -- LC    05/02/24 0000 97.4 °F (36.3 °C) 122 20 105/71 100 % -- Ventilator -- DL

## 2024-05-03 NOTE — PROGRESS NOTES
[facility]  Nephrology Daily Progress Note    Cristhian Lopez  [id number]  68 year old      HPI:   Cristhian Lopez is a 68 year old male.  Remains intubated and sedated.  On 3 pressors.  FiO2 40%.      ROS:     Unable      PHYSICAL EXAM:   Temp:  [97.7 °F (36.5 °C)-98.1 °F (36.7 °C)] 97.7 °F (36.5 °C)  Pulse:  [114-143] 121  Resp:  [18-34] 24  BP: ()/(63-88) 98/76  SpO2:  [94 %-100 %] 100 %  AO: ()/(53-80) 91/53  FiO2 (%):  [40 %] 40 %  Patient Weight for the past 72 hrs:   Weight   04/30/24 1046 150 lb (68 kg)   04/30/24 1400 208 lb 8.9 oz (94.6 kg)   04/30/24 2100 197 lb 14.4 oz (89.8 kg)   05/01/24 0400 198 lb 1.6 oz (89.9 kg)   05/02/24 0548 204 lb 9.6 oz (92.8 kg)       Constitutional: appears well hydrated alert and responsive no acute distress noted  Neck/Thyroid: neck is supple without adenopathy  Lymphatic: no abnormal cervical, supraclavicular or axillary adenopathy is noted  Respiratory: normal to inspection lungs are clear to auscultation bilaterally normal respiratory effort  Cardiovascular: regular rate and rhythm no murmurs, gallups, or rubs  Abdomen: soft non-tender non-distended no organomegaly noted no masses  Musculoskeletal: full ROM all extremities good strength  no deformities  Extremities: 1 + edema.   Neurological: exam appropriate for age reflexes and motor skills appropriate for age    Labs:  Lab Results   Component Value Date    WBC 19.9 05/03/2024    WBC 19.9 05/03/2024    HGB 13.2 05/03/2024    HGB 13.2 05/03/2024    HCT 38.3 05/03/2024    HCT 38.3 05/03/2024    .0 05/03/2024    .0 05/03/2024    CREATSERUM 2.04 05/03/2024    BUN 51 05/03/2024     05/03/2024    K 3.2 05/03/2024    CL 96 05/03/2024    CO2 28.0 05/03/2024     05/03/2024    CA 7.2 05/03/2024    ALB 2.1 05/03/2024    ALKPHO 123 05/03/2024    BILT 5.8 05/03/2024    TP 4.2 05/03/2024    AST 34 05/03/2024    ALT 28 05/03/2024    PTT 88.8 05/03/2024    MG 1.7 05/03/2024     PHOS 4.4 05/03/2024     Recent Labs   Lab 05/01/24  0511 05/02/24  0512 05/03/24  0533   WBC 18.4* 20.8*  20.8* 19.9*  19.9*   HGB 16.6 14.8  14.8 13.2  13.2   HCT 48.8 42.7  42.7 38.3*  38.3*   .0 157.0  157.0 110.0*  110.0*     Recent Labs   Lab 05/01/24  0511 05/02/24  0512 05/03/24  0539   * 242* 220*   BUN 45* 52* 51*   CREATSERUM 2.10* 2.27* 2.04*   CA 7.9* 7.3* 7.2*   ALB 2.8* 2.2* 2.1*   * 130* 132*   K 3.6 3.3* 3.2*   CL 96* 99 96*   CO2 16.0* 23.0 28.0   ALKPHO 111 92 123*   AST 45* 36* 34   ALT 42 36 28   BILT 5.4* 5.5* 5.8*   TP 5.0* 4.4* 4.2*   PHOS 6.1* 5.6* 4.4       Imaging  XR CHEST AP PORTABLE  (CPT=71045)    Addendum Date: 5/3/2024    ADDENDUM: There is a minimally displaced left posterior 9th rib fracture, which was visible on the recent chest CT.  No definite radiographically visible right rib fractures.    Dictated by (CST): Chico Hayward MD on 5/03/2024 at 7:53 AM     Finalized by (CST): Chico Hayward MD on 5/03/2024 at 7:53 AM             Result Date: 5/3/2024  CONCLUSION:  1. Lines/tubes in stable customary positioning. 2. Greater than 50% right-sided pneumothorax has increased in size with worsening associated subsegmental atelectasis at the right lung base.  No associated mediastinal shift.  The right-sided pigtail pleural drainage catheter is in stable positioning; correlate clinically with catheter function. 3. Stable cardiomegaly and post CABG changes.  There are findings consistent with worsening mild to moderate CHF/fluid overload including progressive pulmonary edema.   Results of this examination were discussed with the patient's nurse, Fidelia Portillo, by Dr. Hayward at 7:51 on 05/03/2024.  Dictated by (CST): Chico Hayward MD on 5/03/2024 at 7:45 AM     Finalized by (CST): Chico Hayward MD on 5/03/2024 at 7:52 AM          XR ABDOMEN (1 VIEW) (CPT=74018)    Result Date: 5/2/2024  CONCLUSION:   No significant change gaseous  distension of the transverse colon.  Mild improvement in the gaseous distension of the stomach with a well-positioned enteric tube.  Otherwise, there is a paucity of abdominal bowel gas which is nonspecific.  Delayed nephrograms, which may be secondary to renal failure/acute tubular necrosis.  Lesser incidental findings described above.    Dictated by (CST): Dayday Dave MD on 5/02/2024 at 1:48 PM     Finalized by (CST): Dayday Dave MD on 5/02/2024 at 1:54 PM          XR CHEST AP PORTABLE  (CPT=71045)    Result Date: 5/2/2024  CONCLUSION:  1.  Small right apical pneumothorax occupying 5-10% of hemithorax volume is present.  No tracheal or mediastinal shift.  Stable position of right chest tube. 2.  Cardiomegaly.  Post CABG. 3.  Elevation/eventration of the right hemidiaphragm.  Linear bibasilar pulmonary opacities lung most likely representing atelectasis however correlate for pneumonia. 4.  Interstitial prominence suggesting mild edema. 5.  Gross stable/satisfactory position of lines and tubes.  Dictated by (CST): Kings Troy MD on 5/02/2024 at 8:47 AM     Finalized by (CST): Kings Troy MD on 5/02/2024 at 8:50 AM          XR CHEST AP PORTABLE  (CPT=71045)    Result Date: 5/1/2024  CONCLUSION:  1. Right lower lobe pigtail chest tube placed with significant re-expansion of the right lung with a small right apical right upper lobe lateral pneumothorax as described above.  Moderate elevation right hemidiaphragm.  Bibasilar atelectasis.  I cannot exclude right perihilar pneumonia.  Follow-up studies are advised.  ET tube in the trachea at the level of the clavicles     Dictated by (CST): Vern Day MD on 5/01/2024 at 10:23 AM     Finalized by (CST): Vern Day MD on 5/01/2024 at 10:26 AM          XR ABDOMEN (1 VIEW) (CPT=74018)    Result Date: 5/1/2024  CONCLUSION:  1. Moderate gaseous distention of the stomach and transverse colon.  NG tube terminates in the cardia of the stomach and may need to be  advanced further into the more distal stomach to decompress.  No large well-defined free intraperitoneal air collection is seen.  No definite bowel obstruction.    Dictated by (CST): Vern Day MD on 5/01/2024 at 10:16 AM     Finalized by (CST): Vern Day MD on 5/01/2024 at 10:20 AM          XR CHEST AP PORTABLE  (CPT=71045)    Result Date: 5/1/2024  CONCLUSION:   Moderate right apicolateral pneumothorax.  Progressive opacities in the right perihilar region and right lower lobe, which are favored to reflect atelectasis.  Prominence of the pulmonary markings, which may reflect mild interstitial edema or be secondary to low lung volumes.  Posterior left 8th rib fracture.   The finding of a right pneumothorax was discussed with Audelia VAIL by Hamilton QUIGLEY at 7:26 a.m. on 05/01/2024.   Dictated by (CST): Dayday Dave MD on 5/01/2024 at 7:47 AM     Finalized by (CST): Dayday Dave MD on 5/01/2024 at 8:01 AM          XR CHEST AP PORTABLE  (CPT=71045)    Result Date: 5/1/2024  CONCLUSION:  1. Increasing large right-sided pneumothorax.  2. Postthoracotomy chest with cardiomegaly and nonspecific interstitial opacities.  3. Additional support tubes and lines as above.    Results of this examination were discussed with the patient's critical care nurse, Daryn Win, by Dr. Villasenor at 0726 on 05/01/2024.   Dictated by (CST): Gaurav Villasenor MD on 5/01/2024 at 7:23 AM     Finalized by (CST): Gaurav Villasenor MD on 5/01/2024 at 7:27 AM          XR CHEST AP PORTABLE  (CPT=71045)    Result Date: 4/30/2024  CONCLUSION:  Right internal jugular central venous catheter tip projects over the expected superior cavoatrial junction.   Unchanged right greater than left basilar opacity.   Dictated by (CST): Ratna Larson MD on 4/30/2024 at 7:45 PM     Finalized by (CST): Ratna Larson MD on 4/30/2024 at 7:48 PM          XR CHEST AP PORTABLE  (CPT=71045)    Result Date: 4/30/2024  CONCLUSION:  1. No pneumothorax. 2. Right jugular  venous catheter with tip in right atrium approximately 5.5 cm below the RA SVC junction. 3. Elevation of right hemidiaphragm with right basilar atelectasis. 4. Pneumoperitoneum unchanged from recent CT.    Dictated by (CST): Edvin Strauss MD on 4/30/2024 at 6:21 PM     Finalized by (CST): Edvin Strauss MD on 4/30/2024 at 6:25 PM          CT ABDOMEN+PELVIS(CONTRAST ONLY)(CPT=74177)    Result Date: 4/30/2024  CONCLUSION:   Small volume of pneumoperitoneum.  The pneumoperitoneum appears to be most prominent within the upper abdomen and contiguous with the loop of proximal transverse colon.  Therefore a small perforation in the proximal transverse colon is felt to be the origin of the pneumoperitoneum.  Other  etiology such as a perforated duodenal or gastric ulcer are also within the differential but are felt to be less likely.  Large volume of abdominal and pelvic ascites.  No obstruction.  Right ventricular thrombus again seen and unchanged.  Cholelithiasis without CT evidence of acute cholecystitis.  Multiple other incidental findings as described in the body of the report which are unchanged.    Dictated by (CST): Prem Pina MD on 4/30/2024 at 2:50 PM     Finalized by (CST): Prem Pina MD on 4/30/2024 at 2:56 PM          XR CHEST AP PORTABLE  (CPT=71045)    Result Date: 4/30/2024  CONCLUSION:  1. Mild cardiomegaly and mildly prominent pulmonary vascularity but no hilary pulmonary edema.  Suboptimal inspiration.  Moderate elevation of the right hemidiaphragm unchanged.  Patchy right basal airspace disease may suggest right basal pneumonia and or  atelectasis.  Correlate clinically and follow-up studies are advised.    Dictated by (CST): Vern Day MD on 4/30/2024 at 12:59 PM     Finalized by (CST): Vern Day MD on 4/30/2024 at 1:03 PM          CTA CHEST+CTA ABDOMEN DISSECT SET (CPT=71275/59504)    Result Date: 4/30/2024  CONCLUSION:   Severe biventricular dilation.  1.9 x 1.7 centimeter thrombus at the  RV apex  Small nonocclusive PE right lower lobar artery.  Small occlusive subsegmental PE at the apical left upper lobe  Small volume right effusion  Large volume ascites  Mild pneumoperitoneum.  This could be from bowel perforation or could be iatrogenic such as from any recent paracentesis  No acute aortic syndrome  1.8 x 1.5 centimeter low-density nodule or cyst along the inferior margin of the pancreatic uncinate.  This could be a pseudo cyst or cystic pancreatic lesion  Discussed with Dr. Anne     Dictated by (CST): Bradley Rick MD on 4/30/2024 at 12:14 PM     Finalized by (CST): Bradley Rick MD on 4/30/2024 at 12:37 PM             Medications:    Current Facility-Administered Medications:     insulin degludec 100 units/mL flextouch 14 Units, 14 Units, Subcutaneous, Daily    insulin regular human (Novolin R, Humulin R) 100 UNIT/ML injection 4 Units, 4 Units, Subcutaneous, Q6H    argatroban 50 mg/50mL infusion premix, 0.09 mcg/kg/min, Intravenous, Continuous    vancomycin (Vancocin) 1.25 g in sodium chloride 0.9% 250mL IVPB premix, 1,250 mg, Intravenous, Q48H    phenylephrine (Zia-Synephrine) 250 mg in sodium chloride 0.9% 250 mL infusion,  mcg/min, Intravenous, Continuous    propofol (Diprivan) 10 mg/mL infusion premix, 5-50 mcg/kg/min, Intravenous, Continuous    fentaNYL (Sublimaze) 50 mcg/mL injection 25 mcg, 25 mcg, Intravenous, Q30 Min PRN **OR** fentaNYL (Sublimaze) 50 mcg/mL injection 50 mcg, 50 mcg, Intravenous, Q30 Min PRN    acetaminophen (Tylenol) tab 650 mg, 650 mg, Oral, Q4H PRN **OR** acetaminophen (Tylenol) 160 MG/5ML oral liquid 650 mg, 650 mg, Oral, Q4H PRN **OR** acetaminophen (Tylenol) rectal suppository 650 mg, 650 mg, Rectal, Q4H PRN **OR** acetaminophen (Ofirmev) 10 mg/mL infusion premix 1,000 mg, 1,000 mg, Intravenous, Q6H PRN    polyethylene glycol (PEG 3350) (Miralax) 17 g oral packet 17 g, 17 g, Oral, Daily PRN    senna (Senokot) 8.8 MG/5ML oral syrup 17.6 mg, 10 mL,  Oral, Nightly PRN    bisacodyl (Dulcolax) 10 MG rectal suppository 10 mg, 10 mg, Rectal, Daily PRN    fleet enema (Fleet) 7-19 GM/118ML rectal enema 133 mL, 1 enema, Rectal, Once PRN    chlorhexidine gluconate (Peridex) 0.12 % oral solution 15 mL, 15 mL, Mouth/Throat, BID@0800,2000    insulin regular human (Novolin R, Humulin R) 100 UNIT/ML injection 1-7 Units, 1-7 Units, Subcutaneous, 4 times per day    sodium bicarbonate 150 mEq in dextrose 5% 1,000 mL infusion, 150 mEq, Intravenous, Continuous    Vancomycin: PHARMACY DOSING, 1 each, Intravenous, See Admin Instructions (RX holding)    vasopressin (Vasostrict) 20 Units in sodium chloride 0.9% 100 mL infusion for septic shock, 0.03 Units/min, Intravenous, Continuous    piperacillin-tazobactam (Zosyn) 3.375 g in dextrose 5% 100 mL IVPB-ADDV, 3.375 g, Intravenous, Q8H    pantoprazole (Protonix) 40 mg in sodium chloride 0.9% PF 10 mL IV push, 40 mg, Intravenous, Daily    glucose (Dex4) 15 GM/59ML oral liquid 15 g, 15 g, Oral, Q15 Min PRN **OR** glucose (Glutose) 40% oral gel 15 g, 15 g, Oral, Q15 Min PRN **OR** glucose-vitamin C (Dex-4) chewable tab 4 tablet, 4 tablet, Oral, Q15 Min PRN **OR** dextrose 50% injection 50 mL, 50 mL, Intravenous, Q15 Min PRN **OR** glucose (Dex4) 15 GM/59ML oral liquid 30 g, 30 g, Oral, Q15 Min PRN **OR** glucose (Glutose) 40% oral gel 30 g, 30 g, Oral, Q15 Min PRN **OR** glucose-vitamin C (Dex-4) chewable tab 8 tablet, 8 tablet, Oral, Q15 Min PRN    norepinephrine (Levophed) 32 mg in dextrose 5% 250 mL infusion, 0.5-30 mcg/min, Intravenous, Continuous    morphINE PF 2 MG/ML injection 1 mg, 1 mg, Intravenous, Q4H PRN    Allergies:  Allergies   Allergen Reactions    Heparin HIVES     Patient not sure       Input/Output:    Intake/Output Summary (Last 24 hours) at 5/3/2024 1018  Last data filed at 5/3/2024 0744  Gross per 24 hour   Intake 3790.6 ml   Output 1200 ml   Net 2590.6 ml          ASSESSMENT/PLAN:   Assessment   Patient Active  Problem List   Diagnosis    Left inguinal hernia    Coronary artery disease without angina pectoris, unspecified vessel or lesion type, unspecified whether native or transplanted heart    Acute congestive heart failure (HCC)    Acute congestive heart failure, unspecified heart failure type (HCC)    Syncope and collapse    JU (acute kidney injury) ([facility])    Dizziness    Parasomnia    RLS (restless legs syndrome)    Episodic mood disorder ([facility])    Anemia, unspecified type    Rectal bleeding    Acute on chronic congestive heart failure, unspecified heart failure type ([facility])    Iron deficiency anemia due to chronic blood loss    Scrotal edema    Goals of care, counseling/discussion    Palliative care encounter    Hypotension    Hypotension, unspecified hypotension type    Pneumoperitoneum    Bilateral pulmonary embolism (HCC)    RV (right ventricular) mural thrombus       UO improving 1160 ml and JU better with creatinine down to 2.04.  Replace K.  CO2 28.  Stop bicarb drip .  TPN to start later today.  IVF adjusted.  Discussed with RN and dietician.              5/3/2024  Josh Omalley MD

## 2024-05-03 NOTE — RESPIRATORY THERAPY NOTE
Patient is on full ventilator support. Suction provided, bilateral bs, no acute events or changes made overnight, RT will continue to monitor.           05/03/24 0512   Vent Information   Interface Invasive   Vent Type AV   Vent plugged into main power? Yes   Vent Mode PC/AC   Settings   FiO2 (%) 40 %   Resp Rate (Set) 14   PEEP/CPAP (cm H2O) 5 cm H20   Press Control > PEEP CWP 15   Insp Time (sec) 0.65 sec   PIP Set (cm H2O) 20 cm H2O   Trigger Sensitivity Flow (L/min) 1 L/min   Humidification Heater   H2O Bag Level 1/2 Full   Heater Temperature 98.6 °F (37 °C)   Readings   Total RR 25   Minute Ventilation (L/min) 9.4 L/min   Expiratory Tidal Volume 380 mL   PIP Observed (cm H2O) 20 cm H2O   MAP (cm H2O) 9   I/E Ratio 1:2.8   Plateau Pressure (cm H2O) 19 cm H2O   Static Compliance (L/cm H2O) 26   Dynamic Compliance (L/cm H2O) 25 L/cm H2O

## 2024-05-03 NOTE — PROGRESS NOTES
INFECTIOUS DISEASE PROGRESS NOTE    Cristhian Lopez Patient Status:  Inpatient    9/3/1955 MRN [id number]   [facility] [facility] 2W/SW Attending Aretha Hazel MD   Hosp Day # 3 PCP KARI BARCLAY       SUBJECTIVE  Intubated. No significant secretions. Remains on 3 vasopressors. No diarrhea.    ASSESSMENT/PLAN:    Antibiotics: IV vancomycin, zosyn     # Acute sepsis with shock on vasopressors from possible intraabdominal source  # Pneumoperitoneum with concern for bowel perforation and large volume ascites - KUB w/o worsening               - h/o colon cancer with previous hemicolectomy  # Acute respiratory failure s/p intubation  with large R sided PTX s/p chest tube  # JU on CKD with metabolic acidosis  # Acute leukocytosis with bandemia - r/o bacteremia  # RV thrombus and acute PE with h/o VTE  # Severe biventricular heart failure EF 25% s/p AICD; CAD s/p CABG        PLAN:     -  IV zosyn - day #3  -  discontinue IV vancomycin  -  f/up cx  -  GOC discussion  -  Follow fever curve, wbc  -  Reviewed labs, micro, imaging reports  -  d/w staff     History of Present Illness:  Cristhian Lopez is a a(n) 68 year old male. Patient is a 68-year-old male with a history of biventricular heart failure EF 25%, CAD status post CABG with an AICD, CKD, severe pulmonary hypertension, VTE who lives at home but mostly bedbound and wheelchair-bound who had a fall and unable to get up with associated weakness and fatigue.  Poor oral intake.  Brought to the hospital initially afebrile with WBC of 3.7, up to 18.4 with an elevated lactic acid up to 6.4, JU, INR of 6.8.  CTA chest, abdomen, pelvis with right ventricular apex thrombus, small nonocclusive PE, large volume ascites.  CT A/P showed pneumoperitoneum with concern for transverse colon origin.  Started on IV vancomycin and Zosyn.  Blood cultures pending.  Requiring vasopressors.  Increasing ox requirement.    OBJECTIVE  /79 (BP [facility]: Left arm)   Pulse  (!) 126   Temp 97.7 °F (36.5 °C) (Temporal)   Resp 25   Ht 175.3 cm (5' 9\")   Wt 204 lb 9.6 oz (92.8 kg)   SpO2 99%   BMI 30.21 kg/m²     General: in bed, sedated  HEENT: eyes closed, ETT in place  Neck: No lymphadenopathy.  Supple.  Cardiovascular: No chest wall tenderness  Respiratory: Symmetric expansion  Abdomen: Soft, distended  Musculoskeletal: +LE edema  Integument: No lesions. No erythema.  R chest tube  RIJ CVC  R radial madhav Aviles MD [facility]  [phone]

## 2024-05-03 NOTE — PLAN OF CARE
Problem: RESPIRATORY - ADULT  Goal: Achieves optimal ventilation and oxygenation  Description: INTERVENTIONS:  - Assess for changes in respiratory status  - Assess for changes in mentation and behavior  - Position to facilitate oxygenation and minimize respiratory effort  - Oxygen supplementation based on oxygen saturation or ABGs  - Provide Smoking Cessation handout, if applicable  - Encourage broncho-pulmonary hygiene including cough, deep breathe, Incentive Spirometry  - Assess the need for suctioning and perform as needed  - Assess and instruct to report SOB or any respiratory difficulty  - Respiratory Therapy support as indicated  - Manage/alleviate anxiety  - Monitor for signs/symptoms of CO2 retention  Outcome: Progressing   Patient received intubated and on ventilator PC/AC f14/PIP 20/PEEP+5/40%. Pulmonary MD reduced PEEP to +3 due to risk of worsening pneumothorax. PIP total is now 18 cmH2O. Patient is not a candidate for SBT due to hemodynamic instability. Bilateral breath sounds auscultated. Suction provided when indicated. No acute events during the daytime. RT will continue to monitor.

## 2024-05-03 NOTE — DIETARY NOTE
ADULT NUTRITION INITIAL ASSESSMENT    Pt is at high nutrition risk.  Pt meets severe malnutrition criteria.      CRITERIA FOR MALNUTRITION DIAGNOSIS:  Criteria for severe malnutrition diagnosis: acute illness/injury related to wt loss greater than 5% in 1 month, body fat moderate depletion, muscle mass moderate depletion, and fluid accumulation moderate to severe.    RECOMMENDATIONS TO MD:   RD ordered PN per protocol. No electrolyte replacement protocol ordered. Replace magnesium and K prior to initiation of PN. High refeeding risk r/t acute malnutrition - appropriate labs ordered for monitoring. Transition to EN when medically appropriate.   See Nutrition Intervention for PN specifics     ADMITTING DIAGNOSIS:  Pneumoperitoneum [K66.8]  RV (right ventricular) mural thrombus [I51.3]  Bilateral pulmonary embolism (HCC) [I26.99]  Hypotension, unspecified hypotension type [I95.9]  PERTINENT PAST MEDICAL HISTORY:   Past Medical History:    Coronary heart disease    2 stents in place, pstient sees Dr. Westbrook    Essential hypertension    Heart attack (HCC)     maker w/ 5 stents    Hyperlipidemia    Melanoma in situ of left upper extremity (HCC)    right thumb    RLS (restless legs syndrome)     PATIENT STATUS:   Initial 05/03/24: Received MD consult to initiate parenteral nutrition. Pt presented to hospital s/p fall with possible perforated bowel, right ventricular thrombus and PE, possible sepsis. Pt with PMH as listed above including sigmoidectomy for adenocarcinoma, CKD stage 3, CHF, and DM2 per H&P. Sedated on propofol. OGT in place. Pt is orally intubated on pressor support x3. Surgery on consult - \"difficult to tell if bowel leak but KUB nl\". Not appropriate for EN feeds at this time. Discussed regular insulin provision in PN with endocrinology via secure chat. Discussed PN volume with nephrology on unit - plan to minimize fluid provision in PN and stop IVF once PN hung. Palliative care on consult - poor  prognosis. Unable to obtain diet hx from pt. No family or friends at bedside. Noted significant wt loss of 7% over the past ~1 month per EMR on admission with noted LBM and fat wasting. Has right internal jugular triple lumen CVC however plan for dedicated (PICC) line insertion for PN.     FOOD/NUTRITION RELATED HISTORY:  Appetite: NPO - appetite/intake PTA  Intake:  NPO since admission, x4 days  Intake Meeting Needs: NPO  Percent Meals Eaten (last 3 days)       None        Food Allergies: No Known Food Allergies (NKFA)  Cultural/Ethnic/Gnosticist Preferences: Not Obtained    GASTROINTESTINAL: +BM last reported BM 4/29/24 and abdomen rounded/distended, hypoactive bowel sounds, soft/nontender    MEDICATIONS: reviewed; Noted NO electrolyte replacement protocol ordered; 40 mEq KCl IV replacement ordered by nephrology; basal/bolus insulin regimen per endocrinology with degludec 14 units q AM, 4 units Novolin q 6 hrs with medium dose corrective scale; IV Zosyn in 300 ml D5W daily; IV Vanco in 250 ml 0.9NS every other day; Levophed at 16 mcg/min, ligia-synephrine at 100 mcg/min, vasopressin at 0.03 units/min, propofol at 5.4 ml/hr (provides 143 kcal from LE daily), and IVF adjusted to 0.9NS at 83 ml/hr this AM (order to decrease to 42 ml/hr once PN hangs)   sodium chloride      argatroban 0.09 mcg/kg/min (05/03/24 0613)    phenylephrine 125 mcg/min (05/03/24 1020)    propofol 10 mcg/kg/min (05/03/24 1038)    vasopressin (Vasostrict) 20 Units in sodium chloride 0.9% 100 mL infusion for septic shock 0.03 Units/min (05/03/24 0046)    norepinephrine 15 mcg/min (05/03/24 1020)      insulin degludec  14 Units Subcutaneous Daily    insulin regular human  4 Units Subcutaneous Q6H    potassium chloride  40 mEq Intravenous Once    vancomycin  1,250 mg Intravenous Q48H    chlorhexidine gluconate  15 mL Mouth/Throat BID@0800,2000    insulin regular human  1-7 Units Subcutaneous 4 times per day    Vancomycin IV  1 each Intravenous See  Admin Instructions (RX holding)    piperacillin-tazobactam  3.375 g Intravenous Q8H    pantoprazole  40 mg Intravenous Daily     LABS: reviewed; noted elevated BUN and Creatinine c/w h/o CKD 3; hyponatremia, hypokalemia, hypochloremia, low-normal magnesium; corrected Ca for low Albumin 8.72; Tbili 5.8 elevated; P trending down - now within acceptable limits; severe hypoalbuminemia   Recent Labs     05/01/24  0511 05/02/24  0512 05/03/24  0539   * 242* 220*   BUN 45* 52* 51*   CREATSERUM 2.10* 2.27* 2.04*   CA 7.9* 7.3* 7.2*   MG 1.8 1.7 1.7   * 130* 132*   K 3.6 3.3* 3.2*   CL 96* 99 96*   CO2 16.0* 23.0 28.0   PHOS 6.1* 5.6* 4.4   OSMOCALC 284 292 294     NUTRITION RELATED PHYSICAL FINDINGS:  - Nutrition Focused Physical Exam (NFPE): moderate depletion body fat orbital region and fat overlying rib cage region, moderate depletion muscle mass clavicle region, and severe depletion muscle mass temple region per visual exam. Difficult to assess extremities given +3 edema.   - Fluid Accumulation: +3 Bilateral Upper extremity, Lower extremity, Hand (s), and Feet see RN documentation for details  - Skin Integrity: at risk and intact see RN documentation for details    ANTHROPOMETRICS:  HT: 175.3 cm (5' 9\")  WT: 92.8 kg (204 lb 9.6 oz) (likely true wt lower, +++ edema, up 4% (7 lbs) from admission)  ADMISSION WT: 89.8 kg (198 lbs) - wt utilized for anthropometric assessment  BMI: Body mass index is 30.21 kg/m².  BMI CLASSIFICATION: 30-34.9 kg/m2 - obesity class I  IBW: 160 lbs (73 kg)        124% IBW  Usual Body Wt: 225 lbs (~1 yr ago) & 213 lbs (~1 month ago) per EMR     88% UBW    WEIGHT HISTORY: Admission wt reflects 12% wt loss over the past 1 year and 7% wt loss over the past 1 month per EMR.   Patient Weight(s) for the past 336 hrs:   Weight   05/02/24 0548 92.8 kg (204 lb 9.6 oz)   05/01/24 0400 89.9 kg (198 lb 1.6 oz)   04/30/24 2100 89.8 kg (197 lb 14.4 oz)   04/30/24 1400 94.6 kg (208 lb 8.9 oz)    04/30/24 1046 68 kg (150 lb)     Wt Readings from Last 10 Encounters:   05/02/24 92.8 kg (204 lb 9.6 oz)   03/03/24 96.4 kg (212 lb 9.6 oz)   02/13/24 104.3 kg (230 lb)   02/10/24 106 kg (233 lb 9.6 oz)   05/05/23 101.6 kg (224 lb)   02/20/23 99.8 kg (220 lb)   03/12/22 88.5 kg (195 lb)   12/26/21 91 kg (200 lb 9.9 oz)   09/01/21 95.3 kg (210 lb)   08/25/21 94.3 kg (208 lb)     NUTRITION DIAGNOSIS/PROBLEM:   Severe Malnutrition related to Acute - Physiological causes resulting in anorexia or diminished intake as evidenced by wt loss greater than 5% in 1 month, body fat moderate depletion, muscle mass moderate depletion, and fluid accumulation moderate to severe    NUTRITION INTERVENTION:     NUTRITION PRESCRIPTION:   Estimated Nutrition needs: --dosing wt of 90 kg - wt taken on 4/30/24   Calories: 1838 calories/day (PSU 2003B (10.4 L/min, 36.7C)or 20 kcal calories per kg Dosing wt) (aim 70-80% of goal for first week or 6969-5619 kcal/day)  Protein:  g protein/day (1.2-1.5 g protein/kg Ideal body wt (IBW))  Fluid Needs: Per MD discretion - minimize    - Diet:       Procedures    NPO      Parenteral Nutrition: RD ordered the following  CPN via PICC once placed, 1000 ml total volume (per nephrology recs), 80 g protein, 510 kcal dextrose (150 g) & 300 kcal lipid (+143 kcal from propofol). Total energy = 1129 kcal.   Additives and electrolytes ordered. 15 units regular insulin in bag.   Meets 77% of initial estimated kcal and 91% of minimum estimated protein needs. Will advance PN as tolerated to reach full nutrition.   Discussed above orders with RN and APN. Order in place to decrease IVF rate to 42 ml/hr once PN hung.     - RD Malnutrition Care Plan:  Initiate Nutrition Support (PN to transition to EN when medically appropriate) within the next 24 hrs  - Meals and snacks: NPO  - Medical Food Supplements: RD added None at this time - NPO  - Vitamin and mineral supplements: NPO  - Feeding assistance: NPO  -  Nutrition education: not appropriate at this time   - Coordination of nutrition care: collaboration with other providers - pt discussed with RN and APRN on unit  - Discharge and transfer of nutrition care to new setting or provider: monitor plans - from home alone    MONITOR AND EVALUATE/NUTRITION GOALS:  - Food and Nutrient Intake:      Monitor:  Respiratory status and ability to take PO safely  - Food and Nutrient Administration:      Monitor: TPN tolerance, propofol rate, adequacy of TPN, for TPN adjustment, and goc/family wishes regarding nutrition  - Anthropometric Measurement:    Monitor weight  - Nutrition Goals:      TPN to meet greater than 80% nutrition needs, labs within acceptable limits, euglycemia, minimize lean body mass loss, support body systems, monitor fluid status, halt true wt loss, and improved GI status    DIETITIAN FOLLOW UP: RD to follow and monitor nutrition status    Sangeetha Palmer MS, RD, LDN, [facility]  [id number]

## 2024-05-03 NOTE — PLAN OF CARE
Problem: CARDIOVASCULAR - ADULT  Goal: Maintains optimal cardiac output and hemodynamic stability  Description: INTERVENTIONS:  - Monitor vital signs, rhythm, and trends  - Monitor for bleeding, hypotension and signs of decreased cardiac output  - Evaluate effectiveness of vasoactive medications to optimize hemodynamic stability  - Monitor arterial and/or venous puncture sites for bleeding and/or hematoma  - Assess quality of pulses, skin color and temperature  - Assess for signs of decreased coronary artery perfusion - ex. Angina  - Evaluate fluid balance, assess for edema, trend weights  Outcome: Progressing  Goal: Absence of cardiac arrhythmias or at baseline  Description: INTERVENTIONS:  - Continuous cardiac monitoring, monitor vital signs, obtain 12 lead EKG if indicated  - Evaluate effectiveness of antiarrhythmic and heart rate control medications as ordered  - Initiate emergency measures for life threatening arrhythmias  - Monitor electrolytes and administer replacement therapy as ordered  Outcome: Progressing     Problem: MUSCULOSKELETAL - ADULT  Goal: Return mobility to safest level of function  Description: INTERVENTIONS:  - Assess patient stability and activity tolerance for standing, transferring and ambulating w/ or w/o assistive devices  - Assist with transfers and ambulation using safe patient handling equipment as needed  - Ensure adequate protection for wounds/incisions during mobilization  - Obtain PT/OT consults as needed  - Advance activity as appropriate  - Communicate ordered activity level and limitations with patient/family  Outcome: Progressing  Goal: Maintain proper alignment of affected body part  Description: INTERVENTIONS:  - Support and protect limb and body alignment per provider's orders  - Instruct and reinforce with patient and family use of appropriate assistive device and precautions (e.g. spinal or hip dislocation precautions)  Outcome: Progressing     Problem: Patient  Centered Care  Goal: Patient preferences are identified and integrated in the patient's plan of care  Description: Interventions:  - Provide timely, complete, and accurate information to patient/family  - Incorporate patient and family knowledge, values, beliefs, and cultural backgrounds into the planning and delivery of care  - Encourage patient/family to participate in care and decision-making at the level they choose  - Honor patient and family perspectives and choices  Outcome: Progressing     Problem: Diabetes/Glucose Control  Goal: Glucose maintained within prescribed range  Description: INTERVENTIONS:  - Monitor Blood Glucose as ordered  - Assess for signs and symptoms of hyperglycemia and hypoglycemia  - Administer ordered medications to maintain glucose within target range  - Assess barriers to adequate nutritional intake and initiate nutrition consult as needed  - Instruct patient on self management of diabetes  Outcome: Progressing     Problem: Safety Risk - Non-Violent Restraints  Goal: Patient will remain free from self-harm  Description: INTERVENTIONS:  - Apply the least restrictive restraint to prevent harm  - Notify patient and family of reasons restraints applied  - Assess for any contributing factors to confusion (electrolyte disturbances, delirium, medications)  - Discontinue any unnecessary medical devices as soon as possible  - Assess the patient's physical comfort, circulation, skin condition, hydration, nutrition and elimination needs   - Reorient and redirection as needed  - Assess for the need to continue restraints  Outcome: Progressing     Problem: RESPIRATORY - ADULT  Goal: Achieves optimal ventilation and oxygenation  Description: INTERVENTIONS:  - Assess for changes in respiratory status  - Assess for changes in mentation and behavior  - Position to facilitate oxygenation and minimize respiratory effort  - Oxygen supplementation based on oxygen saturation or ABGs  - Provide Smoking  Cessation handout, if applicable  - Encourage broncho-pulmonary hygiene including cough, deep breathe, Incentive Spirometry  - Assess the need for suctioning and perform as needed  - Assess and instruct to report SOB or any respiratory difficulty  - Respiratory Therapy support as indicated  - Manage/alleviate anxiety  - Monitor for signs/symptoms of CO2 retention  Outcome: Progressing

## 2024-05-03 NOTE — PROGRESS NOTES
[facility]  part of [facility]    Progress Note    Cristhian Lopez Patient Status:  Inpatient    9/3/1955 MRN [id number]   [facility] [facility] 2W/SW Attending Aretha Hazel MD   Hosp Day # 3 PCP KARI BARCLAY       Subjective:     Unable to perform ROS    Chest tube was slightly kinked and repositioned and then significant air bubble and was redressed again now much better  Intubated on mechanical ventilation  On 3 pressors  Minimally responsive  Doing very poorly  Objective:   Blood pressure (!) 87/74, pulse (!) 126, temperature 97.7 °F (36.5 °C), temperature source Temporal, resp. rate 25, height 5' 9\" (1.753 m), weight 204 lb 9.6 oz (92.8 kg), SpO2 99%.  Physical Exam  Vitals and nursing note reviewed.   Constitutional:       General: He is in acute distress.      Appearance: He is ill-appearing.   HENT:      Head: Atraumatic.      Nose: Nose normal.      Mouth/Throat:      Mouth: Mucous membranes are dry.   Eyes:      General: No scleral icterus.  Cardiovascular:      Rate and Rhythm: Regular rhythm. Tachycardia present.      Heart sounds: Murmur heard.      No gallop.   Pulmonary:      Effort: No respiratory distress.      Breath sounds: Rales present. No wheezing or rhonchi.   Abdominal:      General: There is distension.      Tenderness: There is no guarding or rebound.      Comments: No BS    Musculoskeletal:      Right lower leg: Edema present.      Left lower leg: Edema present.   Lymphadenopathy:      Cervical: No cervical adenopathy.   Neurological:      Comments: Intubated and sedated on mechanical ventilation  Withdrawal to pain and moves extremity well lower sedation  Unstable on 3 pressors         Results:   Lab Results   Component Value Date    WBC 19.9 (H) 2024    WBC 19.9 (H) 2024    HGB 13.2 2024    HGB 13.2 2024    HCT 38.3 (L) 2024    HCT 38.3 (L) 2024    .0 (L) 2024    .0 (L) 2024    CREATSERUM  2.04 (H) 05/03/2024    BUN 51 (H) 05/03/2024     (L) 05/03/2024    K 3.2 (L) 05/03/2024    CL 96 (L) 05/03/2024    CO2 28.0 05/03/2024     (H) 05/03/2024    CA 7.2 (L) 05/03/2024    ALB 2.1 (L) 05/03/2024    ALKPHO 123 (H) 05/03/2024    BILT 5.8 (H) 05/03/2024    TP 4.2 (L) 05/03/2024    AST 34 05/03/2024    ALT 28 05/03/2024    PTT 88.8 (H) 05/03/2024    INR 7.07 (HH) 05/02/2024    TSH 0.397 12/25/2021    PSA 9.27 (H) 02/20/2023    DDIMER 0.93 (H) 12/22/2021    MG 1.7 05/03/2024    PHOS 4.4 05/03/2024    TROP <0.045 08/25/2021    TROPHS 117 (HH) 02/08/2024     (H) 04/30/2024    B12 389 12/25/2021    ETOH <3 05/29/2023       XR CHEST AP PORTABLE  (CPT=71045)    Addendum Date: 5/3/2024    ADDENDUM: There is a minimally displaced left posterior 9th rib fracture, which was visible on the recent chest CT.  No definite radiographically visible right rib fractures.    Dictated by (CST): Chico Hayward MD on 5/03/2024 at 7:53 AM     Finalized by (CST): Chico Hayward MD on 5/03/2024 at 7:53 AM             Result Date: 5/3/2024  CONCLUSION:  1. Lines/tubes in stable customary positioning. 2. Greater than 50% right-sided pneumothorax has increased in size with worsening associated subsegmental atelectasis at the right lung base.  No associated mediastinal shift.  The right-sided pigtail pleural drainage catheter is in stable positioning; correlate clinically with catheter function. 3. Stable cardiomegaly and post CABG changes.  There are findings consistent with worsening mild to moderate CHF/fluid overload including progressive pulmonary edema.   Results of this examination were discussed with the patient's nurse, Fidelia Portillo, by Dr. Hayward at 7:51 on 05/03/2024.  Dictated by (CST): Chico Hayward MD on 5/03/2024 at 7:45 AM     Finalized by (CST): Chico Hayward MD on 5/03/2024 at 7:52 AM          XR ABDOMEN (1 VIEW) (CPT=74018)    Result Date: 5/2/2024  CONCLUSION:   No significant  change gaseous distension of the transverse colon.  Mild improvement in the gaseous distension of the stomach with a well-positioned enteric tube.  Otherwise, there is a paucity of abdominal bowel gas which is nonspecific.  Delayed nephrograms, which may be secondary to renal failure/acute tubular necrosis.  Lesser incidental findings described above.    Dictated by (CST): Dayday Dave MD on 5/02/2024 at 1:48 PM     Finalized by (CST): Dayday Dave MD on 5/02/2024 at 1:54 PM          XR CHEST AP PORTABLE  (CPT=71045)    Result Date: 5/2/2024  CONCLUSION:  1.  Small right apical pneumothorax occupying 5-10% of hemithorax volume is present.  No tracheal or mediastinal shift.  Stable position of right chest tube. 2.  Cardiomegaly.  Post CABG. 3.  Elevation/eventration of the right hemidiaphragm.  Linear bibasilar pulmonary opacities lung most likely representing atelectasis however correlate for pneumonia. 4.  Interstitial prominence suggesting mild edema. 5.  Gross stable/satisfactory position of lines and tubes.  Dictated by (CST): Kings Troy MD on 5/02/2024 at 8:47 AM     Finalized by (CST): Kings Troy MD on 5/02/2024 at 8:50 AM               Assessment & Plan:       1-profound shock state /  cardiogenic and septic  Now on 3 pressors   Severe biventricular heart failure  Also sepsis with perforated bowel  S/p sepsis reassessment  Broad-spectrum antibiotics  Supportive care        2 -perforated bowel /pneumoperitoneum on CT  Prior history of hemicolectomy for colon cancer  CT with microperforation in the transverse colon with large ascites  Poor surgical candidate  Surgery following  Conservative management for now  NG to suction and start TPN      3- right side pneumothorax   S/p CT on 5/1 /reposition today now better we will follow-up chest x-ray     4-acute respiratory failure with hypoxia  Patient intubated and now on mechanical ventilation  Vent support and management     5-severe end-stage  biventricular heart failure LVEF 25 % and right heart failure       H/o CAD s/p CABG and stenting  and ICD       Now with RV thrombus      Cardiology following         6-acute PE with a prior history of VTE   Also pt with right ventricular thrombus     Patient with allergy to heparin and now on argatroban     7-acute on chronic kidney injury  Per renal      8-very poor functional status and recently completely wheelchair and bedbound     9-h/o medical noncompliance     10-CODE STATUS ; patient was DNAR/select need changes status and now full code  Patient unfortunately with no other family members  Friend Harsh called today /no answer  Palliative care following     D/w  staff   D/w consultants  Prognosis is guarded    35 min cct today             Travis Mejia MD  5/3/2024

## 2024-05-03 NOTE — PROGRESS NOTES
[facility]  part of [facility]    Cardiology Progress Note    Cristhian Lopez Patient Status:  Inpatient    9/3/1955 MRN [id number]   [facility] [facility] 2W/SW Attending Aretha Hazel MD   Hosp Day # 3 PCP KARI BARCLAY     2024  Subjective:   Patient remains intubated on 3 pressors  Chest tubes in place  Telemetry-sinus tachycardia at 127 bpm    Objective:   Temp: 97.9 °F (36.6 °C)  Pulse: 127  Resp: 24  BP: 97/70  AO: 98/58  FiO2 (%): 40 %    Intake/Output:     Intake/Output Summary (Last 24 hours) at 5/3/2024 0911  Last data filed at 5/3/2024 0744  Gross per 24 hour   Intake 3790.6 ml   Output 1200 ml   Net 2590.6 ml       Last 3 Weights   24 0548 204 lb 9.6 oz (92.8 kg)   24 0400 198 lb 1.6 oz (89.9 kg)   24 2100 197 lb 14.4 oz (89.8 kg)   24 1400 208 lb 8.9 oz (94.6 kg)   24 1046 150 lb (68 kg)   24 0508 212 lb 9.6 oz (96.4 kg)   24 0559 211 lb 9.6 oz (96 kg)   24 0544 216 lb (98 kg)   24 0510 234 lb 11.2 oz (106.5 kg)   24 0500 232 lb (105.2 kg)   24 0505 237 lb (107.5 kg)   24 0522 233 lb 9.6 oz (106 kg)   24 1154 236 lb 12.8 oz (107.4 kg)   24 0314 229 lb 8 oz (104.1 kg)   24 1639 233 lb 9.6 oz (106 kg)   24 0910 192 lb (87.1 kg)   24 0848 230 lb (104.3 kg)           Physical Exam:     General: Intubated and sedated on 3 pressors   HEENT: Normocephalic, anicteric sclera, neck supple.  Neck: No JVD, carotids 2+, no bruits.  Cardiac: Regular rate and rhythm. S1, S2 normal. No murmur, pericardial rub, S3.  Lungs: Clear without wheezes, rales, rhonchi or dullness.  Normal excursions and effort.  Abdomen: Soft, non-tender. BS-present.  Extremities: Without clubbing, cyanosis or edema.  Peripheral pulses are 2+.  Neurologic: Alert and oriented, normal affect.  Skin: Warm and dry.              Recent Labs   Lab 24  1111 24  1521 24  1717 24  1853  05/01/24  0511 05/02/24  0512 05/02/24  1018 05/03/24  0533   WBC 3.7* 6.5  --   --  18.4* 20.8*  20.8*  --  19.9*  19.9*   HGB 16.5 14.9  --   --  16.6 14.8  14.8  --  13.2  13.2   MCV 73.0* 72.6*  --   --  72.2* 72.1*  72.1*  --  71.6*  71.6*   .0* 136.0*  --   --  182.0 157.0  157.0  --  110.0*  110.0*   BAND  --   --   --   --  4  --   --   --    INR 1.65*  --  6.79* 6.10*  --   --  7.07*  --        Recent Labs   Lab 04/30/24  1111 05/01/24 0511 05/02/24  0512 05/03/24  0539   * 128* 130* 132*   K 3.6 3.6 3.3* 3.2*   CL 95* 96* 99 96*   CO2 22.0 16.0* 23.0 28.0   BUN 41* 45* 52* 51*   CREATSERUM 1.86* 2.10* 2.27* 2.04*   CA 8.0* 7.9* 7.3* 7.2*   MG  --  1.8 1.7 1.7   PHOS  --  6.1* 5.6* 4.4   * 217* 242* 220*       Recent Labs   Lab 04/30/24  1521 04/30/24  2041 05/01/24  0511 05/02/24  0512 05/03/24  0539   ALT 35 35 42 36 28   AST 34 43* 45* 36* 34   ALB 2.3* 2.7* 2.8* 2.2* 2.1*       No results for input(s): \"TROP\" in the last 168 hours.    Allergies:   Allergies   Allergen Reactions    Heparin HIVES     Patient not sure       Medications:  Current Facility-Administered Medications   Medication Dose Route Frequency    insulin degludec 100 units/mL flextouch 14 Units  14 Units Subcutaneous Daily    insulin regular human (Novolin R, Humulin R) 100 UNIT/ML injection 4 Units  4 Units Subcutaneous Q6H    argatroban 50 mg/50mL infusion premix  0.09 mcg/kg/min Intravenous Continuous    vancomycin (Vancocin) 1.25 g in sodium chloride 0.9% 250mL IVPB premix  1,250 mg Intravenous Q48H    phenylephrine (Zia-Synephrine) 250 mg in sodium chloride 0.9% 250 mL infusion   mcg/min Intravenous Continuous    propofol (Diprivan) 10 mg/mL infusion premix  5-50 mcg/kg/min Intravenous Continuous    fentaNYL (Sublimaze) 50 mcg/mL injection 25 mcg  25 mcg Intravenous Q30 Min PRN    Or    fentaNYL (Sublimaze) 50 mcg/mL injection 50 mcg  50 mcg Intravenous Q30 Min PRN    acetaminophen (Tylenol) tab  650 mg  650 mg Oral Q4H PRN    Or    acetaminophen (Tylenol) 160 MG/5ML oral liquid 650 mg  650 mg Oral Q4H PRN    Or    acetaminophen (Tylenol) rectal suppository 650 mg  650 mg Rectal Q4H PRN    Or    acetaminophen (Ofirmev) 10 mg/mL infusion premix 1,000 mg  1,000 mg Intravenous Q6H PRN    polyethylene glycol (PEG 3350) (Miralax) 17 g oral packet 17 g  17 g Oral Daily PRN    senna (Senokot) 8.8 MG/5ML oral syrup 17.6 mg  10 mL Oral Nightly PRN    bisacodyl (Dulcolax) 10 MG rectal suppository 10 mg  10 mg Rectal Daily PRN    fleet enema (Fleet) 7-19 GM/118ML rectal enema 133 mL  1 enema Rectal Once PRN    chlorhexidine gluconate (Peridex) 0.12 % oral solution 15 mL  15 mL Mouth/Throat BID@0800,2000    insulin regular human (Novolin R, Humulin R) 100 UNIT/ML injection 1-7 Units  1-7 Units Subcutaneous 4 times per day    sodium bicarbonate 150 mEq in dextrose 5% 1,000 mL infusion  150 mEq Intravenous Continuous    Vancomycin: PHARMACY DOSING  1 each Intravenous See Admin Instructions (RX holding)    vasopressin (Vasostrict) 20 Units in sodium chloride 0.9% 100 mL infusion for septic shock  0.03 Units/min Intravenous Continuous    piperacillin-tazobactam (Zosyn) 3.375 g in dextrose 5% 100 mL IVPB-ADDV  3.375 g Intravenous Q8H    pantoprazole (Protonix) 40 mg in sodium chloride 0.9% PF 10 mL IV push  40 mg Intravenous Daily    glucose (Dex4) 15 GM/59ML oral liquid 15 g  15 g Oral Q15 Min PRN    Or    glucose (Glutose) 40% oral gel 15 g  15 g Oral Q15 Min PRN    Or    glucose-vitamin C (Dex-4) chewable tab 4 tablet  4 tablet Oral Q15 Min PRN    Or    dextrose 50% injection 50 mL  50 mL Intravenous Q15 Min PRN    Or    glucose (Dex4) 15 GM/59ML oral liquid 30 g  30 g Oral Q15 Min PRN    Or    glucose (Glutose) 40% oral gel 30 g  30 g Oral Q15 Min PRN    Or    glucose-vitamin C (Dex-4) chewable tab 8 tablet  8 tablet Oral Q15 Min PRN    norepinephrine (Levophed) 32 mg in dextrose 5% 250 mL infusion  0.5-30 mcg/min  Intravenous Continuous    morphINE PF 2 MG/ML injection 1 mg  1 mg Intravenous Q4H PRN         Assessment:    Septic shock, bowel perforation  VDRF  Large right PTX, tension?  Small pulm embolism  Small pneumoperitoneum  Ascites  JU  Lactic acidosis  CAD/CABG/PCI  Ischemic cardiomyopathy EF 25%  Pulm hypertension  Hyperlipidemia  Diabetes  Question of HIT history    Plan:  - Pt with progressive respiratory failure, shock requiring escalation of pressors currently on Levophed, vasopressin and norepinephrine   - large PTX on CXR, Pulm/CritCare placed chest tube   - Continue pressor support   - Appreciate ICU care and general surgery input. Abdomen distended but not tense on exam today.  - Repeat INR 7.07  5/2/24  - poor prognosis  - On argatroban due to allergy to heparin for small pulmonary embolism  - Repeat echocardiogram 5/2/24; EF 20-25%       Dominguez Lu MD     L3

## 2024-05-03 NOTE — PROGRESS NOTES
Argatroban monitoring table   Date Time Rate  (mcg/kg/min) APTT H/H Plt INR Dose change New rate  (mcg/kg/min) Next APTT Warfarin  dose   4/30 1442   30.7 16.5/47 123 1.65   2       4/30 1717 -see comments 85 14.9/43.4 136     0.25 2030     4/30 2041 0.25- see note 68       CPM   2300     4/30 2330 0.25 78       No change 0.25 0511     5/1 05:11 0.25 70.4 16.6/48.8 182 6.1 No change  --- 5/2 @ 0511     5/2 05:12 0.25 100.6 14.8/42.7 157   Hold x2 hrs   5/2@ 1000      5/2 10:18 OFF  100.8        Hold x2 more hrs    5/2@ 1230      5/2 12:50  OFF  92.4        Hold x2 more hrs    5/2@1500     5/2 15:08 OFF 92.9       Hold x 2 more hrs   5/2 @1800     5/2 1812 OFF 93.1       Hold until AM   5/3 @0511     5/3 0533 Resume 0.09mcg/kg/min 84.6 13.2/38.3 110  Resumed 0.09mcg/kg/min 5/3@0830    5/3 0924 0.09 mcg/kg/min 88.8    NA cpm 5/4 AM       Note: drip rate previously based on initial admit weight 68 kg --> new argatroban order needed to be entered so 50mg/50mL product could be used.  Wanted to keep dosing weight (68kg) the same despite pt's charted weight changing to 92.8 kg but this weight was not available as a button anymore in the new order.  0.125 mcg/kg/min using previous weight of 68 kg comes to 0.5 ml/hr.  Using new weight (92.8 kg) 0.5 ml/hr amounts to a rate of 0.09 mcg/kg/min.       PLAN:  5/3- CPM at rate of 0.09 mcg/kg/min = 0.5 ml/hr. D/w YARED Fish.

## 2024-05-03 NOTE — PROGRESS NOTES
[facility]  part of [facility]     Hospitalist Progress Note     Cristhian Lopez Patient Status:  Inpatient    9/3/1955 MRN [id number]   [facility] [facility] 2W/SW Attending Aretha Hazel MD   Hosp Day # 3 PCP KARI BARCLAY     Subjective:     Patient laying in bed, sedated, intubated.   Remains on pressors at this time.   Friend remains at the bedside.   No acute overnight events reported by the nursing staff.     Objective:    Review of Systems:   Unable to assess.       Vital signs:  Temp:  [97.7 °F (36.5 °C)-98.1 °F (36.7 °C)] 97.7 °F (36.5 °C)  Pulse:  [114-143] 126  Resp:  [18-32] 25  BP: ()/(63-83) 100/79  SpO2:  [94 %-100 %] 99 %  AO: ()/(52-66) 96/60  FiO2 (%):  [40 %] 40 %      Physical Exam:    Gen: intubated, sedated.  Chest: mechanical breath sounds  CVS: normal s1 and s2 RR  Abd: NABS soft NT ND  Neuro: unable to assess  Ext: no edema in bilateral LE      Diagnostic Data:    Labs:  Recent Labs   Lab 24  1111 24  1521 24  1717 24  1853 24  0511 24  0512 24  1018 24  0533   WBC 3.7* 6.5  --   --  18.4* 20.8*  20.8*  --  19.9*  19.9*   HGB 16.5 14.9  --   --  16.6 14.8  14.8  --  13.2  13.2   MCV 73.0* 72.6*  --   --  72.2* 72.1*  72.1*  --  71.6*  71.6*   .0* 136.0*  --   --  182.0 157.0  157.0  --  110.0*  110.0*   BAND  --   --   --   --  4  --   --   --    INR 1.65*  --  6.79* 6.10*  --   --  7.07*  --        Recent Labs   Lab 05/01/24  0511 24  0512 24  0539   * 242* 220*   BUN 45* 52* 51*   CREATSERUM 2.10* 2.27* 2.04*   CA 7.9* 7.3* 7.2*   ALB 2.8* 2.2* 2.1*   * 130* 132*   K 3.6 3.3* 3.2*   CL 96* 99 96*   CO2 16.0* 23.0 28.0   ALKPHO 111 92 123*   AST 45* 36* 34   ALT 42 36 28   BILT 5.4* 5.5* 5.8*   TP 5.0* 4.4* 4.2*       Estimated Creatinine Clearance: 35.1 mL/min (A) (based on SCr of 2.04 mg/dL (H)).    Recent Labs   Lab 24  1717 24  0164  05/02/24  1018   PTP 63.1* 58.0* 65.2*   INR 6.79* 6.10* 7.07*              Imaging: Imaging data reviewed in Epic.    Medications:    insulin degludec  14 Units Subcutaneous Daily    insulin regular human  4 Units Subcutaneous Q6H    potassium chloride  40 mEq Intravenous Once    chlorhexidine gluconate  15 mL Mouth/Throat BID@0800,2000    insulin regular human  1-7 Units Subcutaneous 4 times per day    piperacillin-tazobactam  3.375 g Intravenous Q8H    pantoprazole  40 mg Intravenous Daily       Assessment & Plan:     Profound shock - cardiogenic + septic  Acute hypoxic respiratory failure  Biventricular heart failure  Perforated bowel  Hx of CAD s/p CABG and stenting and ICD  RV thrombus  CT reviewed  ICU on consult  On 3 pressors  Continue IVF, IV abx  Full vent support  Cardiology on consult  Continue pressor support - on levophed, vasopressin and norepinephrine  Consider addition of inotrope if poor UOP and concern for cardiogenic shock  Echo reviewed - EF 20-25%  ID on consult  Continue zosyn, stop IV Vancomycin  Follow up on cultures  Gsx on consult  Repeat CT a/p  Poor surgical candidate  Poor prognosis  R sided PTX  Chest tube in place  Appreciate Pulm recs  Acute PE with hx of VTE  RV thrombus  Allergy to heparin  Currently on Argatroban  Uncontrolled hyperglycemia  Endo on consult  Medium SSI  Tresiba 14U daily  JU on CKD  Continue IVF  Monitor labs  Nephrology on consult  Strict Is and Os, daily weights  Stop bicarb drip  Start TPN        Plan of care discussed with patient or family at bedside.      Supplementary Documentation:     Quality:  DVT Prophylaxis: argatroban  CODE status: Full      Estimated date of discharge: TBD  Discharge is dependent on: clinical stability  At this point Mr. Lopez is expected to be discharge to: home            **Certification      PHYSICIAN Certification of Need for Inpatient Hospitalization - Initial Certification    Patient will require inpatient services that  will reasonably be expected to span two midnight's based on the clinical documentation in H+P.   Based on patients current state of illness, I anticipate that, after discharge, patient will require TBD.      Aretha Hazel MD  Hospitalist    MDM: High, I personally reviewed the available laboratories, imaging. I discussed the case with RN. I ordered laboratories, studies including AM labs.  Medical decision making high, risk is high.       The 21st Century Cures Act makes medical notes like these available to patients in the interest of transparency. Please be advised this is a medical document. Medical documents are intended to carry relevant information, facts as evident, and the clinical opinion of the practitioner. The medical note is intended as peer to peer communication and may appear blunt or direct. It is written in medical language and may contain abbreviations or verbiage that are unfamiliar.

## 2024-05-03 NOTE — PALLIATIVE CARE NOTE
[facility]  part of [facility]  Palliative Care Progress Note    Cristhian Lopez Patient Status:  Inpatient    9/3/1955 MRN [id number]   [facility] [facility] 2W/SW Attending Aretha Hazel MD   Hosp Day # 3 PCP KARI BARCLAY     The  Cures Act makes medical notes like these available to patients in the interest of transparency. Please be advised this is a medical document. Medical documents are intended to carry relevant information, facts as evident, and the clinical opinion of the practitioner. The medical note is intended as peer to peer communication and may appear blunt or direct. It is written in medical language and may contain abbreviations or verbiage that are unfamiliar.     Subjective     When I entered the room, the patient was in the bed sedated on Propofol, on MV, on 3 pressors levophed, Vasopressin and Norepinephrine, on Bicarb drip, he is tachycardic and hypotensive, There are no visitors present at the bedside.      Palliative Care symptom needs assessed:     Unable to obtain ROS due to pt is sedated    Allergies:  Allergies   Allergen Reactions    Heparin HIVES     Patient not sure       Medications:     Current Facility-Administered Medications:     insulin degludec 100 units/mL flextouch 14 Units, 14 Units, Subcutaneous, Daily    insulin regular human (Novolin R, Humulin R) 100 UNIT/ML injection 4 Units, 4 Units, Subcutaneous, Q6H    argatroban 50 mg/50mL infusion premix, 0.09 mcg/kg/min, Intravenous, Continuous    vancomycin (Vancocin) 1.25 g in sodium chloride 0.9% 250mL IVPB premix, 1,250 mg, Intravenous, Q48H    phenylephrine (Zia-Synephrine) 250 mg in sodium chloride 0.9% 250 mL infusion,  mcg/min, Intravenous, Continuous    propofol (Diprivan) 10 mg/mL infusion premix, 5-50 mcg/kg/min, Intravenous, Continuous    fentaNYL (Sublimaze) 50 mcg/mL injection 25 mcg, 25 mcg, Intravenous, Q30 Min PRN **OR** fentaNYL (Sublimaze) 50 mcg/mL injection 50  mcg, 50 mcg, Intravenous, Q30 Min PRN    acetaminophen (Tylenol) tab 650 mg, 650 mg, Oral, Q4H PRN **OR** acetaminophen (Tylenol) 160 MG/5ML oral liquid 650 mg, 650 mg, Oral, Q4H PRN **OR** acetaminophen (Tylenol) rectal suppository 650 mg, 650 mg, Rectal, Q4H PRN **OR** acetaminophen (Ofirmev) 10 mg/mL infusion premix 1,000 mg, 1,000 mg, Intravenous, Q6H PRN    polyethylene glycol (PEG 3350) (Miralax) 17 g oral packet 17 g, 17 g, Oral, Daily PRN    senna (Senokot) 8.8 MG/5ML oral syrup 17.6 mg, 10 mL, Oral, Nightly PRN    bisacodyl (Dulcolax) 10 MG rectal suppository 10 mg, 10 mg, Rectal, Daily PRN    fleet enema (Fleet) 7-19 GM/118ML rectal enema 133 mL, 1 enema, Rectal, Once PRN    chlorhexidine gluconate (Peridex) 0.12 % oral solution 15 mL, 15 mL, Mouth/Throat, BID@0800,2000    insulin regular human (Novolin R, Humulin R) 100 UNIT/ML injection 1-7 Units, 1-7 Units, Subcutaneous, 4 times per day    sodium bicarbonate 150 mEq in dextrose 5% 1,000 mL infusion, 150 mEq, Intravenous, Continuous    Vancomycin: PHARMACY DOSING, 1 each, Intravenous, See Admin Instructions (RX holding)    vasopressin (Vasostrict) 20 Units in sodium chloride 0.9% 100 mL infusion for septic shock, 0.03 Units/min, Intravenous, Continuous    piperacillin-tazobactam (Zosyn) 3.375 g in dextrose 5% 100 mL IVPB-ADDV, 3.375 g, Intravenous, Q8H    pantoprazole (Protonix) 40 mg in sodium chloride 0.9% PF 10 mL IV push, 40 mg, Intravenous, Daily    glucose (Dex4) 15 GM/59ML oral liquid 15 g, 15 g, Oral, Q15 Min PRN **OR** glucose (Glutose) 40% oral gel 15 g, 15 g, Oral, Q15 Min PRN **OR** glucose-vitamin C (Dex-4) chewable tab 4 tablet, 4 tablet, Oral, Q15 Min PRN **OR** dextrose 50% injection 50 mL, 50 mL, Intravenous, Q15 Min PRN **OR** glucose (Dex4) 15 GM/59ML oral liquid 30 g, 30 g, Oral, Q15 Min PRN **OR** glucose (Glutose) 40% oral gel 30 g, 30 g, Oral, Q15 Min PRN **OR** glucose-vitamin C (Dex-4) chewable tab 8 tablet, 8 tablet, Oral, Q15  Min PRN    norepinephrine (Levophed) 32 mg in dextrose 5% 250 mL infusion, 0.5-30 mcg/min, Intravenous, Continuous    morphINE PF 2 MG/ML injection 1 mg, 1 mg, Intravenous, Q4H PRN    Objective     Vital Signs:  Blood pressure 97/70, pulse (!) 127, temperature 97.9 °F (36.6 °C), temperature source Temporal, resp. rate 24, height 5' 9\" (1.753 m), weight 204 lb 9.6 oz (92.8 kg), SpO2 100%.  Body mass index is 30.21 kg/m².  Present Level of pain: TANIA  Non-verbal signs of pain present: No    Physical Exam:  General: Johnny is in the bed he is sedated on MV to ET-tube, on 3 pressors for his hypotension and Bicarb drip  HEENT: Et-tube, dry oral MM  Cardiac: ST regular rate and rhythm, S1, S2 normal, + murmur.  Lungs: diminished without wheezes. On MV , + CT with air leak noted.   Abdomen: Softly distended, hypoactive bowel sounds  Extremities: + generalized pitting   Neurologic: sedated.  Skin: Warm and dry.    Prior to admission Palliative performance scale PPSv2 (%): 50    Hematology:  Lab Results   Component Value Date    WBC 19.9 (H) 05/03/2024    WBC 19.9 (H) 05/03/2024    HGB 13.2 05/03/2024    HGB 13.2 05/03/2024    HCT 38.3 (L) 05/03/2024    HCT 38.3 (L) 05/03/2024    .0 (L) 05/03/2024    .0 (L) 05/03/2024       Coags:  Lab Results   Component Value Date    INR 7.07 (HH) 05/02/2024    PTT 84.6 (H) 05/03/2024       Chemistry:  Lab Results   Component Value Date    CREATSERUM 2.04 (H) 05/03/2024    BUN 51 (H) 05/03/2024     (L) 05/03/2024    K 3.2 (L) 05/03/2024    CL 96 (L) 05/03/2024    CO2 28.0 05/03/2024     (H) 05/03/2024    CA 7.2 (L) 05/03/2024    ALB 2.1 (L) 05/03/2024    ALKPHO 123 (H) 05/03/2024    BILT 5.8 (H) 05/03/2024    TP 4.2 (L) 05/03/2024    AST 34 05/03/2024    ALT 28 05/03/2024    PSA 9.27 (H) 02/20/2023    DDIMER 0.93 (H) 12/22/2021    MG 1.7 05/03/2024    PHOS 4.4 05/03/2024    TROP <0.045 08/25/2021       Imaging:  XR CHEST AP PORTABLE  (CPT=71045)    Addendum Date:  5/3/2024    ADDENDUM: There is a minimally displaced left posterior 9th rib fracture, which was visible on the recent chest CT.  No definite radiographically visible right rib fractures.    Dictated by (CST): Chico Hayward MD on 5/03/2024 at 7:53 AM     Finalized by (CST): Chico Hayward MD on 5/03/2024 at 7:53 AM             Result Date: 5/3/2024  CONCLUSION:  1. Lines/tubes in stable customary positioning. 2. Greater than 50% right-sided pneumothorax has increased in size with worsening associated subsegmental atelectasis at the right lung base.  No associated mediastinal shift.  The right-sided pigtail pleural drainage catheter is in stable positioning; correlate clinically with catheter function. 3. Stable cardiomegaly and post CABG changes.  There are findings consistent with worsening mild to moderate CHF/fluid overload including progressive pulmonary edema.   Results of this examination were discussed with the patient's nurse, Fidelia Portillo, by Dr. Hayward at 7:51 on 05/03/2024.  Dictated by (CST): Chico Hayward MD on 5/03/2024 at 7:45 AM     Finalized by (CST): Chico Hayward MD on 5/03/2024 at 7:52 AM          XR ABDOMEN (1 VIEW) (CPT=74018)    Result Date: 5/2/2024  CONCLUSION:   No significant change gaseous distension of the transverse colon.  Mild improvement in the gaseous distension of the stomach with a well-positioned enteric tube.  Otherwise, there is a paucity of abdominal bowel gas which is nonspecific.  Delayed nephrograms, which may be secondary to renal failure/acute tubular necrosis.  Lesser incidental findings described above.    Dictated by (CST): Dayday Dave MD on 5/02/2024 at 1:48 PM     Finalized by (CST): Dayday Dave MD on 5/02/2024 at 1:54 PM          XR CHEST AP PORTABLE  (CPT=71045)    Result Date: 5/2/2024  CONCLUSION:  1.  Small right apical pneumothorax occupying 5-10% of hemithorax volume is present.  No tracheal or mediastinal shift.  Stable  position of right chest tube. 2.  Cardiomegaly.  Post CABG. 3.  Elevation/eventration of the right hemidiaphragm.  Linear bibasilar pulmonary opacities lung most likely representing atelectasis however correlate for pneumonia. 4.  Interstitial prominence suggesting mild edema. 5.  Gross stable/satisfactory position of lines and tubes.  Dictated by (CST): Kings Troy MD on 5/02/2024 at 8:47 AM     Finalized by (CST): Kings Troy MD on 5/02/2024 at 8:50 AM          XR CHEST AP PORTABLE  (CPT=71045)    Result Date: 5/1/2024  CONCLUSION:  1. Right lower lobe pigtail chest tube placed with significant re-expansion of the right lung with a small right apical right upper lobe lateral pneumothorax as described above.  Moderate elevation right hemidiaphragm.  Bibasilar atelectasis.  I cannot exclude right perihilar pneumonia.  Follow-up studies are advised.  ET tube in the trachea at the level of the clavicles     Dictated by (CST): eVrn Day MD on 5/01/2024 at 10:23 AM     Finalized by (CST): Vern Day MD on 5/01/2024 at 10:26 AM          XR ABDOMEN (1 VIEW) (CPT=74018)    Result Date: 5/1/2024  CONCLUSION:  1. Moderate gaseous distention of the stomach and transverse colon.  NG tube terminates in the cardia of the stomach and may need to be advanced further into the more distal stomach to decompress.  No large well-defined free intraperitoneal air collection is seen.  No definite bowel obstruction.    Dictated by (CST): Vern aDy MD on 5/01/2024 at 10:16 AM     Finalized by (CST): Vern Day MD on 5/01/2024 at 10:20 AM             Summary of Discussion    5/3/24 addendum:   pts friend Woodrow returned my phone call, I provided a clinical update for Woodrow. Woodrow agreed to PICC line placement for TPN. Woodrow stated he got in touch with Johnny's cousin Rubens who lives in [place]. Rubens and Woodrow will meet here tomorrow at the hospital @8am to discuss a plan for Johnny pending his progress and discuss decision  making as the days go by as Woodrow is leaving for [place] for 3 weeks on Monday. Woodrow had not heard back from Johnny's  yet in regards to HCPOA pw. I discussed with Woodrow to discuss with Rubens and Harsh reinstating DNAR/DNI with selective treatment prior to a crisis occurring. I printed out POLST form that was signed by Johnny back in February for Woodrow to review to help with decision making. Ongoing GOC discussions will be needed over time. I will follow up on Monday. I will put Rubens's name and number in my note.     5/3/24 spoke with pts friend Harsh who is also helping with HC decisions for Johnny while he is not able to make decisions, over the phone provided clinical update. Discussed placing PICC line for IV nutrition TPN with Harsh. Harsh is agreeable to PICC line insertion for TPN.     5/3/24 Placed a call out to Woodrow no answer left VM to return my call.     Assessment and Recommendation       RV (right ventricular) thrombus    Acute on chronic HFrEF-EF 25%-declined ICD in the past    Profound shock cardiogenic and septic    Cute hypoxic respiratory failure intubated on May 1st    Biventricular heart failure    Right pneumothorax increasing in size on CXR today, CT insertion May 1st    JU on CKD      CAD with history of PCI and Cabg     Cardiorenal syndrome    Moderate to severe MR, moderate TR    Bilateral LE cellulitis    HLD    H/o PE 2019    H/o LV thrombus 2021    JU on CKD    HTN    DM    Noncompliant with his diet    Scrotal edema    H/o colon cancer s/p sigmoidectomy 2023    DJD of the lumbar spine    Goals of care counseling  -see above for details  -Pt is Full Code  -Johnny's friends are agreeable to palliative care following  -Dispo: TBD. SW to help with dc planning.   -no  requested  -overall poor prognosis, ongoing GOC will be needed over the course of hospitalization.   -Provided emotional support to Johnny's friends who are coping adequately     Advance care planning  see above for details  -Pt's  friend Woodrow Le is his HC surrogate [phone], Woodrow will contact Johnny's  to see if HCPOA pw are on file and to obtain a copy. If Johnny has not filled out HCPOA pw in the past, His friend Woodrow is willing to serve as HC surrogate for Johnny. Johnny told me in the past that Woodrow was his HCPOA. Johnny's friend Harsh Castillo is also willing to serve as HC surrogate if needed his number is [phone]  Johnny has a cousin who lives in [facility] [place] [phone]     Palliative Performance Scale 10%    Discussed with Ila YI APN, Polina RN and Joselin VAIL    Palliative Care Follow Up: Palliative care team will continue to follow. Feel free to contact our team with any questions or concerns.    Thank you for allowing Palliative Care services to participate in the care of Cristhian Lopez.    A total of 25 minutes were spent on this follow-up, which included all of the following: chart review, direct face to face contact, history taking, physical examination, counseling and coordinating care, and documentation.     Yareli Hooker, [id number]  5/3/2024  9:49AM  Palliative Care Services

## 2024-05-03 NOTE — PLAN OF CARE
Problem: RESPIRATORY - ADULT  Goal: Achieves optimal ventilation and oxygenation  Description: INTERVENTIONS:  - Assess for changes in respiratory status  - Assess for changes in mentation and behavior  - Position to facilitate oxygenation and minimize respiratory effort  - Oxygen supplementation based on oxygen saturation or ABGs  - Provide Smoking Cessation handout, if applicable  - Encourage broncho-pulmonary hygiene including cough, deep breathe, Incentive Spirometry  - Assess the need for suctioning and perform as needed  - Assess and instruct to report SOB or any respiratory difficulty  - Respiratory Therapy support as indicated  - Manage/alleviate anxiety  - Monitor for signs/symptoms of CO2 retention  Outcome: Progressing    Received patient on full vent support of PC/AC 14, IP 15, PEEP +3, and FIO2 40%,  osmin. Breath sound auscultated and suction provided as needed, sedated and not a candidate for SBT on this shift, RT will continue to monitor the pt.

## 2024-05-03 NOTE — PLAN OF CARE
Problem: CARDIOVASCULAR - ADULT  Goal: Maintains optimal cardiac output and hemodynamic stability  Description: INTERVENTIONS:  - Monitor vital signs, rhythm, and trends  - Monitor for bleeding, hypotension and signs of decreased cardiac output  - Evaluate effectiveness of vasoactive medications to optimize hemodynamic stability  - Monitor arterial and/or venous puncture sites for bleeding and/or hematoma  - Assess quality of pulses, skin color and temperature  - Assess for signs of decreased coronary artery perfusion - ex. Angina  - Evaluate fluid balance, assess for edema, trend weights  Outcome: Progressing  Goal: Absence of cardiac arrhythmias or at baseline  Description: INTERVENTIONS:  - Continuous cardiac monitoring, monitor vital signs, obtain 12 lead EKG if indicated  - Evaluate effectiveness of antiarrhythmic and heart rate control medications as ordered  - Initiate emergency measures for life threatening arrhythmias  - Monitor electrolytes and administer replacement therapy as ordered  Outcome: Progressing     Problem: MUSCULOSKELETAL - ADULT  Goal: Return mobility to safest level of function  Description: INTERVENTIONS:  - Assess patient stability and activity tolerance for standing, transferring and ambulating w/ or w/o assistive devices  - Assist with transfers and ambulation using safe patient handling equipment as needed  - Ensure adequate protection for wounds/incisions during mobilization  - Obtain PT/OT consults as needed  - Advance activity as appropriate  - Communicate ordered activity level and limitations with patient/family  Outcome: Progressing  Goal: Maintain proper alignment of affected body part  Description: INTERVENTIONS:  - Support and protect limb and body alignment per provider's orders  - Instruct and reinforce with patient and family use of appropriate assistive device and precautions (e.g. spinal or hip dislocation precautions)  Outcome: Progressing     Problem: Patient Centered  Care  Goal: Patient preferences are identified and integrated in the patient's plan of care  Description: Interventions:  - What would you like us to know as we care for you?   - Provide timely, complete, and accurate information to patient/family  - Incorporate patient and family knowledge, values, beliefs, and cultural backgrounds into the planning and delivery of care  - Encourage patient/family to participate in care and decision-making at the level they choose  - Honor patient and family perspectives and choices  Outcome: Progressing     Problem: Diabetes/Glucose Control  Goal: Glucose maintained within prescribed range  Description: INTERVENTIONS:  - Monitor Blood Glucose as ordered  - Assess for signs and symptoms of hyperglycemia and hypoglycemia  - Administer ordered medications to maintain glucose within target range  - Assess barriers to adequate nutritional intake and initiate nutrition consult as needed  - Instruct patient on self management of diabetes  Outcome: Progressing     Problem: Patient/Family Goals  Goal: Patient/Family Long Term Goal  Description: Patient's Long Term Goal:     Interventions:  -   - See additional Care Plan goals for specific interventions  Outcome: Progressing  Goal: Patient/Family Short Term Goal  Description: Patient's Short Term Goal:     Interventions:   -   - See additional Care Plan goals for specific interventions  Outcome: Progressing     Problem: Safety Risk - Non-Violent Restraints  Goal: Patient will remain free from self-harm  Description: INTERVENTIONS:  - Apply the least restrictive restraint to prevent harm  - Notify patient and family of reasons restraints applied  - Assess for any contributing factors to confusion (electrolyte disturbances, delirium, medications)  - Discontinue any unnecessary medical devices as soon as possible  - Assess the patient's physical comfort, circulation, skin condition, hydration, nutrition and elimination needs   - Reorient and  redirection as needed  - Assess for the need to continue restraints  Outcome: Progressing     Problem: RESPIRATORY - ADULT  Goal: Achieves optimal ventilation and oxygenation  Description: INTERVENTIONS:  - Assess for changes in respiratory status  - Assess for changes in mentation and behavior  - Position to facilitate oxygenation and minimize respiratory effort  - Oxygen supplementation based on oxygen saturation or ABGs  - Provide Smoking Cessation handout, if applicable  - Encourage broncho-pulmonary hygiene including cough, deep breathe, Incentive Spirometry  - Assess the need for suctioning and perform as needed  - Assess and instruct to report SOB or any respiratory difficulty  - Respiratory Therapy support as indicated  - Manage/alleviate anxiety  - Monitor for signs/symptoms of CO2 retention  Outcome: Progressing

## 2024-05-04 PROBLEM — N17.9 ACUTE KIDNEY INJURY (HCC): Status: ACTIVE | Noted: 2023-05-03

## 2024-05-04 NOTE — PROGRESS NOTES
[facility]  part of [facility] Hospitalist Progress Note     Cristhian Lopez Patient Status:  Inpatient    9/3/1955 MRN [id number]   [facility] [facility] 2W/SW Attending Aretha Hazel MD   Hosp Day # 4 PCP KARI BARCLAY     Subjective:     Patient laying in bed, sedated, intubated.   Remains on pressors at this time.   No acute overnight events reported by the nursing staff.   Off of sedation this a.m.    Objective:    Review of Systems:   Unable to assess.       Vital signs:  Temp:  [98.1 °F (36.7 °C)-99.9 °F (37.7 °C)] 99.4 °F (37.4 °C)  Pulse:  [110-139] 138  Resp:  [14-32] 28  BP: ()/(58-92) 110/84  SpO2:  [94 %-100 %] 97 %  AO: ()/(56-70) 100/70  FiO2 (%):  [35 %-40 %] 35 %      Physical Exam:    Gen: intubated, sedated.  Chest: mechanical breath sounds  CVS: normal s1 and s2 RR  Abd: NABS soft NT ND  Neuro: unable to assess  Ext: no edema in bilateral LE      Diagnostic Data:    Labs:  Recent Labs   Lab 24  1111 24  1521 24  1717 24  1853 24  0511 24  0512 24  1018 24  0533 24  0446   WBC 3.7* 6.5  --   --  18.4* 20.8*  20.8*  --  19.9*  19.9* 13.2*   HGB 16.5 14.9  --   --  16.6 14.8  14.8  --  13.2  13.2 13.3   MCV 73.0* 72.6*  --   --  72.2* 72.1*  72.1*  --  71.6*  71.6* 71.7*   .0* 136.0*  --   --  182.0 157.0  157.0  --  110.0*  110.0* 90.0*   BAND  --   --   --   --  4  --   --   --   --    INR 1.65*  --  6.79* 6.10*  --   --  7.07*  --   --        Recent Labs   Lab 24  0512 24  0539 24  0446   * 220* 180*   BUN 52* 51* 51*   CREATSERUM 2.27* 2.04* 1.94*   CA 7.3* 7.2* 7.4*   ALB 2.2* 2.1* 2.3*   * 132* 133*   K 3.3* 3.2* 4.2   CL 99 96* 98   CO2 23.0 28.0 27.0   ALKPHO 92 123* 111   AST 36* 34 40*   ALT 36 28 24   BILT 5.5* 5.8* 6.4*   TP 4.4* 4.2* 4.6*       Estimated Creatinine Clearance: 36.9 mL/min (A) (based on SCr of 1.94 mg/dL (H)).    Recent Labs    Lab 04/30/24  1717 04/30/24  1853 05/02/24  1018   PTP 63.1* 58.0* 65.2*   INR 6.79* 6.10* 7.07*              Imaging: Imaging data reviewed in Epic.    Medications:    insulin regular human  2 Units Subcutaneous Q6H    insulin degludec  14 Units Subcutaneous Daily    chlorhexidine gluconate  15 mL Mouth/Throat BID@0800,2000    insulin regular human  1-7 Units Subcutaneous 4 times per day    piperacillin-tazobactam  3.375 g Intravenous Q8H    pantoprazole  40 mg Intravenous Daily       Assessment & Plan:       Profound shock - cardiogenic + septic  Acute hypoxic respiratory failure  Biventricular heart failure  Perforated bowel  Hx of CAD s/p CABG and stenting and ICD  RV thrombus  CT reviewed  ICU on consult  On 3 pressors  Continue IVF, IV abx  Full vent support  Cardiology on consult  Continue pressor support - on levophed, vasopressin and norepinephrine  Consider addition of inotrope if poor UOP and concern for cardiogenic shock  Echo reviewed - EF 20-25%  ID on consult  Continue zosyn, stop IV Vancomycin  Follow up on cultures  Gsx on consult  Repeat CT a/p  Not a surgical candidate  Poor surgical candidate  Poor prognosis  Continue management per consulting physician recs  R sided PTX  Chest tube in place  Appreciate Pulm recs  Acute PE with hx of VTE  RV thrombus  Allergy to heparin  Currently on Argatroban  Uncontrolled hyperglycemia  Endo on consult  Medium SSI  Tresiba 14U daily  JU on CKD  Continue IVF  Monitor labs  Nephrology on consult  Strict Is and Os, daily weights  Stop bicarb drip  Start TPN        Plan of care discussed with patient or family at bedside.      Supplementary Documentation:     Quality:  DVT Prophylaxis: argatroban  CODE status: Full      Estimated date of discharge: TBD  Discharge is dependent on: clinical stability  At this point Mr. Lopez is expected to be discharge to: home            **Certification      PHYSICIAN Certification of Need for Inpatient Hospitalization -  Initial Certification    Patient will require inpatient services that will reasonably be expected to span two midnight's based on the clinical documentation in H+P.   Based on patients current state of illness, I anticipate that, after discharge, patient will require TBD.      Aretha Hazel MD  Hospitalist    MDM: High, I personally reviewed the available laboratories, imaging. I discussed the case with RN. I ordered laboratories, studies including AM labs.  Medical decision making high, risk is high.       The 21st Century Cures Act makes medical notes like these available to patients in the interest of transparency. Please be advised this is a medical document. Medical documents are intended to carry relevant information, facts as evident, and the clinical opinion of the practitioner. The medical note is intended as peer to peer communication and may appear blunt or direct. It is written in medical language and may contain abbreviations or verbiage that are unfamiliar.

## 2024-05-04 NOTE — PROGRESS NOTES
[facility]  part of [facility]    Progress Note    Cristhian Lopez Patient Status:  Inpatient    9/3/1955 MRN [id number]   [facility] [facility] 2W/SW Attending Aretha Hazel MD   Hosp Day # 4 PCP KARI BARCLAY     Subjective:  Intubated    Diabetes History:  HgA1c 10.4%      Home Regimen:  Metformin 1000mg PO BID  Glimepiride 4mg PO daily   -->unclear if he was taking these medications, per H&P he was not taking any meds at home     He was seen by endocrine service in 3/2024.  At that time maintained on Novolog 2 units subcutaneous TID with meals.  Recommended discharge home with Metformin twice daily.     Review of systems not possible due to the patient's medical condition      Objective/Physical Exam:  Physical Exam:   Vital Signs:  Blood pressure 104/73, pulse (!) 131, temperature 99.8 °F (37.7 °C), temperature source Axillary, resp. rate 22, height 5' 9\" (1.753 m), weight 212 lb 6.4 oz (96.3 kg), SpO2 100%.      General Appearance: Intubated and sedated  Head: Atraumatic  Eyes:  normal conjunctivae, sclera., normal sclera and normal pupils  Respiratory: Mechanically ventilated  Extremities: no obvious extremity swelling      Labs:  Pertinent labs reviewed    Assessment/Plan:  Patient Active Problem List   Diagnosis    Left inguinal hernia    Coronary artery disease without angina pectoris, unspecified vessel or lesion type, unspecified whether native or transplanted heart    Acute congestive heart failure (HCC)    Acute congestive heart failure, unspecified heart failure type (HCC)    Syncope and collapse    JU (acute kidney injury) (HCC)    Dizziness    Parasomnia    RLS (restless legs syndrome)    Episodic mood disorder ([facility])    Anemia, unspecified type    Rectal bleeding    Acute on chronic congestive heart failure, unspecified heart failure type (HCC)    Iron deficiency anemia due to chronic blood loss    Scrotal edema    Goals of care, counseling/discussion    Palliative  care encounter    Hypotension    Hypotension, unspecified hypotension type    Pneumoperitoneum    Bilateral pulmonary embolism (HCC)    RV (right ventricular) mural thrombus     Type 2 DM  ON TPN : 15 units regular insulin in the bag  - Tresiba 14 units subcutaneous daily   -Novolin R 2 units along with CF based on every 6 hours Accu-Cheks  - hypoglycemic protocol    We will follow.    Renu Moarles MD

## 2024-05-04 NOTE — RESPIRATORY THERAPY NOTE
Patient received on full vent support with the following settings PC 15/RR 14/40%/+3. Bilateral BS heard on auscultation. Suction provided as needed. Patient not appropriate for SBT. Fio2 weaned to 35%, Spo2 99%.

## 2024-05-04 NOTE — PROGRESS NOTES
[facility]  part of [facility]    Nephrology Progress Note    Chief Complaint   Patient presents with    Back Pain       Subjective:   68 year old male, following for JU.     Has high grade fevers.   Urine output 1.3L/24 hours.     Review of Systems:   Review of Systems   Unable to perform ROS: Intubated       Objective:   Temp:  [98.1 °F (36.7 °C)-99.9 °F (37.7 °C)] 99.8 °F (37.7 °C)  Pulse:  [112-131] 131  Resp:  [14-32] 22  BP: ()/(58-82) 104/73  SpO2:  [97 %-100 %] 100 %  AO: (84-99)/(52-65) 91/63  FiO2 (%):  [35 %-40 %] 35 %  SpO2: 100 %     Intake/Output Summary (Last 24 hours) at 5/4/2024 0851  Last data filed at 5/4/2024 0541  Gross per 24 hour   Intake 3841.1 ml   Output 1445 ml   Net 2396.1 ml     Wt Readings from Last 3 Encounters:   05/04/24 212 lb 6.4 oz (96.3 kg)   03/03/24 212 lb 9.6 oz (96.4 kg)   02/13/24 230 lb (104.3 kg)     Physical Exam  Constitutional:       Appearance: He is ill-appearing.      Comments: intubated   Cardiovascular:      Rate and Rhythm: Normal rate and regular rhythm.      Heart sounds: Normal heart sounds.   Pulmonary:      Effort: Pulmonary effort is normal.      Breath sounds: Normal breath sounds.   Musculoskeletal:      Comments: anasarca   Neurological:      Comments: intubated   Psychiatric:      Comments: intubated         Medications:  Current Facility-Administered Medications   Medication Dose Route Frequency    insulin regular human (Novolin R, Humulin R) 100 UNIT/ML injection 2 Units  2 Units Subcutaneous Q6H    insulin degludec 100 units/mL flextouch 14 Units  14 Units Subcutaneous Daily    sodium chloride 0.9% infusion   Intravenous Continuous    dextrose 10% infusion (TPN no rate)   Intravenous Continuous PRN    adult 3 in 1 TPN   Intravenous Continuous TPN    dextrose 5%-sodium chloride 0.45% infusion   Intravenous Continuous    argatroban 50 mg/50mL infusion premix  0.09 mcg/kg/min Intravenous Continuous    phenylephrine  (Zia-Synephrine) 250 mg in sodium chloride 0.9% 250 mL infusion   mcg/min Intravenous Continuous    propofol (Diprivan) 10 mg/mL infusion premix  5-50 mcg/kg/min Intravenous Continuous    fentaNYL (Sublimaze) 50 mcg/mL injection 25 mcg  25 mcg Intravenous Q30 Min PRN    Or    fentaNYL (Sublimaze) 50 mcg/mL injection 50 mcg  50 mcg Intravenous Q30 Min PRN    acetaminophen (Tylenol) tab 650 mg  650 mg Oral Q4H PRN    Or    acetaminophen (Tylenol) 160 MG/5ML oral liquid 650 mg  650 mg Oral Q4H PRN    Or    acetaminophen (Tylenol) rectal suppository 650 mg  650 mg Rectal Q4H PRN    Or    acetaminophen (Ofirmev) 10 mg/mL infusion premix 1,000 mg  1,000 mg Intravenous Q6H PRN    polyethylene glycol (PEG 3350) (Miralax) 17 g oral packet 17 g  17 g Oral Daily PRN    senna (Senokot) 8.8 MG/5ML oral syrup 17.6 mg  10 mL Oral Nightly PRN    bisacodyl (Dulcolax) 10 MG rectal suppository 10 mg  10 mg Rectal Daily PRN    fleet enema (Fleet) 7-19 GM/118ML rectal enema 133 mL  1 enema Rectal Once PRN    chlorhexidine gluconate (Peridex) 0.12 % oral solution 15 mL  15 mL Mouth/Throat BID@0800,2000    insulin regular human (Novolin R, Humulin R) 100 UNIT/ML injection 1-7 Units  1-7 Units Subcutaneous 4 times per day    vasopressin (Vasostrict) 20 Units in sodium chloride 0.9% 100 mL infusion for septic shock  0.03 Units/min Intravenous Continuous    piperacillin-tazobactam (Zosyn) 3.375 g in dextrose 5% 100 mL IVPB-ADDV  3.375 g Intravenous Q8H    pantoprazole (Protonix) 40 mg in sodium chloride 0.9% PF 10 mL IV push  40 mg Intravenous Daily    glucose (Dex4) 15 GM/59ML oral liquid 15 g  15 g Oral Q15 Min PRN    Or    glucose (Glutose) 40% oral gel 15 g  15 g Oral Q15 Min PRN    Or    glucose-vitamin C (Dex-4) chewable tab 4 tablet  4 tablet Oral Q15 Min PRN    Or    dextrose 50% injection 50 mL  50 mL Intravenous Q15 Min PRN    Or    glucose (Dex4) 15 GM/59ML oral liquid 30 g  30 g Oral Q15 Min PRN    Or    glucose (Glutose)  40% oral gel 30 g  30 g Oral Q15 Min PRN    Or    glucose-vitamin C (Dex-4) chewable tab 8 tablet  8 tablet Oral Q15 Min PRN    norepinephrine (Levophed) 32 mg in dextrose 5% 250 mL infusion  0.5-30 mcg/min Intravenous Continuous    morphINE PF 2 MG/ML injection 1 mg  1 mg Intravenous Q4H PRN              Results:     Recent Labs   Lab 05/02/24  0512 05/03/24  0533 05/04/24  0446   RBC 5.92*  5.92* 5.35  5.35 5.41   HGB 14.8  14.8 13.2  13.2 13.3   HCT 42.7  42.7 38.3*  38.3* 38.8*   MCV 72.1*  72.1* 71.6*  71.6* 71.7*   NEPRELIM 18.47* 18.30* 11.48*   WBC 20.8*  20.8* 19.9*  19.9* 13.2*   .0  157.0 110.0*  110.0* 90.0*     Recent Labs   Lab 05/02/24 0512 05/03/24  0539 05/04/24  0446   * 220* 180*   BUN 52* 51* 51*   CREATSERUM 2.27* 2.04* 1.94*   CA 7.3* 7.2* 7.4*   ALB 2.2* 2.1* 2.3*   * 132* 133*   K 3.3* 3.2* 4.2   CL 99 96* 98   CO2 23.0 28.0 27.0   ALKPHO 92 123* 111   AST 36* 34 40*   ALT 36 28 24   BILT 5.5* 5.8* 6.4*   TP 4.4* 4.2* 4.6*     PTT   Date Value Ref Range Status   05/04/2024 90.2 (H) 23.3 - 35.6 seconds Final     Comment:     Elevations of the aPTT in patients not receiving  anticoagulant therapy (Heparin, etc.), may be  seen in Factor deficiency, vitamin K deficiency,  factor inhibitors, liver disease, etc.  Clinical correlation is recommended.       INR   Date Value Ref Range Status   05/02/2024 7.07 (HH) 0.80 - 1.20 Final     Comment:     Therapeutic INR range for patients on warfarin:  2.0-3.0 for most medical conditions and surgical prophylaxis   2.5-3.5 for mechanical heart valves and recurrent thromboembolism    Direct thrombin inhibitors (e.g. argatroban, bivalirudin), factor Xa inhibitors (e.g. apixaban, rivaroxaban), and conditions such as coagulation factor deficiency, vitamin K deficiency, and liver disease will prolong the prothrombin time/INR.    Unfractionated heparin and low molecular weight heparin do not affect the prothrombin time/INR up to a  concentration of 1 IU/mL and 1.5 IU/mL, respectively.         No results for input(s): \"BNP\" in the last 168 hours.  Recent Labs   Lab 05/02/24  0512 05/03/24  0539 05/04/24  0446   MG 1.7 1.7 2.0   PHOS 5.6* 4.4 4.6        Recent Labs   Lab 05/02/24  0512 05/03/24  0539 05/04/24  0446   PHOS 5.6* 4.4 4.6   ALB 2.2* 2.1* 2.3*       XR CHEST AP PORTABLE  (CPT=71045)    Result Date: 5/4/2024  PROCEDURE: XR CHEST AP PORTABLE  (CPT=71045) TIME: 519  COMPARISON: [facility], XR CHEST AP PORTABLE (CPT=71045), 5/03/2024, 5:20 AM.  INDICATIONS: Reassess pneumothorax. Follow up shortness of breath.  TECHNIQUE:   Single view.   Findings and impression:  Stable well-positioned support devices  Stable heart  Right pigtail pleural drain remains in place.  Decreased pneumothorax now 1.2 centimeter  Partly obscured right perihilar opacity remains present.  Moderate right and mild left lung volume loss with bibasilar atelectasis, unchanged      Dictated by (CST): Bradley Rick MD on 5/04/2024 at 8:15 AM     Finalized by (CST): Bradley Rick MD on 5/04/2024 at 8:16 AM          XR CHEST AP PORTABLE  (CPT=71045)    Addendum Date: 5/3/2024    ADDENDUM: There is a minimally displaced left posterior 9th rib fracture, which was visible on the recent chest CT.  No definite radiographically visible right rib fractures.    Dictated by (CST): Chico Hayward MD on 5/03/2024 at 7:53 AM     Finalized by (CST): Chico Hayward MD on 5/03/2024 at 7:53 AM             Result Date: 5/3/2024  CONCLUSION:  1. Lines/tubes in stable customary positioning. 2. Greater than 50% right-sided pneumothorax has increased in size with worsening associated subsegmental atelectasis at the right lung base.  No associated mediastinal shift.  The right-sided pigtail pleural drainage catheter is in stable positioning; correlate clinically with catheter function. 3. Stable cardiomegaly and post CABG changes.  There are findings consistent with  worsening mild to moderate CHF/fluid overload including progressive pulmonary edema.   Results of this examination were discussed with the patient's nurse, Fidelia Portillo, by Dr. Hayward at 7:51 on 05/03/2024.  Dictated by (CST): Chico Hayward MD on 5/03/2024 at 7:45 AM     Finalized by (CST): Chico Hayward MD on 5/03/2024 at 7:52 AM          XR ABDOMEN (1 VIEW) (CPT=74018)    Result Date: 5/2/2024  CONCLUSION:   No significant change gaseous distension of the transverse colon.  Mild improvement in the gaseous distension of the stomach with a well-positioned enteric tube.  Otherwise, there is a paucity of abdominal bowel gas which is nonspecific.  Delayed nephrograms, which may be secondary to renal failure/acute tubular necrosis.  Lesser incidental findings described above.    Dictated by (CST): Dayday Dave MD on 5/02/2024 at 1:48 PM     Finalized by (CST): Dayday Dave MD on 5/02/2024 at 1:54 PM               Assessment and Plan:     JU on CKD:   Likely due to ischemic ATN.   Cr improved to 1.94 today.   Stop fluids. Pt is volume overloaded. On TPN.   Monitor I/Os.     Septic and cardiogenic shock:   Pneumoperitoneum with concern for bowel perforation, not a surgical candidate.   On pressors. Has developed high grade fevers.   On Zosyn.   ID following.     CAD/CHF with EF 25%:   Cards following.     R sided pneumothorax:   On Vent  Has a chest tube.     PE/RV thrombus:   ON Argatroban       Julio C Lemus MD  5/4/2024

## 2024-05-04 NOTE — PROGRESS NOTES
[facility]  part of [facility]    Progress Note      Assessment and Plan:   1.  Abdominal sepsis with hilary shock state-requiring accelerating regime of pressors.  The blood pressure is plummeting.  He was deemed not a surgical candidate.  Actively dying.    I discussed issues at length with brother, power of  and other friends at the bedside.  They agreed to DNAR full treatment.  Regardless, the blood pressure is plummeting.  Will not accelerate care beyond this juncture and the temperature has jumped to 106.  Family is aware of the grim predicament.    Recommendations:  1.  DNAR full treatment  2.  Ongoing empiric antibiotic  3.  Pressors, but will not accelerate beyond this point  4.  Will follow clinically.    2.  Right-sided pneumothorax-ongoing suction to chest tube    3.  Acute respiratory failure-full ventilator support    4.  RV thrombus-continue argatroban    5.  Acute kidney injury-supportive care as per nephrology    7.  CODE STATUS-DNAR full.    Subjective:   Cristhian Lopez is a(n) 68 year old male who is comatose    Objective:   Blood pressure (!) 51/36, pulse 107, temperature (!) 106.7 °F (41.5 °C), temperature source Rectal, resp. rate (!) 34, height 5' 9\" (1.753 m), weight 212 lb 6.4 oz (96.3 kg), SpO2 91%.    Physical Exam comatose white male  HEENT examination is unremarkable with pupils equal round and reactive to light and accommodation.   Neck without adenopathy, thyromegaly, JVD nor bruit.   Lungs central rattles to auscultation and percussion.  Cardiac regular rate and rhythm no murmur.   Abdomen nontender, without hepatosplenomegaly and no mass appreciable.   Extremities without clubbing cyanosis nor edema.   Neurologic with advanced encephalopathy.  Skin without gross abnormality     Results:     Lab Results   Component Value Date    WBC 13.2 05/04/2024    HGB 13.3 05/04/2024    HCT 38.8 05/04/2024    PLT 90.0 05/04/2024    CREATSERUM 1.94 05/04/2024    BUN  51 05/04/2024     05/04/2024    K 4.2 05/04/2024    CL 98 05/04/2024    CO2 27.0 05/04/2024     05/04/2024    CA 7.4 05/04/2024    ALB 2.3 05/04/2024    ALKPHO 111 05/04/2024    BILT 6.4 05/04/2024    TP 4.6 05/04/2024    AST 40 05/04/2024    ALT 24 05/04/2024    PTT 90.2 05/04/2024    ADRIANA 155 05/04/2024    LIP 40 05/04/2024    MG 2.0 05/04/2024    PHOS 4.6 05/04/2024     Chest x-ray-chest tube in place.  Likely residual pneumothorax at the right base.    Greater than 35 minutes critical care time    Andrea Joseph MD  Medical Director, Critical Care, [facility]  Medical Director, [facility]  Pager: [phone]

## 2024-05-04 NOTE — PROGRESS NOTES
Pt developed high fevers this shift of 107 degrees. Fever has not responded to treatments to bring it down. Pt brother aware and asked for anointing and last rights -  came bedside to provide this service. Family aware of patient's current declining status and expressed understanding.

## 2024-05-04 NOTE — SPIRITUAL CARE NOTE
Spiritual Care Visit Note    Patient Name: Cristhian Lopez Date of Spiritual Care Visit: 24   : 9/3/1955 Primary Dx: Hypotension, unspecified hypotension type       Referred By: Referral From: Family    Spiritual Care Taxonomy:    Intended Effects: Josette affirmation    Methods: Offer spiritual/Congregational support    Interventions: Connect someone with their josette community/clergy    Visit Type/Summary:     - Spiritual Care: Responded to a request for spiritual care and assessed the patient for spiritual care needs. Responded to a request via the on call phone Consulted with RN prior to visit. Provided support for Patient's spiritual/Congregational requests. Coordinated  visit for Sacrament of the Sick.  received call from pt RN with pt brothers phone number to call concerning anointing pt. I spoke with pt brother Jose J who stated he would like pt to be anointed and pt has no home parish. I contacted FrJeremias Asim Jaeger who kindly agreed to come to [facility] and anoint pt and said he would come immediately. I called back pt's brother and left message stating  was en route to perform anointing.     Spiritual Care support can be requested via an Epic consult. For urgent/immediate needs, please contact the On Call  at: [place]: ext [phone]    Rev. Beth Lowe

## 2024-05-04 NOTE — PROGRESS NOTES
Cardiology Progress Note    Cristhian Lopez Patient Status:  Inpatient    9/3/1955 MRN [id number]   [facility] [facility] 2W/SW Attending Aretha Hazel MD   Hosp Day # 4 PCP KARI BARCLAY       Subjective: Intubated and sedated.    Objective:   Temp: 99.8 °F (37.7 °C)  Pulse: 131  Resp: 22  BP: 104/73  AO: 91/63  FiO2 (%): 40 %    Intake/Output:     Intake/Output Summary (Last 24 hours) at 2024 0809  Last data filed at 2024 0541  Gross per 24 hour   Intake 3841.1 ml   Output 1445 ml   Net 2396.1 ml       Last 3 Weights   24 0500 212 lb 6.4 oz (96.3 kg)   24 0548 204 lb 9.6 oz (92.8 kg)   24 0400 198 lb 1.6 oz (89.9 kg)   24 2100 197 lb 14.4 oz (89.8 kg)   24 1400 208 lb 8.9 oz (94.6 kg)   24 1046 150 lb (68 kg)   24 0508 212 lb 9.6 oz (96.4 kg)   24 0559 211 lb 9.6 oz (96 kg)   24 0544 216 lb (98 kg)   24 0510 234 lb 11.2 oz (106.5 kg)   24 0500 232 lb (105.2 kg)   24 0505 237 lb (107.5 kg)   24 0522 233 lb 9.6 oz (106 kg)   24 1154 236 lb 12.8 oz (107.4 kg)   24 0314 229 lb 8 oz (104.1 kg)   24 1639 233 lb 9.6 oz (106 kg)   24 0910 192 lb (87.1 kg)   24 0848 230 lb (104.3 kg)       Tele: Sinus tach    Physical Exam:       Neck: No JVD, carotids 2+, no bruits.  Cardiac: Regular rate and rhythm. S1, S2 normal. No murmur, pericardial rub, S3.  Lungs: Clear without wheezes, rales, rhonchi or dullness.  Normal excursions and effort.  Abdomen: Soft, non-tender. BS-present.  Extremities: Without clubbing, cyanosis or edema.  Peripheral pulses are 2+.  Neurologic: Sedated and intubated  Skin: Warm and dry.     Laboratory/Data:    Labs:         Recent Labs   Lab 24  1111 24  1521 24  1717 24  1853 24  0511 24  0512 24  1018 24  0533 24  0446   WBC 3.7* 6.5  --   --  18.4* 20.8*  20.8*  --  19.9*  19.9* 13.2*   HGB 16.5 14.9  --   --  16.6  14.8  14.8  --  13.2  13.2 13.3   MCV 73.0* 72.6*  --   --  72.2* 72.1*  72.1*  --  71.6*  71.6* 71.7*   .0* 136.0*  --   --  182.0 157.0  157.0  --  110.0*  110.0* 90.0*   BAND  --   --   --   --  4  --   --   --   --    INR 1.65*  --  6.79* 6.10*  --   --  7.07*  --   --        Recent Labs   Lab 04/30/24  1111 05/01/24  0511 05/02/24  0512 05/03/24  0539 05/04/24  0446   * 128* 130* 132* 133*   K 3.6 3.6 3.3* 3.2* 4.2   CL 95* 96* 99 96* 98   CO2 22.0 16.0* 23.0 28.0 27.0   BUN 41* 45* 52* 51* 51*   CREATSERUM 1.86* 2.10* 2.27* 2.04* 1.94*   CA 8.0* 7.9* 7.3* 7.2* 7.4*   MG  --  1.8 1.7 1.7 2.0   PHOS  --  6.1* 5.6* 4.4 4.6   * 217* 242* 220* 180*       Recent Labs   Lab 04/30/24  2041 05/01/24  0511 05/02/24  0512 05/03/24  0539 05/04/24  0446   ALT 35 42 36 28 24   AST 43* 45* 36* 34 40*   ALB 2.7* 2.8* 2.2* 2.1* 2.3*       No results for input(s): \"TROP\" in the last 168 hours.    Allergies:   Allergies   Allergen Reactions    Heparin HIVES     Patient not sure       Medications:  Current Facility-Administered Medications   Medication Dose Route Frequency    insulin degludec 100 units/mL flextouch 14 Units  14 Units Subcutaneous Daily    insulin regular human (Novolin R, Humulin R) 100 UNIT/ML injection 4 Units  4 Units Subcutaneous Q6H    sodium chloride 0.9% infusion   Intravenous Continuous    dextrose 10% infusion (TPN no rate)   Intravenous Continuous PRN    adult 3 in 1 TPN   Intravenous Continuous TPN    dextrose 5%-sodium chloride 0.45% infusion   Intravenous Continuous    argatroban 50 mg/50mL infusion premix  0.09 mcg/kg/min Intravenous Continuous    phenylephrine (Zia-Synephrine) 250 mg in sodium chloride 0.9% 250 mL infusion   mcg/min Intravenous Continuous    propofol (Diprivan) 10 mg/mL infusion premix  5-50 mcg/kg/min Intravenous Continuous    fentaNYL (Sublimaze) 50 mcg/mL injection 25 mcg  25 mcg Intravenous Q30 Min PRN    Or    fentaNYL (Sublimaze) 50 mcg/mL  injection 50 mcg  50 mcg Intravenous Q30 Min PRN    acetaminophen (Tylenol) tab 650 mg  650 mg Oral Q4H PRN    Or    acetaminophen (Tylenol) 160 MG/5ML oral liquid 650 mg  650 mg Oral Q4H PRN    Or    acetaminophen (Tylenol) rectal suppository 650 mg  650 mg Rectal Q4H PRN    Or    acetaminophen (Ofirmev) 10 mg/mL infusion premix 1,000 mg  1,000 mg Intravenous Q6H PRN    polyethylene glycol (PEG 3350) (Miralax) 17 g oral packet 17 g  17 g Oral Daily PRN    senna (Senokot) 8.8 MG/5ML oral syrup 17.6 mg  10 mL Oral Nightly PRN    bisacodyl (Dulcolax) 10 MG rectal suppository 10 mg  10 mg Rectal Daily PRN    fleet enema (Fleet) 7-19 GM/118ML rectal enema 133 mL  1 enema Rectal Once PRN    chlorhexidine gluconate (Peridex) 0.12 % oral solution 15 mL  15 mL Mouth/Throat BID@0800,2000    insulin regular human (Novolin R, Humulin R) 100 UNIT/ML injection 1-7 Units  1-7 Units Subcutaneous 4 times per day    vasopressin (Vasostrict) 20 Units in sodium chloride 0.9% 100 mL infusion for septic shock  0.03 Units/min Intravenous Continuous    piperacillin-tazobactam (Zosyn) 3.375 g in dextrose 5% 100 mL IVPB-ADDV  3.375 g Intravenous Q8H    pantoprazole (Protonix) 40 mg in sodium chloride 0.9% PF 10 mL IV push  40 mg Intravenous Daily    glucose (Dex4) 15 GM/59ML oral liquid 15 g  15 g Oral Q15 Min PRN    Or    glucose (Glutose) 40% oral gel 15 g  15 g Oral Q15 Min PRN    Or    glucose-vitamin C (Dex-4) chewable tab 4 tablet  4 tablet Oral Q15 Min PRN    Or    dextrose 50% injection 50 mL  50 mL Intravenous Q15 Min PRN    Or    glucose (Dex4) 15 GM/59ML oral liquid 30 g  30 g Oral Q15 Min PRN    Or    glucose (Glutose) 40% oral gel 30 g  30 g Oral Q15 Min PRN    Or    glucose-vitamin C (Dex-4) chewable tab 8 tablet  8 tablet Oral Q15 Min PRN    norepinephrine (Levophed) 32 mg in dextrose 5% 250 mL infusion  0.5-30 mcg/min Intravenous Continuous    morphINE PF 2 MG/ML injection 1 mg  1 mg Intravenous Q4H PRN          Assessment:  Septic shock due to bowel perforation  Small PE on argatroban  Acute renal insufficiency  History of CAD s/p CABG and PCI  History of severe ischemic cardiomyopathy  Hypertension  Hyperlipidemia    Plan:  Continue supportive care  INR is falsely elevated due to argatroban, would not follow the level  Prognosis guarded    Oneal Sierra MD  5/4/2024  8:09 AM    3

## 2024-05-04 NOTE — PLAN OF CARE
Problem: CARDIOVASCULAR - ADULT  Goal: Maintains optimal cardiac output and hemodynamic stability  Description: INTERVENTIONS:  - Monitor vital signs, rhythm, and trends  - Monitor for bleeding, hypotension and signs of decreased cardiac output  - Evaluate effectiveness of vasoactive medications to optimize hemodynamic stability  - Monitor arterial and/or venous puncture sites for bleeding and/or hematoma  - Assess quality of pulses, skin color and temperature  - Assess for signs of decreased coronary artery perfusion - ex. Angina  - Evaluate fluid balance, assess for edema, trend weights  Outcome: Not Progressing  Goal: Absence of cardiac arrhythmias or at baseline  Description: INTERVENTIONS:  - Continuous cardiac monitoring, monitor vital signs, obtain 12 lead EKG if indicated  - Evaluate effectiveness of antiarrhythmic and heart rate control medications as ordered  - Initiate emergency measures for life threatening arrhythmias  - Monitor electrolytes and administer replacement therapy as ordered  Outcome: Not Progressing     Problem: MUSCULOSKELETAL - ADULT  Goal: Return mobility to safest level of function  Description: INTERVENTIONS:  - Assess patient stability and activity tolerance for standing, transferring and ambulating w/ or w/o assistive devices  - Assist with transfers and ambulation using safe patient handling equipment as needed  - Ensure adequate protection for wounds/incisions during mobilization  - Obtain PT/OT consults as needed  - Advance activity as appropriate  - Communicate ordered activity level and limitations with patient/family  Outcome: Not Progressing  Goal: Maintain proper alignment of affected body part  Description: INTERVENTIONS:  - Support and protect limb and body alignment per provider's orders  - Instruct and reinforce with patient and family use of appropriate assistive device and precautions (e.g. spinal or hip dislocation precautions)  Outcome: Not Progressing     Problem:  Patient Centered Care  Goal: Patient preferences are identified and integrated in the patient's plan of care  Description: Interventions:  - What would you like us to know as we care for you?   - Provide timely, complete, and accurate information to patient/family  - Incorporate patient and family knowledge, values, beliefs, and cultural backgrounds into the planning and delivery of care  - Encourage patient/family to participate in care and decision-making at the level they choose  - Honor patient and family perspectives and choices  Outcome: Not Progressing     Problem: Diabetes/Glucose Control  Goal: Glucose maintained within prescribed range  Description: INTERVENTIONS:  - Monitor Blood Glucose as ordered  - Assess for signs and symptoms of hyperglycemia and hypoglycemia  - Administer ordered medications to maintain glucose within target range  - Assess barriers to adequate nutritional intake and initiate nutrition consult as needed  - Instruct patient on self management of diabetes  Outcome: Not Progressing     Problem: Patient/Family Goals  Goal: Patient/Family Long Term Goal  Description: Patient's Long Term Goal:     Interventions:  -   - See additional Care Plan goals for specific interventions  Outcome: Not Progressing  Goal: Patient/Family Short Term Goal  Description: Patient's Short Term Goal:     Interventions:   -   - See additional Care Plan goals for specific interventions  Outcome: Not Progressing     Problem: Safety Risk - Non-Violent Restraints  Goal: Patient will remain free from self-harm  Description: INTERVENTIONS:  - Apply the least restrictive restraint to prevent harm  - Notify patient and family of reasons restraints applied  - Assess for any contributing factors to confusion (electrolyte disturbances, delirium, medications)  - Discontinue any unnecessary medical devices as soon as possible  - Assess the patient's physical comfort, circulation, skin condition, hydration, nutrition and  elimination needs   - Reorient and redirection as needed  - Assess for the need to continue restraints  Outcome: Not Progressing     Problem: RESPIRATORY - ADULT  Goal: Achieves optimal ventilation and oxygenation  Description: INTERVENTIONS:  - Assess for changes in respiratory status  - Assess for changes in mentation and behavior  - Position to facilitate oxygenation and minimize respiratory effort  - Oxygen supplementation based on oxygen saturation or ABGs  - Provide Smoking Cessation handout, if applicable  - Encourage broncho-pulmonary hygiene including cough, deep breathe, Incentive Spirometry  - Assess the need for suctioning and perform as needed  - Assess and instruct to report SOB or any respiratory difficulty  - Respiratory Therapy support as indicated  - Manage/alleviate anxiety  - Monitor for signs/symptoms of CO2 retention  Outcome: Not Progressing

## 2024-05-04 NOTE — SPIRITUAL CARE NOTE
Spiritual Care Visit Note    Patient Name: Cristhian Lopez Date of Spiritual Care Visit: 24   : 9/3/1955 Primary Dx: Hypotension, unspecified hypotension type       Referred By: Referral From: Nurse    Spiritual Care Taxonomy:    Intended Effects: Build relationship of care and support    Methods: Offer support;Collaborate with care team member    Interventions: Active listening;Ask guided questions;Discuss plan of care    Visit Type/Summary:     - Spiritual Care: Responded to a request via the on call phone Consulted with RN prior to visit.  responded to call from RN requesting support for pt family. Family was not present at attempted visit. SW RN about pt condition, plan of care, and needs of family. RN stated family mentioned  anointing. I agreed to come back to room when family present to offer [employer]/emotional support and if requested by family I will attempt to find a  to come to [facility] to anoint pt today.   remains available as needed for follow up.    Spiritual Care support can be requested via an Epic consult. For urgent/immediate needs, please contact the On Call  at: [place]: ext [phone]    Rev. Beth Lowe

## 2024-05-04 NOTE — PLAN OF CARE
Problem: Diabetes/Glucose Control  Goal: Glucose maintained within prescribed range  Description: INTERVENTIONS:  - Monitor Blood Glucose as ordered  - Assess for signs and symptoms of hyperglycemia and hypoglycemia  - Administer ordered medications to maintain glucose within target range  - Assess barriers to adequate nutritional intake and initiate nutrition consult as needed  - Instruct patient on self management of diabetes  Outcome: Progressing     Problem: RESPIRATORY - ADULT  Goal: Achieves optimal ventilation and oxygenation  Description: INTERVENTIONS:  - Assess for changes in respiratory status  - Assess for changes in mentation and behavior  - Position to facilitate oxygenation and minimize respiratory effort  - Oxygen supplementation based on oxygen saturation or ABGs  - Provide Smoking Cessation handout, if applicable  - Encourage broncho-pulmonary hygiene including cough, deep breathe, Incentive Spirometry  - Assess the need for suctioning and perform as needed  - Assess and instruct to report SOB or any respiratory difficulty  - Respiratory Therapy support as indicated  - Manage/alleviate anxiety  - Monitor for signs/symptoms of CO2 retention  Outcome: Progressing

## 2024-05-04 NOTE — PROGRESS NOTES
Argatroban monitoring table   Date Time Rate  (mcg/kg/min) APTT H/H Plt INR Dose change New rate  (mcg/kg/min) Next APTT Warfarin  dose   4/30 1442   30.7 16.5/47 123 1.65   2       4/30 1717 -see comments 85 14.9/43.4 136     0.25 2030     4/30 2041 0.25- see note 68       CPM   2300     4/30 2330 0.25 78       No change 0.25 0511     5/1 05:11 0.25 70.4 16.6/48.8 182 6.1 No change  --- 5/2 @ 0511     5/2 05:12 0.25 100.6 14.8/42.7 157   Hold x2 hrs   5/2@ 1000      5/2 10:18 OFF  100.8        Hold x2 more hrs    5/2@ 1230      5/2 12:50  OFF  92.4        Hold x2 more hrs    5/2@1500     5/2 15:08 OFF 92.9       Hold x 2 more hrs   5/2 @1800     5/2 1812 OFF 93.1       Hold until AM   5/3 @0511     5/3 0533 Resume 0.09mcg/kg/min 84.6 13.2/38.3 110   Resumed 0.09mcg/kg/min 5/3@0830     5/3 0924 0.09 mcg/kg/min 88.8       NA cpm 5/4 AM     5/4 0446 0.09 mcg/kg/min 90.2 13.3/38.8   NA cpm 5/5 AM       Note: drip rate previously based on initial admit weight 68 kg --> new argatroban order needed to be entered so 50mg/50mL product could be used.  Wanted to keep dosing weight (68kg) the same despite pt's charted weight changing to 92.8 kg but this weight was not available as a button anymore in the new order.  0.125 mcg/kg/min using previous weight of 68 kg comes to 0.5 ml/hr.  Using new weight (92.8 kg) 0.5 ml/hr amounts to a rate of 0.09 mcg/kg/min.       PLAN:  5/4- CPM at rate of 0.09 mcg/kg/min = 0.5 ml/hr. D/w YARED Catherine

## 2024-05-04 NOTE — RESPIRATORY THERAPY NOTE
Patient received on ventilator. Patient is not a candidate for SBT. Bilateral breath sounds auscultated. Suction provided when indicated. No acute events. RT will continue to monitor.

## 2024-05-04 NOTE — PROGRESS NOTES
INFECTIOUS DISEASE PROGRESS NOTE    Cristhian Lopez Patient Status:  Inpatient    9/3/1955 MRN [id number]   [facility] [facility] 2W/SW Attending Aretha Hazel MD   Hosp Day # 4 PCP KARI BARCLAY       SUBJECTIVE  Patient seen and examined, chart, notes, labs reviewed  Had fever 106.9  Wbc 13.2, creat 1.94    ASSESSMENT/PLAN:    Antibiotics: IV vancomycin, zosyn     # Acute sepsis with shock on vasopressors from possible intraabdominal source  # Pneumoperitoneum with concern for bowel perforation and large volume ascites - KUB w/o worsening               - h/o colon cancer with previous hemicolectomy  # Acute Fever 106.9, ?central fever  # Acute respiratory failure s/p intubation  with large R sided PTX s/p chest tube  # JU on CKD with metabolic acidosis  # Acute leukocytosis with bandemia - r/o bacteremia  # 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 Patient is a 68-year-old male with a history of biventricular heart failure EF 25%, CAD status post CABG with an AICD, CKD, severe pulmonary hypertension, VTE who lives at home but mostly bedbound and wheelchair-bound who had a fall and unable to get up with associated weakness and fatigue.  Poor oral intake.  Brought to the hospital initially afebrile with WBC of 3.7, up to 18.4 with an elevated lactic acid up to 6.4, JU, INR of 6.8.  CTA chest, abdomen, pelvis with right ventricular apex thrombus, small nonocclusive PE, large volume ascites.  CT A/P showed pneumoperitoneum with concern for transverse colon origin.  Started on IV vancomycin and Zosyn.  Blood cultures pending.  Requiring vasopressors.   Increasing ox requirement.    OBJECTIVE  /75 (BP [facility]: Left arm)   Pulse (!) 136   Temp (!) 106.9 °F (41.6 °C) (Rectal)   Resp 26   Ht 175.3 cm (5' 9\")   Wt 212 lb 6.4 oz (96.3 kg)   SpO2 93%   BMI 31.37 kg/m²     General: in bed, sedated  HEENT: eyes closed, ETT in place  Neck: No lymphadenopathy.  Supple.  Cardiovascular: No chest wall tenderness  Respiratory: Symmetric expansion  Abdomen: Soft, distended  Musculoskeletal: +LE edema  Integument: No lesions. No erythema.  R chest tube  RIJ CVC  R radial madhav [facility]  [phone]

## 2024-05-04 NOTE — PROGRESS NOTES
[facility]  part of [facility]    Progress Note    Cristhian Lopez Patient Status:  Inpatient    9/3/1955 MRN [id number]   [facility] [facility] 2W/SW Attending Aretha Hazel MD   Hosp Day # 4 PCP KARI BARCLAY     Assessment and Plan:     Septic shock. Not surgical candidate per Dr Best  cont supportiive care  Wbc improved      Subjective:   Intubated  3 pressors    Objective:   Patient Vitals for the past 24 hrs:   BP Temp Temp src Pulse Resp SpO2 Weight   24 0600 103/75 -- -- (!) 130 22 99 % --   24 0500 103/80 -- -- (!) 129 20 100 % 212 lb 6.4 oz (96.3 kg)   24 0400 100/79 99.8 °F (37.7 °C) Axillary (!) 126 20 99 % --   24 0300 96/58 -- -- (!) 131 21 100 % --   24 0200 93/82 -- -- 120 21 100 % --   24 0100 90/74 -- -- (!) 126 21 100 % --   24 0000 100/71 99.4 °F (37.4 °C) Temporal (!) 128 20 100 % --   24 2300 97/79 99.9 °F (37.7 °C) Temporal (!) 128 -- 100 % --   24 2200 99/73 -- -- (!) 123 18 100 % --   24 2100 / -- -- 112 22 100 % --   24 2030 / -- -- (!) 124 18 99 % --   24 2000 90/70 99.1 °F (37.3 °C) Temporal (!) 127 18 100 % --   24 1900 95/72 -- -- (!) 121 14 100 % --   24 1800 97/76 -- -- (!) 122 14 100 % --   24 1700 90/68 -- -- (!) 124 18 99 % --   24 1600 96/63 98.3 °F (36.8 °C) Temporal (!) 122 18 100 % --   24 1500 95/73 -- -- (!) 123 18 97 % --   24 1400 99/78 -- -- (!) 125 18 99 % --   24 1300 90/75 -- -- (!) 123 18 99 % --   24 1200 91/67 98.1 °F (36.7 °C) Temporal 116 18 99 % --   24 1113 -- -- -- (!) 126 25 99 % --   24 1100 100/79 -- -- (!) 123 (!) 32 100 % --   24 1000 (!) 87/74 -- -- 116 21 100 % --   24 0900 98/76 -- -- (!) 121 24 100 % --   24 0800 91/63 97.7 °F (36.5 °C) Temporal (!) 127 21 98 % --       Intake/Output                   24 0700 - 24 0659 24 0700 - 24 0659  05/04/24 0700 - 05/05/24 0659       Intake    P.O.  --  0  --    P.O. -- 0 --    I.V.  3605.6  3379.6  --    I.V. 2669.3 1632 --    Volume (mL) Propofol 220.6 209 --    Volume (mL) Phenylephrine 277.7 175.6 --    Volume (mL) Vasopressin 182.8 249.2 --    Volume (mL) Argatroban 2.2 12 --    Volume (mL) Norepinephrine 253 184.8 --    Volume (mL) (sodium chloride 0.9% infusion) -- 596 --    Volume (mL) (dextrose 5%-sodium chloride 0.45% infusion) -- 321 --    IV PIGGYBACK  --  300  --    Volume (mL) (acetaminophen (Ofirmev) 10 mg/mL infusion premix 1,000 mg) -- 100 --    Volume (mL) (piperacillin-tazobactam (Zosyn) 3.375 g in dextrose 5% 100 mL IVPB-ADDV) -- 200 --    TPN  --  346.5  --    Volume Infused  (mL) -- 346.5 --    Total Intake 3605.6 4026.1 --       Output    Urine  1160  1350  --    Output (mL) (Urethral Catheter) 1160 1350 --    Emesis/NG output  0  5  --    Emesis -- 0 --    Output (mL) (NG/OG Tube Orogastric Center mouth) 0 5 --    Stool  --  --  --    Stool Count Calculated for I/O -- 1 x --    Chest Tube  48  130  --    Output (mL) (Chest Tube 1 Right Pleural) 48 130 --    Total Output 1208 1485 --       Net I/O     2397.6 2541.1 --            Exam: abd- distended, mod tender    Results:     Lab Results   Component Value Date    WBC 13.2 (H) 05/04/2024    HGB 13.3 05/04/2024    HCT 38.8 (L) 05/04/2024    PLT 90.0 (L) 05/04/2024    CREATSERUM 1.94 (H) 05/04/2024    BUN 51 (H) 05/04/2024     (L) 05/04/2024    K 4.2 05/04/2024    CL 98 05/04/2024    CO2 27.0 05/04/2024     (H) 05/04/2024    CA 7.4 (L) 05/04/2024    ALB 2.3 (L) 05/04/2024    ALKPHO 111 05/04/2024    BILT 6.4 (H) 05/04/2024    TP 4.6 (L) 05/04/2024    AST 40 (H) 05/04/2024    ALT 24 05/04/2024    PTT 90.2 (H) 05/04/2024    INR 7.07 (HH) 05/02/2024    TSH 0.397 12/25/2021    ADRIANA 155 (H) 05/04/2024    LIP 40 05/04/2024    PSA 9.27 (H) 02/20/2023    DDIMER 0.93 (H) 12/22/2021    MG 2.0 05/04/2024    PHOS 4.6 05/04/2024    TROP  <0.045 08/25/2021     (H) 04/30/2024    B12 389 12/25/2021    ETOH <3 05/29/2023       XR CHEST AP PORTABLE  (CPT=71045)    Addendum Date: 5/3/2024    ADDENDUM: There is a minimally displaced left posterior 9th rib fracture, which was visible on the recent chest CT.  No definite radiographically visible right rib fractures.    Dictated by (CST): Chico Hayward MD on 5/03/2024 at 7:53 AM     Finalized by (CST): Chico Hayward MD on 5/03/2024 at 7:53 AM             Result Date: 5/3/2024  CONCLUSION:  1. Lines/tubes in stable customary positioning. 2. Greater than 50% right-sided pneumothorax has increased in size with worsening associated subsegmental atelectasis at the right lung base.  No associated mediastinal shift.  The right-sided pigtail pleural drainage catheter is in stable positioning; correlate clinically with catheter function. 3. Stable cardiomegaly and post CABG changes.  There are findings consistent with worsening mild to moderate CHF/fluid overload including progressive pulmonary edema.   Results of this examination were discussed with the patient's nurse, Fidelia Portillo, by Dr. Hayward at 7:51 on 05/03/2024.  Dictated by (CST): Chico Hayward MD on 5/03/2024 at 7:45 AM     Finalized by (CST): Chico Hayward MD on 5/03/2024 at 7:52 AM          XR ABDOMEN (1 VIEW) (CPT=74018)    Result Date: 5/2/2024  CONCLUSION:   No significant change gaseous distension of the transverse colon.  Mild improvement in the gaseous distension of the stomach with a well-positioned enteric tube.  Otherwise, there is a paucity of abdominal bowel gas which is nonspecific.  Delayed nephrograms, which may be secondary to renal failure/acute tubular necrosis.  Lesser incidental findings described above.    Dictated by (CST): Dayday Dave MD on 5/02/2024 at 1:48 PM     Finalized by (CST): Dayday Dave MD on 5/02/2024 at 1:54 PM                   Luis Manuel Vila MD  5/4/2024

## 2024-05-05 NOTE — PROGRESS NOTES
Patient passed away at 0148. Absence of a pulse via doppler was confirmed by this RN, Shahid RN, and Radha RN. Notified attending physician and consulting physicians. Attempted to notify patient's brother, Jose J, but call went to voicemail. Spoke with patient's brother, Jose J earlier in the shift and he confirmed the  home with address that he wanted his brother's body released to. He also stated that he will likely be turning off his phone at night, but would call me in the morning if I called. Notified charge RN, public safety and nursing supervisor. Called [facility], and pt was determined not to be a candidate for any organ or tissue donation. Spoke with the 's office and it was determined the he was not a 's case. Death packed completed and patient sent to [facility] via transport.

## 2024-05-05 NOTE — PROGRESS NOTES
[facility]  part of [facility]    Progress Note      Assessment and Plan:   1.  Abdominal sepsis with hilary shock state-the patient had developed worsening shock overnight.  His abdomen became more firm.  I evaluated him early this morning prior to his loss of blood pressure and pulse.  I had previously discussed impending demise with the patient's power of  and brother.    2.  Right-sided pneumothorax    3.  Acute respiratory failure    4.  RV thrombus-    5.  Acute kidney injury    7.  CODE STATUS-DNAR full.    Subjective:   Cristhian Lopez is a(n) 68 year old male who is became pulseless and was pronounced .    Objective:   Blood pressure (!) 51/36, pulse (!) 124, temperature (!) 107.2 °F (41.8 °C), temperature source Rectal, resp. rate 14, height 5' 9\" (1.753 m), weight 212 lb 6.4 oz (96.3 kg), SpO2 90%.    Physical Exam comatose white male  HEENT examination is unremarkable with pupils equal round and reactive to light and accommodation.   Neck without adenopathy, thyromegaly, JVD nor bruit.   Lungs central rattles to auscultation and percussion.  Cardiac regular rate and rhythm no murmur.   Abdomen nontender, without hepatosplenomegaly and no mass appreciable.   Extremities without clubbing cyanosis nor edema.   Neurologic with advanced encephalopathy.  Skin without gross abnormality     Results:          Chest x-ray-chest tube in place.  Likely residual pneumothorax at the right base.      Andrea Joseph MD  Medical Director, Critical Care, [facility]  Medical Director, [facility]  Pager: [phone]

## 2024-05-05 NOTE — DISCHARGE SUMMARY
[facility]  Discharge Summary / Death Note     Cristhian Lopez  : 9/3/1955     Date of Admission:  2024  Date of Death:  2024     Death Diagnoses:   Profound shock - cardiogenic + septic  Acute hypoxic respiratory failure  Biventricular heart failure  Perforated bowel  Hx of CAD s/p CABG and stenting and ICD  RV thrombus  R sided PTX  Acute PE with hx of VTE  RV thrombus  Uncontrolled hyperglycemia  JU on CKD      History of Present Illness:     The patient is 68-year-old  male who was brought into the emergency department after he fell at home. The patient is an extremely poor historian. Upon arrival, he was noted to be a bit drowsy but able to answer questions. CBC showed white blood cell count of 3.7, hemoglobin 16.5; noted to have a left shift on differential. Chemistry showed sodium 130, chloride 95, potassium 3.6, BUN and creatinine 41 and 1.86. GFR is 39, which is below his baseline. ALT and AST 45 and 47, alkaline phosphatase 128, total bilirubin 4.9, and total protein 5.0. The patient had lactic acid of 4.8. Chest x-ray showed cardiomegaly with prominent pulmonary vascularity consistent with pulmonary edema. Noted to have systolic blood pressure of 80. CTA scan of the chest and CT angiogram showed severe biventricular dilation, small nonocclusive PE in the right lower lobar artery, and small occlusive subsegmental pulmonary embolism at the apical left upper lobe. There is a 1.9 cm thrombus at the right ventricular apex, large volume ascites, mild pneumoperitoneum. CT scan of the abdomen was ordered, still pending. Started empirically on IV vancomycin and Zosyn and was given IV fluid sepsis bolus plus heparin, and he will be admitted to the hospital for further management. Blood cultures and reflex lactic acid still pending.      Hospital Course:     Time of Death:  148    Profound shock - cardiogenic + septic  Acute hypoxic respiratory failure  Biventricular heart  failure  Perforated bowel  Hx of CAD s/p CABG and stenting and ICD  RV thrombus  CT reviewed  ICU on consult  On 3 pressors  Continue IVF, IV abx  Full vent support  Cardiology on consult  Continue pressor support - on levophed, vasopressin and norepinephrine  Consider addition of inotrope if poor UOP and concern for cardiogenic shock  Echo reviewed - EF 20-25%  ID on consult  Continue zosyn, stop IV Vancomycin  Follow up on cultures  Gsx on consult  Repeat CT a/p  Not a surgical candidate  Poor surgical candidate  Poor prognosis  Continue management per consulting physician recs  Code status was changed to DNAR/Full  R sided PTX  Chest tube in place  Appreciate Pulm recs  Acute PE with hx of VTE  RV thrombus  Allergy to heparin  Currently on Argatroban  Uncontrolled hyperglycemia  Endo on consult  Medium SSI  Tresiba 14U daily  JU on CKD  Continue IVF  Monitor labs  Nephrology on consult  Strict Is and Os, daily weights  Stop bicarb drip  Start TPN  This morning the patient was found unresponsive. No spontaneous movements were present. There was not response to verbal or tactile stimuli. Pupils were mid-dilated and fixed. No breath sounds were appreciated over either lung field. No carotid pulses were palpable. No heart sounds were heard over entire precordium. Patient pronounced  at date & time indicated above. Family and attending notified by staff.      Aretha Hazel MD  2024  7:51 AM

## 2024-05-05 NOTE — PLAN OF CARE
Problem: Safety Risk - Non-Violent Restraints  Goal: Patient will remain free from self-harm  Description: INTERVENTIONS:  - Apply the least restrictive restraint to prevent harm  - Notify patient and family of reasons restraints applied  - Assess for any contributing factors to confusion (electrolyte disturbances, delirium, medications)  - Discontinue any unnecessary medical devices as soon as possible  - Assess the patient's physical comfort, circulation, skin condition, hydration, nutrition and elimination needs   - Reorient and redirection as needed  - Assess for the need to continue restraints  Outcome: Completed   DC'D Restraints

## 2024-05-05 NOTE — RESPIRATORY THERAPY NOTE
Pt on current vent settings PC/14/P15/+3/60% ; ETT 8.0 @ 24 cm.  Suctioned pt as needed throughout the night.  Pt tolerating and saturating appropriately.  No acute changes, RT to continue to monitor.

## 2024-05-06 NOTE — PAYOR COMM NOTE
--------------  DISCHARGE REVIEW    Payor: [employer] MED[employer]ARE ADV PPO  Subscriber #:  [id number]  Authorization Number: [id number]    Admit date: 24  Admit time:   4:04 PM  Discharge Date: 2024  5:22 AM     Admitting Physician: Luke Rockwell MD  Attending Physician:  No att. providers found  Primary Care Physician: Neida Salazar          Discharge Summary Notes        Discharge Summary signed by Aretha Hazel MD at 2024  7:55 AM       Author: Aretha Hazel MD Specialty: HOSPITALIST, Internal Medicine Author Type: Physician    Filed: 2024  7:55 AM Date of Service: 2024  7:51 AM Status: Signed    : Aretha Hazel MD (Physician)         [facility]  Discharge Summary / Death Note     Cristhian Lopez  : 9/3/1955     Date of Admission:  2024  Date of Death:  2024     Death Diagnoses:   Profound shock - cardiogenic + septic  Acute hypoxic respiratory failure  Biventricular heart failure  Perforated bowel  Hx of CAD s/p CABG and stenting and ICD  RV thrombus  R sided PTX  Acute PE with hx of VTE  RV thrombus  Uncontrolled hyperglycemia  JU on CKD      History of Present Illness:     The patient is 68-year-old  male who was brought into the emergency department after he fell at home. The patient is an extremely poor historian. Upon arrival, he was noted to be a bit drowsy but able to answer questions. CBC showed white blood cell count of 3.7, hemoglobin 16.5; noted to have a left shift on differential. Chemistry showed sodium 130, chloride 95, potassium 3.6, BUN and creatinine 41 and 1.86. GFR is 39, which is below his baseline. ALT and AST 45 and 47, alkaline phosphatase 128, total bilirubin 4.9, and total protein 5.0. The patient had lactic acid of 4.8. Chest x-ray showed cardiomegaly with prominent pulmonary vascularity consistent with pulmonary edema. Noted to have systolic blood pressure of 80. CTA scan of the chest and CT angiogram showed severe  biventricular dilation, small nonocclusive PE in the right lower lobar artery, and small occlusive subsegmental pulmonary embolism at the apical left upper lobe. There is a 1.9 cm thrombus at the right ventricular apex, large volume ascites, mild pneumoperitoneum. CT scan of the abdomen was ordered, still pending. Started empirically on IV vancomycin and Zosyn and was given IV fluid sepsis bolus plus heparin, and he will be admitted to the hospital for further management. Blood cultures and reflex lactic acid still pending.      Hospital Course:     Time of Death:  0148    Profound shock - cardiogenic + septic  Acute hypoxic respiratory failure  Biventricular heart failure  Perforated bowel  Hx of CAD s/p CABG and stenting and ICD  RV thrombus  CT reviewed  ICU on consult  On 3 pressors  Continue IVF, IV abx  Full vent support  Cardiology on consult  Continue pressor support - on levophed, vasopressin and norepinephrine  Consider addition of inotrope if poor UOP and concern for cardiogenic shock  Echo reviewed - EF 20-25%  ID on consult  Continue zosyn, stop IV Vancomycin  Follow up on cultures  Gsx on consult  Repeat CT a/p  Not a surgical candidate  Poor surgical candidate  Poor prognosis  Continue management per consulting physician recs  Code status was changed to DNAR/Full  R sided PTX  Chest tube in place  Appreciate Pulm recs  Acute PE with hx of VTE  RV thrombus  Allergy to heparin  Currently on Argatroban  Uncontrolled hyperglycemia  Endo on consult  Medium SSI  Tresiba 14U daily  JU on CKD  Continue IVF  Monitor labs  Nephrology on consult  Strict Is and Os, daily weights  Stop bicarb drip  Start TPN  This morning the patient was found unresponsive. No spontaneous movements were present. There was not response to verbal or tactile stimuli. Pupils were mid-dilated and fixed. No breath sounds were appreciated over either lung field. No carotid pulses were palpable. No heart sounds were heard over entire  precordium. Patient pronounced  at date & time indicated above. Family and attending notified by staff.      Aretha Hazel MD  2024  7:51 AM             SUBJECTIVE  Patient seen and examined, chart, notes, labs reviewed  Had fever 106.9  Wbc 13.2, creat 1.94     ASSESSMENT/PLAN:     Antibiotics: IV vancomycin, zosyn     # Acute sepsis with shock on vasopressors from possible intraabdominal source  # Pneumoperitoneum with concern for bowel perforation and large volume ascites - KUB w/o worsening               - h/o colon cancer with previous hemicolectomy  # Acute Fever 106.9, ?central fever  # Acute respiratory failure s/p intubation  with large R sided PTX s/p chest tube  # JU on CKD with metabolic acidosis  # Acute leukocytosis with bandemia - r/o bacteremia  # 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 Patient is a 68-year-old male with a history of biventricular heart failure EF 25%, CAD status post CABG with an AICD, CKD, severe pulmonary hypertension, VTE who lives at home but mostly bedbound and wheelchair-bound who had a fall and unable to get up with associated weakness and fatigue.  Poor oral intake.  Brought to the hospital initially afebrile with WBC of 3.7, up to 18.4 with an elevated lactic acid up to 6.4, JU, INR of 6.8.  CTA chest, abdomen, pelvis with right ventricular apex thrombus, small nonocclusive PE, large volume ascites.  CT A/P showed pneumoperitoneum with concern for transverse colon origin.  Started on IV vancomycin and Zosyn.  Blood cultures pending.  Requiring vasopressors.  Increasing ox requirement.     OBJECTIVE  /75  (BP [facility]: Left arm)   Pulse (!) 136   Temp (!) 106.9 °F (41.6 °C) (Rectal)   Resp 26   Ht 175.3 cm (5' 9\")   Wt 212 lb 6.4 oz (96.3 kg)   SpO2 93%   BMI 31.37 kg/m²      General: in bed, sedated  HEENT: eyes closed, ETT in place  Neck: No lymphadenopathy.  Supple.  Cardiovascular: No chest wall tenderness  Respiratory: Symmetric expansion  Abdomen: Soft, distended  Musculoskeletal: +LE edema  Integument: No lesions. No erythema.  R chest tube  RIJ CVC  R radial madhav Aviles  [facility]  [phone]               Electronically signed by Valdemar Mooney DO at 5/4/2024  2:21 PM

## 2024-05-07 ENCOUNTER — TELEPHONE (OUTPATIENT)
Dept: INTERNAL MEDICINE UNIT | Facility: HOSPITAL | Age: 69
End: 2024-05-07

## 2024-05-07 NOTE — TELEPHONE ENCOUNTER
Death certificate completed and signed by Hospitalist MD Dr. Hazel. Submitted via fax to [phone]. Confirmation fax received.      [phone]

## 2024-12-25 NOTE — PROGRESS NOTES
[facility]  part of [facility]    Progress Note    Cristhian Lopez Patient Status:  Inpatient    9/3/1955 MRN [id number]   [facility] [facility] 2W/SW Attending Aretha Hazel MD   Hosp Day # 3 PCP KARI BARCLAY     Subjective:  Intubated   still on pressors     Objective/Physical Exam:  Vital Signs:  Blood pressure 96/63, pulse (!) 122, temperature 98.3 °F (36.8 °C), temperature source Temporal, resp. rate 18, height 5' 9\" (1.753 m), weight 204 lb 9.6 oz (92.8 kg), SpO2 100%.  Abdomen:  Soft, less distended   NG  0 recorded  Labs:  Lab Results   Component Value Date    WBC 19.9 (H) 2024    WBC 19.9 (H) 2024    HGB 13.2 2024    HGB 13.2 2024    HCT 38.3 (L) 2024    HCT 38.3 (L) 2024    .0 (L) 2024    .0 (L) 2024    CREATSERUM 2.04 (H) 2024    BUN 51 (H) 2024     (L) 2024    K 3.2 (L) 2024    CL 96 (L) 2024    CO2 28.0 2024     (H) 2024    CA 7.2 (L) 2024    ALB 2.1 (L) 2024    ALKPHO 123 (H) 2024    BILT 5.8 (H) 2024    TP 4.2 (L) 2024    AST 34 2024    ALT 28 2024    PTT 88.8 (H) 2024    INR 7.07 (HH) 2024    TSH 0.397 2021    PSA 9.27 (H) 2023    DDIMER 0.93 (H) 2021    MG 1.7 2024    PHOS 4.4 2024    TROP <0.045 2021     (H) 2024    B12 389 2021    ETOH <3 2023       Imaging:  XR CHEST AP PORTABLE  (CPT=71045)    Addendum Date: 5/3/2024    ADDENDUM: There is a minimally displaced left posterior 9th rib fracture, which was visible on the recent chest CT.  No definite radiographically visible right rib fractures.    Dictated by (CST): Chico Hayward MD on 2024 at 7:53 AM     Finalized by (CST): Chico Hayward MD on 2024 at 7:53 AM             Result Date: 5/3/2024  PROCEDURE: XR CHEST AP PORTABLE  (CPT=71045) TIME: 5:25.    Date of Service:  12/06/2024    CONTINUATION:    PHYSICAL EXAMINATION:  HEENT:  Eyes, nares reveal congested nasal mucosa.  There is some cloudy to yellowish discolored nasal discharge overlying the turbinates.  He has some scant crusted nasal discharge, but no flaring.  Tympanic membranes are pearly gray bilaterally, but there is no injection.  There is no erythema or bulging.  There is no drainage in either ear canal.  Oropharynx reveals some scant clear posterior secretions.  He has no halitosis or strawberry tongue.  Mucous membranes are moist.  NECK:  Supple.  He has good range of motion to the neck.  There is no lymphadenopathy.  There are no masses.  LUNGS:  Fair aeration.  He does have decreased breath sounds to the right mid to lower lung field with some scattered rhonchi.  There is a prolonged expiratory phase.  There are some fine crackles to lower lung field.  He has no retractions or abdominal breathing.  HEART:  Mildly tachycardic, but there is no murmur.  Cap refill is brisk.  He has no rub or gallop.  ABDOMEN:  Soft, rounded, nontender, nondistended.  He does have good bowel sounds.  There is no rebound or guarding.  He has no CVA tenderness.   EXTREMITIES:  Warm, dry without abnormalities.  There is no cyanosis, clubbing, or edema.  SKIN:  Warm, dry, well perfused.  Cap refill is brisk.  He has no rash or other lesions.    ASSESSMENT:  1. Community-acquired pneumonia of the right lung, unspecified part.  2. Persistent cough.  3. Fever in pediatric patient.    PLAN:  I did discuss the different findings on his examination with Kartik and his mother.  Due to his constellation of findings on his examination, chest x-ray both PA and lateral was conducted in the office.  Chest x-ray did reveal blunting of the right heart border and stranding pneumonia was seen at the perihilar lung fields.  I did discuss these findings with mother.  Radiologist then did provide reading of probable atypical right-sided  COMPARISON: [facility], CTA CHEST+CTA ABDOMEN DISSECT SET (CPT=71275/79519), 4/30/2024, 11:51 AM.  [facility], XR CHEST AP PORTABLE (CPT=71045), 5/02/2024, 6:52 AM.  INDICATIONS: Shock state, likely cardiogenic in septic; perforated bowel; acute pulmonary embolism; right-sided pneumothorax; acute respiratory failure with hypoxia.  TECHNIQUE:   Single view.   FINDINGS:  CARDIAC/VASC: The cardiac silhouette remains enlarged.  Mediastinal clips are again noted consistent with a previous CABG.  There is stable calcification of the aortic knob.  There has been development of central pulmonary venous congestion. MEDIAST/SELMA:   No visible mass or adenopathy. LUNGS/PLEURA: There is worsening elevation of the right hemidiaphragm with progressive consolidation at the right lung base consistent with atelectasis.  Worsening residual opacities are seen throughout both lungs, likely relating to edema.  A pneumothorax previously seen at the right lung apex has increased in size measuring 4.5 cm, previously 1.7 cm.  A right-sided pigtail pleural drainage catheter tip again curls over the right lung base laterally.  There is no pleural effusion. BONES: Sternotomy changes are again noted. OTHER: An endotracheal tube tip again projects 5.1 cm above the briseyda.  An enteric tube again courses into the left upper quadrant.  A right internal jugular approach central venous catheter tip again projects over the cavoatrial junction.         CONCLUSION:  1. Lines/tubes in stable customary positioning. 2. Greater than 50% right-sided pneumothorax has increased in size with worsening associated subsegmental atelectasis at the right lung base.  No associated mediastinal shift.  The right-sided pigtail pleural drainage catheter is in stable positioning; correlate clinically with catheter function. 3. Stable cardiomegaly and post CABG changes.  There are findings consistent with worsening mild to moderate CHF/fluid  pneumonia.  Due to his clinical findings as well, we did opt to go ahead and place him on Zithromax suspension to cover for community-acquired pneumonia, likely mycoplasma.  He was started on 200 mg per 5 mL suspension to do 11 mL daily for 5 days.  Additionally, due to the likely inflammation and bronchospasm on his examination and history of coughing spasms, he was also given an albuterol inhaler that he could use as often as 6 to 8 hours.  He was also given a mask and spacer which were dispensed in the office.   Albuterol inhaler teaching was done per nurse and demonstrated.  He and the family did receive the pediatric mask and spacer in the clinic prior to his appointment ending and their subsequent departure and discharge to home from facility.  We did review the worsening signs and symptoms to monitor for in the home with pneumonia, including persistent or ongoing fever, signs of respiratory distress, dehydration and other concerns.  If he does do well, I did recommend a followup in approximately 2 weeks' time frame.  Family was urged to call with any other concerns or questions.        Dictated By:  Clementina Chan MD  Signing Provider:  Clementina Chan MD    JLH/AQT  D:  12/24/2024 02:28:57 PM  T:  12/24/2024 07:23:36 PM  Job:  [id number]        overload including progressive pulmonary edema.   Results of this examination were discussed with the patient's nurse, Fidelia Portillo, by Dr. Hayward at 7:51 on 2024.  Dictated by (CST): Chico Hayward MD on 2024 at 7:45 AM     Finalized by (CST): Chico Hayward MD on 2024 at 7:52 AM          CARD ECHO 2D DOPPLER CONTRAST (CPT=93306)    Result Date: 2024  Transthoracic Echocardiogram Name:Cristhian Lopez Date: 2024 :  1955 Ht:  (69in)  BP: 98 / 85 MRN:  [id number]    Age:  68years    Wt:  (204lb) HR: 74bpm Loc:  [facility]       Gndr: M          BSA: 2.08m^2 Sonographer: Taya PRABHAKAR Ordering:    Lupillo Gee Consulting:  Liana Saunders ---------------------------------------------------------------------------- History/Indications:   Hypotension. ---------------------------------------------------------------------------- Procedure information:  A transthoracic complete 2D study was performed. Additional evaluation included M-mode, complete spectral Doppler, and color Doppler.  Patient status:  Inpatient.  [facility]:  Echo laboratory. Comparison was made to the study of 2024.    This was a routine study. Transthoracic echocardiography for diagnosis. Image quality was suboptimal. The study was technically limited due to poor acoustic window availability, restricted patient mobility, and patient on ventilator. Intravenous contrast (Definity) was administered to evaluate left ventricular function. ECG rhythm:   Frequent ectopies ---------------------------------------------------------------------------- Conclusions: 1. Left ventricle: The cavity size was moderately increased. Wall thickness    was normal. Systolic function was markedly reduced. The estimated    ejection fraction was 20-25%, by biplane method of disks. Doppler    parameters are consistent with restrictive physiology, indicative of    decreased left ventricular diastolic compliance and/or increased left     atrial pressure. 2. Right ventricle: The cavity size was mildly increased. Systolic function    was moderately to severely reduced. 3. Aortic valve: There was mild regurgitation. 4. Mitral valve: There was moderate to severe regurgitation. 5. Left atrium: The atrium was moderately to markedly dilated. 6. Pulmonary arteries: Systolic pressure was moderately increased, estimated    to be 55mm Hg. 7. Pericardium, extracardiac: A small to moderate, loculated pericardial    effusion was identified posterior to the heart. There was a left pleural    effusion. * ---------------------------------------------------------------------------- * Findings: Left ventricle:  The cavity size was moderately increased. Wall thickness was normal. Systolic function was markedly reduced. The estimated ejection fraction was 20-25%, by biplane method of disks. No diagnostic evidence for diffuse regional wall motion abnormalities. Doppler parameters are consistent with restrictive physiology, indicative of decreased left ventricular diastolic compliance and/or increased left atrial pressure. Left atrium:  Well visualized. The atrium was moderately to markedly dilated. Right ventricle:  The cavity size was mildly increased. Systolic function was moderately to severely reduced. Right atrium:  Well visualized. The atrium was normal in size. Mitral valve:  Well visualized. The valve was structurally normal. The leaflets were normal thickness. Leaflet separation was normal. No evidence for prolapse.  Doppler:  Transvalvular velocity was within the normal range. There was no evidence for stenosis. There was moderate to severe regurgitation. Aortic valve:  Well visualized. The valve was structurally normal. The valve was trileaflet. The leaflets were normal thickness. Cusp separation was normal.  Doppler:  Transvalvular velocity was within the normal range. There was no evidence for stenosis. There was mild regurgitation. Tricuspid valve:  Well  visualized. The annulus is normal-sized. The valve is structurally normal. The leaflets are normal thickness. Leaflet separation was normal. No echocardiographic evidence for tricuspid prolapse.  Doppler: Transvalvular velocity was within the normal range. There was no evidence for stenosis. There was mild regurgitation. Pulmonic valve:   Well visualized. The annulus is normal-sized. The valve is structurally normal. The leaflets are normal thickness. Cusp separation was normal. No evidence for prolapse.  Doppler:  Transvalvular velocity was within the normal range. There was no evidence for stenosis. There was trivial regurgitation. Pericardium:  A small to moderate, loculated pericardial effusion was identified posterior to the heart. Pleura:  There was a left pleural effusion. Aorta: Aortic root: The aortic root was normal-sized. The aortic root was normal. Ascending aorta: The ascending aorta was normal. The ascending aorta was normal. Pulmonary arteries: The main pulmonary artery was normal-sized. Systolic pressure was moderately increased, estimated to be 55mm Hg. Moderate pulmonary hypertension was present. Systemic veins:  Central venous respirophasic diameter changes are blunted (< 50%). Inferior vena cava: The IVC was normal-sized. ---------------------------------------------------------------------------- Measurements  Left ventricle         Value        Ref       02/26/2024  IVS thickness, ED,     1.0   cm     0.6 - 1.0 0.9  PLAX  LV ID, ED, PLAX    (H) 6.4   cm     4.2 - 5.8 6.5  LV ID, ES, PLAX    (H) 5.7   cm     2.5 - 4.0 5.3  LV PW thickness,       0.9   cm     0.6 - 1.0 0.9  ED, PLAX  IVS/LV PW ratio,       1.11         --------- 1.00  ED, PLAX  LV PW/LV ID ratio,     0.14         --------- 0.14  ED, PLAX  LV ejection        (L) 23    %      52 - 72   38  fraction  Stroke volume/bsa,     18    ml/m^2 --------- ----------  2D  LV end-diastolic   (H) 225   ml     69 - 185  301  volume, 1-p A4C  LV  ejection        (L) 15    %      46 - 74   35  fraction, 1-p A4C  Stroke volume, 1-p     34    ml     --------- 105  A4C  LV end-diastolic   (H) 108   ml/m^2 37 - 93   134  volume/bsa, 1-p  A4C  Stroke volume/bsa,     16    ml/m^2 --------- 47  1-p A4C  LV end-diastolic   (H) 231   ml     62 - 150  297  volume, 2-p  LV end-systolic    (H) 184   ml     21 - 61   190  volume, 2-p  LV ejection        (L) 20    %      52 - 72   36  fraction, 2-p  Stroke volume, 2-p     47    ml     --------- 107  LV end-diastolic   (H) 111   ml/m^2 34 - 74   132  volume/bsa, 2-p  LV end-systolic    (H) 88    ml/m^2 11 - 31   85  volume/bsa, 2-p  Stroke volume/bsa,     22.6  ml/m^2 --------- 47.7  2-p  LV e', lateral         16.6  cm/sec >=10.0    ----------  LV E/e', lateral       6            <=13      ----------  LV e', medial      (L) 4.6   cm/sec >=7.0     ----------  LV E/e', medial        20           --------- ----------  LV e', average         10.6  cm/sec --------- ----------  LV E/e', average       9            <=14      ----------  LVOT                   Value        Ref       02/26/2024  LVOT ID                2.5   cm     --------- 2.1  LVOT peak              0.64  m/sec  --------- ----------  velocity, S  LVOT VTI, S            7.7   cm     --------- ----------  LVOT peak              2     mm Hg  --------- ----------  gradient, S  LVOT mean              1     mm Hg  --------- ----------  gradient, S  Stroke volume          38    ml     --------- ----------  (SV), LVOT DP  Stroke index           18    ml/m^2 --------- ----------  (SV/bsa), LVOT DP  Aortic valve           Value        Ref       02/26/2024  Aortic leaflet         2.0   cm     --------- ----------  separation, MM  Aortic root            Value        Ref       02/26/2024  Aortic root ID,        3.3   cm     2.3 - 3.5 ----------  STJ, ED  Ascending aorta        Value        Ref       02/26/2024  Ascending aorta ID     3.7   cm     2.2 - 3.8 3.2  Left atrium             Value        Ref       02/26/2024  LA volume, S       (H) 96    ml     18 - 58   118  LA volume/bsa, S   (H) 46    ml/m^2 16 - 34   52  LA volume, ES, 1-p (H) 103   ml     18 - 58   118  A4C  LA volume, ES, 1-p (H) 89    ml     18 - 58   117  A2C  LA volume, ES, A/L     99    ml     --------- ----------  LA volume/bsa, ES, (H) 48    ml/m^2 16 - 34   ----------  A/L  Mitral valve           Value        Ref       02/26/2024  Mitral E-wave peak     0.92  m/sec  --------- ----------  velocity  Mitral A-wave peak     0.46  m/sec  --------- ----------  velocity  Mitral                 119   ms     --------- ----------  deceleration time  Mitral peak            3     mm Hg  --------- ----------  gradient, D  Mitral E/A ratio,      2            --------- ----------  peak  Mitral regurg peak     428   cm/sec --------- ----------  velocity  Mitral peak LV-LA      73.27 mm Hg  --------- ----------  gradient, S  Mitral regurg PISA     0.6   cm     --------- ----------  radius  Mitral maximal         425   cm/sec --------- ----------  regurg velocity,  PISA  Mitral regurg VTI,     89.9  cm     --------- ----------  PISA  Mitral ERO, PISA       0.21  cm^2   --------- ----------  Mitral regurg          19    ml     --------- ----------  volume, PISA  Pulmonary artery       Value        Ref       02/26/2024  PA pressure, S, DP     55    mm Hg  --------- ----------  Tricuspid valve        Value        Ref       02/26/2024  Tricuspid regurg   (H) 3.44  m/sec  <=2.8     ----------  peak velocity  Tricuspid peak         47    mm Hg  --------- ----------  RV-RA gradient  Systemic veins         Value        Ref       02/26/2024  Estimated CVP          8     mm Hg  --------- 15  Inferior vena cava     Value        Ref       02/26/2024  ID                     2.0   cm     <=2.1     2.5  Right ventricle        Value        Ref       02/26/2024  TAPSE, 2D          (L) 1.19  cm     >=1.70    1.18  TAPSE, MM          (L) 1.19  cm      >=1.70    1.18  RV pressure, S, DP     55    mm Hg  --------- ----------  RV s', lateral     (L) 7.2   cm/sec >=9.5     ---------- Legend: (L)  and  (H)  rosalee values outside specified reference range. ---------------------------------------------------------------------------- Prepared and electronically signed by Tavon Maier 05/02/2024 14:28     XR ABDOMEN (1 VIEW) (CPT=74018)    Result Date: 5/2/2024  PROCEDURE: XR ABDOMEN (1 VIEW) (CPT=74018)  COMPARISON: [facility], CT ABDOMEN + PELVIS (CONTRAST ONLY) (CPT=74177), 4/30/2024, 2:01 PM.  [facility], XR ABDOMEN (1 VIEW) (CPT=74018), 5/01/2024, 9:29 AM.  INDICATIONS: Pneumoperitoneum ; sepsis.  TECHNIQUE:   Single view.   FINDINGS:   The enteric tube courses below the diaphragm with the distal tip terminating in the body of the stomach.  The gaseous distension of the stomach has improved when compared to 05/01/2024.  There is no significant change in the gaseous distension of the transverse colon.  There is a paucity of remaining bowel gas, which is nonspecific.  There is retained contrast within the bilateral kidneys there are phleboliths in the pelvis.  There are postoperative changes involving the distal sigmoid colon.  The abdominal ascites and punctate gallstones are better demonstrated on the prior CT chest  dated 04/03/2024.  The pneumoperitoneum was better demonstrated on the prior CT.  There are right greater than left basilar opacities.  There is elevation of the right hemidiaphragm.  The heart is unchanged in size.  Multilevel degenerative changes of the lumbar spine.  Mild-to-moderate degenerative change involving the osseous pelvis.  There are vascular calcifications.  There are sternotomy wires.         CONCLUSION:   No significant change gaseous distension of the transverse colon.  Mild improvement in the gaseous distension of the stomach with a well-positioned enteric tube.  Otherwise, there is a paucity of  abdominal bowel gas which is nonspecific.  Delayed nephrograms, which may be secondary to renal failure/acute tubular necrosis.  Lesser incidental findings described above.    Dictated by (CST): Dayday Dave MD on 5/02/2024 at 1:48 PM     Finalized by (CST): Dayday Dave MD on 5/02/2024 at 1:54 PM          XR CHEST AP PORTABLE  (CPT=71045)    Result Date: 5/2/2024  PROCEDURE: XR CHEST AP PORTABLE  (CPT=71045) TIME: 6:56 a.m.   COMPARISON: [facility], XR CHEST AP PORTABLE (CPT=71045), 5/01/2024, 9:29 AM.  INDICATIONS: Follow up for chest tube insertion for right sided pneumothorax.  TECHNIQUE:   Single view.   FINDINGS:  CARDIAC/VASC: The cardiac silhouette is enlarged. There are surgical clips over the cardiac silhouette compatible with prior CABG.  Unremarkable pulmonary vasculature.  MEDIAST/SELMA:   Atherosclerotic aorta with no visible aneurysm.  LUNGS/PLEURA: Interstitial prominence.  Small bibasilar pulmonary opacities.  Elevation/eventration of the right hemidiaphragm.  Stable position of right basal chest tube.  Stable small right apical pneumothorax occupying approximately 5-10% of hemithorax volume. BONES: Scattered mild degenerative endplate changes in the visualized thoracolumbar spine.  Sternotomy wires and plates are present. OTHER: Stable position of right neck central line with tip at the cavoatrial junction.  Stable position of endotracheal tube with tip at the aortic knob.  Stable position of feeding tube with tip in the proximal-mid gastric body.         CONCLUSION:  1.  Small right apical pneumothorax occupying 5-10% of hemithorax volume is present.  No tracheal or mediastinal shift.  Stable position of right chest tube. 2.  Cardiomegaly.  Post CABG. 3.  Elevation/eventration of the right hemidiaphragm.  Linear bibasilar pulmonary opacities lung most likely representing atelectasis however correlate for pneumonia. 4.  Interstitial prominence suggesting mild edema. 5.  Gross  stable/satisfactory position of lines and tubes.  Dictated by (CST): Kings Troy MD on 5/02/2024 at 8:47 AM     Finalized by (CST): Kings Troy MD on 5/02/2024 at 8:50 AM          XR CHEST AP PORTABLE  (CPT=71045)    Result Date: 5/1/2024  PROCEDURE: XR CHEST AP PORTABLE  (CPT=71045) TIME: 0929 hours.   COMPARISON: [facility], XR CHEST AP PORTABLE (CPT=71045), 5/01/2024, 6:54 AM.  INDICATIONS: Chest Tube insertion for right-sided pneumothorax.  TECHNIQUE:   Single view.   FINDINGS:  CARDIAC/VASC: Mild cardiomegaly and normal pulmonary vascularity.  Status post sternotomy and coronary bypass.  Fracture of the 1st Josias suture. MEDIAST/SELMA:   No visible mass or adenopathy. LUNGS/PLEURA: Right lower pigtail chest tube placed with significant re-expansion of the right lung with a small right apical pneumothorax measuring 1.7 cm in height, small right upper lobe lateral pneumothorax measuring 5.3 mm.  Moderate elevation of the right hemidiaphragm.  Bibasilar atelectasis suggested.  Can not exclude right perihilar pneumonia this may suggest more likely atelectasis from recent pneumothorax.  ET tube in the trachea at the level of the clavicles.  NG tube terminates in the cardia of the stomach.  Right internal jugular central line tip ends in the cavoatrial junction.  BONES: No fracture or visible bony lesion. OTHER: Negative.          CONCLUSION:  1. Right lower lobe pigtail chest tube placed with significant re-expansion of the right lung with a small right apical right upper lobe lateral pneumothorax as described above.  Moderate elevation right hemidiaphragm.  Bibasilar atelectasis.  I cannot exclude right perihilar pneumonia.  Follow-up studies are advised.  ET tube in the trachea at the level of the clavicles     Dictated by (CST): Vern Day MD on 5/01/2024 at 10:23 AM     Finalized by (CST): Vern Day MD on 5/01/2024 at 10:26 AM          XR ABDOMEN (1 VIEW) (CPT=74018)    Result Date:  5/1/2024  PROCEDURE: XR ABDOMEN (1 VIEW) (CPT=74018)  COMPARISON: [facility], CT ABDOMEN + PELVIS (CONTRAST ONLY) (CPT=74177), 4/30/2024, 2:01 PM.  [facility], XR CHEST AP PORTABLE (CPT=71045), 4/30/2024, 5:53 PM.  [facility], XR CHEST AP PORTABLE (CPT=71045),  4/30/2024, 7:05 PM.  [facility], XR CHEST AP PORTABLE (CPT=71045), 5/01/2024, 5:45 AM.  [facility], XR CHEST AP PORTABLE (CPT=71045), 5/01/2024, 6:54 AM.  [facility], XR CHEST AP PORTABLE (CPT=71045), 5/01/2024, 9:29 AM.  INDICATIONS: pneumoperitoneum  TECHNIQUE:   Single view.   FINDINGS:  BOWEL GAS PATTERN: Moderate gaseous distention of the stomach and transverse colon.  NG tube terminates in the cardia of the stomach and may need to be advanced further into the more distal stomach to decompress it.  Correlate clinically. SOFT TISSUES: Normal.  No masses or organomegaly.  CALCIFICATIONS: None significant. BONES: Normal.  No significant arthritic changes.  OTHER: No well-defined large free intraperitoneal air collection is seen.  Small amount of contrast within the decompressed urinary bladder with a Louise catheter suggested.         CONCLUSION:  1. Moderate gaseous distention of the stomach and transverse colon.  NG tube terminates in the cardia of the stomach and may need to be advanced further into the more distal stomach to decompress.  No large well-defined free intraperitoneal air collection is seen.  No definite bowel obstruction.    Dictated by (CST): Vern Day MD on 5/01/2024 at 10:16 AM     Finalized by (CST): Vern Day MD on 5/01/2024 at 10:20 AM          XR CHEST AP PORTABLE  (CPT=71045)    Result Date: 5/1/2024  PROCEDURE: XR CHEST AP PORTABLE  (CPT=71045) TIME: 5:45.   COMPARISON: [facility], XR CHEST AP PORTABLE (CPT=71045), 5/01/2024, 6:54 AM.  [facility], CT ABDOMEN + PELVIS (CONTRAST ONLY) (CPT=74177),  4/30/2024, 2:01 PM.  [facility], XR CHEST AP PORTABLE (CPT=71045),  4/30/2024, 7:05 PM.  [facility], XR CHEST AP PORTABLE (CPT=71045), 4/30/2024, 5:53 PM.  [facility], XR CHEST AP PORTABLE (CPT=71045), 4/30/2024, 12:30 PM.  INDICATIONS: Follow-up shortness of breath.  TECHNIQUE:   Single view.   FINDINGS:  CARDIAC/VASC: The cardiomediastinal silhouette is enlarged and unchanged in size.  There postoperative changes from prior CABG.  There is unchanged discontinuity of the superior sternotomy wire.  There is atherosclerotic calcification of the thoracic aorta. MEDIAST/SELMA:   The mediastinum and hilum are unchanged. LUNGS/PLEURA: Moderate right apicolateral pneumothorax.  The pneumothorax measures approximately 3 cm in thickness apically.  There are progressive opacities in the right perihilar region and right lower lobe.  There is prominence of the interstitial markings.  There are low lung volumes.  There is elevation of the right hemidiaphragm.  There is unchanged minimal left basilar opacity.  No left pneumothorax. BONES: Multilevel degenerative changes of the thoracic spine.  Bilateral shoulder degenerative change.  There is a left posterior 8th rib fracture OTHER: The right IJ central venous catheter is in unchanged position.         CONCLUSION:   Moderate right apicolateral pneumothorax.  Progressive opacities in the right perihilar region and right lower lobe, which are favored to reflect atelectasis.  Prominence of the pulmonary markings, which may reflect mild interstitial edema or be secondary to low lung volumes.  Posterior left 8th rib fracture.   The finding of a right pneumothorax was discussed with Audelia VAIL by Hamilton QUIGLEY at 7:26 a.m. on 05/01/2024.   Dictated by (CST): Dayday Dave MD on 5/01/2024 at 7:47 AM     Finalized by (CST): Dayday Dave MD on 5/01/2024 at 8:01 AM          XR CHEST AP PORTABLE  (CPT=71045)    Result Date:  5/1/2024  PROCEDURE: XR CHEST AP PORTABLE  (CPT=71045) TIME: 0700.   COMPARISON: [facility], XR CHEST AP PORTABLE (CPT=71045), 4/30/2024, 5:53 PM.  [facility], XR CHEST AP PORTABLE (CPT=71045), 4/30/2024, 7:05 PM.  [facility], XR CHEST AP PORTABLE (CPT=71045), 5/01/2024, 5:45 AM.  INDICATIONS: ETT and OG placement.  TECHNIQUE:   Single view.   FINDINGS:  POSITION: The patient is semi-erect and moderately rotated to the right. DEVICES: There is a right-sided central venous catheter with tip projecting near the cavoatrial junction.  An endotracheal tube is seen with tip terminating approximately 4.6 cm above the briseyda.  A nasogastric tube extends caudally beyond the field of view. CARDIAC/VASC: The cardiomediastinal silhouette is accentuated by AP technique, but likely stably enlarged. There is mild tortuosity of the thoracic aorta with peripheral atherosclerotic vascular calcification. The pulmonary vascularity is within normal limits. MEDIAST/SELMA: Surgical clips are present. LUNGS/PLEURA: A large right-sided pneumothorax is evident, perhaps intervally increased from 0549. A background of interstitial opacities is seen throughout the lungs. No airspace consolidation, pleural effusion, or BONES: Median sternotomy wires and sternal fixation plates are demonstrated. Mild degenerative changes of the thoracic spine are apparent. There are degenerative changes of the shoulders bilaterally. OTHER: Persistent pneumoperitoneum may be present. Leads overlie the chest and obscure underlying detail.           CONCLUSION:  1. Increasing large right-sided pneumothorax.  2. Postthoracotomy chest with cardiomegaly and nonspecific interstitial opacities.  3. Additional support tubes and lines as above.    Results of this examination were discussed with the patient's critical care nurse, Daryn Win, by Dr. Villasenor at 0726 on 05/01/2024.   Dictated by (CST): Gaurav Villasenor MD on 5/01/2024  at 7:23 AM     Finalized by (CST): Gaurav Villasenor MD on 5/01/2024 at 7:27 AM          XR CHEST AP PORTABLE  (CPT=71045)    Result Date: 4/30/2024  PROCEDURE: XR CHEST AP PORTABLE  (CPT=71045) TIME: 7:12 p.m..   COMPARISON: [facility], XR CHEST AP PORTABLE (CPT=71045), 4/30/2024, 5:53 PM.  [facility], XR CHEST AP PORTABLE (CPT=71045), 4/30/2024, 12:30 PM.  [facility], XR CHEST AP PORTABLE (CPT=71045), 2/23/2024, 11:15 AM.  INDICATIONS: CVC line adjustment.  TECHNIQUE:   Single view.   FINDINGS:  CARDIAC/VASC: Post coronary bypass .   Cardiomegaly.  Pulmonary vascularity normal. Atherosclerotic calcification aorta.  MEDIAST/SELMA:   No visible mass or adenopathy. LUNGS/PLEURA: Low lung volumes.  No pneumothorax.  Elevation of right hemidiaphragm is unchanged.  Bibasilar opacities again noted.  BONES: Post sternotomy. OTHER: Pneumoperitoneum again noted, as seen on recent CT examination.  The tip of the right internal jugular central venous catheter projects over the expected location of the superior cavoatrial junction.         CONCLUSION:  Right internal jugular central venous catheter tip projects over the expected superior cavoatrial junction.   Unchanged right greater than left basilar opacity.   Dictated by (CST): Ratna Larson MD on 4/30/2024 at 7:45 PM     Finalized by (CST): Ratna Larson MD on 4/30/2024 at 7:48 PM          XR CHEST AP PORTABLE  (CPT=71045)    Result Date: 4/30/2024  PROCEDURE: XR CHEST AP PORTABLE  (CPT=71045) TIME: 1800  COMPARISON: [facility], CTA CHEST+CTA ABDOMEN DISSECT SET (CPT=71275/90118), 4/30/2024, 11:51 AM.  [facility], XR CHEST AP PORTABLE (CPT=71045), 4/30/2024, 12:30 PM.  INDICATIONS: Follow-up abnormal chest x-ray.  Very central line placement.  TECHNIQUE:   Single view.   FINDINGS:  CARDIAC/VASC: Post coronary bypass .   Cardiomegaly.  Pulmonary vascularity normal. Atherosclerotic  calcification aorta.  MEDIAST/SELMA:   No visible mass or adenopathy. LUNGS/PLEURA: No pneumothorax.  Elevation of right hemidiaphragm with right basilar atelectasis. BONES: Post sternotomy. OTHER: Right jugular venous catheter with tip in the right atrium approximately 5.5 cm below the RA SVC junction.  Pneumoperitoneum is unchanged from recent CT         CONCLUSION:  1. No pneumothorax. 2. Right jugular venous catheter with tip in right atrium approximately 5.5 cm below the RA SVC junction. 3. Elevation of right hemidiaphragm with right basilar atelectasis. 4. Pneumoperitoneum unchanged from recent CT.    Dictated by (CST): Edvin Strauss MD on 4/30/2024 at 6:21 PM     Finalized by (CST): Edvin Strauss MD on 4/30/2024 at 6:25 PM          CT ABDOMEN+PELVIS(CONTRAST ONLY)(CPT=74177)    Result Date: 4/30/2024  PROCEDURE: CT ABDOMEN + PELVIS (CONTRAST ONLY) (CPT=74177)  COMPARISON: [facility], CTA CHEST+CTA ABDOMEN DISSECT SET (CPT=71275/91200), 4/30/2024, 11:51 AM.  INDICATIONS: please get full abdomen and pelvis after discuss with Prabhjot and Estephania  TECHNIQUE: CT images of the abdomen and pelvis were obtained with non-ionic intravenous contrast material.  Automated exposure control for dose reduction was used. Adjustment of the mA and/or kV was done based on the patient's size. Use of iterative reconstruction technique for dose reduction was used.  Dose information is transmitted to the ACR (American College of Radiology) NRDR (National Radiology Data Registry) which includes the Dose Index Registry.  FINDINGS:  LIVER: Atrophic with a nodular contour. GALLBLADDER: There are multiple gallstones. No gallbladder wall thickening or pericholecystic fluid. BILIARY: No intra-or extrahepatic biliary ductal dilation. SPLEEN: Unremarkable PANCREAS: The pancreas enhances symmetrically. No ductal dilation. ADRENALS: Unremarkable KIDNEYS: The kidneys enhance symmetrically. There is no hydronephrosis.  1.6 cm  cystic lesion within the uncinate process is once again seen and unchanged.  AORTA/VASCULAR:   There is atherosclerotic calcification of the abdominal aorta extending into bilateral iliac arteries. RETROPERITONEUM: There are scattered subcentimeter nonspecific retroperitoneal lymph nodes which are unchanged. BOWEL:  Small hiatal hernia with wall thickening at the gastroesophageal junction.  Small volume of pneumoperitoneum is once again seen, most prominent within the anterior aspect of the upper abdomen.  Centered along a loop of transverse colon seen best on series 2, image 76 and series 5, image 18. The small bowel loops are decompressed. The appendix is normal. There are no inflammatory changes within the right lower quadrant.  Remainder of the bowel is otherwise unremarkable without dilation or wall thickening.   MESENTERY: Large volume of abdominal ascites.  There is no mass or loculated collection.  Diffuse subcutaneous edema within the bilateral upper and lower quadrants. PELVIS: Large volume of pelvic ascites.  No pelvic lymphadenopathy. BONES:   There is degenerative disease of the thoracic and lumbar spine. Degenerative changes are seen within the sacroiliac joints and pubic symphysis. Degenerative changes are seen in the bilateral hips.  Sclerotic lesion is seen within the L5 vertebral body which is indeterminate. LUNG BASES: Thrombus within the right ventricle again seen and unchanged.  Otherwise dictated in separate study.         CONCLUSION:   Small volume of pneumoperitoneum.  The pneumoperitoneum appears to be most prominent within the upper abdomen and contiguous with the loop of proximal transverse colon.  Therefore a small perforation in the proximal transverse colon is felt to be the origin of the pneumoperitoneum.  Other  etiology such as a perforated duodenal or gastric ulcer are also within the differential but are felt to be less likely.  Large volume of abdominal and pelvic ascites.  No  obstruction.  Right ventricular thrombus again seen and unchanged.  Cholelithiasis without CT evidence of acute cholecystitis.  Multiple other incidental findings as described in the body of the report which are unchanged.    Dictated by (CST): Prem Pina MD on 4/30/2024 at 2:50 PM     Finalized by (CST): Prem Pina MD on 4/30/2024 at 2:56 PM          XR CHEST AP PORTABLE  (CPT=71045)    Result Date: 4/30/2024  PROCEDURE: XR CHEST AP PORTABLE  (CPT=71045) TIME: 1230 hours.   COMPARISON: [facility], CTA CHEST+CTA ABDOMEN DISSECT SET (CPT=71275/29877), 4/30/2024, 11:51 AM.  [facility], XR CHEST AP PORTABLE (CPT=71045), 2/23/2024, 11:15 AM.  INDICATIONS: Back pain post fall.  TECHNIQUE:   Single view.   FINDINGS:  CARDIAC/VASC: Mild cardiomegaly and minimally prominent pulmonary vascularity but no hilary pulmonary edema.  Status post sternotomy and coronary bypass.  Again noted is a fracture of the 1st are no suture. MEDIAST/SELMA:   No visible mass or adenopathy. LUNGS/PLEURA: Suboptimal inspiration.  Moderate elevation of the right hemidiaphragm.  Patchy right basal airspace disease may suggest right basilar pneumonia and or atelectasis.  Correlate clinically and follow-up studies are advised. BONES: No fracture or visible bony lesion. OTHER: Negative.          CONCLUSION:  1. Mild cardiomegaly and mildly prominent pulmonary vascularity but no hilary pulmonary edema.  Suboptimal inspiration.  Moderate elevation of the right hemidiaphragm unchanged.  Patchy right basal airspace disease may suggest right basal pneumonia and or  atelectasis.  Correlate clinically and follow-up studies are advised.    Dictated by (CST): Vern Day MD on 4/30/2024 at 12:59 PM     Finalized by (CST): Vern Day MD on 4/30/2024 at 1:03 PM          CTA CHEST+CTA ABDOMEN DISSECT SET (CPT=71275/05585)    Result Date: 4/30/2024  PROCEDURE: CT CHEST ABDOMEN DISSECT SET (CPT=71275/20526)   COMPARISON: [facility], CT CHEST PE AORTA (IV ONLY) (CPT=71260), 12/23/2021, 1:05 AM.  INDICATIONS: Back pain, hypotensive , very poor historian  TECHNIQUE:   CT images of the chest and abdomen were obtained with intravenous contrast material.  Automated exposure control for dose reduction was used. Adjustment of the mA and/or kV was done based on the patient's size. Use of iterative reconstruction technique for dose reduction was used.  Dose information is transmitted to the ACR (American College of Radiology) NRDR (National Radiology Data Registry) which includes the Dose Index Registry.  FINDINGS: Normal size aorta.  No acute aortic syndrome.  Moderately calcified abdominal aorta.  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  Mesenteric arteries are widely patent.  Severe biventricular and moderate biatrial dilation.  1.9 x 1.7 centimeter thrombus at the RV apex new from prior  Nonocclusive PE RLL lobar artery image 60. Occlusive subsegmental PE at the left apex image 31.  Possible lingular segmental PE image 65  Prior CABG with severely calcified native coronary arteries.  Stable 1.3 centimeter anterior mediastinal nodule with peripheral calcifications likely benign.  No pericardial effusion  Small volume right basilar effusion  Subsegmental atelectasis right base.  Minimal emphysema  Abdomen and pelvis:  Normal liver contour.  Enlarged caudate lobe.  Punctate gallstones  Normal spleen  1.8 x 1.5 centimeter low-density nodule or cyst along the inferior margin of the pancreatic uncinate  Normal adrenals and kidneys  Unobstructed bowel.  Large volume ascites.  Mild pneumoperitoneum.  Laparotomy scar  1 centimeter sclerotic lesion posterior aspect of L5, nonspecific.          CONCLUSION:   Severe biventricular dilation.  1.9 x 1.7 centimeter thrombus at the RV apex  Small nonocclusive PE right lower lobar artery.  Small occlusive subsegmental PE at the  apical left upper lobe  Small volume right effusion  Large volume ascites  Mild pneumoperitoneum.  This could be from bowel perforation or could be iatrogenic such as from any recent paracentesis  No acute aortic syndrome  1.8 x 1.5 centimeter low-density nodule or cyst along the inferior margin of the pancreatic uncinate.  This could be a pseudo cyst or cystic pancreatic lesion  Discussed with Dr. Anne     Dictated by (CST): Bradley Rick MD on 4/30/2024 at 12:14 PM     Finalized by (CST): Bradley Rick MD on 4/30/2024 at 12:37 PM            Assessment/Plan:  Patient Active Problem List   Diagnosis    Left inguinal hernia    Coronary artery disease without angina pectoris, unspecified vessel or lesion type, unspecified whether native or transplanted heart    Acute congestive heart failure (HCC)    Acute congestive heart failure, unspecified heart failure type (HCC)    Syncope and collapse    JU (acute kidney injury) (HCC)    Dizziness    Parasomnia    RLS (restless legs syndrome)    Episodic mood disorder (HCC)    Anemia, unspecified type    Rectal bleeding    Acute on chronic congestive heart failure, unspecified heart failure type (HCC)    Iron deficiency anemia due to chronic blood loss    Scrotal edema    Goals of care, counseling/discussion    Palliative care encounter    Hypotension    Hypotension, unspecified hypotension type    Pneumoperitoneum    Bilateral pulmonary embolism (HCC)    RV (right ventricular) mural thrombus   Creat 2.04  TB 5.8 19.9 wbc   hgb 13.2 plate 110     Appears slight Improvement in abd signs  Doubt free air  Would consider repeat CT if  stable    no IV contrast   Contrast via NG  possibility but doubt will progress  cpm    ZHEN ALVAREZ MD  5/3/2024  5:11 PM

## (undated) NOTE — LETTER
[place] [address]  Authorization for Invasive Procedures  Date: 4/30/24           Time: 1716    I hereby authorize Dr. Mejia, my physician and his/her assistants (if applicable), which may include medical students, residents, and/or fellows, to perform the following surgical operation/ procedure and administer such anesthesia as may be determined necessary by my physician: Central Line Insertion on Cristhian V Pacioni  2.   I recognize that during the surgical operation/procedure, unforeseen conditions may necessitate additional or different procedures than those listed above.  I, therefore, further authorize and request that the above-named surgeon, assistants, or designees perform such procedures as are, in their judgment, necessary and desirable.    3.   My surgeon/physician has discussed prior to my surgery the potential benefits, risks and side effects of this procedure; the likelihood of achieving goals; and potential problems that might occur during recuperation.  They also discussed reasonable alternatives to the procedure, including risks, benefits, and side effects related to the alternatives and risks related to not receiving this procedure.  I have had all my questions answered and I acknowledge that no guarantee has been made as to the result that may be obtained.    4.   Should the need arise during my operation/procedure, which includes change of level of care prior to discharge, I also consent to the administration of blood and/or blood products.  Further, I understand that despite careful testing and screening of blood or blood products by collecting agencies, I may still be subject to ill effects as a result of receiving a blood transfusion and/or blood products.  The following are some, but not all, of the potential risks that can occur: fever and allergic reactions, hemolytic reactions, transmission of diseases such as Hepatitis, AIDS and Cytomegalovirus (CMV) and  fluid overload.  In the event that I wish to have an autologous transfusion of my own blood, or a directed donor transfusion, I will discuss this with my physician.   Check only if Refusing Blood or Blood Products  I understand refusal of blood or blood products as deemed necessary by my physician may have serious consequences to my condition to include possible death. I hereby assume responsibility for my refusal and release the hospital, its personnel, and my physicians from any responsibility for the consequences of my refusal.         o  Refuse         5.   I authorize the use of any specimen, organs, tissues, body parts or foreign objects that may be removed from my body during the operation/procedure for diagnosis, research or teaching purposes and their subsequent disposal by hospital authorities.  I also authorize the release of specimen test results and/or written reports to my treating physician on the hospital medical staff or other referring or consulting physicians involved in my care, at the discretion of the Pathologist or my treating physician.    6.   I consent to the photographing or videotaping of the operations or procedures to be performed, including appropriate portions of my body for medical, scientific, or educational purposes, provided my identity is not revealed by the pictures or by descriptive texts accompanying them.  If the procedure has been photographed/videotaped, the surgeon will obtain the original picture, image, videotape or CD.  The hospital will not be responsible for storage, release or maintenance of the picture, image, tape or CD.    7.   I consent to the presence of a  or observers in the operating room as deemed necessary by my physician or their designees.    8.   I recognize that in the event my procedure results in extended X-Ray/fluoroscopy time, I may develop a skin reaction.    9. If I have a Do Not Attempt Resuscitation (DNAR) order in place, that  status will be suspended while in the operating room, procedural suite, and during the recovery period unless otherwise explicitly stated by me (or a person authorized to consent on my behalf). The surgeon or my attending physician will determine when the applicable recovery period ends for purposes of reinstating the DNAR order.  10. Patients having a sterilization procedure: I understand that if the procedure is successful the results will be permanent and it will therefore be impossible for me to inseminate, conceive, or bear children.  I also understand that the procedure is intended to result in sterility, although the result has not been guaranteed.   11. I acknowledge that my physician has explained sedation/analgesia administration to me including the risk and benefits I consent to the administration of sedation/analgesia as may be necessary or desirable in the judgment of my physician.    I CERTIFY THAT I HAVE READ AND FULLY UNDERSTAND THE ABOVE CONSENT TO OPERATION and/or OTHER PROCEDURE.        ____________________________________       _________________________________      ______________________________  Signature of Patient         Signature of Responsible Person        Printed Name of Responsible Person        ____________________________________      _________________________________      ______________________________       Signature of Witness          Relationship to Patient                       Date                                       Time  Patient Name: Cristhian Lopez  : 9/3/1955    Reviewed: 2024   Printed: 2024  Medical Record #: [id number] Page 1 of 2             STATEMENT OF PHYSICIAN My signature below affirms that prior to the time of the procedure; I have explained to the patient and/or his/her legal representative, the risks and benefits involved in the proposed treatment and any reasonable alternative to the proposed treatment. I have also explained the risks  and benefits involved in refusal of the proposed treatment and alternatives to the proposed treatment and have answered the patient's questions. If I have a significant financial interest in a co-management agreement or a significant financial interest in any product or implant, or other significant relationship used in this procedure/surgery, I have disclosed this and had a discussion with my patient.     _______________________________________________________________ _____________________________  (Signature of Physician)                                                                                         (Date)                                   (Time)  Patient Name: Cristhian Maldonadogerber  : 9/3/1955    Reviewed: 2024   Printed: 2024  Medical Record #: [id number] Page 2 of 2

## (undated) NOTE — LETTER
[facility]  Administration of Anesthesia  Cristhian MENDES agree to be cared for by a physician anesthesiologist alone and/or with a nurse anesthetist, who is specially trained to monitor me and give me medicine to put me to sleep or keep me comfortable during my procedure    I understand that my anesthesiologist and/or anesthetist is not an employee or agent of [facility] or [employer]. He or she works for [facility]    As the patient asking for anesthesia services, I agree to:  Allow the anesthesiologist (anesthesia doctor) to give me medicine and do additional procedures as necessary. Some examples are: Starting or using an “IV” to give me medicine, fluids or blood during my procedure, and having a breathing tube placed to help me breathe when I’m asleep (intubation). In the event that my heart stops working properly, I understand that my anesthesiologist will make every effort to sustain my life, unless otherwise directed by [facility] Do Not Resuscitate documents.  Tell my anesthesia doctor before my procedure:  If I am pregnant.  The last time that I ate or drank.  iii. All of the medicines I take (including prescriptions, herbal supplements, and pills I can buy without a prescription (including street drugs/illegal medications). Failure to inform my anesthesiologist about these medicines may increase my risk of anesthetic complications.  iv.If I am allergic to anything or have had a reaction to anesthesia before.  I understand how the anesthesia medicine will help me (benefits).  I understand that with any type of anesthesia medicine there are risks:  The most common risks are: nausea, vomiting, sore throat, muscle soreness, damage to my eyes, mouth, or teeth (from breathing tube placement).  Rare risks include: remembering what happened during my procedure, allergic reactions to medications, injury to my airway, heart, lungs, vision, nerves, or  muscles and in extremely rare instances death.  My doctor has explained to me other choices available to me for my care (alternatives).  Pregnant Patients (“epidural”):  I understand that the risks of having an epidural (medicine given into my back to help control pain during labor), include itching, low blood pressure, difficulty urinating, headache or slowing of the baby’s heart. Very rare risks include infection, bleeding, seizure, irregular heart rhythms and nerve injury.  Regional Anesthesia (“spinal”, “epidural”, & “nerve blocks”):  I understand that rare but potential complications include headache, bleeding, infection, seizure, irregular heart rhythms, and nerve injury.    _____________________________________________________________________________  Patient (or Representative) Signature/Relationship to Patient  Date   Time    _____________________________________________________________________________   Name (if used)    Language/Organization   Time    _____________________________________________________________________________  Nurse Anesthetist Signature     Date   Time  _____________________________________________________________________________  Anesthesiologist Signature     Date   Time  I have discussed the procedure and information above with the patient (or patient’s representative) and answered their questions. The patient or their representative has agreed to have anesthesia services.    _____________________________________________________________________________  Witness        Date   Time  I have verified that the signature is that of the patient or patient’s representative, and that it was signed before the procedure  Patient Name: Cristhian Lopez     : 9/3/1955                 Printed: 2024 at 3:44 PM    Medical Record #: [id number]                                            Page 1 of 1  ----------ANESTHESIA CONSENT----------

## (undated) NOTE — LETTER
[facility] [address], [place], [place]  Authorization for Surgical Operation and Procedure                                                                                           I hereby authorize Minna Cerda MD, my physician and his/her assistants (if applicable), which may include medical students, residents, and/or fellows, to perform the following surgical operation/ procedure and administer such anesthesia as may be determined necessary by my physician: Operation/Procedure name (s) FLEXIBLE SIGMOIDOSCOPY on Cristhian V Pacioni   2.   I recognize that during the surgical operation/procedure, unforeseen conditions may necessitate additional or different procedures than those listed above.  I, therefore, further authorize and request that the above-named surgeon, assistants, or designees perform such procedures as are, in their judgment, necessary and desirable.    3.   My surgeon/physician has discussed prior to my surgery the potential benefits, risks and side effects of this procedure; the likelihood of achieving goals; and potential problems that might occur during recuperation.  They also discussed reasonable alternatives to the procedure, including risks, benefits, and side effects related to the alternatives and risks related to not receiving this procedure.  I have had all my questions answered and I acknowledge that no guarantee has been made as to the result that may be obtained.    4.   Should the need arise during my operation/procedure, which includes change of level of care prior to discharge, I also consent to the administration of blood and/or blood products.  Further, I understand that despite careful testing and screening of blood or blood products by collecting agencies, I may still be subject to ill effects as a result of receiving a blood transfusion and/or blood products.  The following are some, but not all, of the potential risks that can occur: fever and  allergic reactions, hemolytic reactions, transmission of diseases such as Hepatitis, AIDS and Cytomegalovirus (CMV) and fluid overload.  In the event that I wish to have an autologous transfusion of my own blood, or a directed donor transfusion, I will discuss this with my physician.  Check only if Refusing Blood or Blood Products  I understand refusal of blood or blood products as deemed necessary by my physician may have serious consequences to my condition to include possible death. I hereby assume responsibility for my refusal and release the hospital, its personnel, and my physicians from any responsibility for the consequences of my refusal.    o  Refuse   5.   I authorize the use of any specimen, organs, tissues, body parts or foreign objects that may be removed from my body during the operation/procedure for diagnosis, research or teaching purposes and their subsequent disposal by hospital authorities.  I also authorize the release of specimen test results and/or written reports to my treating physician on the hospital medical staff or other referring or consulting physicians involved in my care, at the discretion of the Pathologist or my treating physician.    6.   I consent to the photographing or videotaping of the operations or procedures to be performed, including appropriate portions of my body for medical, scientific, or educational purposes, provided my identity is not revealed by the pictures or by descriptive texts accompanying them.  If the procedure has been photographed/videotaped, the surgeon will obtain the original picture, image, videotape or CD.  The hospital will not be responsible for storage, release or maintenance of the picture, image, tape or CD.    7.   I consent to the presence of a  or observers in the operating room as deemed necessary by my physician or their designees.    8.   I recognize that in the event my procedure results in extended X-Ray/fluoroscopy  time, I may develop a skin reaction.    9. If I have a Do Not Attempt Resuscitation (DNAR) order in place, that status will be suspended while in the operating room, procedural suite, and during the recovery period unless otherwise explicitly stated by me (or a person authorized to consent on my behalf). The surgeon or my attending physician will determine when the applicable recovery period ends for purposes of reinstating the DNAR order.  10. Patients having a sterilization procedure: I understand that if the procedure is successful the results will be permanent and it will therefore be impossible for me to inseminate, conceive, or bear children.  I also understand that the procedure is intended to result in sterility, although the result has not been guaranteed.   11. I acknowledge that my physician has explained sedation/analgesia administration to me including the risk and benefits I consent to the administration of sedation/analgesia as may be necessary or desirable in the judgment of my physician.    I CERTIFY THAT I HAVE READ AND FULLY UNDERSTAND THE ABOVE CONSENT TO OPERATION and/or OTHER PROCEDURE.     _________________________________________ _________________________________     ___________________________________  Signature of Patient     Signature of Responsible Person                   Printed Name of Responsible Person                              _________________________________________ ______________________________        ___________________________________  Signature of Witness         Date  Time         Relationship to Patient    STATEMENT OF PHYSICIAN My signature below affirms that prior to the time of the procedure; I have explained to the patient and/or his/her legal representative, the risks and benefits involved in the proposed treatment and any reasonable alternative to the proposed treatment. I have also explained the risks and benefits involved in refusal of the proposed treatment and  alternatives to the proposed treatment and have answered the patient's questions. If I have a significant financial interest in a co-management agreement or a significant financial interest in any product or implant, or other significant relationship used in this procedure/surgery, I have disclosed this and had a discussion with my patient.     _______________________________________________________________ _____________________________  (Signature of Physician)                                                                                         (Date)                                   (Time)  Patient Name: Cristhian Maldonadogerber    : 9/3/1955   Printed: 2024      Medical Record #: [id number]                                              Page 1 of 1

## (undated) NOTE — LETTER
[facility]  part of [facility]     PICC INSERTION CONSENT     I agree to have a Peripherally Inserted Central Catheter (PICC) placed in my arm.   1. The PICC insertion procedure, care, maintenance, risks, benefits, and complications have been explained to me by my physician, ________________________, and I understand them.   2. I understand that this may not be the only way I can receive my medication. I understand that my physician has determined that the PICC would be the safest and most effective means of administering my medication at this time. If there are other options of giving medication into my veins those options have been explained to me by my physician and I have chosen this one.   3. I realize a nurse who has been specially trained and certified by the hospital and ’s representative to insert a PICC will perform this procedure. My catheter will be inserted by _____________________________.   4. I have been informed by my doctor of the nature and purpose of this procedure and the risks involved and the possibility of complications. I realize that this is an invasive procedure and has certain risks such as air embolism (air entering the catheter or my vein), arterial puncture (a tearing of one of my arteries), infection, irregular heartbeat and venous thrombosis (a blood clot in a vein) nerve injury and fracture of the catheter with or without migration.   5. In order to numb the area where the line will be placed, a small amount of anesthetic medication will be injected as ordered by my physician.   6. I understand that while the catheter will be placed in my upper arm the end of the catheter will come to rest in an area near my heart.     7. The person performing this procedure has discussed the potential benefits, risks, and side effects of the PICC; the likelihood of achieving goals; and potential problems that might occur during recuperation. They also discussed  reasonable alternatives to the PICC, including risks, benefits, and side effects related to the alternatives and risks related to not receiving this procedure.    8.  I have expressed any questions about this procedure to my physician or the PICC Proceduralist and he/she has answered them.  I certify that I have read and understand this consent to the insertion of a PICC.      _________________________________________________________   Date     Time     Patient/Guardian Signature       ____________________________________   Printed name of Patient/Guardian          ________________________________________________________________    Date        Time                   Witnessing RN Signature      Patient Name: Cristhian Lopez     : 9/3/1955                 Printed: May 3, 2024     Medical Record #: [id number]

## (undated) NOTE — LETTER
Hospital Discharge Documentation  Please phone to schedule a hospital follow up appointment.     From: [address] [facility]  Phone: [phone]    Patient discharged time/date: 12/26/2021  6:21 PM  Patient discharge disposition:  Home or [facility]   -2D echo EF 35-40%, moderate to severe MR, possible LV apical mural thrombus--> Eliquis for 3 months   -Cardiology has been consulted.   -Optimizing heart failure meds     Coronary artery disease, h/o CABG 2019, no active ischemia  Ischemic car evidence of left or right lower extremity DVT.    Dictated by (CST): Alvin Marin MD on 12/24/2021 at 7:40 AM     Finalized by (CST): Alvin Marin MD on 12/24/2021 at 7:40 AM                     Yesi Aldrich MD  12/25/2021            Electronically s

## (undated) NOTE — LETTER
Hospital Discharge Documentation  Please phone to schedule a hospital follow up appointment. From: [address] [facility]  Phone: [phone]    Patient discharged time/date: 2023  6:25 PM  Patient discharge disposition:  Home or Self Care       Discharge Summary - D/C Summary        Discharge Summary signed by Monica Rowan MD at 2023  4:17 PM  Version 1 of 1      Author: Monica Rowan MD Service: Hospitalist Author Type: Physician    Filed: 2023  4:17 PM Date of Service: 2023  4:16 PM Status: Signed    : Monica Rowan MD (Physician)           [id number] Patient Status:  Inpatient    9/3/1955 MRN [id number]   [facility] [facility] 3W/SW Attending No att. providers found   Hosp Day # 2 PCP Ayanna Ruiz MD     DATE OF ADMISSION: 5/3/2023  DATE OF DISCHARGE: 2023   DISPOSITION: home  CONDITION ON DISCHARGE: good    DISCHARGE DIAGNOSES:  Syncope  Urinary incontinence  Acute kidney injury  Coronary artery disease  Type 2 diabetes  Hypertension  History of ischemic cardiomyopathy  Restless leg syndrome  Gout    HISTORY OF PRESENT ILLNESS (COPIED FROM ADMISSION H&P)  Patient is a 59-year-old  male who came into the emergency department for evaluation of presumed syncopal episode that happened last night. Also, he is complaining about persistent dizziness. Upon arrival to the emergency room today, he was found to have systolic blood pressure of 92 to 112. He was given IV fluids with improvement in his blood pressure. Patient is in sinus rhythm, not tachycardic. GFR was found to be 45, which is below his baseline. CBC showed hemoglobin of 9.2, which is again possibly _______. CT scan of the brain was unremarkable. HOSPITAL COURSE:  Patient was admitted, seen by cardiology. The nature of his \"syncopal episodes\" were not clear.   He does have some evidence of short runs of ventricular tachycardia but this is unlikely to be the cause of his presenting complaints. Seen by neurology, EEG was negative, brain imaging negative. Patient has refused ICD in the past despite his very low ejection fraction. It is still possible he is having runs of ventricular arrhythmia causing his syncopal episodes. Patient declined having further work-up. He was seen by electrophysiology and cleared to go home and follow-up with his regular primary care physician and cardiology team.    Patient understands return to the emergency room for increased pain, fever, discharge, shortness of breath, chest pain, new neurologic symptoms, other concerning symptoms. PHYSICAL EXAM:     Gen: A+Ox3. No distress. HEENT: NCAT, neck supple, no carotid bruit. CV: RRR, S1S2, and intact distal pulses. No gallop, rub, murmur. Pulm: Effort and breath sounds normal. No distress, wheezes, rales, rhonchi. Abd: Soft, NTND, BS normal, no mass, no HSM, no rebound/guarding. Neuro: Normal reflexes, CN. Sensory/motor exams grossly normal deficit. Coordination  and gait normal.   MS: No joint effusions. No peripheral edema. Skin: Skin is warm and dry. No rashes, erythema, diaphoresis. Psych: Normal mood and affect. Behavior and judgment normal.     DISCHARGE MEDICATIONS     Discharge Medications        START taking these medications        Instructions Prescription details   aspirin 81 MG Chew  Replaces: aspirin 325 MG Tabs      Chew 1 tablet (81 mg total) by mouth daily. Refills: 0     cyanocobalamin 100 MCG Tabs      Take 1 tablet (100 mcg total) by mouth daily. Quantity: 90 tablet  Refills: 3     gabapentin 100 MG Caps  Commonly known as: Neurontin      Take 1 capsule (100 mg total) by mouth 3 (three) times daily. Quantity: 90 capsule  Refills: 1     QUEtiapine 50 MG Tabs  Commonly known as: SEROquel      Take 1 tablet (50 mg total) by mouth nightly.    Quantity: 30 tablet  Refills: 1            CHANGE how you take these medications        Instructions Prescription details   apixaban 5 MG Tabs  Commonly known as: Eliquis  What changed:   how much to take  additional instructions      Take 1 tablet (5 mg total) by mouth 2 (two) times daily. Refills: 0     PARoxetine HCl 40 MG Tabs  Commonly known as: PAXIL  What changed: Another medication with the same name was added. Make sure you understand how and when to take each. Refills: 0     PARoxetine 20 MG Tabs  Commonly known as: Paxil  What changed: You were already taking a medication with the same name, and this prescription was added. Make sure you understand how and when to take each. Take 1 tablet (20 mg total) by mouth daily. Quantity: 30 tablet  Refills: 1            CONTINUE taking these medications        Instructions Prescription details   allopurinol 300 MG Tabs  Commonly known as: Zyloprim      Take 1 tablet (300 mg total) by mouth daily. Refills: 0     atorvastatin 80 MG Tabs  Commonly known as: Lipitor      Take 1 tablet (80 mg total) by mouth daily. Refills: 0     bumetanide 2 MG Tabs  Commonly known as: Bumex      Take 1 tablet (2 mg total) by mouth daily. Refills: 0     carvedilol 12.5 MG Tabs  Commonly known as: Coreg      Take 1 tablet (12.5 mg total) by mouth 2 (two) times daily with meals. Quantity: 60 tablet  Refills: 3     Entresto 24-26 MG Tabs  Generic drug: sacubitril-valsartan      Take 1 tablet by mouth 2 (two) times daily. Refills: 0     metFORMIN HCl 1000 MG Tabs  Commonly known as: GLUCOPHAGE      Take 1 tablet (1,000 mg total) by mouth in the morning and 1 tablet (1,000 mg total) before bedtime. Refills: 0     rOPINIRole 1 MG Tabs  Commonly known as: Requip      Take 1 tablet (1 mg total) by mouth daily. Refills: 0     spironolactone 25 MG Tabs  Commonly known as: Aldactone      Take 1 tablet (25 mg total) by mouth daily.    Refills: 0     Zetia 10 MG Tabs  Generic drug: ezetimibe       Refills: 0            STOP taking these medications      aspirin 325 MG Tabs  Replaced by: aspirin 81 MG Chew                  Where to Get Your Medications        These medications were sent to [facility], [address] [facility], [address] [address]      Phone: [phone]   cyanocobalamin 100 MCG Tabs  gabapentin 100 MG Caps  PARoxetine 20 MG Tabs  QUEtiapine 50 MG Tabs         CONSULTANTS  Consultants         Provider   Role Specialty     Santana Lundberg MD  Consulting Physician Cardiac Electrophysiology     Carlos Villalobos DO  Consulting Physician NEUROLOGY     Elena Espana MD  Consulting Physician Psychiatry            FOLLOW UP:  Mark Love  [address] [place] [address] Road  [place] [address] [place] [address] Drive  [phone]  Go on 5/17/2023  at 9:30am    Carlos Villalobos DO  [address] [facility]  [phone]    Follow up in 3 month(s)  follow up for insomnia and restless leg. please complete the sleep study. Tara Dinh MD  [facility] [id number]    Follow up in 1 week(s)      The above plan and follow-up instructions were reviewed with the patient and they verbalized understanding and agreement. They understand to return to the emergency room for any concerning signs or symptoms. Greater than 30 minutes spent on discharge.  -----------------------    Hospital Discharge Diagnoses: Syncope    Lace+ Score: 59  59-90 High Risk  29-58 Medium Risk  0-28   Low Risk. TCM Follow-Up Recommendation:  LACE > 58:  High Risk of readmission after discharge from the hospital.    Electronically signed by Stephanie Berrios MD on 5/6/2023  4:17 PM